# Patient Record
Sex: MALE | Race: WHITE | Employment: OTHER | URBAN - METROPOLITAN AREA
[De-identification: names, ages, dates, MRNs, and addresses within clinical notes are randomized per-mention and may not be internally consistent; named-entity substitution may affect disease eponyms.]

---

## 2018-04-12 ENCOUNTER — NURSE ONLY (OUTPATIENT)
Dept: NON INVASIVE DIAGNOSTICS | Age: 73
End: 2018-04-12
Payer: MEDICARE

## 2018-04-12 ENCOUNTER — TELEPHONE (OUTPATIENT)
Dept: NON INVASIVE DIAGNOSTICS | Age: 73
End: 2018-04-12

## 2018-04-12 ENCOUNTER — HOSPITAL ENCOUNTER (OUTPATIENT)
Dept: CARDIOLOGY | Age: 73
Discharge: HOME | End: 2018-04-12
Payer: MEDICARE

## 2018-04-12 ENCOUNTER — OFFICE VISIT (OUTPATIENT)
Dept: CARDIOLOGY CLINIC | Age: 73
End: 2018-04-12
Payer: MEDICARE

## 2018-04-12 VITALS
HEART RATE: 81 BPM | WEIGHT: 192.7 LBS | HEIGHT: 70 IN | BODY MASS INDEX: 27.59 KG/M2 | DIASTOLIC BLOOD PRESSURE: 66 MMHG | RESPIRATION RATE: 16 BRPM | SYSTOLIC BLOOD PRESSURE: 120 MMHG

## 2018-04-12 DIAGNOSIS — I25.5 ISCHEMIC CARDIOMYOPATHY: Primary | ICD-10-CM

## 2018-04-12 DIAGNOSIS — Z95.810 AICD (AUTOMATIC CARDIOVERTER/DEFIBRILLATOR) PRESENT: ICD-10-CM

## 2018-04-12 DIAGNOSIS — I50.20 SYSTOLIC CONGESTIVE HEART FAILURE, UNSPECIFIED CONGESTIVE HEART FAILURE CHRONICITY: Primary | ICD-10-CM

## 2018-04-12 DIAGNOSIS — Z95.810 AICD (AUTOMATIC CARDIOVERTER/DEFIBRILLATOR) PRESENT: Primary | ICD-10-CM

## 2018-04-12 DIAGNOSIS — I25.5 ISCHEMIC CARDIOMYOPATHY: ICD-10-CM

## 2018-04-12 LAB
LV EF: 30 %
LVEF MODALITY: NORMAL

## 2018-04-12 PROCEDURE — 1036F TOBACCO NON-USER: CPT | Performed by: INTERNAL MEDICINE

## 2018-04-12 PROCEDURE — 4040F PNEUMOC VAC/ADMIN/RCVD: CPT | Performed by: INTERNAL MEDICINE

## 2018-04-12 PROCEDURE — 3017F COLORECTAL CA SCREEN DOC REV: CPT | Performed by: INTERNAL MEDICINE

## 2018-04-12 PROCEDURE — 93283 PRGRMG EVAL IMPLANTABLE DFB: CPT | Performed by: INTERNAL MEDICINE

## 2018-04-12 PROCEDURE — 93306 TTE W/DOPPLER COMPLETE: CPT

## 2018-04-12 PROCEDURE — G8598 ASA/ANTIPLAT THER USED: HCPCS | Performed by: INTERNAL MEDICINE

## 2018-04-12 PROCEDURE — 93000 ELECTROCARDIOGRAM COMPLETE: CPT | Performed by: INTERNAL MEDICINE

## 2018-04-12 PROCEDURE — G8419 CALC BMI OUT NRM PARAM NOF/U: HCPCS | Performed by: INTERNAL MEDICINE

## 2018-04-12 PROCEDURE — 1123F ACP DISCUSS/DSCN MKR DOCD: CPT | Performed by: INTERNAL MEDICINE

## 2018-04-12 PROCEDURE — G8427 DOCREV CUR MEDS BY ELIG CLIN: HCPCS | Performed by: INTERNAL MEDICINE

## 2018-04-12 PROCEDURE — 99214 OFFICE O/P EST MOD 30 MIN: CPT | Performed by: INTERNAL MEDICINE

## 2018-04-19 ENCOUNTER — ANESTHESIA (OUTPATIENT)
Dept: CARDIAC CATH/INVASIVE PROCEDURES | Age: 73
End: 2018-04-19

## 2018-04-19 ENCOUNTER — HOSPITAL ENCOUNTER (OUTPATIENT)
Dept: GENERAL RADIOLOGY | Age: 73
Discharge: HOME | End: 2018-04-21
Payer: MEDICARE

## 2018-04-19 ENCOUNTER — HOSPITAL ENCOUNTER (OUTPATIENT)
Dept: CARDIAC CATH/INVASIVE PROCEDURES | Age: 73
Discharge: HOME | End: 2018-04-19
Payer: MEDICARE

## 2018-04-19 ENCOUNTER — ANESTHESIA EVENT (OUTPATIENT)
Dept: CARDIAC CATH/INVASIVE PROCEDURES | Age: 73
End: 2018-04-19

## 2018-04-19 VITALS
BODY MASS INDEX: 27.49 KG/M2 | DIASTOLIC BLOOD PRESSURE: 80 MMHG | WEIGHT: 192 LBS | RESPIRATION RATE: 16 BRPM | SYSTOLIC BLOOD PRESSURE: 175 MMHG | HEART RATE: 71 BPM | TEMPERATURE: 98.2 F | HEIGHT: 70 IN | OXYGEN SATURATION: 97 %

## 2018-04-19 VITALS — OXYGEN SATURATION: 97 % | DIASTOLIC BLOOD PRESSURE: 84 MMHG | SYSTOLIC BLOOD PRESSURE: 168 MMHG

## 2018-04-19 LAB
ANION GAP SERPL CALCULATED.3IONS-SCNC: 18 MMOL/L (ref 7–16)
BASOPHILS ABSOLUTE: 0.02 E9/L (ref 0–0.2)
BASOPHILS RELATIVE PERCENT: 0.6 % (ref 0–2)
BUN BLDV-MCNC: 18 MG/DL (ref 8–23)
CALCIUM SERPL-MCNC: 9.8 MG/DL (ref 8.6–10.2)
CHLORIDE BLD-SCNC: 102 MMOL/L (ref 98–107)
CO2: 23 MMOL/L (ref 22–29)
CREAT SERPL-MCNC: 0.9 MG/DL (ref 0.7–1.2)
EKG ATRIAL RATE: 66 BPM
EKG P-R INTERVAL: 180 MS
EKG Q-T INTERVAL: 426 MS
EKG QRS DURATION: 98 MS
EKG QTC CALCULATION (BAZETT): 446 MS
EKG R AXIS: 6 DEGREES
EKG T AXIS: 100 DEGREES
EKG VENTRICULAR RATE: 66 BPM
EOSINOPHILS ABSOLUTE: 0.05 E9/L (ref 0.05–0.5)
EOSINOPHILS RELATIVE PERCENT: 1.5 % (ref 0–6)
GFR AFRICAN AMERICAN: >60
GFR NON-AFRICAN AMERICAN: >60 ML/MIN/1.73
GLUCOSE BLD-MCNC: 157 MG/DL (ref 74–109)
HCT VFR BLD CALC: 33.7 % (ref 37–54)
HEMOGLOBIN: 11.2 G/DL (ref 12.5–16.5)
IMMATURE GRANULOCYTES #: 0.03 E9/L
IMMATURE GRANULOCYTES %: 0.9 % (ref 0–5)
LV EF: 35 %
LVEF MODALITY: NORMAL
LYMPHOCYTES ABSOLUTE: 0.9 E9/L (ref 1.5–4)
LYMPHOCYTES RELATIVE PERCENT: 26.2 % (ref 20–42)
MAGNESIUM: 2.1 MG/DL (ref 1.6–2.6)
MCH RBC QN AUTO: 28.8 PG (ref 26–35)
MCHC RBC AUTO-ENTMCNC: 33.2 % (ref 32–34.5)
MCV RBC AUTO: 86.6 FL (ref 80–99.9)
MONOCYTES ABSOLUTE: 0.23 E9/L (ref 0.1–0.95)
MONOCYTES RELATIVE PERCENT: 6.7 % (ref 2–12)
NEUTROPHILS ABSOLUTE: 2.2 E9/L (ref 1.8–7.3)
NEUTROPHILS RELATIVE PERCENT: 64.1 % (ref 43–80)
PDW BLD-RTO: 15 FL (ref 11.5–15)
PLATELET # BLD: 119 E9/L (ref 130–450)
PMV BLD AUTO: 12.5 FL (ref 7–12)
POTASSIUM SERPL-SCNC: 3.9 MMOL/L (ref 3.5–5)
RBC # BLD: 3.89 E12/L (ref 3.8–5.8)
SODIUM BLD-SCNC: 143 MMOL/L (ref 132–146)
WBC # BLD: 3.4 E9/L (ref 4.5–11.5)

## 2018-04-19 PROCEDURE — 36415 COLL VENOUS BLD VENIPUNCTURE: CPT

## 2018-04-19 PROCEDURE — C1721 AICD, DUAL CHAMBER: HCPCS

## 2018-04-19 PROCEDURE — 2720000010 HC SURG SUPPLY STERILE

## 2018-04-19 PROCEDURE — 3700000001 HC ADD 15 MINUTES (ANESTHESIA)

## 2018-04-19 PROCEDURE — 71045 X-RAY EXAM CHEST 1 VIEW: CPT

## 2018-04-19 PROCEDURE — 2580000003 HC RX 258: Performed by: NURSE ANESTHETIST, CERTIFIED REGISTERED

## 2018-04-19 PROCEDURE — 93010 ELECTROCARDIOGRAM REPORT: CPT | Performed by: INTERNAL MEDICINE

## 2018-04-19 PROCEDURE — 6360000002 HC RX W HCPCS: Performed by: NURSE ANESTHETIST, CERTIFIED REGISTERED

## 2018-04-19 PROCEDURE — 3700000000 HC ANESTHESIA ATTENDED CARE

## 2018-04-19 PROCEDURE — 33263 RMVL & RPLCMT DFB GEN 2 LEAD: CPT | Performed by: INTERNAL MEDICINE

## 2018-04-19 PROCEDURE — 80048 BASIC METABOLIC PNL TOTAL CA: CPT

## 2018-04-19 PROCEDURE — 2580000003 HC RX 258: Performed by: INTERNAL MEDICINE

## 2018-04-19 PROCEDURE — 2500000003 HC RX 250 WO HCPCS

## 2018-04-19 PROCEDURE — 93005 ELECTROCARDIOGRAM TRACING: CPT | Performed by: INTERNAL MEDICINE

## 2018-04-19 PROCEDURE — 2580000003 HC RX 258

## 2018-04-19 PROCEDURE — 6360000002 HC RX W HCPCS: Performed by: INTERNAL MEDICINE

## 2018-04-19 PROCEDURE — 6360000002 HC RX W HCPCS

## 2018-04-19 PROCEDURE — C1781 MESH (IMPLANTABLE): HCPCS

## 2018-04-19 PROCEDURE — 85025 COMPLETE CBC W/AUTO DIFF WBC: CPT

## 2018-04-19 PROCEDURE — 83735 ASSAY OF MAGNESIUM: CPT

## 2018-04-19 RX ORDER — PROPOFOL 10 MG/ML
INJECTION, EMULSION INTRAVENOUS PRN
Status: DISCONTINUED | OUTPATIENT
Start: 2018-04-19 | End: 2018-04-19 | Stop reason: SDUPTHER

## 2018-04-19 RX ORDER — SODIUM CHLORIDE 9 MG/ML
INJECTION, SOLUTION INTRAVENOUS CONTINUOUS PRN
Status: DISCONTINUED | OUTPATIENT
Start: 2018-04-19 | End: 2018-04-19 | Stop reason: SDUPTHER

## 2018-04-19 RX ORDER — FENTANYL CITRATE 50 UG/ML
INJECTION, SOLUTION INTRAMUSCULAR; INTRAVENOUS PRN
Status: DISCONTINUED | OUTPATIENT
Start: 2018-04-19 | End: 2018-04-19 | Stop reason: SDUPTHER

## 2018-04-19 RX ORDER — SODIUM CHLORIDE 9 MG/ML
INJECTION, SOLUTION INTRAVENOUS ONCE
Status: COMPLETED | OUTPATIENT
Start: 2018-04-19 | End: 2018-04-19

## 2018-04-19 RX ORDER — MIDAZOLAM HYDROCHLORIDE 1 MG/ML
INJECTION INTRAMUSCULAR; INTRAVENOUS PRN
Status: DISCONTINUED | OUTPATIENT
Start: 2018-04-19 | End: 2018-04-19 | Stop reason: SDUPTHER

## 2018-04-19 RX ORDER — SODIUM CHLORIDE 0.9 % (FLUSH) 0.9 %
10 SYRINGE (ML) INJECTION PRN
Status: DISCONTINUED | OUTPATIENT
Start: 2018-04-19 | End: 2018-04-20 | Stop reason: HOSPADM

## 2018-04-19 RX ORDER — ONDANSETRON 2 MG/ML
4 INJECTION INTRAMUSCULAR; INTRAVENOUS EVERY 6 HOURS PRN
Status: DISCONTINUED | OUTPATIENT
Start: 2018-04-19 | End: 2018-04-20 | Stop reason: HOSPADM

## 2018-04-19 RX ORDER — SODIUM CHLORIDE 0.9 % (FLUSH) 0.9 %
10 SYRINGE (ML) INJECTION EVERY 12 HOURS SCHEDULED
Status: DISCONTINUED | OUTPATIENT
Start: 2018-04-19 | End: 2018-04-20 | Stop reason: HOSPADM

## 2018-04-19 RX ORDER — CEPHALEXIN 500 MG/1
500 CAPSULE ORAL 3 TIMES DAILY
Qty: 9 CAPSULE | Refills: 0 | Status: SHIPPED | OUTPATIENT
Start: 2018-04-19 | End: 2018-08-02

## 2018-04-19 RX ORDER — ACETAMINOPHEN 325 MG/1
650 TABLET ORAL EVERY 4 HOURS PRN
Status: DISCONTINUED | OUTPATIENT
Start: 2018-04-19 | End: 2018-04-20 | Stop reason: HOSPADM

## 2018-04-19 RX ORDER — PROPOFOL 10 MG/ML
INJECTION, EMULSION INTRAVENOUS CONTINUOUS PRN
Status: DISCONTINUED | OUTPATIENT
Start: 2018-04-19 | End: 2018-04-19 | Stop reason: SDUPTHER

## 2018-04-19 RX ADMIN — SODIUM CHLORIDE: 9 INJECTION, SOLUTION INTRAVENOUS at 07:25

## 2018-04-19 RX ADMIN — FENTANYL CITRATE 50 MCG: 50 INJECTION, SOLUTION INTRAMUSCULAR; INTRAVENOUS at 07:35

## 2018-04-19 RX ADMIN — MIDAZOLAM HYDROCHLORIDE 2 MG: 1 INJECTION, SOLUTION INTRAMUSCULAR; INTRAVENOUS at 07:30

## 2018-04-19 RX ADMIN — VANCOMYCIN HYDROCHLORIDE 2 G: 1 INJECTION, POWDER, LYOPHILIZED, FOR SOLUTION INTRAVENOUS at 07:40

## 2018-04-19 RX ADMIN — FENTANYL CITRATE 50 MCG: 50 INJECTION, SOLUTION INTRAMUSCULAR; INTRAVENOUS at 07:40

## 2018-04-19 RX ADMIN — PROPOFOL 50 MG: 10 INJECTION, EMULSION INTRAVENOUS at 07:42

## 2018-04-19 RX ADMIN — PROPOFOL 15 MCG/KG/MIN: 10 INJECTION, EMULSION INTRAVENOUS at 07:42

## 2018-04-19 RX ADMIN — SODIUM CHLORIDE: 9 INJECTION, SOLUTION INTRAVENOUS at 06:47

## 2018-04-19 ASSESSMENT — PULMONARY FUNCTION TESTS
PIF_VALUE: 0

## 2018-04-24 ENCOUNTER — HOSPITAL ENCOUNTER (OUTPATIENT)
Age: 73
Discharge: HOME | End: 2018-04-26
Payer: MEDICARE

## 2018-04-24 LAB
ALBUMIN SERPL-MCNC: 4.5 G/DL (ref 3.5–5.2)
ALP BLD-CCNC: 57 U/L (ref 40–129)
ALT SERPL-CCNC: 28 U/L (ref 0–40)
ANION GAP SERPL CALCULATED.3IONS-SCNC: 22 MMOL/L (ref 7–16)
AST SERPL-CCNC: 45 U/L (ref 0–39)
BILIRUB SERPL-MCNC: 0.8 MG/DL (ref 0–1.2)
BUN BLDV-MCNC: 21 MG/DL (ref 8–23)
CALCIUM SERPL-MCNC: 10 MG/DL (ref 8.6–10.2)
CHLORIDE BLD-SCNC: 98 MMOL/L (ref 98–107)
CO2: 22 MMOL/L (ref 22–29)
CREAT SERPL-MCNC: 1.1 MG/DL (ref 0.7–1.2)
GFR AFRICAN AMERICAN: >60
GFR NON-AFRICAN AMERICAN: >60 ML/MIN/1.73
GLUCOSE BLD-MCNC: 125 MG/DL (ref 74–109)
HBA1C MFR BLD: 7.1 % (ref 4.8–5.9)
POTASSIUM SERPL-SCNC: 4.6 MMOL/L (ref 3.5–5)
PROSTATE SPECIFIC ANTIGEN: <0.01 NG/ML (ref 0–4)
SODIUM BLD-SCNC: 142 MMOL/L (ref 132–146)
TOTAL PROTEIN: 7.8 G/DL (ref 6.4–8.3)
TSH SERPL DL<=0.05 MIU/L-ACNC: 1.97 UIU/ML (ref 0.27–4.2)
VITAMIN D 25-HYDROXY: 20 NG/ML (ref 30–100)

## 2018-04-24 PROCEDURE — 80053 COMPREHEN METABOLIC PANEL: CPT

## 2018-04-24 PROCEDURE — 84443 ASSAY THYROID STIM HORMONE: CPT

## 2018-04-24 PROCEDURE — G0103 PSA SCREENING: HCPCS

## 2018-04-24 PROCEDURE — 82306 VITAMIN D 25 HYDROXY: CPT

## 2018-04-24 PROCEDURE — 83036 HEMOGLOBIN GLYCOSYLATED A1C: CPT

## 2018-05-01 ENCOUNTER — NURSE ONLY (OUTPATIENT)
Dept: NON INVASIVE DIAGNOSTICS | Age: 73
End: 2018-05-01
Payer: MEDICARE

## 2018-05-01 DIAGNOSIS — Z95.810 AICD (AUTOMATIC CARDIOVERTER/DEFIBRILLATOR) PRESENT: Primary | ICD-10-CM

## 2018-05-01 DIAGNOSIS — I47.20 VENTRICULAR TACHYCARDIA: ICD-10-CM

## 2018-05-01 PROCEDURE — 93280 PM DEVICE PROGR EVAL DUAL: CPT | Performed by: INTERNAL MEDICINE

## 2018-05-23 ENCOUNTER — HOSPITAL ENCOUNTER (OUTPATIENT)
Age: 73
Discharge: HOME | End: 2018-05-25
Payer: MEDICARE

## 2018-05-23 PROCEDURE — 88305 TISSUE EXAM BY PATHOLOGIST: CPT

## 2018-05-27 ENCOUNTER — HOSPITAL ENCOUNTER (OUTPATIENT)
Age: 73
Discharge: HOME | End: 2018-05-27
Payer: MEDICARE

## 2018-05-27 LAB — PROSTATE SPECIFIC ANTIGEN: <0.01 NG/ML (ref 0–4)

## 2018-05-27 PROCEDURE — 36415 COLL VENOUS BLD VENIPUNCTURE: CPT

## 2018-05-27 PROCEDURE — 84153 ASSAY OF PSA TOTAL: CPT

## 2018-06-13 ENCOUNTER — HOSPITAL ENCOUNTER (OUTPATIENT)
Age: 73
Discharge: HOME | End: 2018-06-15
Payer: MEDICARE

## 2018-06-13 PROCEDURE — 88305 TISSUE EXAM BY PATHOLOGIST: CPT

## 2018-08-02 ENCOUNTER — TELEPHONE (OUTPATIENT)
Dept: NON INVASIVE DIAGNOSTICS | Age: 73
End: 2018-08-02

## 2018-08-02 ENCOUNTER — OFFICE VISIT (OUTPATIENT)
Dept: NON INVASIVE DIAGNOSTICS | Age: 73
End: 2018-08-02
Payer: MEDICARE

## 2018-08-02 VITALS
HEIGHT: 70 IN | SYSTOLIC BLOOD PRESSURE: 148 MMHG | RESPIRATION RATE: 16 BRPM | BODY MASS INDEX: 28.2 KG/M2 | WEIGHT: 197 LBS | HEART RATE: 71 BPM | DIASTOLIC BLOOD PRESSURE: 70 MMHG

## 2018-08-02 DIAGNOSIS — Z95.810 AICD (AUTOMATIC CARDIOVERTER/DEFIBRILLATOR) PRESENT: Primary | ICD-10-CM

## 2018-08-02 PROCEDURE — G8427 DOCREV CUR MEDS BY ELIG CLIN: HCPCS | Performed by: INTERNAL MEDICINE

## 2018-08-02 PROCEDURE — 1036F TOBACCO NON-USER: CPT | Performed by: INTERNAL MEDICINE

## 2018-08-02 PROCEDURE — G8598 ASA/ANTIPLAT THER USED: HCPCS | Performed by: INTERNAL MEDICINE

## 2018-08-02 PROCEDURE — 99214 OFFICE O/P EST MOD 30 MIN: CPT | Performed by: INTERNAL MEDICINE

## 2018-08-02 PROCEDURE — G8417 CALC BMI ABV UP PARAM F/U: HCPCS | Performed by: INTERNAL MEDICINE

## 2018-08-02 PROCEDURE — 1101F PT FALLS ASSESS-DOCD LE1/YR: CPT | Performed by: INTERNAL MEDICINE

## 2018-08-02 PROCEDURE — 93283 PRGRMG EVAL IMPLANTABLE DFB: CPT | Performed by: INTERNAL MEDICINE

## 2018-08-02 PROCEDURE — 4040F PNEUMOC VAC/ADMIN/RCVD: CPT | Performed by: INTERNAL MEDICINE

## 2018-08-02 PROCEDURE — 3017F COLORECTAL CA SCREEN DOC REV: CPT | Performed by: INTERNAL MEDICINE

## 2018-08-02 PROCEDURE — 1123F ACP DISCUSS/DSCN MKR DOCD: CPT | Performed by: INTERNAL MEDICINE

## 2018-08-02 RX ORDER — SENNOSIDES 8.6 MG
650 CAPSULE ORAL EVERY 8 HOURS PRN
Status: ON HOLD | COMMUNITY
End: 2022-10-26 | Stop reason: HOSPADM

## 2018-08-10 ENCOUNTER — TELEPHONE (OUTPATIENT)
Dept: NON INVASIVE DIAGNOSTICS | Age: 73
End: 2018-08-10

## 2018-08-10 NOTE — TELEPHONE ENCOUNTER
We have received your remote transmission. Our staff will contact you if there is anything that needs to be discussed. Your next appointment is 11/7/2018 remote transmission from home.

## 2018-09-25 ENCOUNTER — HOSPITAL ENCOUNTER (EMERGENCY)
Age: 73
Discharge: HOME | End: 2018-09-25
Attending: EMERGENCY MEDICINE
Payer: MEDICARE

## 2018-09-25 ENCOUNTER — APPOINTMENT (OUTPATIENT)
Dept: CT IMAGING | Age: 73
End: 2018-09-25
Payer: MEDICARE

## 2018-09-25 VITALS
RESPIRATION RATE: 20 BRPM | SYSTOLIC BLOOD PRESSURE: 172 MMHG | DIASTOLIC BLOOD PRESSURE: 82 MMHG | TEMPERATURE: 97.6 F | WEIGHT: 197 LBS | HEIGHT: 71 IN | HEART RATE: 77 BPM | BODY MASS INDEX: 27.58 KG/M2 | OXYGEN SATURATION: 96 %

## 2018-09-25 DIAGNOSIS — K57.32 DIVERTICULITIS OF COLON: Primary | ICD-10-CM

## 2018-09-25 LAB
ALBUMIN SERPL-MCNC: 4.4 G/DL (ref 3.5–5.2)
ALP BLD-CCNC: 59 U/L (ref 40–129)
ALT SERPL-CCNC: 40 U/L (ref 0–40)
ANION GAP SERPL CALCULATED.3IONS-SCNC: 15 MMOL/L (ref 7–16)
AST SERPL-CCNC: 38 U/L (ref 0–39)
BASOPHILS ABSOLUTE: 0.02 E9/L (ref 0–0.2)
BASOPHILS RELATIVE PERCENT: 0.3 % (ref 0–2)
BILIRUB SERPL-MCNC: 1.2 MG/DL (ref 0–1.2)
BILIRUBIN URINE: NEGATIVE
BLOOD, URINE: NEGATIVE
BUN BLDV-MCNC: 22 MG/DL (ref 8–23)
CALCIUM SERPL-MCNC: 9.8 MG/DL (ref 8.6–10.2)
CHLORIDE BLD-SCNC: 99 MMOL/L (ref 98–107)
CLARITY: CLEAR
CO2: 24 MMOL/L (ref 22–29)
COLOR: ABNORMAL
CREAT SERPL-MCNC: 1 MG/DL (ref 0.7–1.2)
EOSINOPHILS ABSOLUTE: 0.06 E9/L (ref 0.05–0.5)
EOSINOPHILS RELATIVE PERCENT: 0.8 % (ref 0–6)
GFR AFRICAN AMERICAN: >60
GFR NON-AFRICAN AMERICAN: >60 ML/MIN/1.73
GLUCOSE BLD-MCNC: 141 MG/DL (ref 74–109)
GLUCOSE URINE: NEGATIVE MG/DL
HCT VFR BLD CALC: 38.4 % (ref 37–54)
HEMOGLOBIN: 12.3 G/DL (ref 12.5–16.5)
IMMATURE GRANULOCYTES #: 0.04 E9/L
IMMATURE GRANULOCYTES %: 0.5 % (ref 0–5)
KETONES, URINE: NEGATIVE MG/DL
LACTIC ACID: 1.2 MMOL/L (ref 0.5–2.2)
LEUKOCYTE ESTERASE, URINE: NEGATIVE
LIPASE: 82 U/L (ref 13–60)
LYMPHOCYTES ABSOLUTE: 1.33 E9/L (ref 1.5–4)
LYMPHOCYTES RELATIVE PERCENT: 17.9 % (ref 20–42)
MCH RBC QN AUTO: 28.6 PG (ref 26–35)
MCHC RBC AUTO-ENTMCNC: 32 % (ref 32–34.5)
MCV RBC AUTO: 89.3 FL (ref 80–99.9)
MONOCYTES ABSOLUTE: 0.56 E9/L (ref 0.1–0.95)
MONOCYTES RELATIVE PERCENT: 7.5 % (ref 2–12)
NEUTROPHILS ABSOLUTE: 5.41 E9/L (ref 1.8–7.3)
NEUTROPHILS RELATIVE PERCENT: 73 % (ref 43–80)
NITRITE, URINE: NEGATIVE
PDW BLD-RTO: 14.9 FL (ref 11.5–15)
PH UA: 5.5 (ref 5–9)
PLATELET # BLD: 151 E9/L (ref 130–450)
PMV BLD AUTO: 12.1 FL (ref 7–12)
POTASSIUM SERPL-SCNC: 3.9 MMOL/L (ref 3.5–5)
PROTEIN UA: NEGATIVE MG/DL
RBC # BLD: 4.3 E12/L (ref 3.8–5.8)
SODIUM BLD-SCNC: 138 MMOL/L (ref 132–146)
SPECIFIC GRAVITY UA: 1.02 (ref 1–1.03)
TOTAL PROTEIN: 7.7 G/DL (ref 6.4–8.3)
UROBILINOGEN, URINE: 2 E.U./DL
WBC # BLD: 7.4 E9/L (ref 4.5–11.5)

## 2018-09-25 PROCEDURE — 81003 URINALYSIS AUTO W/O SCOPE: CPT

## 2018-09-25 PROCEDURE — 6360000002 HC RX W HCPCS: Performed by: EMERGENCY MEDICINE

## 2018-09-25 PROCEDURE — 83605 ASSAY OF LACTIC ACID: CPT

## 2018-09-25 PROCEDURE — 2580000003 HC RX 258: Performed by: EMERGENCY MEDICINE

## 2018-09-25 PROCEDURE — 99284 EMERGENCY DEPT VISIT MOD MDM: CPT

## 2018-09-25 PROCEDURE — 6360000004 HC RX CONTRAST MEDICATION: Performed by: RADIOLOGY

## 2018-09-25 PROCEDURE — 96367 TX/PROPH/DG ADDL SEQ IV INF: CPT

## 2018-09-25 PROCEDURE — 96365 THER/PROPH/DIAG IV INF INIT: CPT

## 2018-09-25 PROCEDURE — 83690 ASSAY OF LIPASE: CPT

## 2018-09-25 PROCEDURE — 2500000003 HC RX 250 WO HCPCS: Performed by: EMERGENCY MEDICINE

## 2018-09-25 PROCEDURE — 74177 CT ABD & PELVIS W/CONTRAST: CPT

## 2018-09-25 PROCEDURE — 80053 COMPREHEN METABOLIC PANEL: CPT

## 2018-09-25 PROCEDURE — 85025 COMPLETE CBC W/AUTO DIFF WBC: CPT

## 2018-09-25 RX ORDER — MORPHINE SULFATE 2 MG/ML
4 INJECTION, SOLUTION INTRAMUSCULAR; INTRAVENOUS ONCE
Status: DISCONTINUED | OUTPATIENT
Start: 2018-09-25 | End: 2018-09-25 | Stop reason: HOSPADM

## 2018-09-25 RX ORDER — 0.9 % SODIUM CHLORIDE 0.9 %
250 INTRAVENOUS SOLUTION INTRAVENOUS ONCE
Status: COMPLETED | OUTPATIENT
Start: 2018-09-25 | End: 2018-09-25

## 2018-09-25 RX ORDER — CEFDINIR 300 MG/1
300 CAPSULE ORAL 2 TIMES DAILY
Qty: 20 CAPSULE | Refills: 0 | Status: SHIPPED | OUTPATIENT
Start: 2018-09-25 | End: 2018-10-05

## 2018-09-25 RX ORDER — METRONIDAZOLE 500 MG/1
500 TABLET ORAL 3 TIMES DAILY
Qty: 30 TABLET | Refills: 0 | Status: SHIPPED | OUTPATIENT
Start: 2018-09-25 | End: 2018-10-05

## 2018-09-25 RX ADMIN — SODIUM CHLORIDE 250 ML: 9 INJECTION, SOLUTION INTRAVENOUS at 10:58

## 2018-09-25 RX ADMIN — CEFTRIAXONE SODIUM 2 G: 2 INJECTION, POWDER, FOR SOLUTION INTRAMUSCULAR; INTRAVENOUS at 13:59

## 2018-09-25 RX ADMIN — IOPAMIDOL 110 ML: 755 INJECTION, SOLUTION INTRAVENOUS at 11:53

## 2018-09-25 RX ADMIN — METRONIDAZOLE 500 MG: 500 INJECTION, SOLUTION INTRAVENOUS at 12:48

## 2018-09-25 ASSESSMENT — ENCOUNTER SYMPTOMS
EYE DISCHARGE: 0
SORE THROAT: 0
WHEEZING: 0
EYE PAIN: 0
NAUSEA: 0
EYE REDNESS: 0
DIARRHEA: 1
ABDOMINAL PAIN: 1
COUGH: 0
SHORTNESS OF BREATH: 0
VOMITING: 0
SINUS PRESSURE: 0
BACK PAIN: 0

## 2018-09-25 ASSESSMENT — PAIN DESCRIPTION - ORIENTATION: ORIENTATION: RIGHT;LEFT

## 2018-09-25 ASSESSMENT — PAIN SCALES - GENERAL
PAINLEVEL_OUTOF10: 5
PAINLEVEL_OUTOF10: 5

## 2018-09-25 ASSESSMENT — PAIN DESCRIPTION - LOCATION: LOCATION: ABDOMEN

## 2018-09-25 NOTE — ED NOTES
Discharged with a followup to PCP.  Return here if worse or concerns     Eric Greene RN  09/25/18 [id number]

## 2018-09-25 NOTE — ED PROVIDER NOTES
Negative Negative    Ketones, Urine Negative Negative mg/dL    Specific Gravity, UA 1.025 1.005 - 1.030    Blood, Urine Negative Negative    pH, UA 5.5 5.0 - 9.0    Protein, UA Negative Negative mg/dL    Urobilinogen, Urine 2.0 (A) <2.0 E.U./dL    Nitrite, Urine Negative Negative    Leukocyte Esterase, Urine Negative Negative   Lipase   Result Value Ref Range    Lipase 82 (H) 13 - 60 U/L       Radiology:  CT ABDOMEN PELVIS W IV CONTRAST Additional Contrast? None   Final Result   Addendum 1 of 1   Addendum: In the IMPRESSION it states \"Moderate severe sigmoid   diverticulitis. It should say: \"Moderate to severe sigmoid   diverticulosis \"            Final   Mural thickening and pericolonic fat stranding involving the sigmoid   colon suggestive of early acute diverticulitis. Please note that mural   thickening can also be seen with neoplastic process therefore   short-term follow-up following medical treatment is recommended. Right and left-sided fat-containing inguinal hernias containing loop   of colon in probable appendix in the right. Moderate severe sigmoid diverticulitis. Moderate to severe degenerative changes lumbar spine.          ------------------------- NURSING NOTES AND VITALS REVIEWED ---------------------------  Date / Time Roomed:  9/25/2018  9:54 AM  ED Bed Assignment:  06/06    The nursing notes within the ED encounter and vital signs as below have been reviewed. BP (!) 172/82   Pulse 77   Temp 97.6 °F (36.4 °C) (Oral)   Resp 20   Ht 5' 11\" (1.803 m)   Wt 197 lb (89.4 kg)   SpO2 96%   BMI 27.48 kg/m²   Oxygen Saturation Interpretation: Normal      ------------------------------------------ PROGRESS NOTES ------------------------------------------  I have spoken with the patient and discussed todays results, in addition to providing specific details for the plan of care and counseling regarding the diagnosis and prognosis.   Their questions are answered at this time and they are

## 2018-10-17 ENCOUNTER — OFFICE VISIT (OUTPATIENT)
Dept: CARDIOLOGY CLINIC | Age: 73
End: 2018-10-17
Payer: MEDICARE

## 2018-10-17 VITALS
WEIGHT: 196 LBS | HEIGHT: 71 IN | HEART RATE: 78 BPM | DIASTOLIC BLOOD PRESSURE: 64 MMHG | BODY MASS INDEX: 27.44 KG/M2 | SYSTOLIC BLOOD PRESSURE: 130 MMHG | RESPIRATION RATE: 16 BRPM

## 2018-10-17 DIAGNOSIS — I50.9 CONGESTIVE HEART FAILURE, UNSPECIFIED HF CHRONICITY, UNSPECIFIED HEART FAILURE TYPE (HCC): Primary | ICD-10-CM

## 2018-10-17 PROCEDURE — 1101F PT FALLS ASSESS-DOCD LE1/YR: CPT | Performed by: INTERNAL MEDICINE

## 2018-10-17 PROCEDURE — 3017F COLORECTAL CA SCREEN DOC REV: CPT | Performed by: INTERNAL MEDICINE

## 2018-10-17 PROCEDURE — G8484 FLU IMMUNIZE NO ADMIN: HCPCS | Performed by: INTERNAL MEDICINE

## 2018-10-17 PROCEDURE — 99214 OFFICE O/P EST MOD 30 MIN: CPT | Performed by: INTERNAL MEDICINE

## 2018-10-17 PROCEDURE — G8427 DOCREV CUR MEDS BY ELIG CLIN: HCPCS | Performed by: INTERNAL MEDICINE

## 2018-10-17 PROCEDURE — 93000 ELECTROCARDIOGRAM COMPLETE: CPT | Performed by: INTERNAL MEDICINE

## 2018-10-17 PROCEDURE — 1036F TOBACCO NON-USER: CPT | Performed by: INTERNAL MEDICINE

## 2018-10-17 PROCEDURE — 4040F PNEUMOC VAC/ADMIN/RCVD: CPT | Performed by: INTERNAL MEDICINE

## 2018-10-17 PROCEDURE — G8417 CALC BMI ABV UP PARAM F/U: HCPCS | Performed by: INTERNAL MEDICINE

## 2018-10-17 PROCEDURE — G8598 ASA/ANTIPLAT THER USED: HCPCS | Performed by: INTERNAL MEDICINE

## 2018-10-17 PROCEDURE — 1123F ACP DISCUSS/DSCN MKR DOCD: CPT | Performed by: INTERNAL MEDICINE

## 2018-10-17 NOTE — PROGRESS NOTES
no gallop and no friction rub. No murmur heard. Pulmonary/Chest: Effort normal and breath sounds normal. No respiratory distress. No wheezes. No rales. No tenderness. Abdominal: Soft. Bowel sounds are normal. No distension and no mass. No tenderness. No rebound and no guarding. Musculoskeletal: Normal range of motion. No edema and no tenderness. Lymphadenopathy:   No cervical adenopathy. No groin adenopathy. Neurological: Alert and oriented to person, place, and time. Skin: Skin is warm and dry. No rash noted. Not diaphoretic. No erythema. Psychiatric: Normal mood and affect. Behavior is normal.     EKG:  normal sinus rhythm, nonspecific ST and T waves changes, Diffuse inverted T waves. 06/27/2012 Echocardiogram: MIldly dilated LV; EF 35%; DDI; Mild RVE; Mod RVD; mild LAE; 1-2+ MR.    12/24/2007 Cardiac Catheterization:  Triple vessel disease including left main stenosis    12/24/2007 CABG:  LIMA - LAD; SVG - RI, OM1, OM2, PDA. Sternal rewiring Jan 2008.    04/2010 Device Implant:  Dr. Ronny Villalobos. Generator:  [employer] ; model [id number], serial [id number]. ASSESSMENT AND PLAN:  Patient Active Problem List   Diagnosis    CHF (congestive heart failure) (HCC)    S/P CABG x 4    CAD (coronary artery disease)    MI, old    Hyperlipidemia    Hypertension    Cardiomyopathy ([address]    Ventricular tachycardia ([address]    Pancreatitis    AICD (automatic cardioverter/defibrillator) present    Implantable cardioverter-defibrillator (ICD) at end of device life     1. CAD/CABG: ASA. Intolerant to BB. Intolerant to statin. 2. CMP: Continue ACEI. Intolerant to BB. 3. HTN: Observe. 4. Lipids: Statin intolerant. 5. ICD: Recent PG change. Sees KHAI Lee D.O.   Cardiologist  Cardiology, [address] [place] [address]

## 2018-11-07 ENCOUNTER — NURSE ONLY (OUTPATIENT)
Dept: NON INVASIVE DIAGNOSTICS | Age: 73
End: 2018-11-07
Payer: MEDICARE

## 2018-11-07 DIAGNOSIS — Z95.810 AICD (AUTOMATIC CARDIOVERTER/DEFIBRILLATOR) PRESENT: Primary | ICD-10-CM

## 2018-11-07 PROCEDURE — 93295 DEV INTERROG REMOTE 1/2/MLT: CPT | Performed by: INTERNAL MEDICINE

## 2018-11-07 PROCEDURE — 93296 REM INTERROG EVL PM/IDS: CPT | Performed by: INTERNAL MEDICINE

## 2018-11-24 ENCOUNTER — HOSPITAL ENCOUNTER (EMERGENCY)
Age: 73
Discharge: HOME | End: 2018-11-24
Attending: EMERGENCY MEDICINE
Payer: MEDICARE

## 2018-11-24 ENCOUNTER — APPOINTMENT (OUTPATIENT)
Dept: GENERAL RADIOLOGY | Age: 73
End: 2018-11-24
Payer: MEDICARE

## 2018-11-24 VITALS
RESPIRATION RATE: 18 BRPM | DIASTOLIC BLOOD PRESSURE: 78 MMHG | WEIGHT: 193 LBS | HEART RATE: 83 BPM | OXYGEN SATURATION: 94 % | BODY MASS INDEX: 27.02 KG/M2 | HEIGHT: 71 IN | TEMPERATURE: 98.3 F | SYSTOLIC BLOOD PRESSURE: 179 MMHG

## 2018-11-24 DIAGNOSIS — K59.00 CONSTIPATION, UNSPECIFIED CONSTIPATION TYPE: Primary | ICD-10-CM

## 2018-11-24 PROCEDURE — 99283 EMERGENCY DEPT VISIT LOW MDM: CPT

## 2018-11-24 PROCEDURE — 6370000000 HC RX 637 (ALT 250 FOR IP): Performed by: EMERGENCY MEDICINE

## 2018-11-24 PROCEDURE — 74018 RADEX ABDOMEN 1 VIEW: CPT

## 2018-11-24 RX ADMIN — MAGESIUM CITRATE 296 ML: 1.75 LIQUID ORAL at 03:33

## 2018-11-24 ASSESSMENT — PAIN SCALES - GENERAL: PAINLEVEL_OUTOF10: 3

## 2018-11-26 ENCOUNTER — HOSPITAL ENCOUNTER (OUTPATIENT)
Age: 73
Discharge: HOME | End: 2018-11-26
Payer: MEDICARE

## 2018-11-26 LAB — PROSTATE SPECIFIC ANTIGEN: <0.01 NG/ML (ref 0–4)

## 2018-11-26 PROCEDURE — 84153 ASSAY OF PSA TOTAL: CPT

## 2018-11-26 PROCEDURE — 36415 COLL VENOUS BLD VENIPUNCTURE: CPT

## 2019-01-22 ENCOUNTER — HOSPITAL ENCOUNTER (OUTPATIENT)
Age: 74
Discharge: HOME | End: 2019-01-24
Payer: MEDICARE

## 2019-01-22 PROCEDURE — 87075 CULTR BACTERIA EXCEPT BLOOD: CPT

## 2019-01-22 PROCEDURE — 87070 CULTURE OTHR SPECIMN AEROBIC: CPT

## 2019-01-25 LAB — WOUND/ABSCESS: NORMAL

## 2019-01-27 LAB — ANAEROBIC CULTURE: NORMAL

## 2019-02-06 ENCOUNTER — NURSE ONLY (OUTPATIENT)
Dept: NON INVASIVE DIAGNOSTICS | Age: 74
End: 2019-02-06
Payer: MEDICARE

## 2019-02-06 DIAGNOSIS — Z95.810 AICD (AUTOMATIC CARDIOVERTER/DEFIBRILLATOR) PRESENT: Primary | ICD-10-CM

## 2019-02-06 PROCEDURE — 93296 REM INTERROG EVL PM/IDS: CPT | Performed by: INTERNAL MEDICINE

## 2019-02-06 PROCEDURE — 93295 DEV INTERROG REMOTE 1/2/MLT: CPT | Performed by: INTERNAL MEDICINE

## 2019-04-01 ENCOUNTER — TELEPHONE (OUTPATIENT)
Dept: CARDIOLOGY CLINIC | Age: 74
End: 2019-04-01

## 2019-04-01 NOTE — TELEPHONE ENCOUNTER
Pt is calling to say his angina has returned. It is worse with exertion and cold. Had 2 episodes over the weekend. On Sunday he took 2 ASA with relief. He states he has had Nitro in the past and would like a refill. Pt is due to see Dr Kerr December 05-06-19. Please call pt with  advice/recommendations. Pt was encouraged to go to ER if symptoms should return, pt refuses.

## 2019-04-04 NOTE — TELEPHONE ENCOUNTER
Contacted patient and scheduled office visit  with LUIS ALBERTO Jackson on 4/5/2019 per Dr. Ursula Gonsales instructions. Patient states he feels a little better, but refuses to go to ER if symptoms recur.

## 2019-05-06 ENCOUNTER — OFFICE VISIT (OUTPATIENT)
Dept: CARDIOLOGY CLINIC | Age: 74
End: 2019-05-06
Payer: MEDICARE

## 2019-05-06 VITALS
SYSTOLIC BLOOD PRESSURE: 112 MMHG | HEIGHT: 70 IN | BODY MASS INDEX: 28.32 KG/M2 | HEART RATE: 76 BPM | DIASTOLIC BLOOD PRESSURE: 74 MMHG | RESPIRATION RATE: 16 BRPM | WEIGHT: 197.8 LBS

## 2019-05-06 DIAGNOSIS — I50.9 CHRONIC CONGESTIVE HEART FAILURE, UNSPECIFIED HEART FAILURE TYPE (HCC): ICD-10-CM

## 2019-05-06 DIAGNOSIS — R07.2 PRECORDIAL PAIN: Primary | ICD-10-CM

## 2019-05-06 PROCEDURE — 4040F PNEUMOC VAC/ADMIN/RCVD: CPT | Performed by: INTERNAL MEDICINE

## 2019-05-06 PROCEDURE — 1036F TOBACCO NON-USER: CPT | Performed by: INTERNAL MEDICINE

## 2019-05-06 PROCEDURE — G8417 CALC BMI ABV UP PARAM F/U: HCPCS | Performed by: INTERNAL MEDICINE

## 2019-05-06 PROCEDURE — 99214 OFFICE O/P EST MOD 30 MIN: CPT | Performed by: INTERNAL MEDICINE

## 2019-05-06 PROCEDURE — G8427 DOCREV CUR MEDS BY ELIG CLIN: HCPCS | Performed by: INTERNAL MEDICINE

## 2019-05-06 PROCEDURE — 93000 ELECTROCARDIOGRAM COMPLETE: CPT | Performed by: INTERNAL MEDICINE

## 2019-05-06 PROCEDURE — 3017F COLORECTAL CA SCREEN DOC REV: CPT | Performed by: INTERNAL MEDICINE

## 2019-05-06 PROCEDURE — G8598 ASA/ANTIPLAT THER USED: HCPCS | Performed by: INTERNAL MEDICINE

## 2019-05-06 PROCEDURE — 1123F ACP DISCUSS/DSCN MKR DOCD: CPT | Performed by: INTERNAL MEDICINE

## 2019-05-06 RX ORDER — NITROGLYCERIN 0.4 MG/1
0.4 TABLET SUBLINGUAL EVERY 5 MIN PRN
Qty: 25 TABLET | Refills: 3 | Status: SHIPPED | OUTPATIENT
Start: 2019-05-06 | End: 2019-10-25 | Stop reason: SDUPTHER

## 2019-05-06 RX ORDER — ISOSORBIDE MONONITRATE 30 MG/1
30 TABLET, EXTENDED RELEASE ORAL DAILY
Qty: 30 TABLET | Refills: 3 | Status: SHIPPED | OUTPATIENT
Start: 2019-05-06 | End: 2019-08-25 | Stop reason: SDUPTHER

## 2019-05-06 NOTE — PROGRESS NOTES
CHIEF COMPLAINT: CAD/CHF/CMP/ICD    HISTORY OF PRESENT ILLNESS: Patient is a 74 y.o. male seen at the request of Henry Heredia DO. Patient previously following with Dr. Aminata Young. History of CABG x 5 in 58/80/6466 with very complicated recovery. He also has ICD. Noted angina with cold and exertion. Some FOX. Past Medical History:   Diagnosis Date    CAD (coronary artery disease)     [facility]     [facility]     Diabetes mellitus ([facility]     Diverticulitis     Hyperlipidemia     Hypertension     MI, old     Pancreatitis     Prostate cancer ([facility]     prostate    S/P CABG x 4     Skin cancer     [facility] tachycardia ([facility])        Patient Active Problem List   Diagnosis    [facility]congestive heart failure) ([facility])    S/P CABG x 4    CAD (coronary artery disease)    MI, old    Hyperlipidemia    Hypertension    Cardiomyopathy ([facility]    Ventricular tachycardia ([facility]    Pancreatitis    AICD (automatic cardioverter/defibrillator) present    Implantable cardioverter-defibrillator (ICD) at end of device life       No Known Allergies    Current Outpatient Medications   Medication Sig Dispense Refill    acetaminophen (TYLENOL 8 HOUR ARTHRITIS PAIN) 650 MG extended release tablet Take 650 mg by mouth every 8 hours as needed for Pain      ONE TOUCH ULTRA TEST strip TEST once daily  0    ONE TOUCH ULTRASOFT LANCETS MISC use 1 once daily  0    OXYGEN Inhale 3 L into the lungs as needed      finasteride (PROSCAR) 5 MG tablet take 1 tablet by mouth once daily  0    fenofibrate (TRICOR) 145 MG tablet Take 145 mg by mouth daily   0    glipiZIDE (GLUCOTROL) 5 MG tablet Take 5 mg by mouth 2 times daily (before meals)   0    aspirin 81 MG tablet Take 81 mg by mouth daily.  gabapentin (NEURONTIN) 600 MG tablet Take 600 mg by mouth 3 times daily.  tamsulosin (FLOMAX) 0.4 MG capsule Take 0.4 mg by mouth daily.       furosemide (LASIX) 40 MG tablet Take 40 mg by mouth daily.  omeprazole (PRILOSEC) 20 MG capsule Take 20 mg by mouth daily.  ramipril (ALTACE) 5 MG capsule Take 5 mg by mouth 2 times daily. No current facility-administered medications for this visit. Social History     Socioeconomic History    Marital status:      Spouse name: Not on file    Number of children: Not on file    Years of education: Not on file    Highest education level: Not on file   Occupational History    Not on file   Social Needs    Financial resource strain: Not on file    Food insecurity:     Worry: Not on file     Inability: Not on file    Transportation needs:     Medical: Not on file     Non-medical: Not on file   Tobacco Use    Smoking status: Never Smoker    Smokeless tobacco: Never Used    Tobacco comment: used to smoke occ cigar   Substance and Sexual Activity    Alcohol use: No    Drug use: No    Sexual activity: Not Currently   Lifestyle    Physical activity:     Days per week: Not on file     Minutes per session: Not on file    Stress: Not on file   Relationships    Social connections:     Talks on phone: Not on file     Gets together: Not on file     Attends Zoroastrian service: Not on file     Active member of club or organization: Not on file     Attends meetings of clubs or organizations: Not on file     Relationship status: Not on file    Intimate partner violence:     Fear of current or ex partner: Not on file     Emotionally abused: Not on file     Physically abused: Not on file     Forced sexual activity: Not on file   Other Topics Concern    Not on file   Social History Narrative    Not on file       Family History   Problem Relation Age of Onset    Cancer Father         liver     Review of Systems:  Heart: as above   Lungs: as above   Eyes: denies changes in vision or discharge. Ears: denies changes in hearing or pain. Nose: denies epistaxis or masses   Throat: denies sore throat or trouble swallowing. Neuro: denies numbness, tingling, tremors. Skin: denies rashes or itching. : denies hematuria, dysuria   GI: denies vomiting, diarrhea   Psych: denies mood changed, anxiety, depression. All other systems negative. Physical Exam   /74   Pulse 76   Resp 16   Ht 5' 10\" (1.778 m)   Wt 197 lb 12.8 oz (89.7 kg)   BMI 28.38 kg/m²   Constitutional: Oriented to person, place, and time. Well-developed and well-nourished. No distress. Head: Normocephalic and atraumatic. Eyes: EOM are normal. Pupils are equal, round, and reactive to light. Neck: Normal range of motion. Neck supple. No hepatojugular reflux and no JVD present. Carotid bruit is not present. No tracheal deviation present. No thyromegaly present. Cardiovascular: Normal rate, regular rhythm, normal heart sounds and intact distal pulses. Exam reveals no gallop and no friction rub. No murmur heard. Pulmonary/Chest: Effort normal and breath sounds normal. No respiratory distress. No wheezes. No rales. No tenderness. Abdominal: Soft. Bowel sounds are normal. No distension and no mass. No tenderness. No rebound and no guarding. Musculoskeletal: Normal range of motion. No edema and no tenderness. Lymphadenopathy:   No cervical adenopathy. No groin adenopathy. Neurological: Alert and oriented to person, place, and time. Skin: Skin is warm and dry. No rash noted. Not diaphoretic. No erythema. Psychiatric: Normal mood and affect. Behavior is normal.     EKG:  normal sinus rhythm, nonspecific ST and T waves changes, Diffuse inverted T waves. 06/27/2012 Echocardiogram: MIldly dilated LV; EF 35%; DDI; Mild RVE; Mod RVD; mild LAE; 1-2+ MR.    12/24/2007 Cardiac Catheterization:  Triple vessel disease including left main stenosis    12/24/2007 CABG:  LIMA - LAD; SVG - RI, OM1, OM2, PDA. Sternal rewiring Jan 2008.    04/2010 Device Implant:  Dr. Jennifer Wang. Generator:  [employer] ; model [id number], serial [id number].     ASSESSMENT AND

## 2019-05-08 ENCOUNTER — TELEPHONE (OUTPATIENT)
Dept: CARDIOLOGY | Age: 74
End: 2019-05-08

## 2019-05-08 ENCOUNTER — NURSE ONLY (OUTPATIENT)
Dept: NON INVASIVE DIAGNOSTICS | Age: 74
End: 2019-05-08
Payer: MEDICARE

## 2019-05-08 DIAGNOSIS — Z95.810 AICD (AUTOMATIC CARDIOVERTER/DEFIBRILLATOR) PRESENT: ICD-10-CM

## 2019-05-08 DIAGNOSIS — I47.20 VENTRICULAR TACHYCARDIA: Primary | ICD-10-CM

## 2019-05-08 PROCEDURE — 93296 REM INTERROG EVL PM/IDS: CPT | Performed by: INTERNAL MEDICINE

## 2019-05-08 PROCEDURE — 93295 DEV INTERROG REMOTE 1/2/MLT: CPT | Performed by: INTERNAL MEDICINE

## 2019-05-14 ENCOUNTER — HOSPITAL ENCOUNTER (OUTPATIENT)
Age: 74
Discharge: HOME | End: 2019-05-16
Payer: MEDICARE

## 2019-05-14 LAB
ALBUMIN SERPL-MCNC: 4.6 G/DL (ref 3.5–5.2)
ALP BLD-CCNC: 50 U/L (ref 40–129)
ALT SERPL-CCNC: 34 U/L (ref 0–40)
ANION GAP SERPL CALCULATED.3IONS-SCNC: 17 MMOL/L (ref 7–16)
AST SERPL-CCNC: 36 U/L (ref 0–39)
BILIRUB SERPL-MCNC: 0.5 MG/DL (ref 0–1.2)
BUN BLDV-MCNC: 24 MG/DL (ref 8–23)
CALCIUM SERPL-MCNC: 10.1 MG/DL (ref 8.6–10.2)
CHLORIDE BLD-SCNC: 103 MMOL/L (ref 98–107)
CO2: 22 MMOL/L (ref 22–29)
CREAT SERPL-MCNC: 1.1 MG/DL (ref 0.7–1.2)
GFR AFRICAN AMERICAN: >60
GFR NON-AFRICAN AMERICAN: >60 ML/MIN/1.73
GLUCOSE BLD-MCNC: 137 MG/DL (ref 74–99)
HBA1C MFR BLD: 7.3 % (ref 4–5.6)
POTASSIUM SERPL-SCNC: 5.3 MMOL/L (ref 3.5–5)
PROSTATE SPECIFIC ANTIGEN: <0.01 NG/ML (ref 0–4)
SODIUM BLD-SCNC: 142 MMOL/L (ref 132–146)
TOTAL PROTEIN: 7.5 G/DL (ref 6.4–8.3)
TSH SERPL DL<=0.05 MIU/L-ACNC: 2.2 UIU/ML (ref 0.27–4.2)
VITAMIN D 25-HYDROXY: 22 NG/ML (ref 30–100)

## 2019-05-14 PROCEDURE — 80053 COMPREHEN METABOLIC PANEL: CPT

## 2019-05-14 PROCEDURE — G0103 PSA SCREENING: HCPCS

## 2019-05-14 PROCEDURE — 83036 HEMOGLOBIN GLYCOSYLATED A1C: CPT

## 2019-05-14 PROCEDURE — 84443 ASSAY THYROID STIM HORMONE: CPT

## 2019-05-14 PROCEDURE — 82306 VITAMIN D 25 HYDROXY: CPT

## 2019-05-24 NOTE — PROGRESS NOTES
See [employer] [place] report. Remote monitoring reviewed over a 90 day period.   End of 90 day monitoring period date of service 05/08/19

## 2019-06-06 ENCOUNTER — HOSPITAL ENCOUNTER (OUTPATIENT)
Age: 74
Discharge: HOME | End: 2019-06-06
Payer: MEDICARE

## 2019-06-06 LAB — PROSTATE SPECIFIC ANTIGEN: <0.01 NG/ML (ref 0–4)

## 2019-06-06 PROCEDURE — 36415 COLL VENOUS BLD VENIPUNCTURE: CPT

## 2019-06-06 PROCEDURE — 84153 ASSAY OF PSA TOTAL: CPT

## 2019-06-17 ENCOUNTER — OFFICE VISIT (OUTPATIENT)
Dept: CARDIOLOGY CLINIC | Age: 74
End: 2019-06-17
Payer: MEDICARE

## 2019-06-17 VITALS
HEIGHT: 70 IN | HEART RATE: 69 BPM | SYSTOLIC BLOOD PRESSURE: 138 MMHG | BODY MASS INDEX: 27.97 KG/M2 | RESPIRATION RATE: 16 BRPM | DIASTOLIC BLOOD PRESSURE: 64 MMHG | WEIGHT: 195.4 LBS

## 2019-06-17 DIAGNOSIS — I25.119 CORONARY ARTERY DISEASE WITH ANGINA PECTORIS, UNSPECIFIED VESSEL OR LESION TYPE, UNSPECIFIED WHETHER NATIVE OR TRANSPLANTED HEART (HCC): Primary | ICD-10-CM

## 2019-06-17 PROCEDURE — 93000 ELECTROCARDIOGRAM COMPLETE: CPT | Performed by: INTERNAL MEDICINE

## 2019-06-17 PROCEDURE — G8417 CALC BMI ABV UP PARAM F/U: HCPCS | Performed by: INTERNAL MEDICINE

## 2019-06-17 PROCEDURE — 3017F COLORECTAL CA SCREEN DOC REV: CPT | Performed by: INTERNAL MEDICINE

## 2019-06-17 PROCEDURE — 99214 OFFICE O/P EST MOD 30 MIN: CPT | Performed by: INTERNAL MEDICINE

## 2019-06-17 PROCEDURE — 1036F TOBACCO NON-USER: CPT | Performed by: INTERNAL MEDICINE

## 2019-06-17 PROCEDURE — G8598 ASA/ANTIPLAT THER USED: HCPCS | Performed by: INTERNAL MEDICINE

## 2019-06-17 PROCEDURE — 4040F PNEUMOC VAC/ADMIN/RCVD: CPT | Performed by: INTERNAL MEDICINE

## 2019-06-17 PROCEDURE — G8427 DOCREV CUR MEDS BY ELIG CLIN: HCPCS | Performed by: INTERNAL MEDICINE

## 2019-06-17 PROCEDURE — 1123F ACP DISCUSS/DSCN MKR DOCD: CPT | Performed by: INTERNAL MEDICINE

## 2019-06-17 NOTE — PROGRESS NOTES
CHIEF COMPLAINT: CAD/CHF/CMP/ICD    HISTORY OF PRESENT ILLNESS: Patient is a 74 y.o. male seen at the request of Henry Heredia DO. Patient previously following with Dr. Aminata Young. History of CABG x 5 in 56/97/4462 with very complicated recovery. He also has ICD. Chest pain has improved with medication titration. Stable FOX.      Past Medical History:   Diagnosis Date    CAD (coronary artery disease)     [facility]     CHF (congestive heart failure) (HCC)     Diabetes mellitus ([facility]     Diverticulitis     Hyperlipidemia     Hypertension     MI, old     Pancreatitis     Prostate cancer ([facility]     prostate    S/P CABG x 4     Skin cancer     Ventricular tachycardia (HCC)        Patient Active Problem List   Diagnosis    CHF (congestive heart failure) (HCC)    S/P CABG x 4    CAD (coronary artery disease)    MI, old    Hyperlipidemia    Hypertension    Cardiomyopathy ([facility]    Ventricular tachycardia ([facility]    Pancreatitis    AICD (automatic cardioverter/defibrillator) present    Implantable cardioverter-defibrillator (ICD) at end of device life       No Known Allergies    Current Outpatient Medications   Medication Sig Dispense Refill    Omega-3 Fatty Acids (FISH OIL PO) Take by mouth 2 times daily Indications: unknown of dosage      isosorbide mononitrate (IMDUR) 30 MG extended release tablet Take 1 tablet by mouth daily 30 tablet 3    nitroGLYCERIN (NITROSTAT) 0.4 MG SL tablet Place 1 tablet under the tongue every 5 minutes as needed for Chest pain 25 tablet 3    acetaminophen (TYLENOL 8 HOUR ARTHRITIS PAIN) 650 MG extended release tablet Take 650 mg by mouth every 8 hours as needed for Pain      ONE TOUCH ULTRA TEST strip TEST once daily  0    ONE TOUCH ULTRASOFT LANCETS MISC use 1 once daily  0    OXYGEN Inhale 3 L into the lungs as needed      finasteride (PROSCAR) 5 MG tablet take 1 tablet by mouth once daily  0    fenofibrate (TRICOR) 145 MG tablet Take 145 mg by mouth daily   0    glipiZIDE (GLUCOTROL) 5 MG tablet Take 5 mg by mouth 2 times daily (before meals)   0    aspirin 81 MG tablet Take 81 mg by mouth daily.  gabapentin (NEURONTIN) 600 MG tablet Take 600 mg by mouth 3 times daily.  tamsulosin (FLOMAX) 0.4 MG capsule Take 0.4 mg by mouth daily.  furosemide (LASIX) 40 MG tablet Take 40 mg by mouth daily.  omeprazole (PRILOSEC) 20 MG capsule Take 20 mg by mouth daily.  ramipril (ALTACE) 5 MG capsule Take 5 mg by mouth 2 times daily. No current facility-administered medications for this visit. Social History     Socioeconomic History    Marital status:       Spouse name: Not on file    Number of children: Not on file    Years of education: Not on file    Highest education level: Not on file   Occupational History    Not on file   Social Needs    Financial resource strain: Not on file    Food insecurity:     Worry: Not on file     Inability: Not on file    Transportation needs:     Medical: Not on file     Non-medical: Not on file   Tobacco Use    Smoking status: Never Smoker    Smokeless tobacco: Never Used    Tobacco comment: used to smoke occ cigar   Substance and Sexual Activity    Alcohol use: No    Drug use: No    Sexual activity: Not Currently   Lifestyle    Physical activity:     Days per week: Not on file     Minutes per session: Not on file    Stress: Not on file   Relationships    Social connections:     Talks on phone: Not on file     Gets together: Not on file     Attends Yazidism service: Not on file     Active member of club or organization: Not on file     Attends meetings of clubs or organizations: Not on file     Relationship status: Not on file    Intimate partner violence:     Fear of current or ex partner: Not on file     Emotionally abused: Not on file     Physically abused: Not on file     Forced sexual activity: Not on file   Other Topics Concern    Not on file   Social History Narrative    Not on file       Family History   Problem Relation Age of Onset    Cancer Father         liver     Review of Systems:  Heart: as above   Lungs: as above   Eyes: denies changes in vision or discharge. Ears: denies changes in hearing or pain. Nose: denies epistaxis or masses   Throat: denies sore throat or trouble swallowing. Neuro: denies numbness, tingling, tremors. Skin: denies rashes or itching. : denies hematuria, dysuria   GI: denies vomiting, diarrhea   Psych: denies mood changed, anxiety, depression. All other systems negative. Physical Exam   /64   Pulse 69   Resp 16   Ht 5' 10\" (1.778 m)   Wt 195 lb 6.4 oz (88.6 kg)   BMI 28.04 kg/m²   Constitutional: Oriented to person, place, and time. Well-developed and well-nourished. No distress. Head: Normocephalic and atraumatic. Eyes: EOM are normal. Pupils are equal, round, and reactive to light. Neck: Normal range of motion. Neck supple. No hepatojugular reflux and no JVD present. Carotid bruit is not present. No tracheal deviation present. No thyromegaly present. Cardiovascular: Normal rate, regular rhythm, normal heart sounds and intact distal pulses. Exam reveals no gallop and no friction rub. No murmur heard. Pulmonary/Chest: Effort normal and breath sounds normal. No respiratory distress. No wheezes. No rales. No tenderness. Abdominal: Soft. Bowel sounds are normal. No distension and no mass. No tenderness. No rebound and no guarding. Musculoskeletal: Normal range of motion. No edema and no tenderness. Lymphadenopathy:   No cervical adenopathy. No groin adenopathy. Neurological: Alert and oriented to person, place, and time. Skin: Skin is warm and dry. No rash noted. Not diaphoretic. No erythema. Psychiatric: Normal mood and affect. Behavior is normal.     EKG:  normal sinus rhythm, nonspecific ST and T waves changes, Diffuse inverted T waves.     06/27/2012 Echocardiogram: MIldly dilated LV; EF 35%; DDI; Mild RVE; Mod RVD; mild LAE; 1-2+ MR.    12/24/2007 Cardiac Catheterization:  Triple vessel disease including left main stenosis    12/24/2007 CABG:  LIMA - LAD; SVG - RI, OM1, OM2, PDA. Sternal rewiring Jan 2008.    04/2010 Device Implant:  Dr. Opal Nieves. Generator:  Siobhan Hardin DR; model [id number], serial [id number]. ASSESSMENT AND PLAN:  Patient Active Problem List   Diagnosis    CHF (congestive heart failure) (HCC)    S/P CABG x 4    CAD (coronary artery disease)    MI, old    Hyperlipidemia    Hypertension    Cardiomyopathy ([address]    Ventricular tachycardia ([address]    Pancreatitis    AICD (automatic cardioverter/defibrillator) present    Implantable cardioverter-defibrillator (ICD) at end of device life     1. CAD/CABG: ASA. Intolerant to BB. Intolerant to statin. Had anginal symptoms. Added imdur with improvement. 2. CMP: Continue ACEI. Intolerant to BB. 3. HTN: Observe. 4. Lipids: Statin intolerant. 5. ICD: Sees KHAI Parker D.O.   Cardiologist  Cardiology, [facility]

## 2019-08-26 RX ORDER — ISOSORBIDE MONONITRATE 30 MG/1
TABLET, EXTENDED RELEASE ORAL
Qty: 30 TABLET | Refills: 5 | Status: SHIPPED
Start: 2019-08-26 | End: 2020-02-17

## 2019-09-06 ENCOUNTER — NURSE ONLY (OUTPATIENT)
Dept: NON INVASIVE DIAGNOSTICS | Age: 74
End: 2019-09-06
Payer: MEDICARE

## 2019-09-06 DIAGNOSIS — I47.20 VENTRICULAR TACHYCARDIA: Primary | ICD-10-CM

## 2019-09-06 DIAGNOSIS — Z95.810 AICD (AUTOMATIC CARDIOVERTER/DEFIBRILLATOR) PRESENT: ICD-10-CM

## 2019-09-06 PROCEDURE — 93295 DEV INTERROG REMOTE 1/2/MLT: CPT | Performed by: INTERNAL MEDICINE

## 2019-09-06 PROCEDURE — 93296 REM INTERROG EVL PM/IDS: CPT | Performed by: INTERNAL MEDICINE

## 2019-09-20 ENCOUNTER — HOSPITAL ENCOUNTER (OUTPATIENT)
Age: 74
Discharge: HOME | End: 2019-09-22
Payer: MEDICARE

## 2019-09-20 LAB
AMYLASE: 82 U/L (ref 20–100)
ANION GAP SERPL CALCULATED.3IONS-SCNC: 21 MMOL/L (ref 7–16)
BUN BLDV-MCNC: 27 MG/DL (ref 8–23)
CALCIUM SERPL-MCNC: 10.2 MG/DL (ref 8.6–10.2)
CHLORIDE BLD-SCNC: 98 MMOL/L (ref 98–107)
CO2: 18 MMOL/L (ref 22–29)
CREAT SERPL-MCNC: 1.2 MG/DL (ref 0.7–1.2)
GFR AFRICAN AMERICAN: >60
GFR NON-AFRICAN AMERICAN: 59 ML/MIN/1.73
GLUCOSE BLD-MCNC: 120 MG/DL (ref 74–99)
POTASSIUM SERPL-SCNC: 4.8 MMOL/L (ref 3.5–5)
SODIUM BLD-SCNC: 137 MMOL/L (ref 132–146)

## 2019-09-20 PROCEDURE — 82150 ASSAY OF AMYLASE: CPT

## 2019-09-20 PROCEDURE — 80048 BASIC METABOLIC PNL TOTAL CA: CPT

## 2019-09-24 ENCOUNTER — HOSPITAL ENCOUNTER (EMERGENCY)
Age: 74
Discharge: HOME | End: 2019-09-24
Attending: EMERGENCY MEDICINE
Payer: MEDICARE

## 2019-09-24 ENCOUNTER — APPOINTMENT (OUTPATIENT)
Dept: GENERAL RADIOLOGY | Age: 74
End: 2019-09-24
Payer: MEDICARE

## 2019-09-24 VITALS
OXYGEN SATURATION: 98 % | HEIGHT: 70 IN | HEART RATE: 85 BPM | DIASTOLIC BLOOD PRESSURE: 88 MMHG | TEMPERATURE: 97.6 F | BODY MASS INDEX: 27.49 KG/M2 | RESPIRATION RATE: 16 BRPM | SYSTOLIC BLOOD PRESSURE: 154 MMHG | WEIGHT: 192 LBS

## 2019-09-24 DIAGNOSIS — S90.111A CONTUSION OF RIGHT GREAT TOE WITHOUT DAMAGE TO NAIL, INITIAL ENCOUNTER: Primary | ICD-10-CM

## 2019-09-24 LAB
ANION GAP SERPL CALCULATED.3IONS-SCNC: 15 MMOL/L (ref 7–16)
BUN BLDV-MCNC: 23 MG/DL (ref 8–23)
CALCIUM SERPL-MCNC: 9.7 MG/DL (ref 8.6–10.2)
CHLORIDE BLD-SCNC: 104 MMOL/L (ref 98–107)
CO2: 21 MMOL/L (ref 22–29)
CREAT SERPL-MCNC: 1.1 MG/DL (ref 0.7–1.2)
GFR AFRICAN AMERICAN: >60
GFR NON-AFRICAN AMERICAN: >60 ML/MIN/1.73
GLUCOSE BLD-MCNC: 226 MG/DL (ref 74–99)
HCT VFR BLD CALC: 35 % (ref 37–54)
HEMOGLOBIN: 11.5 G/DL (ref 12.5–16.5)
MCH RBC QN AUTO: 29.3 PG (ref 26–35)
MCHC RBC AUTO-ENTMCNC: 32.9 % (ref 32–34.5)
MCV RBC AUTO: 89.3 FL (ref 80–99.9)
PDW BLD-RTO: 14.6 FL (ref 11.5–15)
PLATELET # BLD: 141 E9/L (ref 130–450)
PMV BLD AUTO: 11.7 FL (ref 7–12)
POTASSIUM SERPL-SCNC: 4.4 MMOL/L (ref 3.5–5)
RBC # BLD: 3.92 E12/L (ref 3.8–5.8)
SODIUM BLD-SCNC: 140 MMOL/L (ref 132–146)
URIC ACID, SERUM: 6.7 MG/DL (ref 3.4–7)
WBC # BLD: 3.7 E9/L (ref 4.5–11.5)

## 2019-09-24 PROCEDURE — 6360000002 HC RX W HCPCS: Performed by: EMERGENCY MEDICINE

## 2019-09-24 PROCEDURE — 96372 THER/PROPH/DIAG INJ SC/IM: CPT

## 2019-09-24 PROCEDURE — 73630 X-RAY EXAM OF FOOT: CPT

## 2019-09-24 PROCEDURE — 80048 BASIC METABOLIC PNL TOTAL CA: CPT

## 2019-09-24 PROCEDURE — 99283 EMERGENCY DEPT VISIT LOW MDM: CPT

## 2019-09-24 PROCEDURE — 36415 COLL VENOUS BLD VENIPUNCTURE: CPT

## 2019-09-24 PROCEDURE — 84550 ASSAY OF BLOOD/URIC ACID: CPT

## 2019-09-24 PROCEDURE — 85027 COMPLETE CBC AUTOMATED: CPT

## 2019-09-24 RX ORDER — NAPROXEN 500 MG/1
500 TABLET ORAL 2 TIMES DAILY
Qty: 14 TABLET | Refills: 0 | Status: SHIPPED | OUTPATIENT
Start: 2019-09-24 | End: 2022-06-30

## 2019-09-24 RX ORDER — KETOROLAC TROMETHAMINE 30 MG/ML
30 INJECTION, SOLUTION INTRAMUSCULAR; INTRAVENOUS ONCE
Status: COMPLETED | OUTPATIENT
Start: 2019-09-24 | End: 2019-09-24

## 2019-09-24 RX ADMIN — KETOROLAC TROMETHAMINE 30 MG: 30 INJECTION, SOLUTION INTRAMUSCULAR at 01:39

## 2019-09-24 ASSESSMENT — PAIN SCALES - GENERAL: PAINLEVEL_OUTOF10: 7

## 2019-09-24 ASSESSMENT — PAIN DESCRIPTION - ORIENTATION: ORIENTATION: RIGHT

## 2019-09-24 ASSESSMENT — PAIN DESCRIPTION - PAIN TYPE: TYPE: ACUTE PAIN

## 2019-09-24 ASSESSMENT — PAIN DESCRIPTION - DESCRIPTORS: DESCRIPTORS: DISCOMFORT

## 2019-09-24 ASSESSMENT — PAIN DESCRIPTION - LOCATION: LOCATION: TOE (COMMENT WHICH ONE)

## 2019-09-24 NOTE — ED PROVIDER NOTES
HPI:  9/24/19,   Time: 1:36 AM         Sheri Penaloza is a 74 y.o. male presenting to the ED for RT Great Toe pain and swelling , beginning 9 hours ago. The complaint has been persistent, mild in severity, and worsened by standing, walking. Patient states he is a diabetic has diabetic neuropathy noticed some swelling and ecchymosis to his right great toe. He denies any trauma. States the toe is swollen is tender to touch. Patient denies history of gout. Denies fevers or chills. He has no chest pain shortness of breath. ROS:   Pertinent positives and negatives are stated within HPI, all other systems reviewed and are negative.  --------------------------------------------- PAST HISTORY ---------------------------------------------  Past Medical History:  has a past medical history of CAD (coronary artery disease), [facility]omy[facility] ([facility] CHF (congestive heart failure) ([facility] Diabetes mellitus ([facility] Diverticulitis, Hyperlipidemia, Hypertension, MI, old, Pancreatitis, Prostate cancer ([facility] S/P CABG x 4, Skin cancer, and [facility] Past Surgical History:  has a past surgical history that includes Kidney surgery; Bladder surgery; Elbow surgery; Coronary artery bypass graft (12/24/2007); Colonoscopy; hernia repair; Cardiac pacemaker placement (04/2010); and Cardiac defibrillator placement (04/19/2018). Social History:  reports that he has never smoked. He has never used smokeless tobacco. He reports that he does not drink alcohol or use drugs. Family History: family history includes Cancer in his father. The patients home medications have been reviewed. Allergies: Patient has no known allergies.     -------------------------------------------------- RESULTS -------------------------------------------------  All laboratory and radiology results have been personally reviewed by myself   LABS:  Results for orders placed or performed during the hospital encounter of 09/24/19   CBC

## 2019-10-25 RX ORDER — NITROGLYCERIN 0.4 MG/1
0.4 TABLET SUBLINGUAL EVERY 5 MIN PRN
Qty: 25 TABLET | Refills: 3 | Status: SHIPPED
Start: 2019-10-25 | End: 2020-02-24 | Stop reason: SDUPTHER

## 2019-12-06 ENCOUNTER — NURSE ONLY (OUTPATIENT)
Dept: NON INVASIVE DIAGNOSTICS | Age: 74
End: 2019-12-06
Payer: MEDICARE

## 2019-12-06 DIAGNOSIS — I47.20 VENTRICULAR TACHYCARDIA: Primary | ICD-10-CM

## 2019-12-06 DIAGNOSIS — Z95.810 AICD (AUTOMATIC CARDIOVERTER/DEFIBRILLATOR) PRESENT: ICD-10-CM

## 2019-12-06 PROCEDURE — 93295 DEV INTERROG REMOTE 1/2/MLT: CPT | Performed by: INTERNAL MEDICINE

## 2019-12-06 PROCEDURE — 93296 REM INTERROG EVL PM/IDS: CPT | Performed by: INTERNAL MEDICINE

## 2020-01-22 ENCOUNTER — HOSPITAL ENCOUNTER (OUTPATIENT)
Age: 75
Discharge: HOME | End: 2020-01-22
Payer: MEDICAID

## 2020-01-22 LAB — PROSTATE SPECIFIC ANTIGEN: <0.01 NG/ML (ref 0–4)

## 2020-01-22 PROCEDURE — 84153 ASSAY OF PSA TOTAL: CPT

## 2020-01-22 PROCEDURE — 36415 COLL VENOUS BLD VENIPUNCTURE: CPT

## 2020-01-23 ENCOUNTER — OFFICE VISIT (OUTPATIENT)
Dept: CARDIOLOGY CLINIC | Age: 75
End: 2020-01-23
Payer: MEDICARE

## 2020-01-23 VITALS
RESPIRATION RATE: 18 BRPM | HEIGHT: 70 IN | HEART RATE: 84 BPM | BODY MASS INDEX: 28.09 KG/M2 | SYSTOLIC BLOOD PRESSURE: 110 MMHG | WEIGHT: 196.2 LBS | DIASTOLIC BLOOD PRESSURE: 64 MMHG

## 2020-01-23 PROCEDURE — 99214 OFFICE O/P EST MOD 30 MIN: CPT | Performed by: INTERNAL MEDICINE

## 2020-01-23 PROCEDURE — G8417 CALC BMI ABV UP PARAM F/U: HCPCS | Performed by: INTERNAL MEDICINE

## 2020-01-23 PROCEDURE — G8427 DOCREV CUR MEDS BY ELIG CLIN: HCPCS | Performed by: INTERNAL MEDICINE

## 2020-01-23 PROCEDURE — G8484 FLU IMMUNIZE NO ADMIN: HCPCS | Performed by: INTERNAL MEDICINE

## 2020-01-23 PROCEDURE — 1123F ACP DISCUSS/DSCN MKR DOCD: CPT | Performed by: INTERNAL MEDICINE

## 2020-01-23 PROCEDURE — 1036F TOBACCO NON-USER: CPT | Performed by: INTERNAL MEDICINE

## 2020-01-23 PROCEDURE — 4040F PNEUMOC VAC/ADMIN/RCVD: CPT | Performed by: INTERNAL MEDICINE

## 2020-01-23 PROCEDURE — 93000 ELECTROCARDIOGRAM COMPLETE: CPT | Performed by: INTERNAL MEDICINE

## 2020-01-23 NOTE — PROGRESS NOTES
Other Topics Concern    Not on file   Social History Narrative    Not on file       Family History   Problem Relation Age of Onset    Cancer Father         liver     Review of Systems:  Heart: as above   Lungs: as above   Eyes: denies changes in vision or discharge. Ears: denies changes in hearing or pain. Nose: denies epistaxis or masses   Throat: denies sore throat or trouble swallowing. Neuro: denies numbness, tingling, tremors. Skin: denies rashes or itching. : denies hematuria, dysuria   GI: denies vomiting, diarrhea   Psych: denies mood changed, anxiety, depression. All other systems negative. Physical Exam   There were no vitals taken for this visit. Constitutional: Oriented to person, place, and time. Well-developed and well-nourished. No distress. Head: Normocephalic and atraumatic. Eyes: EOM are normal. Pupils are equal, round, and reactive to light. Neck: Normal range of motion. Neck supple. No hepatojugular reflux and no JVD present. Carotid bruit is not present. No tracheal deviation present. No thyromegaly present. Cardiovascular: Normal rate, regular rhythm, normal heart sounds and intact distal pulses. Exam reveals no gallop and no friction rub. No murmur heard. Pulmonary/Chest: Effort normal and breath sounds normal. No respiratory distress. No wheezes. No rales. No tenderness. Abdominal: Soft. Bowel sounds are normal. No distension and no mass. No tenderness. No rebound and no guarding. Musculoskeletal: Normal range of motion. No edema and no tenderness. Lymphadenopathy:   No cervical adenopathy. No groin adenopathy. Neurological: Alert and oriented to person, place, and time. Skin: Skin is warm and dry. No rash noted. Not diaphoretic. No erythema. Psychiatric: Normal mood and affect. Behavior is normal.     EKG:  normal sinus rhythm, nonspecific ST and T waves changes, Diffuse inverted T waves.     06/27/2012 Echocardiogram: MIldly dilated LV; EF 35%;

## 2020-02-04 ENCOUNTER — TELEPHONE (OUTPATIENT)
Dept: CARDIOLOGY | Age: 75
End: 2020-02-04

## 2020-02-04 NOTE — TELEPHONE ENCOUNTER
Called patient to schedule Lexiscan and he refused to schedule and will not have this test.  Electronically signed by Veda Garay on 2/4/2020 at 10:30 AM

## 2020-02-17 RX ORDER — ISOSORBIDE MONONITRATE 30 MG/1
TABLET, EXTENDED RELEASE ORAL
Qty: 30 TABLET | Refills: 5 | Status: SHIPPED
Start: 2020-02-17 | End: 2020-07-27

## 2020-02-24 ENCOUNTER — TELEPHONE (OUTPATIENT)
Dept: CARDIOLOGY CLINIC | Age: 75
End: 2020-02-24

## 2020-02-24 RX ORDER — NITROGLYCERIN 0.4 MG/1
0.4 TABLET SUBLINGUAL EVERY 5 MIN PRN
Qty: 25 TABLET | Refills: 3 | Status: SHIPPED
Start: 2020-02-24 | End: 2020-07-15 | Stop reason: SDUPTHER

## 2020-02-25 ENCOUNTER — HOSPITAL ENCOUNTER (OUTPATIENT)
Age: 75
Discharge: HOME | End: 2020-02-27
Payer: MEDICARE

## 2020-03-06 ENCOUNTER — NURSE ONLY (OUTPATIENT)
Dept: NON INVASIVE DIAGNOSTICS | Age: 75
End: 2020-03-06
Payer: MEDICARE

## 2020-03-06 PROCEDURE — 93295 DEV INTERROG REMOTE 1/2/MLT: CPT | Performed by: INTERNAL MEDICINE

## 2020-03-06 PROCEDURE — 93296 REM INTERROG EVL PM/IDS: CPT | Performed by: INTERNAL MEDICINE

## 2020-03-09 NOTE — PROGRESS NOTES
See [employer] [place] report. Remote monitoring reviewed over a 90 day period.   End of 90 day monitoring period date of service 03/06/2020

## 2020-03-10 ENCOUNTER — TELEPHONE (OUTPATIENT)
Dept: CARDIOLOGY CLINIC | Age: 75
End: 2020-03-10

## 2020-06-10 ENCOUNTER — NURSE ONLY (OUTPATIENT)
Dept: NON INVASIVE DIAGNOSTICS | Age: 75
End: 2020-06-10
Payer: MEDICARE

## 2020-06-10 PROCEDURE — 93295 DEV INTERROG REMOTE 1/2/MLT: CPT | Performed by: INTERNAL MEDICINE

## 2020-06-10 PROCEDURE — 93296 REM INTERROG EVL PM/IDS: CPT | Performed by: INTERNAL MEDICINE

## 2020-07-15 RX ORDER — NITROGLYCERIN 0.4 MG/1
0.4 TABLET SUBLINGUAL EVERY 5 MIN PRN
Qty: 25 TABLET | Refills: 3 | Status: SHIPPED
Start: 2020-07-15 | End: 2020-11-03 | Stop reason: SDUPTHER

## 2020-07-27 RX ORDER — ISOSORBIDE MONONITRATE 30 MG/1
TABLET, EXTENDED RELEASE ORAL
Qty: 30 TABLET | Refills: 5 | Status: SHIPPED
Start: 2020-07-27 | End: 2020-10-07 | Stop reason: DRUGHIGH

## 2020-07-31 ENCOUNTER — HOSPITAL ENCOUNTER (OUTPATIENT)
Age: 75
Discharge: HOME | End: 2020-08-02
Payer: MEDICARE

## 2020-07-31 LAB
ALBUMIN SERPL-MCNC: 4.2 G/DL (ref 3.5–5.2)
ALP BLD-CCNC: 43 U/L (ref 40–129)
ALT SERPL-CCNC: 18 U/L (ref 0–40)
ANION GAP SERPL CALCULATED.3IONS-SCNC: 15 MMOL/L (ref 7–16)
AST SERPL-CCNC: 23 U/L (ref 0–39)
BILIRUB SERPL-MCNC: 0.8 MG/DL (ref 0–1.2)
BUN BLDV-MCNC: 38 MG/DL (ref 8–23)
CALCIUM SERPL-MCNC: 10.1 MG/DL (ref 8.6–10.2)
CHLORIDE BLD-SCNC: 100 MMOL/L (ref 98–107)
CO2: 23 MMOL/L (ref 22–29)
CREAT SERPL-MCNC: 1.3 MG/DL (ref 0.7–1.2)
GFR AFRICAN AMERICAN: >60
GFR NON-AFRICAN AMERICAN: 54 ML/MIN/1.73
GLUCOSE BLD-MCNC: 98 MG/DL (ref 74–99)
HBA1C MFR BLD: 6.3 % (ref 4–5.6)
POTASSIUM SERPL-SCNC: 5.3 MMOL/L (ref 3.5–5)
PROSTATE SPECIFIC ANTIGEN: <0.03 NG/ML (ref 0–4)
SODIUM BLD-SCNC: 138 MMOL/L (ref 132–146)
TOTAL PROTEIN: 7.4 G/DL (ref 6.4–8.3)
TSH SERPL DL<=0.05 MIU/L-ACNC: 1.8 UIU/ML (ref 0.27–4.2)
VITAMIN D 25-HYDROXY: 33 NG/ML (ref 30–100)

## 2020-07-31 PROCEDURE — 82306 VITAMIN D 25 HYDROXY: CPT

## 2020-07-31 PROCEDURE — 80053 COMPREHEN METABOLIC PANEL: CPT

## 2020-07-31 PROCEDURE — 84443 ASSAY THYROID STIM HORMONE: CPT

## 2020-07-31 PROCEDURE — G0103 PSA SCREENING: HCPCS

## 2020-07-31 PROCEDURE — 83036 HEMOGLOBIN GLYCOSYLATED A1C: CPT

## 2020-09-14 ENCOUNTER — NURSE ONLY (OUTPATIENT)
Dept: NON INVASIVE DIAGNOSTICS | Age: 75
End: 2020-09-14
Payer: MEDICARE

## 2020-09-14 PROCEDURE — 93295 DEV INTERROG REMOTE 1/2/MLT: CPT | Performed by: INTERNAL MEDICINE

## 2020-09-14 PROCEDURE — 93297 REM INTERROG DEV EVAL ICPMS: CPT | Performed by: INTERNAL MEDICINE

## 2020-09-14 PROCEDURE — 93296 REM INTERROG EVL PM/IDS: CPT | Performed by: INTERNAL MEDICINE

## 2020-09-17 NOTE — PROGRESS NOTES
See [employer] [place] report. Remote monitoring reviewed over a 90 day period. End of 90 day monitoring period date of service 9.14.2020.

## 2020-09-18 ENCOUNTER — TELEPHONE (OUTPATIENT)
Dept: NON INVASIVE DIAGNOSTICS | Age: 75
End: 2020-09-18

## 2020-09-18 NOTE — TELEPHONE ENCOUNTER
----- Message from Arabella Coffman sent at 9/17/2020  3:30 PM EDT -----  Can you try and call and schedule this patient  ----- Message -----  From: Yessenia Connors RN  Sent: 9/17/2020   3:05 PM EDT  To: Konstantin Liu for office visit with Dr. Carl Parham

## 2020-09-30 ENCOUNTER — TELEPHONE (OUTPATIENT)
Dept: ADMINISTRATIVE | Age: 75
End: 2020-09-30

## 2020-09-30 ENCOUNTER — TELEPHONE (OUTPATIENT)
Dept: CARDIOLOGY CLINIC | Age: 75
End: 2020-09-30

## 2020-09-30 NOTE — TELEPHONE ENCOUNTER
Pt calling c/o Angina since March more persistent for the past 2 months. He takes a Nitro and the pain subside - EDG nurse told him to call Cardiology. I connected him to Kayla/[place].

## 2020-09-30 NOTE — TELEPHONE ENCOUNTER
OV early next week. Esthela Millan D.O.   Cardiologist  Cardiology, [facility]

## 2020-09-30 NOTE — TELEPHONE ENCOUNTER
Pt called stating he has been having anginal chest pain for approximately 2 months. He states he has acid reflux and his Prilosec was doubled. He states he has still been having the pain but when he takes NTG the pain stops. Pt declined my attempts to be transferred to Nurse Triage stating he only wants an appt with Dr. Alfred Lombard. Please call pt.

## 2020-09-30 NOTE — TELEPHONE ENCOUNTER
Pt. States that he has been having a lot of chest pain and a burning sensation in his chest, he has been taking nitrostat frequently and it does help. Pt. Has GERD and he is taking prilosec and carafate which are not helping and he is scheduled for an endoscopy on 10/23. He says symptoms are very similar. Pt. also says he will not go to the ER no matter how bad his symptoms are, please advise

## 2020-10-06 ENCOUNTER — TELEPHONE (OUTPATIENT)
Dept: CARDIOLOGY CLINIC | Age: 75
End: 2020-10-06

## 2020-10-06 ENCOUNTER — OFFICE VISIT (OUTPATIENT)
Dept: CARDIOLOGY CLINIC | Age: 75
End: 2020-10-06
Payer: MEDICARE

## 2020-10-06 VITALS
BODY MASS INDEX: 28.06 KG/M2 | DIASTOLIC BLOOD PRESSURE: 58 MMHG | RESPIRATION RATE: 16 BRPM | SYSTOLIC BLOOD PRESSURE: 108 MMHG | WEIGHT: 196 LBS | HEART RATE: 81 BPM | HEIGHT: 70 IN

## 2020-10-06 PROCEDURE — G8417 CALC BMI ABV UP PARAM F/U: HCPCS | Performed by: INTERNAL MEDICINE

## 2020-10-06 PROCEDURE — 4040F PNEUMOC VAC/ADMIN/RCVD: CPT | Performed by: INTERNAL MEDICINE

## 2020-10-06 PROCEDURE — 99214 OFFICE O/P EST MOD 30 MIN: CPT | Performed by: INTERNAL MEDICINE

## 2020-10-06 PROCEDURE — 93000 ELECTROCARDIOGRAM COMPLETE: CPT | Performed by: INTERNAL MEDICINE

## 2020-10-06 PROCEDURE — 1036F TOBACCO NON-USER: CPT | Performed by: INTERNAL MEDICINE

## 2020-10-06 PROCEDURE — G8484 FLU IMMUNIZE NO ADMIN: HCPCS | Performed by: INTERNAL MEDICINE

## 2020-10-06 PROCEDURE — 1123F ACP DISCUSS/DSCN MKR DOCD: CPT | Performed by: INTERNAL MEDICINE

## 2020-10-06 PROCEDURE — G8427 DOCREV CUR MEDS BY ELIG CLIN: HCPCS | Performed by: INTERNAL MEDICINE

## 2020-10-06 RX ORDER — SUCRALFATE 1 G/1
1 TABLET ORAL 4 TIMES DAILY
COMMUNITY
Start: 2020-08-03 | End: 2022-06-30

## 2020-10-06 NOTE — TELEPHONE ENCOUNTER
Pt. Was seen in office today, he states that he is not going to go through with the stress test, he doesn't want any tests or procedures and will not have OV, he is asking if you would change or increase any of his meds at this time,please advise

## 2020-10-06 NOTE — PROGRESS NOTES
CHIEF COMPLAINT: CAD/CHF/CMP/ICD    HISTORY OF PRESENT ILLNESS: Patient is a 66 y.o. male seen at the request of Shaina Newman DO. Patient previously following with Dr. Casper Fuentes. History of CABG x 5 in 05/66/7800 with very complicated recovery. He also has ICD. No CP or SOB. Increased heartburn.     Past Medical History:   Diagnosis Date    CAD (coronary artery disease)     [facility] [address]     [facility]     Diabetes mellitus ([facility] [address]     Diverticulitis     Hyperlipidemia     Hypertension     MI, old     Pancreatitis     Prostate cancer ([facility] [address]     prostate    S/P CABG x 4     Skin cancer     Ventricular tachycardia (HCC)        Patient Active Problem List   Diagnosis    [facility]est[facility] failure) [facility]    S/P CABG x 4    CAD (coronary artery disease)    MI, old    Hyperlipidemia    Hypertension    Cardiomyopathy ([facility] [address]    Ventricular tachycardia ([facility] [address]    Pancreatitis    AICD (automatic cardioverter/defibrillator) present    Implantable cardioverter-defibrillator (ICD) at end of device life       No Known Allergies    Current Outpatient Medications   Medication Sig Dispense Refill    sucralfate (CARAFATE) 1 GM tablet Take 1 g by mouth 2 times daily       isosorbide mononitrate (IMDUR) 30 MG extended release tablet TAKE 1 TABLET BY MOUTH EVERY DAY 30 tablet 5    nitroGLYCERIN (NITROSTAT) 0.4 MG SL tablet Place 1 tablet under the tongue every 5 minutes as needed for Chest pain 25 tablet 3    acetaminophen (TYLENOL 8 HOUR ARTHRITIS PAIN) 650 MG extended release tablet Take 650 mg by mouth every 8 hours as needed for Pain      ONE TOUCH ULTRA TEST strip TEST once daily  0    ONE TOUCH ULTRASOFT LANCETS MISC use 1 once daily  0    OXYGEN Inhale 3 L into the lungs as needed      finasteride (PROSCAR) 5 MG tablet take 1 tablet by mouth once daily  0    fenofibrate (TRICOR) 145 MG tablet Take 145 mg by mouth daily   0    glipiZIDE (GLUCOTROL) 5 MG tablet Take 5 mg by mouth 2 times daily (before meals)   0    aspirin 81 MG tablet Take 81 mg by mouth daily.  gabapentin (NEURONTIN) 600 MG tablet Take 600 mg by mouth 3 times daily.  tamsulosin (FLOMAX) 0.4 MG capsule Take 0.4 mg by mouth daily.  furosemide (LASIX) 40 MG tablet Take 40 mg by mouth daily.  omeprazole (PRILOSEC) 20 MG capsule Take 40 mg by mouth 2 times daily       ramipril (ALTACE) 5 MG capsule Take 5 mg by mouth 2 times daily.  naproxen (NAPROSYN) 500 MG tablet Take 1 tablet by mouth 2 times daily for 7 days 14 tablet 0    Omega-3 Fatty Acids (FISH OIL PO) Take by mouth 2 times daily Indications: unknown of dosage       No current facility-administered medications for this visit. Social History     Socioeconomic History    Marital status:       Spouse name: Not on file    Number of children: Not on file    Years of education: Not on file    Highest education level: Not on file   Occupational History    Not on file   Social Needs    Financial resource strain: Not on file    Food insecurity     Worry: Not on file     Inability: Not on file    Transportation needs     Medical: Not on file     Non-medical: Not on file   Tobacco Use    Smoking status: Never Smoker    Smokeless tobacco: Never Used    Tobacco comment: used to smoke occ cigar   Substance and Sexual Activity    Alcohol use: No    Drug use: No    Sexual activity: Not Currently   Lifestyle    Physical activity     Days per week: Not on file     Minutes per session: Not on file    Stress: Not on file   Relationships    Social connections     Talks on phone: Not on file     Gets together: Not on file     Attends Buddhism service: Not on file     Active member of club or organization: Not on file     Attends meetings of clubs or organizations: Not on file     Relationship status: Not on file    Intimate partner violence     Fear of current or ex partner: Not on file     Emotionally abused: Not on file     Physically abused: Not on file     Forced sexual activity: Not on file   Other Topics Concern    Not on file   Social History Narrative    Not on file       Family History   Problem Relation Age of Onset    Cancer Father         liver     Review of Systems:  Heart: as above   Lungs: as above   Eyes: denies changes in vision or discharge. Ears: denies changes in hearing or pain. Nose: denies epistaxis or masses   Throat: denies sore throat or trouble swallowing. Neuro: denies numbness, tingling, tremors. Skin: denies rashes or itching. : denies hematuria, dysuria   GI: denies vomiting, diarrhea   Psych: denies mood changed, anxiety, depression. All other systems negative. Physical Exam   BP (!) 108/58   Pulse 81   Resp 16   Ht 5' 10\" (1.778 m)   Wt 196 lb (88.9 kg)   BMI 28.12 kg/m²   Constitutional: Oriented to person, place, and time. Well-developed and well-nourished. No distress. Head: Normocephalic and atraumatic. Eyes: EOM are normal. Pupils are equal, round, and reactive to light. Neck: Normal range of motion. Neck supple. No hepatojugular reflux and no JVD present. Carotid bruit is not present. No tracheal deviation present. No thyromegaly present. Cardiovascular: Normal rate, regular rhythm, normal heart sounds and intact distal pulses. Exam reveals no gallop and no friction rub. No murmur heard. Pulmonary/Chest: Effort normal and breath sounds normal. No respiratory distress. No wheezes. No rales. No tenderness. Abdominal: Soft. Bowel sounds are normal. No distension and no mass. No tenderness. No rebound and no guarding. Musculoskeletal: Normal range of motion. No edema and no tenderness. Lymphadenopathy:   No cervical adenopathy. No groin adenopathy. Neurological: Alert and oriented to person, place, and time. Skin: Skin is warm and dry. No rash noted. Not diaphoretic. No erythema. Psychiatric: Normal mood and affect.  Behavior is normal.     EKG:  normal sinus rhythm, nonspecific ST and T waves changes, Diffuse inverted T waves. 06/27/2012 Echocardiogram: MIldly dilated LV; EF 35%; DDI; Mild RVE; Mod RVD; mild LAE; 1-2+ MR.    12/24/2007 Cardiac Catheterization:  Triple vessel disease including left main stenosis    12/24/2007 CABG:  LIMA - LAD; SVG - RI, OM1, OM2, PDA. Sternal rewiring Jan 2008.    04/2010 Device Implant:  Dr. Cedrick Heart. Generator:  Verlan Holstein ; model [id number], serial [id number]. ASSESSMENT AND PLAN:  Patient Active Problem List   Diagnosis    [facility]    S/P CABG x 4    CAD (coronary artery disease)    MI, old    Hyperlipidemia    Hypertension    Cardiomyopathy ([address]    Ventricular tachycardia ([address]    Pancreatitis    AICD (automatic cardioverter/defibrillator) present    Implantable cardioverter-defibrillator (ICD) at end of device life     1. CAD-CABG: ASA. Intolerant to BB. Intolerant to statin. Pharm stress. 2. CMP: Continue ACEI. Intolerant to BB. 3. HTN: Observe. 4. Lipids: Statin intolerant. 5. ICD: Sees KHAI Jordan D.O.   Cardiologist  Cardiology, [facility]

## 2020-10-07 ENCOUNTER — TELEPHONE (OUTPATIENT)
Dept: CARDIOLOGY | Age: 75
End: 2020-10-07

## 2020-10-07 RX ORDER — ISOSORBIDE MONONITRATE 60 MG/1
60 TABLET, EXTENDED RELEASE ORAL DAILY
COMMUNITY
End: 2020-10-07 | Stop reason: SDUPTHER

## 2020-10-07 RX ORDER — ISOSORBIDE MONONITRATE 60 MG/1
60 TABLET, EXTENDED RELEASE ORAL DAILY
Qty: 30 TABLET | Refills: 5 | Status: SHIPPED
Start: 2020-10-07 | End: 2020-11-03 | Stop reason: DRUGHIGH

## 2020-10-07 NOTE — TELEPHONE ENCOUNTER
Noted.     Increase imdur to 60 mg daily. Letitia Muniz D.O.   Cardiologist  Cardiology, [facility]

## 2020-10-07 NOTE — TELEPHONE ENCOUNTER
CALLED TO SCHEDULE LEXISCAN STRESS TEST AND PATIENT DOES NOT WANT TO SCHEDULE.   Electronically signed by Kong Gandhi on 10/7/2020 at 2:26 PM

## 2020-11-03 ENCOUNTER — TELEPHONE (OUTPATIENT)
Dept: CARDIOLOGY CLINIC | Age: 75
End: 2020-11-03

## 2020-11-03 RX ORDER — ISOSORBIDE MONONITRATE 120 MG/1
120 TABLET, EXTENDED RELEASE ORAL DAILY
COMMUNITY
End: 2020-11-03 | Stop reason: SDUPTHER

## 2020-11-03 RX ORDER — NITROGLYCERIN 0.4 MG/1
0.4 TABLET SUBLINGUAL EVERY 5 MIN PRN
Qty: 25 TABLET | Refills: 0 | Status: SHIPPED
Start: 2020-11-03 | End: 2020-12-28 | Stop reason: SDUPTHER

## 2020-11-03 RX ORDER — ISOSORBIDE MONONITRATE 120 MG/1
120 TABLET, EXTENDED RELEASE ORAL DAILY
Qty: 30 TABLET | Refills: 5 | Status: SHIPPED
Start: 2020-11-03 | End: 2021-04-28

## 2020-11-03 NOTE — TELEPHONE ENCOUNTER
Pt. States that he has been taking nitrostat several times a week for chest pain caused by acid reflux, he states that he is having a lot of GI issues and the nitrostat helps, please advise

## 2020-11-24 ENCOUNTER — TELEPHONE (OUTPATIENT)
Dept: NON INVASIVE DIAGNOSTICS | Age: 75
End: 2020-11-24

## 2020-12-14 ENCOUNTER — NURSE ONLY (OUTPATIENT)
Dept: NON INVASIVE DIAGNOSTICS | Age: 75
End: 2020-12-14
Payer: MEDICARE

## 2020-12-14 PROCEDURE — 93295 DEV INTERROG REMOTE 1/2/MLT: CPT | Performed by: INTERNAL MEDICINE

## 2020-12-14 PROCEDURE — 93296 REM INTERROG EVL PM/IDS: CPT | Performed by: INTERNAL MEDICINE

## 2020-12-28 RX ORDER — NITROGLYCERIN 0.4 MG/1
0.4 TABLET SUBLINGUAL EVERY 5 MIN PRN
Qty: 25 TABLET | Refills: 0 | Status: SHIPPED
Start: 2020-12-28 | End: 2021-03-08 | Stop reason: SDUPTHER

## 2020-12-29 ENCOUNTER — TELEPHONE (OUTPATIENT)
Dept: NON INVASIVE DIAGNOSTICS | Age: 75
End: 2020-12-29

## 2020-12-29 NOTE — TELEPHONE ENCOUNTER
----- Message from Evelin Whaley sent at 12/18/2020  8:08 AM EST -----  Regarding: PAST DUE YEARLY W/ CK MERLIN DEVICE  PAST DUE YEARLY W/ CK MERLIN DEVICE

## 2020-12-29 NOTE — TELEPHONE ENCOUNTER
Called and tried to schedule appointment with patient he refused appointment said that he is not going anywhere right now due to COVID, the ice on the roads. He told me that \"it doesn't matter to him, he is not coming to ANY appointments\" He hung up the phone. I called the patient back and explained that if he is going to be non-compliant he can be discharged from the practice. He said that he does not wish to come ot the office due to the weather and [place]. He wants to get a COVID vaccine first. I told the patient we will call him in the Spring and see if we can get something set up when the roads were better. Told the patient to make sure in the mean time to keep the monitor hooked up and gave him the next transmission date for his remote. He verbalized understanding.

## 2021-02-24 ENCOUNTER — TELEPHONE (OUTPATIENT)
Dept: CARDIOLOGY CLINIC | Age: 76
End: 2021-02-24

## 2021-02-24 ENCOUNTER — TELEPHONE (OUTPATIENT)
Dept: NON INVASIVE DIAGNOSTICS | Age: 76
End: 2021-02-24

## 2021-02-24 ENCOUNTER — APPOINTMENT (OUTPATIENT)
Dept: GENERAL RADIOLOGY | Age: 76
DRG: 313 | End: 2021-02-24
Payer: MEDICARE

## 2021-02-24 ENCOUNTER — HOSPITAL ENCOUNTER (INPATIENT)
Age: 76
LOS: 1 days | Discharge: HOME | DRG: 313 | End: 2021-02-25
Attending: EMERGENCY MEDICINE
Payer: MEDICARE

## 2021-02-24 VITALS
DIASTOLIC BLOOD PRESSURE: 88 MMHG | OXYGEN SATURATION: 97 % | RESPIRATION RATE: 20 BRPM | HEART RATE: 105 BPM | BODY MASS INDEX: 27.16 KG/M2 | WEIGHT: 194 LBS | HEIGHT: 71 IN | SYSTOLIC BLOOD PRESSURE: 158 MMHG | TEMPERATURE: 96 F

## 2021-02-24 DIAGNOSIS — R07.9 CHEST PAIN, UNSPECIFIED TYPE: Primary | ICD-10-CM

## 2021-02-24 PROBLEM — R07.89 CHEST PAIN, ATYPICAL: Status: ACTIVE | Noted: 2021-02-24

## 2021-02-24 LAB
ALBUMIN SERPL-MCNC: 4.4 G/DL (ref 3.5–5.2)
ALP BLD-CCNC: 30 U/L (ref 40–129)
ALT SERPL-CCNC: 8 U/L (ref 0–40)
ANION GAP SERPL CALCULATED.3IONS-SCNC: 13 MMOL/L (ref 7–16)
AST SERPL-CCNC: 26 U/L (ref 0–39)
BILIRUB SERPL-MCNC: 0.6 MG/DL (ref 0–1.2)
BUN BLDV-MCNC: 34 MG/DL (ref 8–23)
CALCIUM SERPL-MCNC: 9.4 MG/DL (ref 8.6–10.2)
CHLORIDE BLD-SCNC: 104 MMOL/L (ref 98–107)
CO2: 21 MMOL/L (ref 22–29)
CREAT SERPL-MCNC: 1.4 MG/DL (ref 0.7–1.2)
D DIMER: <200 NG/ML DDU
EKG ATRIAL RATE: 90 BPM
EKG P AXIS: 59 DEGREES
EKG P-R INTERVAL: 234 MS
EKG Q-T INTERVAL: 380 MS
EKG QRS DURATION: 92 MS
EKG QTC CALCULATION (BAZETT): 464 MS
EKG R AXIS: 85 DEGREES
EKG T AXIS: 115 DEGREES
EKG VENTRICULAR RATE: 90 BPM
GFR AFRICAN AMERICAN: 59
GFR NON-AFRICAN AMERICAN: 49 ML/MIN/1.73
GLUCOSE BLD-MCNC: 110 MG/DL (ref 74–99)
HCT VFR BLD CALC: 41.1 % (ref 37–54)
HEMOGLOBIN: 12.9 G/DL (ref 12.5–16.5)
MAGNESIUM: 2.2 MG/DL (ref 1.6–2.6)
MCH RBC QN AUTO: 27.9 PG (ref 26–35)
MCHC RBC AUTO-ENTMCNC: 31.4 % (ref 32–34.5)
MCV RBC AUTO: 89 FL (ref 80–99.9)
PDW BLD-RTO: 14.4 FL (ref 11.5–15)
PLATELET # BLD: 147 E9/L (ref 130–450)
PMV BLD AUTO: 13.3 FL (ref 7–12)
POTASSIUM SERPL-SCNC: 4.3 MMOL/L (ref 3.5–5)
PRO-BNP: 6024 PG/ML (ref 0–450)
RBC # BLD: 4.62 E12/L (ref 3.8–5.8)
SODIUM BLD-SCNC: 138 MMOL/L (ref 132–146)
TOTAL PROTEIN: 7.5 G/DL (ref 6.4–8.3)
TROPONIN: 0.03 NG/ML (ref 0–0.03)
WBC # BLD: 5.9 E9/L (ref 4.5–11.5)

## 2021-02-24 PROCEDURE — 85378 FIBRIN DEGRADE SEMIQUANT: CPT

## 2021-02-24 PROCEDURE — 99283 EMERGENCY DEPT VISIT LOW MDM: CPT

## 2021-02-24 PROCEDURE — 71045 X-RAY EXAM CHEST 1 VIEW: CPT

## 2021-02-24 PROCEDURE — 93005 ELECTROCARDIOGRAM TRACING: CPT | Performed by: NURSE PRACTITIONER

## 2021-02-24 PROCEDURE — 93010 ELECTROCARDIOGRAM REPORT: CPT | Performed by: INTERNAL MEDICINE

## 2021-02-24 PROCEDURE — 84484 ASSAY OF TROPONIN QUANT: CPT

## 2021-02-24 PROCEDURE — 99284 EMERGENCY DEPT VISIT MOD MDM: CPT

## 2021-02-24 PROCEDURE — 80053 COMPREHEN METABOLIC PANEL: CPT

## 2021-02-24 PROCEDURE — 1200000000 HC SEMI PRIVATE

## 2021-02-24 PROCEDURE — 85027 COMPLETE CBC AUTOMATED: CPT

## 2021-02-24 PROCEDURE — 83735 ASSAY OF MAGNESIUM: CPT

## 2021-02-24 PROCEDURE — 83880 ASSAY OF NATRIURETIC PEPTIDE: CPT

## 2021-02-24 RX ORDER — PROMETHAZINE HYDROCHLORIDE 25 MG/1
12.5 TABLET ORAL EVERY 6 HOURS PRN
Status: CANCELLED | OUTPATIENT
Start: 2021-02-24

## 2021-02-24 RX ORDER — ISOSORBIDE MONONITRATE 30 MG/1
120 TABLET, EXTENDED RELEASE ORAL DAILY
Status: CANCELLED | OUTPATIENT
Start: 2021-02-24

## 2021-02-24 RX ORDER — SUCRALFATE 1 G/1
1 TABLET ORAL 2 TIMES DAILY
Status: CANCELLED | OUTPATIENT
Start: 2021-02-24

## 2021-02-24 RX ORDER — ATORVASTATIN CALCIUM 40 MG/1
80 TABLET, FILM COATED ORAL NIGHTLY
Status: CANCELLED | OUTPATIENT
Start: 2021-02-24

## 2021-02-24 RX ORDER — ONDANSETRON 2 MG/ML
4 INJECTION INTRAMUSCULAR; INTRAVENOUS EVERY 6 HOURS PRN
Status: CANCELLED | OUTPATIENT
Start: 2021-02-24

## 2021-02-24 RX ORDER — ACETAMINOPHEN 325 MG/1
650 TABLET ORAL EVERY 6 HOURS PRN
Status: CANCELLED | OUTPATIENT
Start: 2021-02-24

## 2021-02-24 RX ORDER — RAMIPRIL 5 MG/1
5 CAPSULE ORAL 2 TIMES DAILY
Status: CANCELLED | OUTPATIENT
Start: 2021-02-24

## 2021-02-24 RX ORDER — NITROGLYCERIN 0.4 MG/1
0.4 TABLET SUBLINGUAL ONCE
Status: DISCONTINUED | OUTPATIENT
Start: 2021-02-24 | End: 2021-02-25 | Stop reason: HOSPADM

## 2021-02-24 RX ORDER — SODIUM CHLORIDE 0.9 % (FLUSH) 0.9 %
10 SYRINGE (ML) INJECTION PRN
Status: CANCELLED | OUTPATIENT
Start: 2021-02-24

## 2021-02-24 RX ORDER — TAMSULOSIN HYDROCHLORIDE 0.4 MG/1
0.4 CAPSULE ORAL DAILY
Status: CANCELLED | OUTPATIENT
Start: 2021-02-24

## 2021-02-24 RX ORDER — POTASSIUM CHLORIDE 20 MEQ/1
40 TABLET, EXTENDED RELEASE ORAL PRN
Status: CANCELLED | OUTPATIENT
Start: 2021-02-24

## 2021-02-24 RX ORDER — FINASTERIDE 5 MG/1
5 TABLET, FILM COATED ORAL DAILY
Status: CANCELLED | OUTPATIENT
Start: 2021-02-24

## 2021-02-24 RX ORDER — ACETAMINOPHEN 650 MG/1
650 SUPPOSITORY RECTAL EVERY 6 HOURS PRN
Status: CANCELLED | OUTPATIENT
Start: 2021-02-24

## 2021-02-24 RX ORDER — MAGNESIUM SULFATE IN WATER 40 MG/ML
2000 INJECTION, SOLUTION INTRAVENOUS PRN
Status: CANCELLED | OUTPATIENT
Start: 2021-02-24

## 2021-02-24 RX ORDER — GABAPENTIN 600 MG/1
600 TABLET ORAL 3 TIMES DAILY
Status: CANCELLED | OUTPATIENT
Start: 2021-02-24

## 2021-02-24 RX ORDER — PANTOPRAZOLE SODIUM 40 MG/1
40 TABLET, DELAYED RELEASE ORAL
Status: CANCELLED | OUTPATIENT
Start: 2021-02-25

## 2021-02-24 RX ORDER — POLYETHYLENE GLYCOL 3350 17 G/17G
17 POWDER, FOR SOLUTION ORAL DAILY PRN
Status: CANCELLED | OUTPATIENT
Start: 2021-02-24

## 2021-02-24 RX ORDER — POTASSIUM CHLORIDE 7.45 MG/ML
10 INJECTION INTRAVENOUS PRN
Status: CANCELLED | OUTPATIENT
Start: 2021-02-24

## 2021-02-24 RX ORDER — SODIUM CHLORIDE 0.9 % (FLUSH) 0.9 %
10 SYRINGE (ML) INJECTION EVERY 12 HOURS SCHEDULED
Status: CANCELLED | OUTPATIENT
Start: 2021-02-24

## 2021-02-24 RX ORDER — FUROSEMIDE 20 MG/1
40 TABLET ORAL DAILY
Status: CANCELLED | OUTPATIENT
Start: 2021-02-24

## 2021-02-24 NOTE — ED PROVIDER NOTES
HPI:  2/24/21,   Time: 10:52 AM ROSENDO Pollock is a 66 y.o. male with past medical history of CAD/MI s/p CABG in 2007, HFrEF (EF 30% in 2018) s/p ICD, HTN, HLD, DM2, GERD presenting to the ED for chest pain, beginning 1 week ago. The complaint has been persistent, mild in severity, and worsened by nothing. Patient states he has had ongoing burning chest pain for the past week. Chest pain is left sided under the nipple without radiation. Pain is unrelated to exertion and non-pleuritic in nature. He denies any shocks from his ICD. Patient does endorse history of GERD, however this pain has not been relieved with PPIs or antacids. Denies any shortness of breath, abdominal pain, nausea/vomiting, constipation/diarrhea. Review of Systems:   A complete review of systems was performed and pertinent positives and negatives are stated within HPI, all other systems reviewed and are negative.          --------------------------------------------- PAST HISTORY ---------------------------------------------  Past Medical History:  has a past medical history of CAD (coronary artery disease), Cardiomyopathy ([facility] [address] CHF (congestive heart failure) ([facility] [address] Diabetes mellitus ([facility] [address] Diverticulitis, Hyperlipidemia, Hypertension, MI, old, Pancreatitis, Prostate cancer ([facility] [address] S/P CABG x 4, Skin cancer, and Ventricular tachycardia ([facility] [address] Past Surgical History:  has a past surgical history that includes Kidney surgery; Bladder surgery; Elbow surgery; Coronary artery bypass graft (12/24/2007); Colonoscopy; hernia repair; Cardiac pacemaker placement (04/2010); Cardiac defibrillator placement (04/19/2018); and Skin cancer excision (2019). Social History:  reports that he has never smoked. He has never used smokeless tobacco. He reports that he does not drink alcohol or use drugs. Family History: family history includes Cancer in his father. The patients home medications have been reviewed.     Allergies: Patient has no known allergies. ---------------------------------------------------PHYSICAL EXAM--------------------------------------    Constitutional/General: Alert and oriented x3, well appearing, non toxic in NAD  Head: Normocephalic and atraumatic  Eyes: PERRL, EOMI, conjunctive normal, sclera non icteric  Mouth: Oropharynx clear, handling secretions, no trismus, no asymmetry of the posterior oropharynx or uvular edema  Neck: Supple, full ROM, non tender to palpation in the midline, no stridor, no crepitus, no meningeal signs  Respiratory: Lungs clear to auscultation bilaterally, no wheezes, rales, or rhonchi. Not in respiratory distress  Cardiovascular:  Regular rate. Regular rhythm. No murmurs, gallops, or rubs. 2+ distal pulses  Chest: No chest wall tenderness  GI:  Abdomen Soft, Non tender, Non distended. +BS. No organomegaly, no palpable masses,  No rebound, guarding, or rigidity. Musculoskeletal: Moves all extremities x 4. Warm and well perfused, no clubbing, cyanosis, or edema. Capillary refill <3 seconds  Integument: skin warm and dry. Crusted, vesicular lesions along L inframammary fold extending to L subscapular region, no surrounding purulence or erythema  Lymphatic: no lymphadenopathy noted  Neurologic: GCS 15, no focal deficits, symmetric strength 5/5 in the upper and lower extremities bilaterally  Psychiatric: Normal Affect    -------------------------------------------------- RESULTS -------------------------------------------------  I have personally reviewed all laboratory and imaging results for this patient. Results are listed below.      LABS:  Results for orders placed or performed during the hospital encounter of 02/24/21   CBC   Result Value Ref Range    WBC 5.9 4.5 - 11.5 E9/L    RBC 4.62 3.80 - 5.80 E12/L    Hemoglobin 12.9 12.5 - 16.5 g/dL    Hematocrit 41.1 37.0 - 54.0 %    MCV 89.0 80.0 - 99.9 fL    MCH 27.9 26.0 - 35.0 pg    MCHC 31.4 (L) 32.0 - 34.5 %    RDW 14.4 11.5 - 15.0 fL Platelets 067 057 - 351 E9/L    MPV 13.3 (H) 7.0 - 12.0 fL   Comprehensive Metabolic Panel   Result Value Ref Range    Sodium 138 132 - 146 mmol/L    Potassium 4.3 3.5 - 5.0 mmol/L    Chloride 104 98 - 107 mmol/L    CO2 21 (L) 22 - 29 mmol/L    Anion Gap 13 7 - 16 mmol/L    Glucose 110 (H) 74 - 99 mg/dL    BUN 34 (H) 8 - 23 mg/dL    CREATININE 1.4 (H) 0.7 - 1.2 mg/dL    GFR Non-African American 49 >=60 mL/min/1.73    GFR African American 59     Calcium 9.4 8.6 - 10.2 mg/dL    Total Protein 7.5 6.4 - 8.3 g/dL    Albumin 4.4 3.5 - 5.2 g/dL    Total Bilirubin 0.6 0.0 - 1.2 mg/dL    Alkaline Phosphatase 30 (L) 40 - 129 U/L    ALT 8 0 - 40 U/L    AST 26 0 - 39 U/L   Troponin   Result Value Ref Range    Troponin 0.03 0.00 - 0.03 ng/mL   Brain Natriuretic Peptide   Result Value Ref Range    Pro-BNP 6,024 (H) 0 - 450 pg/mL   Magnesium   Result Value Ref Range    Magnesium 2.2 1.6 - 2.6 mg/dL   D-dimer, quantitative   Result Value Ref Range    D-Dimer, Quant <200 ng/mL DDU       RADIOLOGY:  Interpreted by Radiologist.  XR CHEST PORTABLE   Final Result   Likely cardiomegaly. Pleural thickening on the left as before. Otherwise,   no definite new abnormal findings. EKG: This EKG is signed and interpreted by the EP. Time: 1020  Rate: 90  Rhythm: Sinus   Interpretation: 1st degree AVB  Comparison: None      ------------------------- NURSING NOTES AND VITALS REVIEWED ---------------------------   The nursing notes within the ED encounter and vital signs as below have been reviewed by myself. BP (!) 151/86   Pulse 100   Temp 96 °F (35.6 °C) (Tympanic)   Resp 18   Ht 5' 11\" (1.803 m)   Wt 194 lb (88 kg)   SpO2 97%   BMI 27.06 kg/m²   Oxygen Saturation Interpretation: Normal    The patients available past medical records and past encounters were reviewed.         ------------------------------ ED COURSE/MEDICAL DECISION MAKING----------------------  Medications   aspirin EC tablet 325 mg (325 mg Oral Not Given 2/24/21 1105)   nitroGLYCERIN (NITROSTAT) SL tablet 0.4 mg (has no administration in time range)         ED COURSE:       Medical Decision Making:    Patient presenting with atypical chest pain. Initial EKG unremarkable, negative for any acute ST-T changes. Initial troponin 0.03. D dimer checked due to sinus tachycardia and atypical nature of chest pain, negative. CXR resulted negative for acute cardiopulmonary process. Due to extensive cardiac history and multiple comorbidities/risk factors, HEART score of 4, patient will be admitted for further ischemic workup. Case discussed with internal medicine, they will admit the patient. This patient's ED course included: a personal history and physicial examination, re-evaluation prior to disposition, IV medications and cardiac monitoring    This patient has remained hemodynamically stable during their ED course. Re-Evaluations:             Re-evaluation. Patients symptoms are improving    Re-examination  2/24/21   10:52 AM EST          Vital Signs:   Vitals:    02/24/21 1001 02/24/21 1016   BP:  (!) 151/86   Pulse: 120 100   Resp:  18   Temp: 96 °F (35.6 °C) 96 °F (35.6 °C)   TempSrc:  Tympanic   SpO2: 94% 97%   Weight:  194 lb (88 kg)   Height:  5' 11\" (1.803 m)     Card/Pulm:  Rhythm: normal rate. Heart Sounds: Normal S1, S2 and no murmurs, gallops, or rubs. clear to auscultation, no wheezes or rales and unlabored breathing. Capillary Refill: normal.  Radial Pulse:  present 2+. Skin:  Dry and Warm. Consultations:             Internal Medicine - Dr. Leni Abel      Counseling: The emergency provider has spoken with the patient and discussed todays results, in addition to providing specific details for the plan of care and counseling regarding the diagnosis and prognosis.   Questions are answered at this time and they are agreeable with the plan.       --------------------------------- IMPRESSION AND DISPOSITION ---------------------------------    IMPRESSION  1. Chest pain, unspecified type        DISPOSITION  Disposition: Admit to telemetry  Patient condition is stable    NOTE: This report was transcribed using voice recognition software.  Every effort was made to ensure accuracy; however, inadvertent computerized transcription errors may be present      Tung Banks MD  Resident  02/24/21 [id number]

## 2021-02-24 NOTE — TELEPHONE ENCOUNTER
Voicemail from patient c/o pain related to device - see previous notes from Dr. Nyla Muse office. Called back and left v/m for patient regarding sending a remote tx with merlin monitor but reinforcing that with active chest pain - we recommend going to the ER. Left call back extension.      Juliet Lopez RN, BSN  [place]

## 2021-02-24 NOTE — Clinical Note
Patient Class: Inpatient [101]   REQUIRED: Diagnosis: Chest pain, atypical [625385]   Estimated Length of Stay: Estimated stay of less than 2 midnights   Telemetry Bed Required?: Yes

## 2021-02-24 NOTE — TELEPHONE ENCOUNTER
ER if worsens, OV tomorrow if not. Abraham Shah D.O.   Cardiologist  Cardiology, [facility]

## 2021-02-24 NOTE — TELEPHONE ENCOUNTER
Patient states that he is having severe  chest pain and burning sensation that he never experienced before, I instructed him to go to the ER but he refuses saying he doesn't trust the doctors at the hospital, he says he has been trying to reach the device clinic to do a remote check.

## 2021-02-24 NOTE — H&P
Hospitalist History & Physical      PCP: Anabel Morales DO    Date of Admission: 2/24/2021    Date of Service: Pt seen/examined on 2/24/2021     Chief Complaint:  had concerns including Chest Pain (nonradiating midsternal chest pain onset 1wk ago; Shortness of breath; denies abdominal pain, n/v/d). History Of Present Illness:    Mr. Anyi Gleasno, a 66y.o. year old male  who  has a past medical history of CAD (coronary artery disease), Cardiomyopathy ([address] CHF (congestive heart failure) ([address] Diabetes mellitus ([address] Diverticulitis, Hyperlipidemia, Hypertension, MI, old, Pancreatitis, Prostate cancer ([facility] S/P CABG x 4, Skin cancer, and Ventricular tachycardia ([facility] Patient presents emergency department complaints of chest pain. Began about 1 week ago. Persistent. Worsened by nothing. Scribes it is ongoing burning. No radiation. Oxygen dependent at baseline. Has been feeling more short of breath. Denies Nausea, vomiting, fever, chills, constipation, diarrhea. Vital signs assessed emergency department the patient be tachycardic with a rate in the 120s. Hypertensive with blood pressure 151/86. Remainder vital signs within normal limits and stable. Physical exam unremarkable. Chest x-ray shows cardiomegaly. EKG reveals sinus rhythm, first-degree AVB. Laboratory studies notable for creatinine 1.4, proBNP 6024, troponin 0.03. Patient will be admitted for further ischemic work-up.     Past Medical History:   Diagnosis Date    CAD (coronary artery disease)     Cardiomyopathy ([address]     CHF (congestive heart failure) (HCC)     Diabetes mellitus ([facility]     Diverticulitis     Hyperlipidemia     Hypertension     MI, old     Pancreatitis     Prostate cancer ([facility]     prostate    S/P CABG x 4     Skin cancer     Ventricular tachycardia [facility])        Past Surgical History:   Procedure Laterality Date    BLADDER SURGERY      CARDIAC DEFIBRILLATOR PLACEMENT  04/19/2018    D[facility] GENT CHANGE [facility]    CARDIAC PACEMAKER PLACEMENT  04/2010    [facility]     COLONOSCOPY      CORONARY ARTERY BYPASS GRAFT  12/24/2007    ELBOW SURGERY      HERNIA REPAIR      KIDNEY SURGERY      SKIN CANCER EXCISION  2019    nose and cheek       Prior to Admission medications    Medication Sig Start Date End Date Taking? Authorizing Provider   nitroGLYCERIN (NITROSTAT) 0.4 MG SL tablet Place 1 tablet under the tongue every 5 minutes as needed for Chest pain 12/28/20   Jerardo Low MD   isosorbide mononitrate (IMDUR) 120 MG extended release tablet Take 1 tablet by mouth daily 11/3/20   Azeem Potter DO   sucralfate (CARAFATE) 1 GM tablet Take 1 g by mouth 2 times daily  8/3/20   Historical Provider, MD   naproxen (NAPROSYN) 500 MG tablet Take 1 tablet by mouth 2 times daily for 7 days 9/24/19 10/1/19  Venus Pedersen,    Omega-3 Fatty Acids (FISH OIL PO) Take by mouth 2 times daily Indications: unknown of dosage    Historical Provider, MD   acetaminophen (TYLENOL 8 HOUR ARTHRITIS PAIN) 650 MG extended release tablet Take 650 mg by mouth every 8 hours as needed for Pain    Historical Provider, MD   ONE TOUCH ULTRA TEST strip TEST once daily 2/22/17   Historical Provider, MD   ONE TOUCH ULTRASOFT LANCETS MISC use 1 once daily 2/22/17   Historical Provider, MD   OXYGEN Inhale 3 L into the lungs as needed    Historical Provider, MD   finasteride (PROSCAR) 5 MG tablet take 1 tablet by mouth once daily 1/3/17   Historical Provider, MD   fenofibrate (TRICOR) 145 MG tablet Take 145 mg by mouth daily  8/30/16   Historical Provider, MD   glipiZIDE (GLUCOTROL) 5 MG tablet Take 5 mg by mouth 2 times daily (before meals)  8/30/16   Historical Provider, MD   aspirin 81 MG tablet Take 81 mg by mouth daily. Historical Provider, MD   gabapentin (NEURONTIN) 600 MG tablet Take 600 mg by mouth 3 times daily. Historical Provider, MD   tamsulosin (FLOMAX) 0.4 MG capsule Take 0.4 mg by mouth daily.     Historical Provider, MD   furosemide (LASIX) 40 MG tablet Take 40 mg by mouth daily. Historical Provider, MD   omeprazole (PRILOSEC) 20 MG capsule Take 40 mg by mouth 2 times daily     Historical Provider, MD   ramipril (ALTACE) 5 MG capsule Take 5 mg by mouth 2 times daily. Historical Provider, MD         Allergies:  Patient has no known allergies. Social History:    TOBACCO:   reports that he has never smoked. He has never used smokeless tobacco.  ETOH:   reports no history of alcohol use. Family History:    Reviewed in detail and negative for DM, CAD, Cancer, CVA. Positive as follows\"      Problem Relation Age of Onset    Cancer Father         liver       REVIEW OF SYSTEMS:   Pertinent positives as noted in the HPI. All other systems reviewed and negative. PHYSICAL EXAM:  BP (!) 151/86   Pulse 100   Temp 96 °F (35.6 °C) (Tympanic)   Resp 18   Ht 5' 11\" (1.803 m)   Wt 194 lb (88 kg)   SpO2 97%   BMI 27.06 kg/m²   General appearance: No apparent distress, appears stated age and cooperative. HEENT: Normal cephalic, atraumatic without obvious deformity. Pupils equal, round, and reactive to light. Extra ocular muscles intact. Conjunctivae/corneas clear. Neck: Supple, with full range of motion. No jugular venous distention. Trachea midline. Respiratory: Clear to auscultation bilaterally  Cardiovascular: Tachycardia  Abdomen: Soft, nontender, nondistended  Musculoskeletal: No clubbing, cyanosis, edema of bilateral lower extremities. Brisk capillary refill. Skin: Normal skin color. No rashes or lesions. Neurologic:  Neurovascularly intact without any focal sensory/motor deficits.  Cranial nerves: II-XII intact, grossly non-focal.    Reviewed EKG and CXR personally      CBC:   Recent Labs     02/24/21  1107   WBC 5.9   RBC 4.62   HGB 12.9   HCT 41.1   MCV 89.0   RDW 14.4        BMP:   Recent Labs     02/24/21  1107      K 4.3      CO2 21*   BUN 34*   CREATININE 1.4*   MG 2.2 LFT:  Recent Labs     02/24/21  1107   PROT 7.5   ALKPHOS 30*   ALT 8   AST 26   BILITOT 0.6     CE:  Recent Labs     02/24/21  1107   TROPONINI 0.03     PT/INR: No results for input(s): INR, APTT in the last 72 hours. BNP: No results for input(s): BNP in the last 72 hours. ESR: No results found for: SEDRATE  CRP: No results found for: CRP  D Dimer:   Lab Results   Component Value Date    DDIMER <200 02/24/2021      Folate and B12: No results found for: COELTVGL45, No results found for: FOLATE  Lactic Acid:   Lab Results   Component Value Date    LACTA 1.2 09/25/2018     Thyroid Studies:   Lab Results   Component Value Date    TSH 1.800 07/31/2020    J1EADQR 7.9 10/10/2011       Oupatient labs:  Lab Results   Component Value Date    CHOL 296 (H) 08/12/2014    TRIG 1,001 (H) 08/12/2014    HDL 25 08/12/2014    LDLCALC - (AA) 08/12/2014    TSH 1.800 07/31/2020    PSA <0.03 07/31/2020    INR 1.2 04/08/2014    LABA1C 6.3 (H) 07/31/2020       Urinalysis:    Lab Results   Component Value Date    NITRU Negative 09/25/2018    WBCUA 10-20 08/02/2012    BACTERIA RARE 08/02/2012    RBCUA >20 08/02/2012    BLOODU Negative 09/25/2018    SPECGRAV 1.025 09/25/2018    GLUCOSEU Negative 09/25/2018       Imaging:  Xr Chest Portable    Result Date: 2/24/2021  EXAMINATION: ONE XRAY VIEW OF THE CHEST 2/24/2021 10:01 am COMPARISON: 19 April 2018 HISTORY: ORDERING SYSTEM PROVIDED HISTORY: CP TECHNOLOGIST PROVIDED HISTORY: Reason for exam:->CP What reading provider will be dictating this exam?->CRC FINDINGS: Stable postoperative changes with left-sided pacemaker. Left heart border is somewhat obscured by pleural thickening on the left. The heart is estimated to be somewhat enlarged. The pulmonary vascularity is normal.    Likely cardiomegaly. Pleural thickening on the left as before. Otherwise, no definite new abnormal findings.       ASSESSMENT:  Chest pain, rule out ACS  History of coronary artery disease, s/p CABG  Congestive heart failure, EF 30% (2018)  Chronically oxygen dependent  CKD stage III  Hypertension  Hyperlipidemia  Type 2 diabetes      PLAN:  Admit to medicine service  Telemetry  Cycle serial troponins  Echocardiogram  Nuclear stress test  Aspirin, atorvastatin, Lovenox  Resume home medications        Diet: No diet orders on file  Code Status: Prior  Surrogate decision maker confirmed with patient:   Extended Emergency Contact Information  Primary Emergency Contact: Gianfranco Williamson, [address] [facility] Phone: [phone]  Relation: Child  Secondary Emergency Contact: Claudette Beal, [address] Phone: [phone]  Mobile Phone: [phone]  Relation: Other    DVT Prophylaxis: []Lovenox []Heparin []PCD [] [address] []Encouraged ambulation  Disposition: []Med/Surg [] Intermediate [] ICU/CCU  Admit status: [] Observation [] Inpatient     +++++++++++++++++++++++++++++++++++++++++++++++++  [address], [place]  +++++++++++++++++++++++++++++++++++++++++++++++++  NOTE: This report was transcribed using voice recognition software. Every effort was made to ensure accuracy; however, inadvertent computerized transcription errors may be present.

## 2021-02-25 ENCOUNTER — TELEPHONE (OUTPATIENT)
Dept: CARDIOLOGY CLINIC | Age: 76
End: 2021-02-25

## 2021-02-25 ENCOUNTER — TELEPHONE (OUTPATIENT)
Dept: CARDIOLOGY | Age: 76
End: 2021-02-25

## 2021-02-25 DIAGNOSIS — R07.9 CHEST PAIN, UNSPECIFIED TYPE: Primary | ICD-10-CM

## 2021-02-25 DIAGNOSIS — R06.02 SHORTNESS OF BREATH: ICD-10-CM

## 2021-02-25 NOTE — TELEPHONE ENCOUNTER
SCHEDULED STRESS TEST FOR 03-05-21. REVIEWED COVID CHECKLIST WITH PATIENT.     Electronically signed by Dixie Rodriguez on 2/25/2021 at 2:11 PM

## 2021-02-25 NOTE — TELEPHONE ENCOUNTER
Please arrange a pharm stress. Rakesh Helms D.O.   Cardiologist  Cardiology, [facility]

## 2021-02-25 NOTE — TELEPHONE ENCOUNTER
Patient went to ER yesterday for chest pain but left before treatment was complete, he called today and asked for a stress test, please advise

## 2021-03-03 ENCOUNTER — TELEPHONE (OUTPATIENT)
Dept: CARDIOLOGY | Age: 76
End: 2021-03-03

## 2021-03-03 NOTE — TELEPHONE ENCOUNTER
3-3-21 @ 5275. Spoke with the patient with a reminder regarding his stress test on 3-5-21 @ 0700. Instructions given including holding his Imdur for 24 hours and not to take his diabetic medications since he is not to eat after midnight. He states he was diagnosed with shingles on his chest last Friday 2-26-21 but he denies any open sores and says that he never had any. He says that his family doctor caught it in time and he never had any sores. He denies any signs/symptoms of the Covid. He voiced understanding to all instructions.   Jossy Thornton RN

## 2021-03-05 ENCOUNTER — HOSPITAL ENCOUNTER (OUTPATIENT)
Dept: CARDIOLOGY | Age: 76
Discharge: HOME | End: 2021-03-05
Payer: MEDICARE

## 2021-03-05 ENCOUNTER — TELEPHONE (OUTPATIENT)
Dept: CARDIOLOGY | Age: 76
End: 2021-03-05

## 2021-03-05 DIAGNOSIS — R07.9 CHEST PAIN, UNSPECIFIED TYPE: Primary | ICD-10-CM

## 2021-03-05 DIAGNOSIS — R06.02 SHORTNESS OF BREATH: ICD-10-CM

## 2021-03-05 RX ORDER — SODIUM CHLORIDE 0.9 % (FLUSH) 0.9 %
10 SYRINGE (ML) INJECTION PRN
Status: DISCONTINUED | OUTPATIENT
Start: 2021-03-05 | End: 2021-03-05

## 2021-03-05 NOTE — TELEPHONE ENCOUNTER
patient does not want to have testing done    Electronically signed by Kerry Cannon on 3/5/2021 at 8:03 AM

## 2021-03-05 NOTE — PROCEDURES
3-5-21 @ 0700. Patient here for Lexiscan stress test ordered by Dr. Lyssa Hall. Patient received complete instructions when the test was scheduled. Patient saying he refuses to have anything injected in him and doesn't want to stay here for 3 hours to have this test done. He says he was diagnosed last Friday with shingles across his chest so he feels that was the cause of his chest pain. He says he does not have any sores on his chest. He says he feels if he has this test done it will harm him even more because he already has a bad heart. We told him we will cancel his test today and let Dr. Lyssa Hall know. He voiced understanding.   Hernan Silver RN

## 2021-03-08 ENCOUNTER — TELEPHONE (OUTPATIENT)
Dept: CARDIOLOGY CLINIC | Age: 76
End: 2021-03-08

## 2021-03-08 RX ORDER — NITROGLYCERIN 0.4 MG/1
0.4 TABLET SUBLINGUAL EVERY 5 MIN PRN
Qty: 25 TABLET | Refills: 0 | Status: SHIPPED
Start: 2021-03-08 | End: 2021-05-06 | Stop reason: SDUPTHER

## 2021-03-08 NOTE — TELEPHONE ENCOUNTER
Patient requested a refill for nitrostat, a Refill was sent and patient was instructed to go to the ER if he has chest pain. He understood instructions. FAMILY HISTORY:  Father  Still living? No  Family history of lung cancer, Age at diagnosis: Age Unknown    Mother  Still living? Unknown  Family history of CHF (congestive heart failure), Age at diagnosis: Age Unknown

## 2021-04-28 RX ORDER — ISOSORBIDE MONONITRATE 120 MG/1
TABLET, EXTENDED RELEASE ORAL
Qty: 30 TABLET | Refills: 5 | Status: SHIPPED
Start: 2021-04-28 | End: 2021-10-14

## 2021-05-06 ENCOUNTER — TELEPHONE (OUTPATIENT)
Dept: CARDIOLOGY CLINIC | Age: 76
End: 2021-05-06

## 2021-05-06 RX ORDER — NITROGLYCERIN 0.4 MG/1
0.4 TABLET SUBLINGUAL EVERY 5 MIN PRN
Qty: 25 TABLET | Refills: 0 | Status: SHIPPED
Start: 2021-05-06 | End: 2021-06-21 | Stop reason: SDUPTHER

## 2021-06-14 ENCOUNTER — OFFICE VISIT (OUTPATIENT)
Dept: CARDIOLOGY CLINIC | Age: 76
End: 2021-06-14
Payer: MEDICARE

## 2021-06-14 VITALS
HEIGHT: 69 IN | SYSTOLIC BLOOD PRESSURE: 120 MMHG | BODY MASS INDEX: 26.78 KG/M2 | RESPIRATION RATE: 16 BRPM | HEART RATE: 82 BPM | WEIGHT: 180.8 LBS | DIASTOLIC BLOOD PRESSURE: 60 MMHG

## 2021-06-14 DIAGNOSIS — I25.119 CORONARY ARTERY DISEASE WITH ANGINA PECTORIS, UNSPECIFIED VESSEL OR LESION TYPE, UNSPECIFIED WHETHER NATIVE OR TRANSPLANTED HEART (HCC): Primary | ICD-10-CM

## 2021-06-14 PROCEDURE — G8417 CALC BMI ABV UP PARAM F/U: HCPCS | Performed by: INTERNAL MEDICINE

## 2021-06-14 PROCEDURE — 93000 ELECTROCARDIOGRAM COMPLETE: CPT | Performed by: INTERNAL MEDICINE

## 2021-06-14 PROCEDURE — 99214 OFFICE O/P EST MOD 30 MIN: CPT | Performed by: INTERNAL MEDICINE

## 2021-06-14 PROCEDURE — G8427 DOCREV CUR MEDS BY ELIG CLIN: HCPCS | Performed by: INTERNAL MEDICINE

## 2021-06-14 PROCEDURE — 1123F ACP DISCUSS/DSCN MKR DOCD: CPT | Performed by: INTERNAL MEDICINE

## 2021-06-14 PROCEDURE — 4040F PNEUMOC VAC/ADMIN/RCVD: CPT | Performed by: INTERNAL MEDICINE

## 2021-06-14 PROCEDURE — 1036F TOBACCO NON-USER: CPT | Performed by: INTERNAL MEDICINE

## 2021-06-14 RX ORDER — OMEPRAZOLE 40 MG/1
40 CAPSULE, DELAYED RELEASE ORAL 2 TIMES DAILY
Status: ON HOLD | COMMUNITY
Start: 2021-04-26 | End: 2022-07-23 | Stop reason: SDUPTHER

## 2021-06-14 NOTE — PROGRESS NOTES
CHIEF COMPLAINT: CAD/CHF/CMP/ICD    HISTORY OF PRESENT ILLNESS: Patient is a 66 y.o. male seen at the request of Dano Carlos DO. Patient previously following with Dr. Katerin Zepeda. History of CABG x 5 in 14/64/2599 with very complicated recovery. He also has ICD. Recent cath at [facility] with recommendation for medical management. Some SOB. No CP.      Past Medical History:   Diagnosis Date    CAD (coronary artery disease)     Cardiomyopathy ([address]     CHF (congestive heart failure) (HCC)     Diabetes mellitus ([address]     Diverticulitis     Hyperlipidemia     Hypertension     MI, old     Pancreatitis     Prostate cancer ([address]     prostate    S/P CABG x 4     Skin cancer     Ventricular tachycardia (HCC)        Patient Active Problem List   Diagnosis    CHF (congestive heart failure) (HCC)    S/P CABG x 4    CAD (coronary artery disease)    MI, old    Hyperlipidemia    Hypertension    Cardiomyopathy ([address]    Ventricular tachycardia ([address]    Pancreatitis    AICD (automatic cardioverter/defibrillator) present    Implantable cardioverter-defibrillator (ICD) at end of device life    Chest pain, atypical       No Known Allergies    Current Outpatient Medications   Medication Sig Dispense Refill    omeprazole (PRILOSEC) 40 MG delayed release capsule 40 mg 2 times daily       nitroGLYCERIN (NITROSTAT) 0.4 MG SL tablet Place 1 tablet under the tongue every 5 minutes as needed for Chest pain 25 tablet 0    isosorbide mononitrate (IMDUR) 120 MG extended release tablet TAKE 1 TABLET BY MOUTH EVERY DAY 30 tablet 5    sucralfate (CARAFATE) 1 GM tablet Take 1 g by mouth 2 times daily       naproxen (NAPROSYN) 500 MG tablet Take 1 tablet by mouth 2 times daily for 7 days 14 tablet 0    Omega-3 Fatty Acids (FISH OIL PO) Take by mouth 2 times daily Indications: unknown of dosage      acetaminophen (TYLENOL 8 HOUR ARTHRITIS PAIN) 650 MG extended release tablet Take 650 mg by mouth every 8 hours as needed for Pain      ONE TOUCH ULTRA TEST strip TEST once daily  0    ONE TOUCH ULTRASOFT LANCETS MISC use 1 once daily  0    OXYGEN Inhale 3 L into the lungs as needed      finasteride (PROSCAR) 5 MG tablet take 1 tablet by mouth once daily  0    fenofibrate (TRICOR) 145 MG tablet Take 145 mg by mouth daily   0    glipiZIDE (GLUCOTROL) 5 MG tablet Take 5 mg by mouth 2 times daily (before meals)   0    aspirin 81 MG tablet Take 81 mg by mouth daily.  gabapentin (NEURONTIN) 600 MG tablet Take 600 mg by mouth 3 times daily.  tamsulosin (FLOMAX) 0.4 MG capsule Take 0.4 mg by mouth daily.  furosemide (LASIX) 40 MG tablet Take 40 mg by mouth 2 times daily       omeprazole (PRILOSEC) 40 MG delayed release capsule Take 40 mg by mouth 2 times daily       ramipril (ALTACE) 5 MG capsule Take 5 mg by mouth 2 times daily. No current facility-administered medications for this visit. Social History     Socioeconomic History    Marital status:      Spouse name: Not on file    Number of children: Not on file    Years of education: Not on file    Highest education level: Not on file   Occupational History    Not on file   Tobacco Use    Smoking status: Never Smoker    Smokeless tobacco: Never Used    Tobacco comment: used to smoke occ cigar   Vaping Use    Vaping Use: Never used   Substance and Sexual Activity    Alcohol use: No    Drug use: No    Sexual activity: Not Currently   Other Topics Concern    Not on file   Social History Narrative    Not on file     Social Determinants of Health     Financial Resource Strain:     Difficulty of Paying Living Expenses:    Food Insecurity:     Worried About Running Out of Food in the Last Year:     [address] in the Last Year:    Transportation Needs:     Lack of Transportation (Medical):      Lack of Transportation (Non-Medical):    Physical Activity:     Days of Exercise per Week:     Minutes of Exercise per Session: Stress:     Feeling of Stress :    Social Connections:     Frequency of Communication with Friends and Family:     Frequency of Social Gatherings with Friends and Family:     Attends Judaism Services:     Active Member of Clubs or Organizations:     Attends Club or Organization Meetings:     Marital Status:    Intimate Partner Violence:     Fear of Current or Ex-Partner:     Emotionally Abused:     Physically Abused:     Sexually Abused:        Family History   Problem Relation Age of Onset    Cancer Father         liver     Review of Systems:  Heart: as above   Lungs: as above   Eyes: denies changes in vision or discharge. Ears: denies changes in hearing or pain. Nose: denies epistaxis or masses   Throat: denies sore throat or trouble swallowing. Neuro: denies numbness, tingling, tremors. Skin: denies rashes or itching. : denies hematuria, dysuria   GI: denies vomiting, diarrhea   Psych: denies mood changed, anxiety, depression. All other systems negative. Physical Exam   /60   Pulse 82   Resp 16   Ht 5' 9\" (1.753 m)   Wt 180 lb 12.8 oz (82 kg)   BMI 26.70 kg/m²   Constitutional: Oriented to person, place, and time. Well-developed and well-nourished. No distress. Head: Normocephalic and atraumatic. Eyes: EOM are normal. Pupils are equal, round, and reactive to light. Neck: Normal range of motion. Neck supple. No hepatojugular reflux and no JVD present. Carotid bruit is not present. No tracheal deviation present. No thyromegaly present. Cardiovascular: Normal rate, regular rhythm, normal heart sounds and intact distal pulses. Exam reveals no gallop and no friction rub. No murmur heard. Pulmonary/Chest: Effort normal and breath sounds normal. No respiratory distress. No wheezes. No rales. No tenderness. Abdominal: Soft. Bowel sounds are normal. No distension and no mass. No tenderness. No rebound and no guarding. Musculoskeletal: Normal range of motion.  No portion from SVG->RI graft. RCA:   _ RCA Status: Not Applicable.    Additional Comment: Occluded at the ostia. GRAFTS:   _ Left Internal Mammary Artery Graft End to Side to the Left Anterior Descending   . Additional Comments - patent. _ Saphenous Vein Graft End to Side to the Ramus Intermedius . Additional   Comments - patent with mild-moderate disease in the mid-portion; supplies   mid-distal LCx and backfills to partially supply LAD. _ Saphenous Vein Graft End to Side to the OM-2 Second Obtuse Marginal Branch .  Additional Comments - occluded. _ Saphenous Vein Graft End to Side to the Right Posterior Descending Artery .   Additional Comments - occluded. Impression:- Severe/occlusive native 3 vessel CAD with patent LIMA->LAD, SVG->RI/OM1. SVG->OM2 is occluded   - Native RCA is occluded at the ostia as is the SVG->rPDA. The right is fed by L->R collaterals from the LIMA graft     Recommended Treatment: Medical Therapy. Plan: - Recommend aggressive medical therapy for severe coronary artery disease   - start high-intensity statin   - maximize anti-anginal and heart failure therapy     ASSESSMENT AND PLAN:  Patient Active Problem List   Diagnosis    [facility]    S/P CABG x 4    CAD (coronary artery disease)    MI, old    Hyperlipidemia    Hypertension    Cardiomyopathy ([address]    Ventricular tachycardia ([address]    Pancreatitis    AICD (automatic cardioverter/defibrillator) present    Implantable cardioverter-defibrillator (ICD) at end of device life    Chest pain, atypical     1. CAD-CABG: ASA. Intolerant to BB. Intolerant to statin. Cath at [facility] as above 3/15/2021. Medical management recommended. 2. CMP: Continue ACEI/lasix. Intolerant to BB. 3. HTN: Observe. 4. Lipids: Statin intolerant. 5. ICD: Sees EP. 6. CKD    7. DM    Madeleine Garcia D.O.   Cardiologist  Cardiology, [address] [facility]

## 2021-06-21 RX ORDER — NITROGLYCERIN 0.4 MG/1
0.4 TABLET SUBLINGUAL EVERY 5 MIN PRN
Qty: 25 TABLET | Refills: 0 | Status: SHIPPED
Start: 2021-06-21 | End: 2021-09-20 | Stop reason: SDUPTHER

## 2021-09-20 RX ORDER — NITROGLYCERIN 0.4 MG/1
0.4 TABLET SUBLINGUAL EVERY 5 MIN PRN
Qty: 25 TABLET | Refills: 0 | Status: SHIPPED
Start: 2021-09-20 | End: 2022-01-12 | Stop reason: SDUPTHER

## 2021-10-14 RX ORDER — ISOSORBIDE MONONITRATE 120 MG/1
TABLET, EXTENDED RELEASE ORAL
Qty: 30 TABLET | Refills: 5 | Status: SHIPPED
Start: 2021-10-14 | End: 2022-02-16

## 2022-01-12 RX ORDER — NITROGLYCERIN 0.4 MG/1
0.4 TABLET SUBLINGUAL EVERY 5 MIN PRN
Qty: 25 TABLET | Refills: 0 | Status: SHIPPED
Start: 2022-01-12 | End: 2022-04-26 | Stop reason: SDUPTHER

## 2022-02-16 RX ORDER — ISOSORBIDE MONONITRATE 120 MG/1
TABLET, EXTENDED RELEASE ORAL
Qty: 30 TABLET | Refills: 2 | Status: SHIPPED
Start: 2022-02-16 | End: 2022-05-31

## 2022-04-26 ENCOUNTER — OFFICE VISIT (OUTPATIENT)
Dept: CARDIOLOGY CLINIC | Age: 77
End: 2022-04-26
Payer: MEDICARE

## 2022-04-26 VITALS
SYSTOLIC BLOOD PRESSURE: 112 MMHG | DIASTOLIC BLOOD PRESSURE: 60 MMHG | HEIGHT: 70 IN | HEART RATE: 81 BPM | BODY MASS INDEX: 26.2 KG/M2 | WEIGHT: 183 LBS | RESPIRATION RATE: 18 BRPM

## 2022-04-26 DIAGNOSIS — I25.119 CORONARY ARTERY DISEASE WITH ANGINA PECTORIS, UNSPECIFIED VESSEL OR LESION TYPE, UNSPECIFIED WHETHER NATIVE OR TRANSPLANTED HEART (HCC): Primary | ICD-10-CM

## 2022-04-26 PROCEDURE — G8417 CALC BMI ABV UP PARAM F/U: HCPCS | Performed by: INTERNAL MEDICINE

## 2022-04-26 PROCEDURE — 1123F ACP DISCUSS/DSCN MKR DOCD: CPT | Performed by: INTERNAL MEDICINE

## 2022-04-26 PROCEDURE — 1036F TOBACCO NON-USER: CPT | Performed by: INTERNAL MEDICINE

## 2022-04-26 PROCEDURE — 93000 ELECTROCARDIOGRAM COMPLETE: CPT | Performed by: INTERNAL MEDICINE

## 2022-04-26 PROCEDURE — 99214 OFFICE O/P EST MOD 30 MIN: CPT | Performed by: INTERNAL MEDICINE

## 2022-04-26 PROCEDURE — G8427 DOCREV CUR MEDS BY ELIG CLIN: HCPCS | Performed by: INTERNAL MEDICINE

## 2022-04-26 PROCEDURE — 4040F PNEUMOC VAC/ADMIN/RCVD: CPT | Performed by: INTERNAL MEDICINE

## 2022-04-26 RX ORDER — RANOLAZINE 500 MG/1
500 TABLET, EXTENDED RELEASE ORAL 2 TIMES DAILY
Qty: 60 TABLET | Refills: 3 | Status: SHIPPED
Start: 2022-04-26 | End: 2022-06-30

## 2022-04-26 RX ORDER — NITROGLYCERIN 0.4 MG/1
0.4 TABLET SUBLINGUAL EVERY 5 MIN PRN
Qty: 25 TABLET | Refills: 3 | Status: ON HOLD
Start: 2022-04-26 | End: 2022-10-26 | Stop reason: HOSPADM

## 2022-04-26 NOTE — PROGRESS NOTES
CHIEF COMPLAINT: CAD/CHF/CMP/ICD    HISTORY OF PRESENT ILLNESS: Patient is a 64 y.o. male seen at the request of Shaina Newman DO. Patient previously following with Dr. Casper Fuentes. History of CABG x 5 in 34/60/3955 with very complicated recovery. He also has ICD. Recent cath at [facility] with recommendation for medical management. Some SOB. No CP. Some heartburn at night.      Past Medical History:   Diagnosis Date    CAD (coronary artery disease)     Cardiomyopathy ([address]     [facility]     Diabetes mellitus ([address]     Diverticulitis     Hyperlipidemia     Hypertension     MI, old     Pancreatitis     Pro[facility] ([facility]     prostate    S/P CABG x 4     Skin cancer     Ventricular tachycardia ([facility]        Patient Active Problem List   Diagnosis    CHF [facility]congestive heart failure) ([facility]    S/P CABG x 4    CAD (coronary artery disease)    MI, old    Hyperlipidemia    Hypertension    Cardiomyopathy ([facility]    Ventricular tachycardia ([address]    Pancreatitis    AICD (automatic cardioverter/defibrillator) present    Implantable cardioverter-defibrillator (ICD) at end of device life    Chest pain, atypical       No Known Allergies    Current Outpatient Medications   Medication Sig Dispense Refill    isosorbide mononitrate (IMDUR) 120 MG extended release tablet TAKE 1 TABLET BY MOUTH EVERY DAY 30 tablet 2    nitroGLYCERIN (NITROSTAT) 0.4 MG SL tablet Place 1 tablet under the tongue every 5 minutes as needed for Chest pain 25 tablet 0    omeprazole (PRILOSEC) 40 MG delayed release capsule 40 mg 2 times daily       sucralfate (CARAFATE) 1 GM tablet Take 1 g by mouth 4 times daily       acetaminophen (TYLENOL 8 HOUR ARTHRITIS PAIN) 650 MG extended release tablet Take 650 mg by mouth every 8 hours as needed for Pain      OXYGEN Inhale 4 L into the lungs as needed       fenofibrate (TRICOR) 145 MG tablet Take 145 mg by mouth daily   0    glipiZIDE (GLUCOTROL) 5 MG tablet Take 5 mg by mouth 2 times daily (before meals)   0    aspirin 81 MG tablet Take 81 mg by mouth daily.  gabapentin (NEURONTIN) 600 MG tablet Take 600 mg by mouth 3 times daily.  furosemide (LASIX) 40 MG tablet Take 40 mg by mouth 2 times daily       ramipril (ALTACE) 5 MG capsule Take 5 mg by mouth 2 times daily.  naproxen (NAPROSYN) 500 MG tablet Take 1 tablet by mouth 2 times daily for 7 days (Patient not taking: Reported on 4/26/2022) 14 tablet 0    Omega-3 Fatty Acids (FISH OIL PO) Take by mouth 2 times daily Indications: unknown of dosage (Patient not taking: Reported on 4/26/2022)      finasteride (PROSCAR) 5 MG tablet take 1 tablet by mouth once daily (Patient not taking: Reported on 4/26/2022)  0    tamsulosin (FLOMAX) 0.4 MG capsule Take 0.4 mg by mouth daily. (Patient not taking: Reported on 4/26/2022)      omeprazole (PRILOSEC) 40 MG delayed release capsule Take 40 mg by mouth 2 times daily  (Patient not taking: Reported on 4/26/2022)       No current facility-administered medications for this visit. Social History     Socioeconomic History    Marital status:       Spouse name: Not on file    Number of children: Not on file    Years of education: Not on file    Highest education level: Not on file   Occupational History    Not on file   Tobacco Use    Smoking status: Never Smoker    Smokeless tobacco: Never Used    Tobacco comment: used to smoke occ cigar   Vaping Use    Vaping Use: Never used   Substance and Sexual Activity    Alcohol use: No    Drug use: No    Sexual activity: Not Currently   Other Topics Concern    Not on file   Social History Narrative    Not on file     Social Determinants of Health     Financial Resource Strain:     Difficulty of Paying Living Expenses: Not on file   Food Insecurity:     Worried About Running Out of Food in the Last Year: Not on file    Marisa of Food in the Last Year: Not on file   Transportation Needs:     Lack of Transportation (Medical): Not on file    Lack of Transportation (Non-Medical): Not on file   Physical Activity:     Days of Exercise per Week: Not on file    Minutes of Exercise per Session: Not on file   Stress:     Feeling of Stress : Not on file   Social Connections:     Frequency of Communication with Friends and Family: Not on file    Frequency of Social Gatherings with Friends and Family: Not on file    Attends [employer] Services: Not on file    Active Member of [address] [place] [address] or Organizations: Not on file    Attends Club or Organization Meetings: Not on file    Marital Status: Not on file   Intimate Partner Violence:     Fear of Current or Ex-Partner: Not on file    Emotionally Abused: Not on file    Physically Abused: Not on file    Sexually Abused: Not on file   Housing Stability:     Unable to Pay for Housing in the Last Year: Not on file    Number of [place] in the Last Year: Not on file    Unstable Housing in the Last Year: Not on file       Family History   Problem Relation Age of Onset    Cancer Father         liver     Review of Systems:  Heart: as above   Lungs: as above   Eyes: denies changes in vision or discharge. Ears: denies changes in hearing or pain. Nose: denies epistaxis or masses   Throat: denies sore throat or trouble swallowing. Neuro: denies numbness, tingling, tremors. Skin: denies rashes or itching. : denies hematuria, dysuria   GI: denies vomiting, diarrhea   Psych: denies mood changed, anxiety, depression. All other systems negative. Physical Exam   /60   Pulse 81   Resp 18   Ht 5' 10\" (1.778 m)   Wt 183 lb (83 kg)   BMI 26.26 kg/m²   Constitutional: Oriented to person, place, and time. Well-developed and well-nourished. No distress. Head: Normocephalic and atraumatic. Eyes: EOM are normal. Pupils are equal, round, and reactive to light. Neck: Normal range of motion. Neck supple. No hepatojugular reflux and no JVD present. Carotid bruit is not present. No tracheal deviation present. No thyromegaly present. Cardiovascular: Normal rate, regular rhythm, normal heart sounds and intact distal pulses. Exam reveals no gallop and no friction rub. No murmur heard. Pulmonary/Chest: Effort normal and breath sounds normal. No respiratory distress. No wheezes. No rales. No tenderness. Abdominal: Soft. Bowel sounds are normal. No distension and no mass. No tenderness. No rebound and no guarding. Musculoskeletal: Normal range of motion. No edema and no tenderness. Lymphadenopathy:   No cervical adenopathy. No groin adenopathy. Neurological: Alert and oriented to person, place, and time. Skin: Skin is warm and dry. No rash noted. Not diaphoretic. No erythema. Psychiatric: Normal mood and affect. Behavior is normal.     EKG:  normal sinus rhythm, nonspecific ST and T waves changes, Diffuse inverted T waves. 06/27/2012 Echocardiogram: MIldly dilated LV; EF 35%; DDI; Mild RVE; Mod RVD; mild LAE; 1-2+ MR.    12/24/2007 Cardiac Catheterization:  Triple vessel disease including left main stenosis    12/24/2007 CABG:  LIMA - LAD; SVG - RI, OM1, OM2, PDA. Sternal rewiring Jan 2008.    04/2010 Device Implant:  Dr. Fran Cortez. Generator:  David Partida DR; model [id number], serial [id number]. Echocardiogram 3/14/21  There is a resting wall motion abnormality in the territory of the LAD and RCA. - The left ventricle is dilated. Left ventricular systolic function is severely decreased. EF = 26 ± 5% (2D biplane) Grade III left ventricular diastolic dysfunction.   - The right ventricle is normal in size. Right ventricular systolic function is moderately decreased. - The left atrial cavity is severely dilated. - The right atrial cavity is dilated. - There is moderately severe (3+) mitral valve regurgitation due to restricted leaflet motion likely related to ischemic heart disease.      Cath Summary 3/15/2021:  Coronary Anatomy: Right Dominant   LMT: _ The LMT has severe diffuse disease. LAD:   _ The LAD has severe diffuse disease.    Additional Comment: Moderate caliber vessel with  in the proximal portion. The  vessel is fed by patent LIMA graft. The distal portion is small in caliber and wraps the apex. There are L->R collaterals. LCX: _ The Circumflex has severe diffuse disease.    Additional Comment: Moderate caliber non-dominant vessel which is fed by SVG->RI/OM1.   RAMUS: _ The ostial Ramus - .    Additional Comment: Fills in the mid-distal portion from SVG->RI graft. RCA:   _ RCA Status: Not Applicable.    Additional Comment: Occluded at the ostia. GRAFTS:   _ Left Internal Mammary Artery Graft End to Side to the Left Anterior Descending   . Additional Comments - patent. _ Saphenous Vein Graft End to Side to the Ramus Intermedius . Additional   Comments - patent with mild-moderate disease in the mid-portion; supplies   mid-distal LCx and backfills to partially supply LAD. _ Saphenous Vein Graft End to Side to the OM-2 Second Obtuse Marginal Branch .  Additional Comments - occluded. _ Saphenous Vein Graft End to Side to the Right Posterior Descending Artery .   Additional Comments - occluded. Impression:- Severe/occlusive native 3 vessel CAD with patent LIMA->LAD, SVG->RI/OM1. SVG->OM2 is occluded   - Native RCA is occluded at the ostia as is the SVG->rPDA. The right is fed by L->R collaterals from the LIMA graft     Recommended Treatment: Medical Therapy.    Plan: - Recommend aggressive medical therapy for severe coronary artery disease   - start high-intensity statin   - maximize anti-anginal and heart failure therapy     ASSESSMENT AND PLAN:  Patient Active Problem List   Diagnosis    [facility]    S/P CABG x 4    CAD (coronary artery disease)    MI, old    Hyperlipidemia    Hypertension    Cardiomyopathy ([address]    Ventricular tachycardia ([address]    Pancreatitis    AICD (automatic cardioverter/defibrillator) present    Implantable cardioverter-defibrillator (ICD) at end of device life    Chest pain, atypical     1. CAD-CABG: ASA. Intolerant to BB. Intolerant to statin. Cath at [facility] as above 3/15/2021. Medical management recommended. ASA/nitrate. Intolerant to BB. Trial ranexa. 2. CMP: Continue ACEI/lasix. Intolerant to BB. 3. HTN: Observe. 4. Lipids: Statin intolerant. 5. ICD: Sees EP. 6. CKD    7. DM    Shine Machuca D.O.   Cardiologist  Cardiology, [facility]

## 2022-05-09 ENCOUNTER — TELEPHONE (OUTPATIENT)
Dept: CARDIOLOGY CLINIC | Age: 77
End: 2022-05-09

## 2022-05-09 RX ORDER — TORSEMIDE 20 MG/1
20 TABLET ORAL 2 TIMES DAILY
COMMUNITY
End: 2022-05-09 | Stop reason: SDUPTHER

## 2022-05-09 RX ORDER — TORSEMIDE 20 MG/1
20 TABLET ORAL 2 TIMES DAILY
Qty: 60 TABLET | Refills: 5 | Status: SHIPPED
Start: 2022-05-09 | End: 2022-06-30

## 2022-05-09 NOTE — TELEPHONE ENCOUNTER
Patient states that since he started Ranexa 6days ago he gained 5lbs, his abdomen is distended and he is having SOB, please advise

## 2022-05-09 NOTE — TELEPHONE ENCOUNTER
Patient says he is taking lasix 40mg bid, he started taking an extra dose and it is not helping, please advise

## 2022-05-31 RX ORDER — ISOSORBIDE MONONITRATE 120 MG/1
TABLET, EXTENDED RELEASE ORAL
Qty: 30 TABLET | Refills: 2 | Status: SHIPPED
Start: 2022-05-31 | End: 2022-06-30

## 2022-06-14 ENCOUNTER — TELEPHONE (OUTPATIENT)
Dept: CARDIOLOGY CLINIC | Age: 77
End: 2022-06-14

## 2022-06-14 NOTE — TELEPHONE ENCOUNTER
Patient called to cancel his OV, patient said he was seen at [facility] and a lot of his meds were changed so he will continue to f/u with [facility] and won't be scheduling with us.

## 2022-06-30 ENCOUNTER — OFFICE VISIT (OUTPATIENT)
Dept: CARDIOLOGY CLINIC | Age: 77
End: 2022-06-30
Payer: MEDICARE

## 2022-06-30 VITALS
RESPIRATION RATE: 16 BRPM | BODY MASS INDEX: 26.35 KG/M2 | SYSTOLIC BLOOD PRESSURE: 118 MMHG | HEART RATE: 74 BPM | WEIGHT: 177.9 LBS | HEIGHT: 69 IN | DIASTOLIC BLOOD PRESSURE: 60 MMHG

## 2022-06-30 DIAGNOSIS — I25.119 CORONARY ARTERY DISEASE WITH ANGINA PECTORIS, UNSPECIFIED VESSEL OR LESION TYPE, UNSPECIFIED WHETHER NATIVE OR TRANSPLANTED HEART (HCC): Primary | ICD-10-CM

## 2022-06-30 PROCEDURE — 99214 OFFICE O/P EST MOD 30 MIN: CPT | Performed by: INTERNAL MEDICINE

## 2022-06-30 PROCEDURE — 1036F TOBACCO NON-USER: CPT | Performed by: INTERNAL MEDICINE

## 2022-06-30 PROCEDURE — 1123F ACP DISCUSS/DSCN MKR DOCD: CPT | Performed by: INTERNAL MEDICINE

## 2022-06-30 PROCEDURE — G8427 DOCREV CUR MEDS BY ELIG CLIN: HCPCS | Performed by: INTERNAL MEDICINE

## 2022-06-30 PROCEDURE — 93000 ELECTROCARDIOGRAM COMPLETE: CPT | Performed by: INTERNAL MEDICINE

## 2022-06-30 PROCEDURE — G8417 CALC BMI ABV UP PARAM F/U: HCPCS | Performed by: INTERNAL MEDICINE

## 2022-06-30 RX ORDER — HYDRALAZINE HYDROCHLORIDE 25 MG/1
25 TABLET, FILM COATED ORAL 2 TIMES DAILY
Qty: 270 TABLET | Refills: 3 | Status: ON HOLD
Start: 2022-06-30 | End: 2022-10-26 | Stop reason: HOSPADM

## 2022-06-30 RX ORDER — TRAZODONE HYDROCHLORIDE 50 MG/1
TABLET ORAL
Status: ON HOLD | COMMUNITY
Start: 2022-06-21 | End: 2022-10-26 | Stop reason: HOSPADM

## 2022-06-30 RX ORDER — LINACLOTIDE 290 UG/1
CAPSULE, GELATIN COATED ORAL
Status: ON HOLD | COMMUNITY
Start: 2022-06-21 | End: 2022-10-12 | Stop reason: HOSPADM

## 2022-06-30 RX ORDER — TORSEMIDE 20 MG/1
20 TABLET ORAL DAILY
Qty: 90 TABLET | Refills: 3 | Status: ON HOLD
Start: 2022-06-30 | End: 2022-10-12 | Stop reason: HOSPADM

## 2022-06-30 RX ORDER — METOPROLOL SUCCINATE 25 MG/1
25 TABLET, EXTENDED RELEASE ORAL DAILY
Qty: 90 TABLET | Refills: 3 | Status: ON HOLD
Start: 2022-06-30 | End: 2022-10-12 | Stop reason: HOSPADM

## 2022-06-30 RX ORDER — ISOSORBIDE MONONITRATE 30 MG/1
30 TABLET, EXTENDED RELEASE ORAL DAILY
Qty: 90 TABLET | Refills: 3 | Status: ON HOLD
Start: 2022-06-30 | End: 2022-07-23 | Stop reason: HOSPADM

## 2022-06-30 NOTE — PROGRESS NOTES
CHIEF COMPLAINT: CAD/CHF/CMP/ICD    HISTORY OF PRESENT ILLNESS: Patient is a 64 y.o. male seen at the request of Sloane Flores DO. History of CABG x 5 in 27/24/3973 with very complicated recovery. He also has ICD. Cath at [facility] with recommendation for medical management. Some SOB. No CP. Past Medical History:   Diagnosis Date    CAD (coronary artery disease)     [facility]     [facility]     Diabetes mellitus [facility]     Diverticulitis     Hyperlipidemia     Hypertension     MI, old     Pancreatitis     Prostate cancer ([facility]     prostate    S/P CABG x 4     Skin cancer     Ventricular tachycardia ([facility])        Patient Active Problem List   Diagnosis    [facility] failure) ([facility]    S/P CABG x 4    CAD (coronary artery disease)    MI, old    Hyperlipidemia    Hypertension    Cardiomy[facility]    Ventricular tachycardia ([facility]    Pancreatitis    AICD (automatic cardioverter/defibrillator) present    Implantable cardioverter-defibrillator (ICD) at end of device life    Chest pain, atypical       No Known Allergies    Current Outpatient Medications   Medication Sig Dispense Refill    LINZESS 290 MCG CAPS capsule TAKE 1 CAPSULE BY MOUTH EVERY DAY      traZODone (DESYREL) 50 MG tablet TAKE 1 TABLET BY MOUTH AT BEDTIME      nitroGLYCERIN (NITROSTAT) 0.4 MG SL tablet Place 1 tablet under the tongue every 5 minutes as needed for Chest pain 25 tablet 3    omeprazole (PRILOSEC) 40 MG delayed release capsule 40 mg 2 times daily       acetaminophen (TYLENOL 8 HOUR ARTHRITIS PAIN) 650 MG extended release tablet Take 650 mg by mouth every 8 hours as needed for Pain      OXYGEN Inhale 4 L into the lungs as needed       finasteride (PROSCAR) 5 MG tablet take 1 tablet by mouth once daily  0    glipiZIDE (GLUCOTROL) 5 MG tablet Take 5 mg by mouth 2 times daily (before meals)   0    aspirin 81 MG tablet Take 81 mg by mouth daily.       tamsulosin (FLOMAX) 0.4 MG capsule Take 0.4 mg by mouth daily       omeprazole (PRILOSEC) 40 MG delayed release capsule Take 40 mg by mouth 2 times daily         No current facility-administered medications for this visit. Social History     Socioeconomic History    Marital status:      Spouse name: Not on file    Number of children: Not on file    Years of education: Not on file    Highest education level: Not on file   Occupational History    Not on file   Tobacco Use    Smoking status: Never Smoker    Smokeless tobacco: Never Used    Tobacco comment: used to smoke occ cigar   Vaping Use    Vaping Use: Never used   Substance and Sexual Activity    Alcohol use: No    Drug use: No    Sexual activity: Not Currently   Other Topics Concern    Not on file   Social History Narrative    Not on file     Social Determinants of Health     Financial Resource Strain:     Difficulty of Paying Living Expenses: Not on file   Food Insecurity:     Worried About Running Out of Food in the Last Year: Not on file    Marisa of Food in the Last Year: Not on file   Transportation Needs:     Lack of Transportation (Medical): Not on file    Lack of Transportation (Non-Medical):  Not on file   Physical Activity:     Days of Exercise per Week: Not on file    Minutes of Exercise per Session: Not on file   Stress:     Feeling of Stress : Not on file   Social Connections:     Frequency of Communication with Friends and Family: Not on file    Frequency of Social Gatherings with Friends and Family: Not on file    Attends [employer] Services: Not on file    Active Member of Clubs or Organizations: Not on file    Attends Club or Organization Meetings: Not on file    Marital Status: Not on file   Intimate Partner Violence:     Fear of Current or Ex-Partner: Not on file    Emotionally Abused: Not on file    Physically Abused: Not on file    Sexually Abused: Not on file   Housing Stability:     Unable to sinus rhythm, nonspecific ST and T waves changes, Diffuse inverted T waves. 06/27/2012 Echocardiogram: MIldly dilated LV; EF 35%; DDI; Mild RVE; Mod RVD; mild LAE; 1-2+ MR.    12/24/2007 Cardiac Catheterization:  Triple vessel disease including left main stenosis    12/24/2007 CABG:  LIMA - LAD; SVG - RI, OM1, OM2, PDA. Sternal rewiring Jan 2008.    04/2010 Device Implant:  Dr. Miguel Mccoy. Generator:  Claus Powell DR; model [id number], serial [id number]. Echocardiogram 3/14/21  There is a resting wall motion abnormality in the territory of the LAD and RCA. - The left ventricle is dilated. Left ventricular systolic function is severely decreased. EF = 26 ± 5% (2D biplane) Grade III left ventricular diastolic dysfunction.   - The right ventricle is normal in size. Right ventricular systolic function is moderately decreased. - The left atrial cavity is severely dilated. - The right atrial cavity is dilated. - There is moderately severe (3+) mitral valve regurgitation due to restricted leaflet motion likely related to ischemic heart disease. Cath Summary 3/15/2021:  Coronary Anatomy: Right Dominant   LMT: _ The LMT has severe diffuse disease. LAD: _ The LAD has severe diffuse disease.    Additional Comment: Moderate caliber vessel with  in the proximal portion. The  vessel is fed by patent LIMA graft. The distal portion is small in caliber and wraps the apex. There are L->R collaterals. LCX: _ The Circumflex has severe diffuse disease.    Additional Comment: Moderate caliber non-dominant vessel which is fed by SVG->RI/OM1.   RAMUS: _ The ostial Ramus - .    Additional Comment: Fills in the mid-distal portion from SVG->RI graft. RCA:_ RCA Status: Not Applicable.    Additional Comment: Occluded at the ostia. GRAFTS: _ Left Internal Mammary Artery Graft End to Side to the Left Anterior Descending   . Additional Comments - patent. _ Saphenous Vein Graft End to Side to the Ramus Intermedius .  Additional   Comments - patent with mild-moderate disease in the mid-portion; supplies   mid-distal LCx and backfills to partially supply LAD. _ Saphenous Vein Graft End to Side to the OM-2 Second Obtuse Marginal Branch .  Additional Comments - occluded. _ Saphenous Vein Graft End to Side to the Right Posterior Descending Artery .   Additional Comments - occluded. Impression:- Severe/occlusive native 3 vessel CAD with patent LIMA->LAD, SVG->RI/OM1. SVG->OM2 is occluded - Native RCA is occluded at the ostia as is the SVG->rPDA. The right is fed by L->R collaterals from the LIMA graft   Recommended Treatment: Medical Therapy. Plan: - Recommend aggressive medical therapy for severe coronary artery disease   - start high-intensity statin   - maximize anti-anginal and heart failure therapy     ECHO CONCLUSIONS 5/13/2022 [facility]:   - Technically difficult exam due to body habitus and suboptimal positioning.   - Exam indication: Evaluation of known heart failure to guide therapy   - The left ventricle is severely dilated. Left ventricular systolic function is severely decreased. EF = 20 ± 5% (visual est.) Definity contrast used for endocardial border detection. Left ventricular diastolic function was not   evaluated due to an arrhythmia. - The right ventricle is dilated. Right ventricular systolic function is moderately to severely decreased. - The left atrial cavity is severely dilated. - The right atrial cavity is dilated. - There is moderate (2+) mitral valve regurgitation.   - Peak/mean gradients of 15/7 mmHg, gradients averaged due to arrhythmia.   - Prior EF reported as 26% (3/2021). - Exam was compared with the prior CC echocardiographic exam performed on 3/14/2021. There is no significant change.    Electronically signed by Mariaa Coy MD on 5/13/2022 at 4:35:31 PM      ASSESSMENT AND PLAN:  Patient Active Problem List   Diagnosis    [facility]    S/P CABG x 4    CAD (coronary artery disease)    MI, old    Hyperlipidemia    Hypertension    Cardiomyopathy ([address]    [facility]    Pancreatitis    AICD (automatic cardioverter/defibrillator) present    Implantable cardioverter-defibrillator (ICD) at end of device life    Chest pain, atypical     1. CAD-CABG: ASA. Intolerant to BB. Intolerant to statin. Cath at [facility] as above 3/15/2021. Medical management recommended. ASA. 2. ICMP: Recommend ACEI/lasix. Intolerant to BB. 3. HTN: Observe. 4. Lipids: Statin intolerant. 5. ICD: Sees EP. 6. CKD    7. DM    Avi Fletcher D.O.   Cardiologist  Cardiology, [facility]

## 2022-07-05 ENCOUNTER — TELEPHONE (OUTPATIENT)
Dept: NON INVASIVE DIAGNOSTICS | Age: 77
End: 2022-07-05

## 2022-07-05 NOTE — TELEPHONE ENCOUNTER
Please see Murj Report. 1 VT on  7/3/2022 at 10:59 pm treated with ATPx1. Duration 14s, avg V [id number] bpm  EGM is c/w VT in VT Zone treated w/ burst ATP x 1 which terminated the arrhythmia    On   Toprol Xl 25mg QD    Nor recent stress or echos. Recently hospitalized at [facility] in 6/2022 for anxiety. Patient overdue for office visit with Dr. Bunny Madsen. Forwarding to Dr. Bunny Madsen.      [place] [id number]

## 2022-07-06 NOTE — TELEPHONE ENCOUNTER
Called and spoke with patient regarding Dr. Santiago Distance recommendation. Patient states that he can't talk right now and that he will call me back. There was a woman on the phone telling the patient that it was his doctors office calling and questioning if he needed to do something. I again explained that we would like to increase his Toprol to 25mg BID and order a stress and echo. Patient states that he will call me back and hung up on me. At this time no medication change or tests ordered until patient calls our office back.          Simba Barfield

## 2022-07-21 ENCOUNTER — HOSPITAL ENCOUNTER (OUTPATIENT)
Age: 77
Setting detail: OBSERVATION
Discharge: NURSING, REHAB OR LONG-TERM CARE | End: 2022-07-24
Attending: EMERGENCY MEDICINE | Admitting: FAMILY MEDICINE
Payer: MEDICARE

## 2022-07-21 ENCOUNTER — APPOINTMENT (OUTPATIENT)
Dept: GENERAL RADIOLOGY | Age: 77
End: 2022-07-21
Payer: MEDICARE

## 2022-07-21 DIAGNOSIS — I50.9 CONGESTIVE HEART FAILURE, UNSPECIFIED HF CHRONICITY, UNSPECIFIED HEART FAILURE TYPE (HCC): ICD-10-CM

## 2022-07-21 DIAGNOSIS — R62.7 FAILURE TO THRIVE IN ADULT: Primary | ICD-10-CM

## 2022-07-21 LAB
ALBUMIN SERPL-MCNC: 3.9 G/DL (ref 3.5–5.2)
ALP BLD-CCNC: 142 U/L (ref 40–129)
ALT SERPL-CCNC: 21 U/L (ref 0–40)
ANION GAP SERPL CALCULATED.3IONS-SCNC: 13 MMOL/L (ref 7–16)
AST SERPL-CCNC: 21 U/L (ref 0–39)
BACTERIA: NORMAL /HPF
BASOPHILS ABSOLUTE: 0.03 E9/L (ref 0–0.2)
BASOPHILS RELATIVE PERCENT: 0.3 % (ref 0–2)
BILIRUB SERPL-MCNC: 1.7 MG/DL (ref 0–1.2)
BILIRUBIN URINE: NEGATIVE
BLOOD, URINE: NEGATIVE
BUN BLDV-MCNC: 27 MG/DL (ref 6–23)
CALCIUM SERPL-MCNC: 10.4 MG/DL (ref 8.6–10.2)
CHLORIDE BLD-SCNC: 100 MMOL/L (ref 98–107)
CLARITY: CLEAR
CO2: 26 MMOL/L (ref 22–29)
COLOR: YELLOW
CREAT SERPL-MCNC: 1.1 MG/DL (ref 0.7–1.2)
EOSINOPHILS ABSOLUTE: 0.02 E9/L (ref 0.05–0.5)
EOSINOPHILS RELATIVE PERCENT: 0.2 % (ref 0–6)
GFR AFRICAN AMERICAN: >60
GFR NON-AFRICAN AMERICAN: >60 ML/MIN/1.73
GLUCOSE BLD-MCNC: 143 MG/DL (ref 74–99)
GLUCOSE URINE: NEGATIVE MG/DL
HCT VFR BLD CALC: 37.1 % (ref 37–54)
HEMOGLOBIN: 11.9 G/DL (ref 12.5–16.5)
IMMATURE GRANULOCYTES #: 0.04 E9/L
IMMATURE GRANULOCYTES %: 0.4 % (ref 0–5)
KETONES, URINE: NEGATIVE MG/DL
LEUKOCYTE ESTERASE, URINE: NEGATIVE
LYMPHOCYTES ABSOLUTE: 1.55 E9/L (ref 1.5–4)
LYMPHOCYTES RELATIVE PERCENT: 15.2 % (ref 20–42)
MAGNESIUM: 1.9 MG/DL (ref 1.6–2.6)
MCH RBC QN AUTO: 26.6 PG (ref 26–35)
MCHC RBC AUTO-ENTMCNC: 32.1 % (ref 32–34.5)
MCV RBC AUTO: 82.8 FL (ref 80–99.9)
MONOCYTES ABSOLUTE: 0.65 E9/L (ref 0.1–0.95)
MONOCYTES RELATIVE PERCENT: 6.4 % (ref 2–12)
NEUTROPHILS ABSOLUTE: 7.9 E9/L (ref 1.8–7.3)
NEUTROPHILS RELATIVE PERCENT: 77.5 % (ref 43–80)
NITRITE, URINE: NEGATIVE
PDW BLD-RTO: 17.4 FL (ref 11.5–15)
PH UA: 5.5 (ref 5–9)
PLATELET # BLD: 184 E9/L (ref 130–450)
PMV BLD AUTO: 11.3 FL (ref 7–12)
POTASSIUM REFLEX MAGNESIUM: 3.4 MMOL/L (ref 3.5–5)
PRO-BNP: ABNORMAL PG/ML (ref 0–450)
PROTEIN UA: NORMAL MG/DL
RBC # BLD: 4.48 E12/L (ref 3.8–5.8)
RBC UA: NORMAL /HPF (ref 0–2)
SODIUM BLD-SCNC: 139 MMOL/L (ref 132–146)
SPECIFIC GRAVITY UA: 1.02 (ref 1–1.03)
TOTAL PROTEIN: 6.8 G/DL (ref 6.4–8.3)
TROPONIN, HIGH SENSITIVITY: 61 NG/L (ref 0–11)
UROBILINOGEN, URINE: 1 E.U./DL
WBC # BLD: 10.2 E9/L (ref 4.5–11.5)
WBC UA: NORMAL /HPF (ref 0–5)

## 2022-07-21 PROCEDURE — 83880 ASSAY OF NATRIURETIC PEPTIDE: CPT

## 2022-07-21 PROCEDURE — 83735 ASSAY OF MAGNESIUM: CPT

## 2022-07-21 PROCEDURE — 84484 ASSAY OF TROPONIN QUANT: CPT

## 2022-07-21 PROCEDURE — 93005 ELECTROCARDIOGRAM TRACING: CPT | Performed by: PHYSICIAN ASSISTANT

## 2022-07-21 PROCEDURE — 99285 EMERGENCY DEPT VISIT HI MDM: CPT

## 2022-07-21 PROCEDURE — G0378 HOSPITAL OBSERVATION PER HR: HCPCS

## 2022-07-21 PROCEDURE — 81001 URINALYSIS AUTO W/SCOPE: CPT

## 2022-07-21 PROCEDURE — 80053 COMPREHEN METABOLIC PANEL: CPT

## 2022-07-21 PROCEDURE — 36415 COLL VENOUS BLD VENIPUNCTURE: CPT

## 2022-07-21 PROCEDURE — 71046 X-RAY EXAM CHEST 2 VIEWS: CPT

## 2022-07-21 PROCEDURE — 85025 COMPLETE CBC W/AUTO DIFF WBC: CPT

## 2022-07-21 ASSESSMENT — PAIN - FUNCTIONAL ASSESSMENT
PAIN_FUNCTIONAL_ASSESSMENT: NONE - DENIES PAIN
PAIN_FUNCTIONAL_ASSESSMENT: NONE - DENIES PAIN

## 2022-07-21 NOTE — LETTER
[address] Medicaid  CERTIFICATION OF NECESSITY  FOR TRANSPORTATION   BY WHEELCHAIR VAN     Individual Information   1. Name: Yolanda Nesbitt 2. [place] Medicaid Billing Number: ***   3. Address: [address] [place] [address]      Transportation Provider Information   4. Provider Name: ***   5. [place] Medicaid Provider Number: *** National Provider Identifier (NPI): ***     Certification  7. Criteria:  By signing this document, the practitioner certifies that two statements are true:  A. This individual must be accompanied by a mobility-related assistive device from the point of pick-up to the point of drop-off. B. Transport of this individual by standard passenger vehicle or common carrier is precluded or contraindicated. 8. Period Beginning Date: 7/22/2022     9. Length  [x] Not more than 2 day(s)  [] One Year     Additional Information Relevant to Certification   10. Comments or Explanations, If Necessary or Appropriate     CHF. Failure to thrive in adult     Certifying Practitioner Information   11. Name of Practitioner: Carmelo Salamanca MD   12. [place] Medicaid Provider Number, If Applicable: * 13. National Provider Identifier (NPI): ***     Signature Information   14. Date of Signature:7/22/2022   13. Name of Person Signing: Geoffrey Aragon RN     16.  Signature and Professional Designation: Electronically signed by Geoffrey Aragon RN on 7/22/2022 at 9:39 AM       [facility] [id number]  Rev. 7/2015

## 2022-07-21 NOTE — ED NOTES
Department of Emergency Medicine    FIRST PROVIDER TRIAGE NOTE             Independent MLP           7/21/22  12:52 PM EDT    Date of Encounter: 7/21/22   MRN: [id number]    Vitals:    07/21/22 1204 07/21/22 1254   BP:  130/71   Pulse: (!) 113 97   Resp:  19   Temp: 97.8 °F (36.6 °C)    SpO2: 96%       HPI: Iwona Gaffney is a 64 y.o. male who presents to the ED for Failure To Thrive (Pt in end stage CHF and having issues with ADLs, wants placed at [facility] to be with his brother)    ROS: Negative for cp, sob, abd pain, cough, vomiting, or diarrhea. Physical Exam:   Gen Appearance/Constitutional: alert  CV: tachycardia  Pulm: abnormal breath sounds auscultated     Initial Plan of Care: All treatment areas with department are currently occupied. Plan to order/Initiate the following while awaiting opening in ED: labs, EKG, and imaging studies.     Initial Plan of Care: Initiate Treatment-Testing, Proceed toTreatment Area When Bed Available for ED Attending/MLP to Continue Care    Electronically signed by Ej Maciel PA-C   DD: 7/21/22       Ej Maciel PA-C  07/21/22 [id number]

## 2022-07-22 LAB
ANION GAP SERPL CALCULATED.3IONS-SCNC: 11 MMOL/L (ref 7–16)
BASOPHILS ABSOLUTE: 0.04 E9/L (ref 0–0.2)
BASOPHILS RELATIVE PERCENT: 0.4 % (ref 0–2)
BUN BLDV-MCNC: 28 MG/DL (ref 6–23)
CALCIUM SERPL-MCNC: 9.4 MG/DL (ref 8.6–10.2)
CHLORIDE BLD-SCNC: 101 MMOL/L (ref 98–107)
CO2: 29 MMOL/L (ref 22–29)
CREAT SERPL-MCNC: 1.2 MG/DL (ref 0.7–1.2)
EKG ATRIAL RATE: 102 BPM
EKG Q-T INTERVAL: 318 MS
EKG QRS DURATION: 104 MS
EKG QTC CALCULATION (BAZETT): 410 MS
EKG R AXIS: -31 DEGREES
EKG T AXIS: -178 DEGREES
EKG VENTRICULAR RATE: 100 BPM
EOSINOPHILS ABSOLUTE: 0.1 E9/L (ref 0.05–0.5)
EOSINOPHILS RELATIVE PERCENT: 1.1 % (ref 0–6)
GFR AFRICAN AMERICAN: >60
GFR NON-AFRICAN AMERICAN: 58 ML/MIN/1.73
GLUCOSE BLD-MCNC: 110 MG/DL (ref 74–99)
HCT VFR BLD CALC: 35.2 % (ref 37–54)
HEMOGLOBIN: 11.2 G/DL (ref 12.5–16.5)
IMMATURE GRANULOCYTES #: 0.03 E9/L
IMMATURE GRANULOCYTES %: 0.3 % (ref 0–5)
LYMPHOCYTES ABSOLUTE: 2.22 E9/L (ref 1.5–4)
LYMPHOCYTES RELATIVE PERCENT: 24.1 % (ref 20–42)
MCH RBC QN AUTO: 26.5 PG (ref 26–35)
MCHC RBC AUTO-ENTMCNC: 31.8 % (ref 32–34.5)
MCV RBC AUTO: 83.4 FL (ref 80–99.9)
METER GLUCOSE: 122 MG/DL (ref 74–99)
MONOCYTES ABSOLUTE: 0.7 E9/L (ref 0.1–0.95)
MONOCYTES RELATIVE PERCENT: 7.6 % (ref 2–12)
NEUTROPHILS ABSOLUTE: 6.13 E9/L (ref 1.8–7.3)
NEUTROPHILS RELATIVE PERCENT: 66.5 % (ref 43–80)
PDW BLD-RTO: 17.7 FL (ref 11.5–15)
PLATELET # BLD: 167 E9/L (ref 130–450)
PMV BLD AUTO: 11.5 FL (ref 7–12)
POTASSIUM REFLEX MAGNESIUM: 3.7 MMOL/L (ref 3.5–5)
PROCALCITONIN: 0.05 NG/ML (ref 0–0.08)
RBC # BLD: 4.22 E12/L (ref 3.8–5.8)
SODIUM BLD-SCNC: 141 MMOL/L (ref 132–146)
TROPONIN, HIGH SENSITIVITY: 69 NG/L (ref 0–11)
WBC # BLD: 9.2 E9/L (ref 4.5–11.5)

## 2022-07-22 PROCEDURE — 84145 PROCALCITONIN (PCT): CPT

## 2022-07-22 PROCEDURE — G0378 HOSPITAL OBSERVATION PER HR: HCPCS

## 2022-07-22 PROCEDURE — 80048 BASIC METABOLIC PNL TOTAL CA: CPT

## 2022-07-22 PROCEDURE — 97161 PT EVAL LOW COMPLEX 20 MIN: CPT

## 2022-07-22 PROCEDURE — 97530 THERAPEUTIC ACTIVITIES: CPT

## 2022-07-22 PROCEDURE — 2580000003 HC RX 258: Performed by: FAMILY MEDICINE

## 2022-07-22 PROCEDURE — 6370000000 HC RX 637 (ALT 250 FOR IP)

## 2022-07-22 PROCEDURE — 97165 OT EVAL LOW COMPLEX 30 MIN: CPT

## 2022-07-22 PROCEDURE — 6360000002 HC RX W HCPCS: Performed by: FAMILY MEDICINE

## 2022-07-22 PROCEDURE — 36415 COLL VENOUS BLD VENIPUNCTURE: CPT

## 2022-07-22 PROCEDURE — 6370000000 HC RX 637 (ALT 250 FOR IP): Performed by: FAMILY MEDICINE

## 2022-07-22 PROCEDURE — 82962 GLUCOSE BLOOD TEST: CPT

## 2022-07-22 PROCEDURE — 84484 ASSAY OF TROPONIN QUANT: CPT

## 2022-07-22 PROCEDURE — 96372 THER/PROPH/DIAG INJ SC/IM: CPT

## 2022-07-22 PROCEDURE — 85025 COMPLETE CBC W/AUTO DIFF WBC: CPT

## 2022-07-22 RX ORDER — GLIPIZIDE 5 MG/1
5 TABLET ORAL
Status: DISCONTINUED | OUTPATIENT
Start: 2022-07-22 | End: 2022-07-24 | Stop reason: HOSPADM

## 2022-07-22 RX ORDER — METOPROLOL SUCCINATE 25 MG/1
25 TABLET, EXTENDED RELEASE ORAL DAILY
Status: DISCONTINUED | OUTPATIENT
Start: 2022-07-22 | End: 2022-07-24 | Stop reason: HOSPADM

## 2022-07-22 RX ORDER — LANOLIN ALCOHOL/MO/W.PET/CERES
3 CREAM (GRAM) TOPICAL NIGHTLY PRN
Status: DISCONTINUED | OUTPATIENT
Start: 2022-07-22 | End: 2022-07-24 | Stop reason: HOSPADM

## 2022-07-22 RX ORDER — SODIUM CHLORIDE 0.9 % (FLUSH) 0.9 %
10 SYRINGE (ML) INJECTION PRN
Status: DISCONTINUED | OUTPATIENT
Start: 2022-07-22 | End: 2022-07-24 | Stop reason: HOSPADM

## 2022-07-22 RX ORDER — ENOXAPARIN SODIUM 100 MG/ML
40 INJECTION SUBCUTANEOUS DAILY
Status: DISCONTINUED | OUTPATIENT
Start: 2022-07-22 | End: 2022-07-24 | Stop reason: HOSPADM

## 2022-07-22 RX ORDER — ACETAMINOPHEN 325 MG/1
650 TABLET ORAL EVERY 6 HOURS PRN
Status: DISCONTINUED | OUTPATIENT
Start: 2022-07-22 | End: 2022-07-24 | Stop reason: HOSPADM

## 2022-07-22 RX ORDER — ONDANSETRON 2 MG/ML
4 INJECTION INTRAMUSCULAR; INTRAVENOUS EVERY 6 HOURS PRN
Status: DISCONTINUED | OUTPATIENT
Start: 2022-07-22 | End: 2022-07-24 | Stop reason: HOSPADM

## 2022-07-22 RX ORDER — TAMSULOSIN HYDROCHLORIDE 0.4 MG/1
0.4 CAPSULE ORAL DAILY
Status: DISCONTINUED | OUTPATIENT
Start: 2022-07-22 | End: 2022-07-24 | Stop reason: HOSPADM

## 2022-07-22 RX ORDER — HYDROXYZINE PAMOATE 25 MG/1
25 CAPSULE ORAL 3 TIMES DAILY PRN
Status: DISCONTINUED | OUTPATIENT
Start: 2022-07-22 | End: 2022-07-24 | Stop reason: HOSPADM

## 2022-07-22 RX ORDER — TORSEMIDE 20 MG/1
20 TABLET ORAL DAILY
Status: DISCONTINUED | OUTPATIENT
Start: 2022-07-22 | End: 2022-07-24 | Stop reason: HOSPADM

## 2022-07-22 RX ORDER — ASPIRIN 81 MG/1
81 TABLET ORAL DAILY
Status: DISCONTINUED | OUTPATIENT
Start: 2022-07-22 | End: 2022-07-24 | Stop reason: HOSPADM

## 2022-07-22 RX ORDER — ACETAMINOPHEN 650 MG/1
650 SUPPOSITORY RECTAL EVERY 6 HOURS PRN
Status: DISCONTINUED | OUTPATIENT
Start: 2022-07-22 | End: 2022-07-24 | Stop reason: HOSPADM

## 2022-07-22 RX ORDER — ISOSORBIDE MONONITRATE 30 MG/1
30 TABLET, EXTENDED RELEASE ORAL DAILY
Status: DISCONTINUED | OUTPATIENT
Start: 2022-07-22 | End: 2022-07-24 | Stop reason: HOSPADM

## 2022-07-22 RX ORDER — PANTOPRAZOLE SODIUM 40 MG/1
40 TABLET, DELAYED RELEASE ORAL
Status: DISCONTINUED | OUTPATIENT
Start: 2022-07-22 | End: 2022-07-24 | Stop reason: HOSPADM

## 2022-07-22 RX ORDER — PROMETHAZINE HYDROCHLORIDE 12.5 MG/1
12.5 TABLET ORAL EVERY 6 HOURS PRN
Status: DISCONTINUED | OUTPATIENT
Start: 2022-07-22 | End: 2022-07-24 | Stop reason: HOSPADM

## 2022-07-22 RX ORDER — SODIUM CHLORIDE 9 MG/ML
INJECTION, SOLUTION INTRAVENOUS PRN
Status: DISCONTINUED | OUTPATIENT
Start: 2022-07-22 | End: 2022-07-24 | Stop reason: HOSPADM

## 2022-07-22 RX ORDER — SODIUM CHLORIDE 0.9 % (FLUSH) 0.9 %
10 SYRINGE (ML) INJECTION EVERY 12 HOURS SCHEDULED
Status: DISCONTINUED | OUTPATIENT
Start: 2022-07-22 | End: 2022-07-24 | Stop reason: HOSPADM

## 2022-07-22 RX ORDER — FINASTERIDE 5 MG/1
5 TABLET, FILM COATED ORAL DAILY
Status: DISCONTINUED | OUTPATIENT
Start: 2022-07-22 | End: 2022-07-24 | Stop reason: HOSPADM

## 2022-07-22 RX ORDER — HYDRALAZINE HYDROCHLORIDE 25 MG/1
25 TABLET, FILM COATED ORAL 2 TIMES DAILY
Status: DISCONTINUED | OUTPATIENT
Start: 2022-07-22 | End: 2022-07-24 | Stop reason: HOSPADM

## 2022-07-22 RX ORDER — POLYETHYLENE GLYCOL 3350 17 G/17G
17 POWDER, FOR SOLUTION ORAL DAILY PRN
Status: DISCONTINUED | OUTPATIENT
Start: 2022-07-22 | End: 2022-07-24 | Stop reason: HOSPADM

## 2022-07-22 RX ADMIN — GLIPIZIDE 5 MG: 5 TABLET ORAL at 06:00

## 2022-07-22 RX ADMIN — HYDRALAZINE HYDROCHLORIDE 25 MG: 25 TABLET, FILM COATED ORAL at 20:10

## 2022-07-22 RX ADMIN — FINASTERIDE 5 MG: 5 TABLET, FILM COATED ORAL at 07:49

## 2022-07-22 RX ADMIN — METOPROLOL SUCCINATE 25 MG: 25 TABLET, EXTENDED RELEASE ORAL at 07:49

## 2022-07-22 RX ADMIN — ENOXAPARIN SODIUM 40 MG: 100 INJECTION SUBCUTANEOUS at 07:49

## 2022-07-22 RX ADMIN — TAMSULOSIN HYDROCHLORIDE 0.4 MG: 0.4 CAPSULE ORAL at 07:49

## 2022-07-22 RX ADMIN — HYDRALAZINE HYDROCHLORIDE 25 MG: 25 TABLET, FILM COATED ORAL at 07:49

## 2022-07-22 RX ADMIN — GLIPIZIDE 5 MG: 5 TABLET ORAL at 16:27

## 2022-07-22 RX ADMIN — Medication 10 ML: at 20:21

## 2022-07-22 RX ADMIN — ASPIRIN 81 MG: 81 TABLET, COATED ORAL at 07:49

## 2022-07-22 RX ADMIN — Medication 10 ML: at 07:50

## 2022-07-22 RX ADMIN — ACETAMINOPHEN 650 MG: 325 TABLET ORAL at 20:17

## 2022-07-22 RX ADMIN — ACETAMINOPHEN 650 MG: 325 TABLET ORAL at 06:00

## 2022-07-22 RX ADMIN — ISOSORBIDE MONONITRATE 30 MG: 30 TABLET, EXTENDED RELEASE ORAL at 07:49

## 2022-07-22 RX ADMIN — PANTOPRAZOLE SODIUM 40 MG: 40 TABLET, DELAYED RELEASE ORAL at 06:00

## 2022-07-22 RX ADMIN — MELATONIN 3 MG ORAL TABLET 3 MG: 3 TABLET ORAL at 21:27

## 2022-07-22 ASSESSMENT — PAIN DESCRIPTION - DESCRIPTORS
DESCRIPTORS: ACHING
DESCRIPTORS: ACHING

## 2022-07-22 ASSESSMENT — PAIN DESCRIPTION - ORIENTATION
ORIENTATION: LEFT;RIGHT
ORIENTATION: LEFT;RIGHT

## 2022-07-22 ASSESSMENT — PAIN SCALES - GENERAL
PAINLEVEL_OUTOF10: 6
PAINLEVEL_OUTOF10: 7
PAINLEVEL_OUTOF10: 6

## 2022-07-22 ASSESSMENT — PAIN DESCRIPTION - LOCATION
LOCATION: SHOULDER
LOCATION: SHOULDER

## 2022-07-22 ASSESSMENT — PAIN - FUNCTIONAL ASSESSMENT: PAIN_FUNCTIONAL_ASSESSMENT: NONE - DENIES PAIN

## 2022-07-22 NOTE — PROGRESS NOTES
[address]        Date:2022                                                  Patient Name: Lincoln Schilling    MRN: [id number]    : 1945    Room: [address] [place] [address]      Evaluating OT: Melissa Carroll, [facility] O[facility]; [id number]      Referring Provider: Sal Howell DO    Specific Provider Orders/Date: OT Eval and Treat 22      Diagnosis: Failure to thrive in adult     Surgery: none     Pertinent Medical History:  has a past medical history of CAD (coronary artery disease), Cardiomyopathy ([facility] CHF (congestive heart failure) ([facility] [address] Diabetes mellitus ([facility] [address] Diverticulitis, Hyperlipidemia, Hypertension, MI, old, Pancreatitis, Prostate cancer ([facility] [address] S/P CABG x 4, Skin cancer, and Ventricular tachycardia ([facility] [address]       Reason for Admission: weakness, decreased ability to complete ADLS    Recommended Adaptive Equipment:  shower chair - further AE TBD depending progression/discharge plan     Precautions:  Fall Risk     Assessment of current deficits:    [x] Functional mobility  [x]ADLs  [x] Strength               [x]Cognition    [x] Functional transfers   [x] IADLs         [x] Safety Awareness   [x]Endurance    [x] Fine Coordination              [x] Balance      [] Vision/perception   []Sensation     []Gross Motor Coordination  [] ROM  [] Delirium                   [] Motor Control     OT PLAN OF CARE   OT POC based on physician orders, patient diagnosis and results of clinical assessment    Frequency/Duration: 1-3 days/wk for 2 weeks PRN   Specific OT Treatment Interventions to include:   * Instruction/training on adapted ADL techniques and AE recommendations to increase functional independence within precautions       * Training on energy conservation strategies, correct breathing pattern and techniques to improve independence/tolerance for self-care routine  * Functional transfer/mobility training/DME recommendations for increased independence, safety, and fall prevention  * Patient/Family education to increase follow through with safety techniques and functional independence  * Recommendation of environmental modifications for increased safety with functional transfers/mobility and ADLs  * Therapeutic exercise to improve motor endurance, ROM, and functional strength for ADLs/functional transfers  * Therapeutic activities to facilitate/challenge dynamic balance, stand tolerance for increased safety and independence with ADLs      Home Living: Pt lives alone in 2 story home with 5-6 steps to enter. Bed and bath on 2nd floor with 10+10 steps and BHR to access.     Bathroom setup: walk in shower    Equipment owned: none    Prior Level of Function: mod ind - min A with ADLs  ind with IADLs  (However pt reports increased difficulty with all ADLs and IADLs d/t increased overall weakness - living on 2nd floor d/t history of falling down stairs)  ambulated with no AD prior  Driving: ?      Pain Level: mild arthritic pain  Cognition: A&O: 4/4 - however decreased insight into problem solving and sequencing    Follows single step directions ~75% of the time - increased time to process   Memory:  good   Sequencing:  fair   Problem solving:  fair   Judgement/safety:  fair     Functional Assessment:  AM-PAC Daily Activity Raw Score: 15/24   Initial Eval Status  Date: 7/22/22 Treatment Status  Date: STGs = LTGs  Time frame: 10-14 days   Feeding Stand by Assist   Tray set up  Independent    Grooming Stand by Assist   Demonstrated functional reach seated EOB  Independent    UB Dressing Minimal Assist   Assist to tie gown seated EOB   Decreased activity tolerance  Independent    LB Dressing Moderate Assist   Derrick socks seated EOB   Decreased functional reach d/t weakness/fatigue  Independent    Bathing Moderate Assist  Would benefit from use of shower chair for increased independence/safety d/t decreased dynamic standing balance  Independent    Toileting Moderate Assist   Verbal cues for lower surface transfer and use of grab bars   Assist to manage LB clothing following toileting   Independent    Bed Mobility  Log Roll: SBA  Supine to sit: Stand by Assist   Sit to supine: Stand by Assist   Supine to sit: Independent   Sit to supine: Independent    Functional Transfers Sit to stand:SBA  Stand to sit:SBA   Stand pivot: Min A with HHA  Commode: Min A with HHA  Sit to stand:ind   Stand to sit:ind  Stand pivot: mod ind  Commode: mod ind    Functional Mobility Min A with HHA   within short household distance  Bed <> bathroom  Reaching for furniture - declined ww  Moderate [place]    Balance Sitting:     Static - sba     Dynamic - sba  Standing: Min A with HHA  Sitting:  Static: ind  Dynamic: ind  Standing: mod ind   Activity Tolerance Fair -   Decreased activity tolerance d/t overall fatigue/weakness  Overall body pain d/t arthritis   Good   Visual/  Perceptual Glasses: yes            Vitals WFL         BUE  ROM/Strength/  Fine motor Coordination   increase BUE muscle strength for improved indep with functional transfers       Hearing: WFL   Sensation:  No c/o numbness or tingling  Tone: WFL   Edema: unremarkable    Patient/Family Goal: pt goal is to discharge to [facility] - where patients cousin currently lives    Based on patient's functional performance as stated below and level of assistance needed prior to admission, this therapist believes that the patient would benefit from further skilled OT following hospital stay in an effort to increase safety, functional independence, and quality of life. Comment: Cleared by RN to see pt. Upon arrival patient lying supine in bed  and agreeable to OT session. At end of session, patient lying supine in bed with call light and phone within reach, all lines and tubes intact.   Overall patient demonstrated decreased independence and safety during completion of ADL/functional transfer/mobility tasks. Pt would benefit from continued skilled OT to increase safety and independence with completion of ADL/IADL tasks for functional independence and quality of life. Treatment: Pt required vc's for proper technique/safety with hand placement/body mechanics/posture for bed mobility/ADLs/functional tranfers/mobility/ww management. Pt required vc's for sequencing/initiation of ADLs/functional transfers. Pt able to  sit EOB ~5  mins to increase core strength/balance/activity tolerance for ease with ADLS. Pt completed toileting tasks with verbal cues for safety, completed pericare seated, assist with LB clothing. Requested to return to bed following light ADLs d/t overall fatigue and pain. Pt required increased time to complete ADLs/functional transfers due to overall deconditioned state and weakness. Educated on AE for ease with LE dressing. Pt required rest breaks during session and educated on pursed lip breathing. Pt appeared to have tolerated session well and appears motivated/cooperative/pleasant . Pt instructed on use of call light for assistance and fall prevention. Pt demo'ing fair understanding of education provided. Continue to educate. Rehab Potential: Good  for established goals       LTG: maximize independence with ADLs to return to PLOF    Patient and/or family were instructed on functional diagnosis, prognosis/goals and OT plan of care. Demonstrated FAIR understanding. [] Malnutrition indicators have been identified and nursing has been notified to ensure a dietitian consult is ordered. Eval Complexity: Low    Evaluation time includes thorough review of current medical information, gathering information on past medical & social history & PLOF, completion of standardized testing, informal observation of tasks, consultation with other medical professions/disciplines, assessment of data & development of POC/goals.      Time In: 0840  Time Out: 4158  Total Treatment Time: none    Min Units   OT Eval Low 75860  x     OT Eval Medium 44351      OT Eval High 41710      OT Re-Eval [id number]       Therapeutic Ex 91665       Therapeutic Activities 67891       ADL/Self Care 23286       Orthotic Management 35483       Manual 64811     Neuro Re-Ed 81585       Non-Billable Time          Evaluation Time additionally includes thorough review of current medical information, gathering information on past medical history/social history and prior level of function, interpretation of standardized testing/informal observation of tasks, assessment of data and development of plan of care and goals.           YUE Hawkins OTR/L; [id number]

## 2022-07-22 NOTE — H&P
Hospitalist History & Physical      PCP: Carlos Lamb DO    Date of Service: Pt seen/examined on 7/21/2022     Chief Complaint:  had concerns including Failure To Thrive (Pt in end stage CHF and having issues with ADLs, wants placed at [facility] on [address] to be with his brother). History Of Present Illness:    Mr. Deangelo Peraza, a 64y.o. year old male  who  has a past medical history of CAD (coronary artery disease), Cardiomyopathy ([address] CHF (congestive heart failure) ([address] Diabetes mellitus ([address] Diverticulitis, Hyperlipidemia, Hypertension, MI, old, Pancreatitis, Prostate cancer ([address] S/P CABG x 4, Skin cancer, and Ventricular tachycardia ([address] Patient presented to the emergency department with concerns about not being able to care for himself. He has a history of end-stage heart failure. Has been feeling weak recently. Having problems with activities of daily living. He would like to be placed at [address] [place] [address] to be with his brother. Vital signs within norm limits and stable. Laboratory studies demonstrate potassium 3.4, BUN 27, glucose 143, troponin 61, proBNP 36,737. Chest x-ray shows bilateral pleural effusions. Medicine consulted for admission.       Past Medical History:   Diagnosis Date    CAD (coronary artery disease)     Cardiomyopathy ([address]     CHF (congestive heart failure) (HCC)     Diabetes mellitus ([address]     Diverticulitis     Hyperlipidemia     Hypertension     MI, old     Pancreatitis     Prostate cancer ([address]     prostate    S/P CABG x 4     Skin cancer     Ventricular tachycardia [facility])        Past Surgical History:   Procedure Laterality Date    BLADDER SURGERY      CARDIAC DEFIBRILLATOR PLACEMENT  04/19/2018    D-ICD GENT CHANGE    [facility]    CARDIAC PACEMAKER PLACEMENT  04/2010    [facility]     COLONOSCOPY      CORONARY ARTERY BYPASS GRAFT  12/24/2007    ELBOW SURGERY      HERNIA REPAIR      KIDNEY SURGERY      SKIN CANCER EXCISION  2019    nose and cheek       Prior to Admission medications    Medication Sig Start Date End Date Taking? Authorizing Provider   LINZESS 290 MCG CAPS capsule TAKE 1 CAPSULE BY MOUTH EVERY DAY 6/21/22   Historical Provider, MD   traZODone (DESYREL) 50 MG tablet TAKE 1 TABLET BY MOUTH AT BEDTIME 6/21/22   Historical Provider, MD   torsemide (DEMADEX) 20 MG tablet Take 1 tablet by mouth daily 6/30/22   Mala Sinha, DO   metoprolol succinate (TOPROL XL) 25 MG extended release tablet Take 1 tablet by mouth daily 6/30/22   Mala Sinha, DO   hydrALAZINE (APRESOLINE) 25 MG tablet Take 1 tablet by mouth in the morning and at bedtime 6/30/22   Mala Sinha, DO   isosorbide mononitrate (IMDUR) 30 MG extended release tablet Take 1 tablet by mouth daily 6/30/22   Mala Sinha, DO   nitroGLYCERIN (NITROSTAT) 0.4 MG SL tablet Place 1 tablet under the tongue every 5 minutes as needed for Chest pain 4/26/22   Mala Sinha, DO   omeprazole (PRILOSEC) 40 MG delayed release capsule 40 mg 2 times daily  4/26/21   Historical Provider, MD   acetaminophen (TYLENOL 8 HOUR ARTHRITIS PAIN) 650 MG extended release tablet Take 650 mg by mouth every 8 hours as needed for Pain    Historical Provider, MD   OXYGEN Inhale 4 L into the lungs as needed     Historical Provider, MD   finasteride (PROSCAR) 5 MG tablet take 1 tablet by mouth once daily 1/3/17   Historical Provider, MD   glipiZIDE (GLUCOTROL) 5 MG tablet Take 5 mg by mouth 2 times daily (before meals)  8/30/16   Historical Provider, MD   aspirin 81 MG tablet Take 81 mg by mouth daily. Historical Provider, MD   tamsulosin (FLOMAX) 0.4 MG capsule Take 0.4 mg by mouth daily     Historical Provider, MD   omeprazole (PRILOSEC) 40 MG delayed release capsule Take 40 mg by mouth 2 times daily      Historical Provider, MD         Allergies:  Patient has no known allergies. Social History:    TOBACCO:   reports that he has never smoked.  He has never used smokeless tobacco.  ETOH:   reports no history of alcohol use.    Family History:    Reviewed in detail and negative for DM, CAD, Cancer, CVA. Positive as follows\"      Problem Relation Age of Onset    Cancer Father         liver       REVIEW OF SYSTEMS:   Pertinent positives as noted in the HPI. All other systems reviewed and negative. PHYSICAL EXAM:  BP (!) 98/52   Pulse 74   Temp 97.8 °F (36.6 °C)   Resp 20   SpO2 96%   General appearance: No apparent distress, appears stated age and cooperative. HEENT: Normal cephalic, atraumatic without obvious deformity. Pupils equal, round, and reactive to light. Extra ocular muscles intact. Conjunctivae/corneas clear. Neck: Supple, with full range of motion. No jugular venous distention. Trachea midline. Respiratory: CTA  Cardiovascular: RRR  Abdomen: S/NT/ND  Musculoskeletal: No clubbing, cyanosis, edema of bilateral lower extremities. Brisk capillary refill. Skin: Normal skin color. No rashes or lesions. Neurologic:  Neurovascularly intact without any focal sensory/motor deficits. Cranial nerves: II-XII intact, grossly non-focal.  Psychiatric: Alert and oriented, thought content appropriate, normal insight    Reviewed EKG and CXR personally      CBC:   Recent Labs     07/21/22  1357   WBC 10.2   RBC 4.48   HGB 11.9*   HCT 37.1   MCV 82.8   RDW 17.4*        BMP:   Recent Labs     07/21/22  1357      K 3.4*      CO2 26   BUN 27*   CREATININE 1.1   MG 1.9     LFT:  Recent Labs     07/21/22  1357   PROT 6.8   ALKPHOS 142*   ALT 21   AST 21   BILITOT 1.7*     CE:  No results for input(s): Cherre Gash in the last 72 hours. PT/INR: No results for input(s): INR, APTT in the last 72 hours. BNP: No results for input(s): BNP in the last 72 hours.   ESR: No results found for: SEDRATE  CRP: No results found for: CRP  D Dimer:   Lab Results   Component Value Date    DDIMER <200 02/24/2021      Folate and B12: No results found for: [id number], No results found for: FOLATE  Lactic Acid:   Lab Results Component Value Date    LACTA 1.2 09/25/2018     Thyroid Studies:   Lab Results   Component Value Date    TSH 1.330 09/20/2021    D4FDUWT 7.9 10/10/2011       Oupatient labs:  Lab Results   Component Value Date    CHOL 224 (H) 09/20/2021    TRIG 361 (H) 09/20/2021    HDL 33 09/20/2021    LDLCALC 119 (H) 09/20/2021    TSH 1.330 09/20/2021    PSA <0.03 09/20/2021    INR 1.2 04/08/2014    LABA1C 5.5 09/20/2021       Urinalysis:    Lab Results   Component Value Date/Time    NITRU Negative 07/21/2022 01:57 PM    [address] NONE 07/21/2022 01:57 PM    BACTERIA NONE SEEN 07/21/2022 01:57 PM    RBCUA NONE 07/21/2022 01:57 PM    RBCUA >20 08/02/2012 09:20 AM    BLOODU Negative 07/21/2022 01:57 PM    SPECGRAV 1.020 07/21/2022 01:57 PM    GLUCOSEU Negative 07/21/2022 01:57 PM       Imaging:  XR CHEST (2 VW)    Result Date: 7/21/2022  EXAMINATION: TWO XRAY VIEWS OF THE CHEST 7/21/2022 1:30 pm COMPARISON: None. HISTORY: ORDERING SYSTEM PROVIDED HISTORY: Shortness of breath TECHNOLOGIST PROVIDED HISTORY: Reason for exam:->Shortness of breath What reading provider will be dictating this exam?->CRC FINDINGS: The heart is enlarged. Mild opacity at the lung lung base. Costophrenic angles are blunted bilaterally. No large pneumothorax. The patient's chin obscures the lung apices. Pacemaker and median sternotomy present. Suspect early CHF and bilateral pleural effusion. Superimposed pneumonia cannot be excluded.        ASSESSMENT:  -Failure to thrive in adult  -Congestive heart failure  -Coronary artery disease  -Diabetes mellitus type 2  -Hypertension  -BPH      PLAN:  -Admit to medicine  -CM/CW consult for placement  -PT/OT  -Continue home medications        Diet: No diet orders on file  Code Status: Prior  Surrogate decision maker confirmed with patient:   Extended Emergency Contact Information  Primary Emergency Contact: [address] [facility], [address] [place] [address] Phone: [phone]  Relation: Child  Secondary Emergency Contact: Cedric [address], [address] Phone: [phone]  Mobile Phone: [phone]  Relation: Other    DVT Prophylaxis: []Lovenox []Heparin []PCD [] [address] []Encouraged ambulation  Disposition: []Med/Surg [] Intermediate [] ICU/CCU  Admit status: [] Observation [] Inpatient     +++++++++++++++++++++++++++++++++++++++++++++++++  Bailee Thompson, DO  +++++++++++++++++++++++++++++++++++++++++++++++++  NOTE: This report was transcribed using voice recognition software. Every effort was made to ensure accuracy; however, inadvertent computerized transcription errors may be present.

## 2022-07-22 NOTE — ACP (ADVANCE CARE PLANNING)
Advance Care Planning     Advance Care Planning Activator (Inpatient)  Conversation Note      Date of ACP Conversation: 7/22/2022   Conversation Conducted with: Patient with Decision Making Capacity  ACP Activator: Lavonne Olivo MD    Hospital diagnoses warranting ACP: Heart failure- end stage, CAD with hx of CABGX5  Principal Problem:    Failure to thrive in adult  Resolved Problems:    * No resolved hospital problems. *        Health Care Decision Maker:   Current Designated Health Care Decision Maker:         Care Preferences    Ventilation: \"If you were in your present state of health and suddenly became very ill and were unable to breathe on your own, what would your preference be about the use of a ventilator (breathing machine) if it were available to you? \"      Would the patient desire the use of ventilator (breathing machine)?: no    \"If your health worsens and it becomes clear that your chance of recovery is unlikely, what would your preference be about the use of a ventilator (breathing machine) if it were available to you? \"     Would the patient desire the use of ventilator (breathing machine)?: No      Resuscitation  \"CPR works best to restart the heart when there is a sudden event, like a heart attack, in someone who is otherwise healthy. Unfortunately, CPR does not typically restart the heart for people who have serious health conditions or who are very sick. \"    \"In the event your heart stopped as a result of an underlying serious health condition, would you want attempts to be made to restart your heart (answer \"yes\" for attempt to resuscitate) or would you prefer a natural death (answer \"no\" for do not attempt to resuscitate)? \" no       [] Yes   [] No   Educated Patient / Awilda Galeas regarding differences between Advance Directives and portable DNR orders.         Conversation Outcomes:  [x] ACP discussion completed  [] Existing advance directive reviewed with patient; no changes to patient's previously recorded wishes  [x] New Advance Directive completed  [] Portable Do Not Rescitate prepared for Provider review and signature  [] POLST/POST/MOLST/MOST prepared for Provider review and signature    Details of ACP discussion: Had a detailed discussion with patient regarding CODE STATUS. Patient mentions that he would not like any aggressive measures and would not like resuscitative medications, chest compressions, be placed on ventilator and would like to die peacefully.    Length of ACP Conversation in minutes: 16 min   Code status following completion of discussion: DNR-CCA     Follow-up plan:    [] Schedule follow-up conversation to continue planning  [] Referred individual to Provider for additional questions/concerns   [] Advised patient/agent/surrogate to review completed ACP document and update if needed with changes in condition, patient preferences or care setting  [] This note routed to one or more involved healthcare providers  [] None    Electronically signed by Melida Waters MD on 7/22/2022 at 11:08 AM

## 2022-07-22 NOTE — CARE COORDINATION
7/22 Care Coordination: Pt has been accepted at [facility]. Can admit over weekend. PASRR complete, Envelope in soft Chart. Will have to set up Ambulette. CM/SW will continue to follow for discharge planning.    Madison DOWELLN,RN--BC  [phone]

## 2022-07-22 NOTE — PROGRESS NOTES
(1.753 m)   Wt 177 lb 14.6 oz (80.7 kg)   SpO2 98%   BMI 26.27 kg/m²     General appearance: No apparent distress, appears stated age and cooperative. HEENT: Pupils equal, round, and reactive to light. Conjunctivae/corneas clear. Neck: Supple, with full range of motion. No jugular venous distention. Trachea midline. Respiratory:  decreased breath sounds on the lower lung fields bilaterally  Cardiovascular: Regular rate and rhythm with normal S1/S2 without murmurs, rubs or gallops. Abdomen: Soft, non-tender, non-distended with normal bowel sounds. Musculoskeletal: No pedal edema noted bilaterally of the lower extremities,   Skin: Skin color, texture, turgor normal.  No rashes or lesions. Neurologic: No focal deficits  Psychiatric: Alert and oriented, thought content appropriate, normal insight    Labs:   Recent Labs     07/21/22  1357 07/22/22  0655   WBC 10.2 9.2   HGB 11.9* 11.2*   HCT 37.1 35.2*    167     Recent Labs     07/21/22  1357 07/22/22  0655    141   K 3.4* 3.7    101   CO2 26 29   BUN 27* 28*   CREATININE 1.1 1.2   CALCIUM 10.4* 9.4     Recent Labs     07/21/22  1357   AST 21   ALT 21   BILITOT 1.7*   ALKPHOS 142*     No results for input(s): INR in the last 72 hours. No results for input(s): Suhas Honeycutt in the last 72 hours. Assessment/Plan:    Active Hospital Problems    Diagnosis Date Noted    Failure to thrive in adult [R62.7] 07/21/2022     Priority: Medium   Heart failure exacerbation  CAD with history of CABG x5  Hypertension  Diabetes mellitus type 2  Failure to thrive in adult  Benign prostate hypertrophy    proBNP was reviewed and shows to be elevated at 36,000 737 with a chest x-ray showing bilateral pleural effusions  Chest x-ray showing possible concern for pneumonia. Ordered procalcitonin which was negative.   We will continue to monitor off antibiotics at this time  Patient currently on torsemide 20 mg which has been recently been adjusted in June at his cardiologist office. Cardiology has been consulted  / consulted-working on placement at discharge    DVT Prophylaxis: Lovenox  Diet: ADULT DIET; Regular  Code Status: Full Code    Dispo -ongoing specialist evaluation, placement at [facility].     Piyush Wu MD

## 2022-07-22 NOTE — CARE COORDINATION
7/22 Care Coordination: Pt in with inability to care for self, wants [facility] placement. Wants [facility], Referral called, they have beds will review. PT/OT seeing pt. Can possible discharge today. Will start Pas/Envelope. CM/SW will continue to follow for discharge planning.    Nelsy DOWELLN,RN--BC  [phone]

## 2022-07-22 NOTE — ED PROVIDER NOTES
HPI:  7/21/22, Time: 9:15 PM EDT         Heriberto Morton is a 64 y.o. male presenting to the ED for unable to care for self, beginning days ago. The complaint has been persistent, moderate in severity, and worsened by nothing. Patient presenting here because of unable to care for self. Patient reports history of heart failure. Patient reporting feeling some weakness. Patient reporting no abdominal pain no vomiting no diarrhea patient reporting no productive cough. Patient reporting no syncopal event. ROS:   Pertinent positives and negatives are stated within HPI, all other systems reviewed and are negative.  --------------------------------------------- PAST HISTORY ---------------------------------------------  Past Medical History:  has a past medical history of CAD (coronary artery disease), Cardiomyopathy ([facility] [address] CHF (congestive heart failure) ([facility] [address] Diabetes mellitus ([facility] [address] Diverticulitis, Hyperlipidemia, Hypertension, MI, old, Pancreatitis, Prostate cancer ([facility] [address] S/P CABG x 4, Skin cancer, and Ventricular tachycardia ([facility] [address] Past Surgical History:  has a past surgical history that includes Kidney surgery; Bladder surgery; Elbow surgery; Coronary artery bypass graft (12/24/2007); Colonoscopy; hernia repair; Cardiac pacemaker placement (04/2010); Cardiac defibrillator placement (04/19/2018); and Skin cancer excision (2019). Social History:  reports that he has never smoked. He has never used smokeless tobacco. He reports that he does not drink alcohol and does not use drugs. Family History: family history includes Cancer in his father. The patients home medications have been reviewed. Allergies: Patient has no known allergies.     ---------------------------------------------------PHYSICAL EXAM--------------------------------------    Constitutional/General: Alert and oriented x3,   Head: Normocephalic and atraumatic  Eyes: PERRL, EOMI  Mouth: Oropharynx clear, handling secretions, no trismus  Neck: Supple, full ROM, non tender to palpation in the midline, no stridor, no crepitus, no meningeal signs  Pulmonary: Lungs clear to auscultation bilaterally, no wheezes, rales, or rhonchi. Not in respiratory distress  Cardiovascular:  Regular rate. Regular rhythm. No murmurs, gallops, or rubs. 2+ distal pulses  Chest: no chest wall tenderness  Abdomen: Soft. Non tender. Non distended. +BS. No rebound, guarding, or rigidity. No pulsatile masses appreciated. Musculoskeletal: Moves all extremities x 4. Warm and well perfused, no clubbing, cyanosis, or edema. Capillary refill <3 seconds  Skin: warm and dry. No rashes. Neurologic: GCS 15, CN 2-12 grossly intact, no focal deficits, symmetric strength 5/5 in the upper and lower extremities bilaterally  Psych: Normal Affect    -------------------------------------------------- RESULTS -------------------------------------------------  I have personally reviewed all laboratory and imaging results for this patient. Results are listed below.      LABS:  Results for orders placed or performed during the hospital encounter of 07/21/22   CBC with Auto Differential   Result Value Ref Range    WBC 10.2 4.5 - 11.5 E9/L    RBC 4.48 3.80 - 5.80 E12/L    Hemoglobin 11.9 (L) 12.5 - 16.5 g/dL    Hematocrit 37.1 37.0 - 54.0 %    MCV 82.8 80.0 - 99.9 fL    MCH 26.6 26.0 - 35.0 pg    MCHC 32.1 32.0 - 34.5 %    RDW 17.4 (H) 11.5 - 15.0 fL    Platelets 596 992 - 947 E9/L    MPV 11.3 7.0 - 12.0 fL    Neutrophils % 77.5 43.0 - 80.0 %    Immature Granulocytes % 0.4 0.0 - 5.0 %    Lymphocytes % 15.2 (L) 20.0 - 42.0 %    Monocytes % 6.4 2.0 - 12.0 %    Eosinophils % 0.2 0.0 - 6.0 %    Basophils % 0.3 0.0 - 2.0 %    Neutrophils Absolute 7.90 (H) 1.80 - 7.30 E9/L    Immature Granulocytes # 0.04 E9/L    Lymphocytes Absolute 1.55 1.50 - 4.00 E9/L    Monocytes Absolute 0.65 0.10 - 0.95 E9/L    Eosinophils Absolute 0.02 (L) 0.05 - 0.50 E9/L    Basophils Absolute 0.03 0.00 - 0.20 E9/L Comprehensive Metabolic Panel w/ Reflex to MG   Result Value Ref Range    Sodium 139 132 - 146 mmol/L    Potassium reflex Magnesium 3.4 (L) 3.5 - 5.0 mmol/L    Chloride 100 98 - 107 mmol/L    CO2 26 22 - 29 mmol/L    Anion Gap 13 7 - 16 mmol/L    Glucose 143 (H) 74 - 99 mg/dL    BUN 27 (H) 6 - 23 mg/dL    Creatinine 1.1 0.7 - 1.2 mg/dL    GFR Non-African American >60 >=60 mL/min/1.73    GFR African American >60     Calcium 10.4 (H) 8.6 - 10.2 mg/dL    Total Protein 6.8 6.4 - 8.3 g/dL    Albumin 3.9 3.5 - 5.2 g/dL    Total Bilirubin 1.7 (H) 0.0 - 1.2 mg/dL    Alkaline Phosphatase 142 (H) 40 - 129 U/L    ALT 21 0 - 40 U/L    AST 21 0 - 39 U/L   Troponin   Result Value Ref Range    Troponin, High Sensitivity 61 (H) 0 - 11 ng/L   Brain Natriuretic Peptide   Result Value Ref Range    Pro-BNP 36,737 (H) 0 - 450 pg/mL   Urinalysis   Result Value Ref Range    Color, UA Yellow Straw/Yellow    Clarity, UA Clear Clear    Glucose, Ur Negative Negative mg/dL    Bilirubin Urine Negative Negative    Ketones, Urine Negative Negative mg/dL    Specific Gravity, UA 1.020 1.005 - 1.030    Blood, Urine Negative Negative    pH, UA 5.5 5.0 - 9.0    Protein, UA TRACE Negative mg/dL    Urobilinogen, Urine 1.0 <2.0 E.U./dL    Nitrite, Urine Negative Negative    Leukocyte Esterase, Urine Negative Negative   Microscopic Urinalysis   Result Value Ref Range    WBC, UA NONE 0 - 5 /HPF    RBC, UA NONE 0 - 2 /HPF    Bacteria, UA NONE SEEN None Seen /HPF   Magnesium   Result Value Ref Range    Magnesium 1.9 1.6 - 2.6 mg/dL   EKG 12 Lead   Result Value Ref Range    Ventricular Rate 100 BPM    Atrial Rate 102 BPM    QRS Duration 104 ms    Q-T Interval 318 ms    QTc Calculation (Bazett) 410 ms    R Axis -31 degrees    T Axis -178 degrees       RADIOLOGY:  Interpreted by Radiologist.  XR CHEST (2 VW)   Final Result   Suspect early CHF and bilateral pleural effusion. Superimposed pneumonia   cannot be excluded.              EKG:  This EKG is signed and interpreted by me. Rate: 100  Rhythm: Sinus  Interpretation: non-specific EKG  Comparison: stable as compared to patient's most recent EKG        ------------------------- NURSING NOTES AND VITALS REVIEWED ---------------------------   The nursing notes within the ED encounter and vital signs as below have been reviewed by myself. BP (!) 98/52   Pulse 74   Temp 97.8 °F (36.6 °C)   Resp 20   SpO2 96%   Oxygen Saturation Interpretation: Normal    The patients available past medical records and past encounters were reviewed. ------------------------------ ED COURSE/MEDICAL DECISION MAKING----------------------  Medications - No data to display          Medical Decision Making:    Presenting here because unable to care for self. Patient ongoing for days. Patient reporting that he is unable to get around his house. Patient does have history of end-stage CHF. Patient reporting no chest pain no difficulty breathing reports no abdominal pain. He is awake alert here. Labs are reviewed he is mildly hypertensive here in the emergency department. Patient chest x-ray does show heart failure his proBNP is elevated. Patient will be admitted for further evaluation treatment    Re-Evaluations:             Re-evaluation. Patients symptoms show no change  Patient reeval and made aware of findings and plan. Consultations:             Internal medicine    Critical Care: This patient's ED course included: a personal history and physicial eaxmination    This patient has been closely monitored during their ED course. Counseling: The emergency provider has spoken with the patient and discussed todays results, in addition to providing specific details for the plan of care and counseling regarding the diagnosis and prognosis.   Questions are answered at this time and they are agreeable with the plan.       --------------------------------- IMPRESSION AND DISPOSITION ---------------------------------    IMPRESSION  1. Failure to thrive in adult    2. Congestive heart failure, unspecified HF chronicity, [facility]        DISPOSITION  Disposition: Admit to telemetry  Patient condition is stable        NOTE: This report was transcribed using voice recognition software.  Every effort was made to ensure accuracy; however, inadvertent computerized transcription errors may be present          Veda Merchant MD  07/21/22 5968

## 2022-07-22 NOTE — PROGRESS NOTES
Physical Therapy    Initial Assessment     Name: Ekta Miller  : 1945  MRN: [id number]      Date of Service: 2022    Evaluating PT: Nader Pichardo PT, DPT [id number]      Room #:  3056/5518-J  Diagnosis:  Failure to thrive in adult [R62.7]  Congestive heart failure, unspecified HF chronicity, unspecified heart failure type ([facility] [address] [I50.9]  PMHx/PSHx:  CHF, CAD, HLD, HTN, Cardiomyopathy, Ventricular Cardiomyopathy, Pancreatitis, DM, Prostate CA, Skin CA, Diverticulitis  Precautions:  Fall Risk, Alarm    SUBJECTIVE:    Pt lives alone in a 2 story house with 2 stair(s) and 1 rail(s) to enter. Pt bed and bath is on second floor with 2 flights of stairs and 1 rail up. Pt ambulated with no AD prior to admission. Pt has a cane he occasionally used. OBJECTIVE:   Initial Evaluation  Date: 22 Treatment Date: Short Term/ Long Term   Goals   AM-PAC 6 Clicks      Was pt agreeable to Eval/treatment? Yes     Does pt have pain? Arthritis Pain     Bed Mobility  Rolling: NT  Supine to sit: SBA  Sit to supine: SBA  Scooting: SBA  Rolling: Independent   Supine to sit: Independent   Sit to supine: Independent   Scooting: Independent    Transfers Sit to stand: Min A  Stand to sit: Min A  Stand pivot: Min A with no AD  Sit to stand: Supervision  Stand to sit: Supervision  Stand pivot: Supervision   Ambulation   2x15 feet with Min A with no AD   >50 feet with Supervision with [employer]   Stair negotiation: ascended and descended NT  TBD   ROM BUE: See OT note  BLE: WFL     Strength BUE: See OT note  BLE: 3+/5     Balance Sitting EOB: SBA  Dynamic Standing: Min A wth no AD  Sitting EOB: Independent   Dynamic Standing: Supervision with [employer]     Pt is A & O x: 3 grossly to person, place, time and situation. Sensation: Pt reports diabetic neuropathy in his feet  Edema: Unremarkable      Patient education  Pt educated on PT role in acute care.     Patient response to education:   Pt verbalized understanding Pt demonstrated skill Pt requires further education in this area   Yes NA Reinforce     ASSESSMENT:    Conditions Requiring Skilled Therapeutic Intervention:    [x]Decreased strength     []Decreased ROM  [x]Decreased functional mobility  [x]Decreased balance   [x]Decreased endurance   []Decreased posture  []Decreased sensation  []Decreased coordination   []Decreased vision  [x]Decreased safety awareness   []Increased pain     Comments:    Upon entry to room, pt was supine in bed and agreeable to PT evaluation. Pt moved to sitting EOB with no physical help needed. Pt sat EOB for 3+ minutes. Pt stood at [employer] EOB with light assistance to maintain balance. Once standing pt completed 3 sidesteps then declined to ambulate any farther and wanted to lay back down. After education on PT role, pt still declined and he moved back to supine in bed. As all needs were being met and session was wrapped up, pt requested to ambulate to bathroom. Pt moved back out of bed. Pt declined to use the walker and light assistance as pt ambulated to bathroom. Pt has small steps and was slightly unsteady. Pt transferred to commode with light assistance and was educated to pull light when done. Pt completed bowel movement then ambulated back to bed with light assistance. Pt was left supine in bed with all needs met and call light nearby. Treatment:  Patient practiced and was instructed in the following treatment:    Bed mobility. Verbal cues for technique and safety. Transfers: x2 EOB, x1 commode. Verbal cues for hand placement, technique and safety. Physical assistance to steady. Ambulation: Verbal cues for [employer] technique with sidesteps. Physical assistance to stay balanced   Sitting Balance: EOB 3+ minutes (endurance, tolerance to upright)    Pt's/family goals:  1. To go to nursing facilty    Prognosis is Good for reaching above PT goals. Patient and or family understand(s) diagnosis, prognosis, and plan of care.   Yes    PHYSICAL THERAPY PLAN OF CARE:    PT POC is established based on physician order and patient diagnosis     Referring provider/PT Order:    07/22/22 0245  PT eval and treat  Start:  07/22/22 0245,   End:  07/22/22 0245,   ONE TIME,   Standing Count:  1 Occurrences,   R         Tarik Rodríguez DO     Diagnosis:  Failure to thrive in adult [R62.7]  Congestive heart failure, unspecified HF chronicity, unspecified heart failure type ([facility] [I50.9]  Specific instructions for next treatment: To increase activity. Current Treatment Recommendations:     [x] Strengthening to improve independence with functional mobility   [] ROM to improve independence with functional mobility   [x] Balance Training to improve static/dynamic balance and to reduce fall risk  [x] Endurance Training to improve activity tolerance during functional mobility   [x] Transfer Training to improve safety and independence with all functional transfers   [x] Gait Training to improve gait mechanics, endurance and assess need for appropriate assistive device  [x] Stair Training in preparation for safe discharge home and/or into the community   [] Positioning to prevent skin breakdown and contractures  [x] Safety and Education Training   [x] Patient/Caregiver Education   [] HEP  [] Other     PT long term treatment goals are located in above grid    Frequency of treatments: 2-5x/week x 2-3 days. Time in  0810  Time out  0830    Total Treatment Time  10 minutes     Evaluation Time includes thorough review of current medical information, gathering information on past medical history/social history and prior level of function, completion of standardized testing/informal observation of tasks, assessment of data and education on plan of care and goals.     CPT codes:  [x] Low Complexity PT evaluation 84671  [] Moderate Complexity PT evaluation 28759  [] High Complexity PT evaluation 58720  [] PT Re-evaluation 30977  [] Gait training 56708 0 minutes  [] Manual therapy 15678 0 minutes  [x] Therapeutic activities 23034 10 minutes  [] Therapeutic exercises 59020 0 minutes  [] Neuromuscular reeducation 22958 0 minutes     Ashley Martinez, PT, DPT  [id number]      Tarun Murphy, SPT

## 2022-07-23 PROCEDURE — 99214 OFFICE O/P EST MOD 30 MIN: CPT | Performed by: INTERNAL MEDICINE

## 2022-07-23 PROCEDURE — G0378 HOSPITAL OBSERVATION PER HR: HCPCS

## 2022-07-23 PROCEDURE — 2580000003 HC RX 258: Performed by: FAMILY MEDICINE

## 2022-07-23 PROCEDURE — 96372 THER/PROPH/DIAG INJ SC/IM: CPT

## 2022-07-23 PROCEDURE — 93005 ELECTROCARDIOGRAM TRACING: CPT | Performed by: NURSE PRACTITIONER

## 2022-07-23 PROCEDURE — 6370000000 HC RX 637 (ALT 250 FOR IP): Performed by: FAMILY MEDICINE

## 2022-07-23 PROCEDURE — 6360000002 HC RX W HCPCS: Performed by: FAMILY MEDICINE

## 2022-07-23 RX ORDER — ISOSORBIDE DINITRATE 20 MG/1
20 TABLET ORAL 3 TIMES DAILY
Qty: 90 TABLET | Refills: 0 | Status: ON HOLD | OUTPATIENT
Start: 2022-07-23 | End: 2022-10-26 | Stop reason: HOSPADM

## 2022-07-23 RX ORDER — OMEPRAZOLE 40 MG/1
40 CAPSULE, DELAYED RELEASE ORAL DAILY
Qty: 30 CAPSULE | Refills: 3 | Status: ON HOLD
Start: 2022-07-23 | End: 2022-10-26 | Stop reason: HOSPADM

## 2022-07-23 RX ADMIN — Medication 10 ML: at 08:39

## 2022-07-23 RX ADMIN — GLIPIZIDE 5 MG: 5 TABLET ORAL at 16:51

## 2022-07-23 RX ADMIN — Medication 10 ML: at 20:43

## 2022-07-23 RX ADMIN — ASPIRIN 81 MG: 81 TABLET, COATED ORAL at 08:38

## 2022-07-23 RX ADMIN — TAMSULOSIN HYDROCHLORIDE 0.4 MG: 0.4 CAPSULE ORAL at 08:38

## 2022-07-23 RX ADMIN — PANTOPRAZOLE SODIUM 40 MG: 40 TABLET, DELAYED RELEASE ORAL at 05:40

## 2022-07-23 RX ADMIN — METOPROLOL SUCCINATE 25 MG: 25 TABLET, EXTENDED RELEASE ORAL at 08:38

## 2022-07-23 RX ADMIN — HYDRALAZINE HYDROCHLORIDE 25 MG: 25 TABLET, FILM COATED ORAL at 08:38

## 2022-07-23 RX ADMIN — FINASTERIDE 5 MG: 5 TABLET, FILM COATED ORAL at 08:39

## 2022-07-23 RX ADMIN — GLIPIZIDE 5 MG: 5 TABLET ORAL at 05:40

## 2022-07-23 RX ADMIN — ISOSORBIDE MONONITRATE 30 MG: 30 TABLET, EXTENDED RELEASE ORAL at 08:39

## 2022-07-23 RX ADMIN — ENOXAPARIN SODIUM 40 MG: 100 INJECTION SUBCUTANEOUS at 08:38

## 2022-07-23 RX ADMIN — TORSEMIDE 20 MG: 20 TABLET ORAL at 08:38

## 2022-07-23 NOTE — CONSULTS
Palliative Care Department  [phone]  Palliative Care Initial Consult  Provider Rita Joshi, APRN - CNP      PATIENT: Virgen Brown  : 1945  MRN: [id number]  ADMISSION DATE: 2022  9:04 PM  Referring Provider: Karli Hu MD    Palliative Medicine was consulted on hospital day 0 for assistance with Code Status Discussion, Family support: End-stage heart failure. HPI:     Gabriel Rankin is a 64 y.o. y/o male with a history of coronary artery disease, cardiomyopathy, congestive heart failure, diabetes mellitus, diverticulitis, hyperlipidemia, hypertension, MI, pancreatitis, prostate cancer, CABG x4, skin cancer, V. tach who presented to [facility] on 2022 with inability to care for himself at home. In the emergency room significant laboratory findings shows proBNP of 36,737. Chest x-ray shows early CHF and bilateral pleural effusion. Superimposed pneumonia cannot be excluded. Patient had a history with the EBONI on 2022 with a EF of 20 to 25%, and was follow up at [facility] where he was found not to be a good candidate for further cardiac intervention, cardiology was consulted, recommending CODE STATUS to be changed to DNR CC. Palliative medicine consulted to discuss goal of care and family support    ASSESSMENT/PLAN:     Pertinent Hospital Diagnoses     Failure to thrive  Congestive heart failure      Palliative Care Encounter / Counseling Regarding Goals of Care  Please see detailed goals of care discussion as below  At this time, Virgen Brown, Does have capacity for medical decision-making. Capacity is time limited and situation/question specific  Outcome of goals of care meeting:  Continue with current medical treatment  Patient wished to be able to go to nursing home and be with his cousin at [address] [place] [address], case management following.   Patient do not wish to escalate care, wishing for CODE STATUS to be changed to DNR CC  He is not sure he wants Examination:  Gen: elderly, thin, NAD, awake, alert   HEENT: normocephalic, atraumatic, PERRL, EOMI,   Neck: trachea midline, no JVD  Lungs: respirations easy and not labored,   Heart: regular rate and rhythm, distant heart tones,   Abdomen: normoactive bowel sounds, soft, non-tender  Extremities: no clubbing, cyanosis or edema, moving all extremities    Skin: warm, dry without rashes, lesions, bruising  Neuro: awake, alert, oriented x 3, follows commands, no gross neurologic deficit    Objective data reviewed: labs, images, records, medication use, vitals, and chart    Time/Communication  Greater than 50% of time spent, total 50 minutes in counseling and coordination of care at the bedside regarding  status discussion and goals of care. Thank you for allowing Palliative Medicine to participate in the care of Connor Hamilton. Note: This report was completed using computerize voiced recognition software. Every effort has been made to ensure accuracy; however, inadvertent computerized transcription errors may be present.

## 2022-07-23 NOTE — PROGRESS NOTES
Hospitalist Progress Note      PCP: Roxana Kocher, DO    Date of Admission: 7/21/2022    Chief Complaint: chest discomfort, failure to thrive    Hospital Course:    Patient presented to the emergency department with concerns about not being able to care for himself. He has a history of end-stage heart failure. Has been feeling weak recently. Having problems with activities of daily living. He would like to be placed at [address] [place] [address] to be with his brother. Vital signs within norm limits and stable. Laboratory studies demonstrate potassium 3.4, BUN 27, glucose 143, troponin 61, proBNP 36,737. Chest x-ray shows bilateral pleural effusions. Medicine consulted for admission. Subjective: Patient was seen at bedside and mentions again about not wanting anything aggressive, palliative consulted. Patient mentions that he would not like any aggressive management.       Medications:  Reviewed    Infusion Medications    sodium chloride       Scheduled Medications    aspirin  81 mg Oral Daily    finasteride  5 mg Oral Daily    glipiZIDE  5 mg Oral BID AC    hydrALAZINE  25 mg Oral BID    isosorbide mononitrate  30 mg Oral Daily    metoprolol succinate  25 mg Oral Daily    pantoprazole  40 mg Oral QAM AC    tamsulosin  0.4 mg Oral Daily    torsemide  20 mg Oral Daily    sodium chloride flush  10 mL IntraVENous 2 times per day    enoxaparin  40 mg SubCUTAneous Daily     PRN Meds: sodium chloride flush, sodium chloride, promethazine **OR** ondansetron, polyethylene glycol, acetaminophen **OR** acetaminophen, hydrOXYzine pamoate, melatonin      Intake/Output Summary (Last 24 hours) at 7/23/2022 1155  Last data filed at 7/23/2022 1018  Gross per 24 hour   Intake 360 ml   Output --   Net 360 ml       Exam:    BP (!) 144/66   Pulse 74   Temp 96.9 °F (36.1 °C) (Temporal)   Resp 18   Ht 5' 9\" (1.753 m)   Wt 177 lb 14.6 oz (80.7 kg)   SpO2 98%   BMI 26.27 kg/m²     General appearance: No apparent distress, appears stated age and cooperative. HEENT: Pupils equal, round, and reactive to light. Conjunctivae/corneas clear. Neck: Supple, with full range of motion. No jugular venous distention. Trachea midline. Respiratory:  decreased breath sounds on the lower lung fields bilaterally  Cardiovascular: Regular rate and rhythm with normal S1/S2 without murmurs, rubs or gallops. Abdomen: Soft, non-tender, non-distended with normal bowel sounds. Musculoskeletal: No pedal edema noted bilaterally of the lower extremities,   Skin: Skin color, texture, turgor normal.  No rashes or lesions. Neurologic: No focal deficits  Psychiatric: Alert and oriented, thought content appropriate, normal insight    Labs:   Recent Labs     07/21/22  1357 07/22/22  0655   WBC 10.2 9.2   HGB 11.9* 11.2*   HCT 37.1 35.2*    167     Recent Labs     07/21/22  1357 07/22/22  0655    141   K 3.4* 3.7    101   CO2 26 29   BUN 27* 28*   CREATININE 1.1 1.2   CALCIUM 10.4* 9.4     Recent Labs     07/21/22  1357   AST 21   ALT 21   BILITOT 1.7*   ALKPHOS 142*     No results for input(s): INR in the last 72 hours. No results for input(s): Janae Barnhart in the last 72 hours. Assessment/Plan:    Active Hospital Problems    Diagnosis Date Noted    Failure to thrive in adult [R62.7] 07/21/2022     Priority: Medium   Heart failure exacerbation  CAD with history of CABG x5  Hypertension  Diabetes mellitus type 2  Failure to thrive in adult  Benign prostate hypertrophy    proBNP was reviewed and shows to be elevated at 36,000 737 with a chest x-ray showing bilateral pleural effusions  Chest x-ray showing possible concern for pneumonia. Ordered procalcitonin which was negative. We will continue to monitor off antibiotics at this time  Patient currently on torsemide 20 mg which has been recently been adjusted in June at his cardiologist office.   Cardiology has been consulted-switched imdur to isosordil 20mg TID   / consulted-working on placement at discharge  Palliative consulted per Cardiology for end stage heart failure    DVT Prophylaxis: Lovenox  Diet: ADULT DIET; Regular  Code Status: DNR-CCA    Dispo -ongoing specialist evaluation, placement at DC.     Shane Byrd MD

## 2022-07-23 NOTE — CONSULTS
I independently interviewed and examined the patient. I have reviewed the above documentation completed by the DEVEN. Please see my additional contributions to the HPI, physical exam, and assessment / medical decision making. Reason for consult: CHF    He was previously known to Dr. Sandy Tan service and also EP service and also follows at [facility] for his cardiology needs. Mr. Kali Hampton is a 42-year-old  male with history of CAD, S/p CABG x 5v ( L-LAD, SVG to RI, SVG to OM1, SVG to OM 2 and SVG to RPDA) - 12/2007, LHC in 3/16/21 showed severe disease in native vessels, all the grafts were occluded, recommended medical therapy only, ICM with EF 20+ -5% based on echo on 5/13/22 at [facility], mod MR, RV function reduced, RHC 5/16/22 >> increased biventricular filling pressures, mod PH and PCWP 26 mmHg, CI 2.73, ICD inserted in 2010 and generator change in 4/18, h/o VT, HT, HLD, DM type 2 non insulin, history of prostate cancer (details unknown), medical non compliance, non smoker. DNR-CCA, lives alone. Patient was not taking meds at home and was taking only intermittently. History of intolerance to Entresto, ACEI and ARB's    He presented on 7/21/22 with not eating and drinking for one week and would like to die in peace. Has chronic dyspnea, no orthopnea, PND, leg edema. No dyspnea at rest, no chest pain and no other symptoms. He would like to go to [address] [facility]. Patient declined further cardiac testing. Denies any cardiac symptoms. Patient is also complaining of constipation and abdominal bloating.     ER work-up- bp 130/71, , 96% on RA    Labs -creat 1.1, BNP 67994, trop 61 , 69, hb 11.9    CXR, early CHF, pleural effusion      Review of Systems:  Cardiac: As per HPI  General: No fever, chills  Pulmonary: As per HPI  GI: No nausea, vomiting  Musculoskeletal: MENESES x 4, no focal motor deficits  Skin: Intact, no rashes  Neuro/Psych: No headache or seizures    Physical Exam:  BP (!) 144/66   Pulse 74   Temp 96.9 °F (36.1 °C) (Temporal)   Resp 18   Ht 5' 9\" (1.753 m)   Wt 177 lb 14.6 oz (80.7 kg)   SpO2 98%   BMI 26.27 kg/m²   Appearance: Awake, alert, no acute respiratory distress, frail  Skin: Intact, no rash  Head: Normocephalic, atraumatic  ENMT: MMM, no rhinorrhea  Neck: Supple, no carotid bruits, JVD normal  Lungs: Clear to auscultation bilaterally. No wheezes, rales, or rhonchi. Cardiac: Irregularly irregular rate and rhythm, +S1S2, no murmurs apparent  Abdomen: Soft, +bowel sounds  Extremities: Moves all extremities x 4, no lower extremity edema  Neurologic: No focal motor deficits apparent, normal mood and affect    Telemetry findings reviewed: Currently not on the monitor patient refused    EKG: Possible atrial fibrillation with heart rate of 100, significant baseline artifact, left axis deviation, frequent premature ventricular complexes, LVH, abnormal EKG. Vitals and labs were reviewed: As above blood pressure 144/66, heart rate 74 sats 98% room air    Labs-BUN/creatinine 28/1.2, proBNP 36,000 737, troponin, essentially 61>> 69, H&H 11.2/34.2    Chest x-ray-early CHF and bilateral pleural effusions superimposed pneumonia cannot be excluded. Pacemaker evaluation 7/4/2022: 1 episode of nonsustained VT was noted on 7/3/2022 no ICD shocks. There is 1 burst of ATP that was terminated. .  Battery life 4.5 years 1% A. fib burden. TTE: 4/13/2018 (Dr. Blaze Goncalves):  Ejection fraction is visually estimated at 30%. The entire apex is severely hypokinetic. Overall ejection fraction severely decreased. Moderate left ventricular concentric hypertrophy noted. There is doppler evidence of stage I diastolic dysfunction. Normal right ventricle structure and function. Physiologic and/or trace mitral regurgitation is present. [facility] ([facility], 3/16/2021): LMT: _ The LMT has severe diffuse disease. LAD: _ The LAD has severe diffuse disease.  Additional Comment: Moderate caliber vessel with  in the proximal portion. The vessel is fed by patent LIMA graft. The distal portion is small in caliber and wraps the apex. There are L->R collaterals. LCX: _ The Circumflex has severe diffuse disease. Additional Comment: Moderate caliber non-dominant vessel which is fed by SVG->RI/OM1. RAMUS: _ The ostial Ramus - . Additional Comment: Fills in the mid-distal portion from SVG->RI graft. RCA: _ RCA Status: Not Applicable. Additional Comment: Occluded at the ostia. GRAFTS: _ Left Internal Mammary Artery Graft End to Side to the Left Anterior Descending. Additional Comments - patent. _ Saphenous Vein Graft End to Side to the Ramus Intermedius . Additional  Comments - patent with mild-moderate disease in the mid-portion; supplies mid-distal LCx and backfills to partially supply LAD. _ Saphenous Vein Graft End to Side to the OM-2 Second Obtuse Marginal Branch. Additional Comments - occluded. _ Saphenous Vein Graft End to Side to the Right Posterior Descending Artery. Additional Comments - occluded. Impression:- Severe/occlusive native 3 vessel CAD with patent LIMA->LAD, SVG->RI/OM1. SVG->OM2 is occluded - Native RCA is occluded at the ostia as is the SVG->rPDA. The right is fed by L->R collaterals from the LIMA graft Recommended Treatment: Medical Therapy. TTE: 5/13/2022 ([facility]): - Technically difficult exam due to body habitus and suboptimal positioning. - Exam indication: Evaluation of known heart failure to guide therapy - The left ventricle is severely dilated. Left ventricular systolic function is severely decreased. EF = 20 ± 5% (visual est.) Definity contrast used for   endocardial border detection. Left ventricular diastolic function was not evaluated due to an arrhythmia. The right ventricle is dilated. Right ventricular systolic function is moderately to severely decreased. - The left atrial cavity is severely dilated. - The right atrial cavity is dilated. There is moderate (2+) mitral valve regurgitation. - -Peak/mean gradients of 15/7 mmHg, gradients averaged due to arrhythmia. -Prior EF reported as 26% (3/2021). Assessment:  Ischemic cardiomyopathy with an EF of 35%  ACC/AHA stage D, NYHA functional class II  Chronic HFrEF and appears euvolemic  History of CAD status post CABG x5v (LIMA to LAD, SVG to OM1, SVG to OM2, SVG to RI and SVG to RPDA) in 2007 with severe multivessel disease and all the grafts were occluded except for LIMA to LAD which was patent. .  Patient is not a candidate for any further cardiac intervention. Not a candidate for cardiac transplant or LVAD due to medication noncompliance  Hypertension, not well controlled  Hyperlipidemia  Type 2 diabetes  History of VT  S/p ICD in situ and recent generator change in 2018  History of prostate cancer  EKG appears irregular suggestive of A. fib but there is significant baseline artifact. Monitor showed normal sinus rhythm with occasional premature ventricular complexes. Plan:   Based on his history and testing at [facility], he is not a candidate for any further cardiac intervention. We will continue guideline directed medical therapy. He has history of intolerance to ACE and ARB's. Continue current guideline directed medical therapy including Toprol-XL, hydralazine. Change Imdur to Isordil 20 mg p.o. 3 times a day. Will repeat EKG to make sure he is not in A. fib. Continue oral Demadex  Consult palliative care. Continue cardiac diet and restrict daily salt intake less than 2 g  We will see him as needed, rest of the care as per primary service  Please consider changing his CODE STATUS to DNR CC. Thank you for consulting and allowing us to participate in [facility]. Deneen Milian MD, Trinidad Son.   [facility]) Cardiology

## 2022-07-23 NOTE — PROGRESS NOTES
Pt seen, brief discussion regarding hospice services. Pt is agitated and is only concerned with receiving a call regarding his dog getting moved to his friend mayo's house. Pt concerned that his phone no longer works, his cell phone was in airplane mode. Pt placed a call to his friend Sameer Urrutia and left a message. Redirected to hospice discussion. Pt states that he just wants to remain comfortable and die in his nursing home. He states that his daughter no longer helps him and he did not want her contacted. HOTV will f/u with CM on call to discuss discharge and hospice admission after discharge if pt is agreeable.

## 2022-07-23 NOTE — CONSULTS
Inpatient Cardiology Consultation      Reason for Consult:  CHF    Consulting Physician: Dr. Sharif Mariscal     Requesting Physician:  Dr, Monta Severance    Date of Consultation: 7/23/2022    HISTORY OF PRESENT ILLNESS:   Mr. Gabriele Katz is a 64year old male who is known to [facility], Dr. Mirella Stanford and Dr. Nuris Cherry. Patient was most recently seen in OP with Dr. Mirella Stanford on 7/3/2022 --> no changes were made. PMH: CAD with history of CABG, Ischemic CMP, HTN, HLD, s/p ICD in situ, CKD, DM. Recent Hospital Encounters; [facility]   5/12/2022-5/17/2022: [facility]: Admitted for acute on chronic systolic heart failure (admission weight 191 pounds), given IV diuretics and GDMT was optimized. Patient underwent a right heart cath showing moderate pulmonary hypertension. Patient developed GUMARO (creatinine of 3.2) with a discharge creatinine of 2.9. DIscharge weight 172 pounds. Was evaluated by EP at that time for arrhythmia burden and patient was started on Toprol-XL 25 mg daily. 6/3/2022: [facility] admission: Patient was admitted for fatigue, weakness and continues to do poorly at home. He has extreme anxiety related to his medical conditions and has limited help by family. He also complained of joint pain. Per cardiology at that time, patient was doing well from a cardiovascular standpoint. Patient does however have a history of moderate to severe ischemic MR, felt to be underestimated by prior TTE. Patient was recommended for further evaluation by EBONI and possible consideration of a clip --> patient declined. CODE STATUS DNR CCA. Telephone Encounter:  EP office: ICD interrogation (7/4/2022): 1 VT at 10:59 pm treated with ATP x 1, duration 14 seconds. Toprol-XL increased to 25 mg BID. Stress test and ECHO ordered. Patient reported that over the past 2 weeks he has had a loss of appetite with little p.o. intake.   He stated that he lives in a two-story home and has been unable to ambulate up and down the steps and therefore he has been staying on the second floor only. His friend occasionally brings him a sandwich in the morning but he does not eat and drink throughout the day. He stated that he has been too weak to go from his bed to the bathroom over the past several days and he is unable to care for himself any longer. He stated \"I am exhausted and I want to die in peace. \"  He stated that he came to the hospital for [facility] [place] [facility] [place] [id number] (on [facility]) so that he could live with his cousin and hopefully die peacefully there. He denies ICD firing, shortness of breath or chest pain. He denies lower extremity edema. He stated that he intermittently takes his medications at home and has nearly \"given up. \"  Therefore he [facility] on 7/21/2022. Upon arrival to the ED: /71, , Afebrile, 96% on RA. Labs: Na+ 139, K+ 3.9, BUN/Cr 27/1.1>>1.2 (today), proBNP [id number], hs-cTnT 61>>69. Alk phos 142, Bilirubin 1.7. WBC 10.2, H/H 11.9/37.1, platelet count 903. UA: negative. CXR: Suspect early CHF and bilateral pleural effusion, superimposed pneumonia cannot be excluded. EKG: Atrial fibrillation, rate 100 bpm, left axis deviation, frequent PVCs, LVH. ER medications: None. He was admitted to a telemetry monitored unit for further evaluation. Patient appears anxious and wants to be discharged to a nursing facility. He denies chest pain and shortness of breath. He does appear to be intermittently confused during exam.  No Family is at the bedside. Please note: past medical records were reviewed per electronic medical record (EMR) - see detailed reports under Past Medical/ Surgical History. Past Medical History:    CAD  Status post CABG 12/24/2007 (LIMA-LAD, SVG-RI, OM1, OM2, PDA. Stress test ordered 3/5/2021: Cancelled due to patient having shingles. [facility] ([facility], 3/16/2021): LMT: _ The LMT has severe diffuse disease. LAD: _ The LAD has severe diffuse disease.  Additional Comment: Moderate caliber dilated. Left ventricular systolic function is severely decreased. EF = 26 ± 5% (2D biplane) Grade III left ventricular diastolic dysfunction. The right ventricle is normal in size. Right ventricular systolic function is   moderately decreased. - The left atrial cavity is severely dilated. - The right atrial cavity is dilated. There is moderately severe (3+) mitral valve regurgitation due to restricted leaflet motion likely related to ischemic heart disease. TTE: 5/13/2022 ([facility]): - Technically difficult exam due to body habitus and suboptimal positioning. - Exam indication: Evaluation of known heart failure to guide therapy - The left ventricle is severely dilated. Left ventricular systolic function is severely decreased. EF = 20 ± 5% (visual est.) Definity contrast used for   endocardial border detection. Left ventricular diastolic function was not evaluated due to an arrhythmia. The right ventricle is dilated. Right ventricular systolic function is moderately to severely decreased. - The left atrial cavity is severely dilated. - The right atrial cavity is dilated. There is moderate (2+) mitral valve regurgitation. - -Peak/mean gradients of 15/7 mmHg, gradients averaged due to arrhythmia. -Prior EF reported as 26% (3/2021). - Exam was compared with the prior  echocardiographic exam performed on  3/14/2021. There is no significant change. Valvular heart disease: Moderate-severe ischemic MR (patient was recommended for further evaluation with EBONI and possible consideration of a clip 6/3/2022 at [facility] --> patient declined. Status post ICD (implanted 4/19/2010 for primary prevention) with generator change 4/19/2018. Post recent ICD interrogation 7/4/2022: AV pacing 6.7%, RV pacing 3.2%, AT/AF burden 1%. Appropriate VT therapy: Successful.   VT episodes detected on 7/3/2022 at 10:59 PM.  Duration 14 seconds with an average V rate of 181 bpm.  EGM is C/W VT and VT zone treated with burst ATP x1 which terminated arrhythmia. Telephone encounter: EP 7/5/2022 Toprol-XL was increased to 25 mg twice daily. Pulmonary hypertension:  RHC: 5/16/2022: [facility]: Elevated biventricular filling pressures (RA 10, PCWP 26), moderate pulmonary hypertension (most likely postcapillary), preserved cardiac index by assumed Sunni 2.73. Hypertension  Hyperlipidemia  Type 2 diabetes mellitus  History of VT  History of prostate cancer  History of diverticulitis  History of pancreatitis  Medical noncompliance  History of hernia repair, elbow surgery, bladder surgery. History of skin melanoma (nose and right-sided cheek) status post excision 2019. Medications Prior to admit:  Prior to Admission medications    Medication Sig Start Date End Date Taking?  Authorizing Provider   LINZESS 290 MCG CAPS capsule TAKE 1 CAPSULE BY MOUTH EVERY DAY 6/21/22   Historical Provider, MD   traZODone (DESYREL) 50 MG tablet TAKE 1 TABLET BY MOUTH AT BEDTIME 6/21/22   Historical Provider, MD   torsemide (DEMADEX) 20 MG tablet Take 1 tablet by mouth daily 6/30/22 Mayra Dine, DO   metoprolol succinate (TOPROL XL) 25 MG extended release tablet Take 1 tablet by mouth daily 6/30/22 Mayra Dine, DO   hydrALAZINE (APRESOLINE) 25 MG tablet Take 1 tablet by mouth in the morning and at bedtime 6/30/22 Mayra Dine, DO   isosorbide mononitrate (IMDUR) 30 MG extended release tablet Take 1 tablet by mouth daily 6/30/22 Mayra Dine, DO   nitroGLYCERIN (NITROSTAT) 0.4 MG SL tablet Place 1 tablet under the tongue every 5 minutes as needed for Chest pain 4/26/22 Mayra Dine, DO   omeprazole (PRILOSEC) 40 MG delayed release capsule 40 mg 2 times daily  4/26/21   Historical Provider, MD   acetaminophen (TYLENOL 8 HOUR ARTHRITIS PAIN) 650 MG extended release tablet Take 650 mg by mouth every 8 hours as needed for Pain    Historical Provider, MD   OXYGEN Inhale 4 L into the lungs as needed     Historical Provider, MD   finasteride (PROSCAR) 5 MG tablet take 1 tablet by mouth once daily 1/3/17   Historical Provider, MD   glipiZIDE (GLUCOTROL) 5 MG tablet Take 5 mg by mouth 2 times daily (before meals)  8/30/16   Historical Provider, MD   aspirin 81 MG tablet Take 81 mg by mouth daily. Historical Provider, MD   tamsulosin (FLOMAX) 0.4 MG capsule Take 0.4 mg by mouth daily     Historical Provider, MD   omeprazole (PRILOSEC) 40 MG delayed release capsule Take 40 mg by mouth 2 times daily      Historical Provider, MD       Current Medications:    Current Facility-Administered Medications: aspirin EC tablet 81 mg, 81 mg, Oral, Daily  finasteride (PROSCAR) tablet 5 mg, 5 mg, Oral, Daily  glipiZIDE (GLUCOTROL) tablet 5 mg, 5 mg, Oral, BID AC  hydrALAZINE (APRESOLINE) tablet 25 mg, 25 mg, Oral, BID  isosorbide mononitrate (IMDUR) extended release tablet 30 mg, 30 mg, Oral, Daily  metoprolol succinate (TOPROL XL) extended release tablet 25 mg, 25 mg, Oral, Daily  pantoprazole (PROTONIX) tablet 40 mg, 40 mg, Oral, QAM AC  tamsulosin (FLOMAX) capsule 0.4 mg, 0.4 mg, Oral, Daily  torsemide (DEMADEX) tablet 20 mg, 20 mg, Oral, Daily  sodium chloride flush 0.9 % injection 10 mL, 10 mL, IntraVENous, 2 times per day  sodium chloride flush 0.9 % injection 10 mL, 10 mL, IntraVENous, PRN  0.9 % sodium chloride infusion, , IntraVENous, PRN  enoxaparin (LOVENOX) injection 40 mg, 40 mg, SubCUTAneous, Daily  promethazine (PHENERGAN) tablet 12.5 mg, 12.5 mg, Oral, Q6H PRN **OR** ondansetron (ZOFRAN) injection 4 mg, 4 mg, IntraVENous, Q6H PRN  polyethylene glycol (GLYCOLAX) packet 17 g, 17 g, Oral, Daily PRN  acetaminophen (TYLENOL) tablet 650 mg, 650 mg, Oral, Q6H PRN **OR** acetaminophen (TYLENOL) suppository 650 mg, 650 mg, Rectal, Q6H PRN  hydrOXYzine pamoate (VISTARIL) capsule 25 mg, 25 mg, Oral, TID PRN  melatonin tablet 3 mg, 3 mg, Oral, Nightly PRN    Allergies:  Patient has no known allergies. Social History:    Nonsmoker  Denies alcohol and illicit drug use.      Family bruit  MS: Good muscle strength and tone. No atrophy or abnormal movements. : Deferred  SKIN: Warm and dry no statis dermatitis or ulcers   NEURO / PSYCH: Oriented to person, place and time. Speech clear and appropriate. Follows all commands. Flat affect. Appears confused at times. DATA:    ECG: See HPI. Tele strips: Patient refuses telemetry. Diagnostic:      Intake/Output Summary (Last 24 hours) at 7/23/2022 0656  Last data filed at 7/22/2022 1744  Gross per 24 hour   Intake 600 ml   Output --   Net 600 ml       Labs:   CBC:   Recent Labs     07/21/22  1357 07/22/22  0655   WBC 10.2 9.2   HGB 11.9* 11.2*   HCT 37.1 35.2*    167     BMP:   Recent Labs     07/21/22  1357 07/22/22  0655    141   K 3.4* 3.7   CO2 26 29   BUN 27* 28*   CREATININE 1.1 1.2   LABGLOM >60 58   CALCIUM 10.4* 9.4     Mag:   Recent Labs     07/21/22  1357   MG 1.9     Phos: No results for input(s): PHOS in the last 72 hours. TFT:   Lab Results   Component Value Date    TSH 1.330 09/20/2021    P0STBZQ 7.9 10/10/2011      HgA1c:   Lab Results   Component Value Date    LABA1C 5.5 09/20/2021     No results found for: EAG  proBNP:   Recent Labs     07/21/22  1357   PROBNP 36,737*       CARDIAC ENZYMES:  Recent Labs     07/21/22  1357 07/22/22  0026   TROPHS 61* 69*     FASTING LIPID PANEL:  Lab Results   Component Value Date/Time    CHOL 224 09/20/2021 01:36 PM    HDL 33 09/20/2021 01:36 PM    LDLCALC 119 09/20/2021 01:36 PM    TRIG 361 09/20/2021 01:36 PM     LIVER PROFILE:  Recent Labs     07/21/22  1357   AST 21   ALT 21   LABALBU 3.9       CXR: 7/21/2022   Suspect early CHF and bilateral pleural effusion. Superimposed pneumonia   cannot be excluded. Assessment/Plan to follow as per Dr. Myles Li.   Electronically signed by GINA Barba CNP on 7/23/22 at 1:23 PM EDT      Signed                                                                                                    I independently interviewed °F (36.1 °C) (Temporal)   Resp 18   Ht 5' 9\" (1.753 m)   Wt 177 lb 14.6 oz (80.7 kg)   SpO2 98%   BMI 26.27 kg/m²   Appearance: Awake, alert, no acute respiratory distress, frail  Skin: Intact, no rash  Head: Normocephalic, atraumatic  ENMT: MMM, no rhinorrhea  Neck: Supple, no carotid bruits, JVD normal  Lungs: Clear to auscultation bilaterally. No wheezes, rales, or rhonchi. Cardiac: Irregularly irregular rate and rhythm, +S1S2, no murmurs apparent  Abdomen: Soft, +bowel sounds  Extremities: Moves all extremities x 4, no lower extremity edema  Neurologic: No focal motor deficits apparent, normal mood and affect     Telemetry findings reviewed: Currently not on the monitor patient refused     EKG: Possible atrial fibrillation with heart rate of 100, significant baseline artifact, left axis deviation, frequent premature ventricular complexes, LVH, abnormal EKG. Vitals and labs were reviewed: As above blood pressure 144/66, heart rate 74 sats 98% room air     Labs-BUN/creatinine 28/1.2, proBNP 36,000 737, troponin, essentially 61>> 69, H&H 11.2/34.2     Chest x-ray-early CHF and bilateral pleural effusions superimposed pneumonia cannot be excluded. Pacemaker evaluation 7/4/2022: 1 episode of nonsustained VT was noted on 7/3/2022 no ICD shocks. There is 1 burst of ATP that was terminated. .  Battery life 4.5 years 1% A. fib burden. TTE: 4/13/2018 (Dr. Earl Stanley):  Ejection fraction is visually estimated at 30%. The entire apex is severely hypokinetic. Overall ejection fraction severely decreased. Moderate left ventricular concentric hypertrophy noted. There is doppler evidence of stage I diastolic dysfunction. Normal right ventricle structure and function. Physiologic and/or trace mitral regurgitation is present. [facility] ([facility], 3/16/2021): LMT: _ The LMT has severe diffuse disease. LAD: _ The LAD has severe diffuse disease. Additional Comment: Moderate caliber vessel with  in the proximal portion.  The vessel is fed by patent LIMA graft. The distal portion is small in caliber and wraps the apex. There are L->R collaterals. LCX: _ The Circumflex has severe diffuse disease. Additional Comment: Moderate caliber non-dominant vessel which is fed by SVG->RI/OM1. RAMUS: _ The ostial Ramus - . Additional Comment: Fills in the mid-distal portion from SVG->RI graft. RCA: _ RCA Status: Not Applicable. Additional Comment: Occluded at the ostia. GRAFTS: _ Left Internal Mammary Artery Graft End to Side to the Left Anterior Descending. Additional Comments - patent. _ Saphenous Vein Graft End to Side to the Ramus Intermedius . Additional  Comments - patent with mild-moderate disease in the mid-portion; supplies mid-distal LCx and backfills to partially supply LAD. _ Saphenous Vein Graft End to Side to the OM-2 Second Obtuse Marginal Branch. Additional Comments - occluded. _ Saphenous Vein Graft End to Side to the Right Posterior Descending Artery. Additional Comments - occluded. Impression:- Severe/occlusive native 3 vessel CAD with patent LIMA->LAD, SVG->RI/OM1. SVG->OM2 is occluded - Native RCA is occluded at the ostia as is the SVG->rPDA. The right is fed by L->R collaterals from the LIMA graft Recommended Treatment: Medical Therapy. TTE: 5/13/2022 ([facility]): - Technically difficult exam due to body habitus and suboptimal positioning. - Exam indication: Evaluation of known heart failure to guide therapy - The left ventricle is severely dilated. Left ventricular systolic function is severely decreased. EF = 20 ± 5% (visual est.) Definity contrast used for   endocardial border detection. Left ventricular diastolic function was not evaluated due to an arrhythmia. The right ventricle is dilated. Right ventricular systolic function is moderately to severely decreased. - The left atrial cavity is severely dilated. - The right atrial cavity is dilated. There is moderate (2+) mitral valve regurgitation. - -Peak/mean gradients of 15/7 mmHg, gradients averaged due to arrhythmia. -Prior EF reported as 26% (3/2021). Assessment:  Ischemic cardiomyopathy with an EF of 35%  ACC/AHA stage D, NYHA functional class II  Chronic HFrEF and appears euvolemic  History of CAD status post CABG x5v (LIMA to LAD, SVG to OM1, SVG to OM2, SVG to RI and SVG to RPDA) in 2007 with severe multivessel disease and all the grafts were occluded except for LIMA to LAD which was patent. .  Patient is not a candidate for any further cardiac intervention. Not a candidate for cardiac transplant or LVAD due to medication noncompliance  Hypertension, not well controlled  Hyperlipidemia  Type 2 diabetes  History of VT  S/p ICD in situ and recent generator change in 2018  History of prostate cancer  EKG appears irregular suggestive of A. fib but there is significant baseline artifact. Monitor showed normal sinus rhythm with occasional premature ventricular complexes. Plan:   Based on his history and testing at [facility], he is not a candidate for any further cardiac intervention. We will continue guideline directed medical therapy. He has history of intolerance to ACE and ARB's. Continue current guideline directed medical therapy including Toprol-XL, hydralazine. Change Imdur to Isordil 20 mg p.o. 3 times a day. Will repeat EKG to make sure he is not in A. fib. Continue oral Demadex  Consult palliative care. Continue cardiac diet and restrict daily salt intake less than 2 g  We will see him as needed, rest of the care as per primary service  Please consider changing his CODE STATUS to DNR CC. Thank you for consulting and allowing us to participate in Mr. Naomi stevens. Zelia Boxer, MD, Elise Campo.   [facility]) Cardiology

## 2022-07-24 VITALS
DIASTOLIC BLOOD PRESSURE: 61 MMHG | OXYGEN SATURATION: 96 % | BODY MASS INDEX: 26.35 KG/M2 | WEIGHT: 177.91 LBS | RESPIRATION RATE: 20 BRPM | TEMPERATURE: 98.2 F | HEART RATE: 109 BPM | SYSTOLIC BLOOD PRESSURE: 121 MMHG | HEIGHT: 69 IN

## 2022-07-24 LAB — SARS-COV-2, NAAT: NOT DETECTED

## 2022-07-24 PROCEDURE — 96372 THER/PROPH/DIAG INJ SC/IM: CPT

## 2022-07-24 PROCEDURE — 6370000000 HC RX 637 (ALT 250 FOR IP)

## 2022-07-24 PROCEDURE — G0378 HOSPITAL OBSERVATION PER HR: HCPCS

## 2022-07-24 PROCEDURE — 6360000002 HC RX W HCPCS: Performed by: FAMILY MEDICINE

## 2022-07-24 PROCEDURE — 87635 SARS-COV-2 COVID-19 AMP PRB: CPT

## 2022-07-24 PROCEDURE — 2580000003 HC RX 258: Performed by: FAMILY MEDICINE

## 2022-07-24 PROCEDURE — 6370000000 HC RX 637 (ALT 250 FOR IP): Performed by: FAMILY MEDICINE

## 2022-07-24 RX ADMIN — FINASTERIDE 5 MG: 5 TABLET, FILM COATED ORAL at 09:24

## 2022-07-24 RX ADMIN — ENOXAPARIN SODIUM 40 MG: 100 INJECTION SUBCUTANEOUS at 09:29

## 2022-07-24 RX ADMIN — PANTOPRAZOLE SODIUM 40 MG: 40 TABLET, DELAYED RELEASE ORAL at 06:51

## 2022-07-24 RX ADMIN — GLIPIZIDE 5 MG: 5 TABLET ORAL at 06:51

## 2022-07-24 RX ADMIN — METOPROLOL SUCCINATE 25 MG: 25 TABLET, EXTENDED RELEASE ORAL at 09:24

## 2022-07-24 RX ADMIN — ISOSORBIDE MONONITRATE 30 MG: 30 TABLET, EXTENDED RELEASE ORAL at 09:24

## 2022-07-24 RX ADMIN — Medication 10 ML: at 09:29

## 2022-07-24 RX ADMIN — HYDRALAZINE HYDROCHLORIDE 25 MG: 25 TABLET, FILM COATED ORAL at 09:24

## 2022-07-24 RX ADMIN — TORSEMIDE 20 MG: 20 TABLET ORAL at 09:24

## 2022-07-24 RX ADMIN — HYDROXYZINE PAMOATE 25 MG: 25 CAPSULE ORAL at 09:26

## 2022-07-24 RX ADMIN — ASPIRIN 81 MG: 81 TABLET, COATED ORAL at 09:24

## 2022-07-24 RX ADMIN — TAMSULOSIN HYDROCHLORIDE 0.4 MG: 0.4 CAPSULE ORAL at 09:24

## 2022-07-24 ASSESSMENT — PAIN SCALES - GENERAL: PAINLEVEL_OUTOF10: 0

## 2022-07-24 NOTE — CARE COORDINATION
7/24/22 RAYMUNDO Note: Received a call from Paula at  [facility] who states they will follow patient once he gets to [facility]. Call placed to [facility] again and no answer. Call placed to [facility] again and no response.  Electronically signed by Brian Ramos RN CM on 7/24/2022 at 11:25 AM

## 2022-07-24 NOTE — DISCHARGE SUMMARY
Hospitalist Discharge Summary    Patient ID: Juan J Uriarte   Patient : 1945  Patient's PCP: Maggie Swan DO    Admit Date: 2022   Admitting Physician: Dieter Thornton DO    Discharge Date:  2022   Discharge Physician: Vijay Maya MD   Discharge Condition: Stable  Discharge Disposition: [facility] (Non-Skilled)      Hospital course in brief:  (Please refer to daily progress notes for a comprehensive review of the hospitalization by requesting medical records)  Patient presented to the emergency department with concerns about not being able to care for himself. He has a history of end-stage heart failure. Has been feeling weak recently. Having problems with activities of daily living. He would like to be placed at [address] [place] [address] to be with his brother. Vital signs within norm limits and stable. Laboratory studies demonstrate potassium 3.4, BUN 27, glucose 143, troponin 61, proBNP 36,737. Chest x-ray shows bilateral pleural effusions. Medicine consulted for admission  proBNP was reviewed and shows to be elevated at 36,000 737 with a chest x-ray showing bilateral pleural effusions. Chest x-ray showing possible concern for pneumonia. Ordered procalcitonin which was negative. monitor off antibiotics and remained stable. Patient currently on torsemide 20 mg which has been recently been adjusted in  at his cardiologist office. Based on his history and testing at [facility], he is not a candidate for any further cardiac intervention. We will continue guideline directed medical therapy. He has history of intolerance to ACE and ARB's. Cardiology has been consulted- patient does not want any testing or active/aggressive interventions-switched imdur to isosordil 20mg TID  per cardiology. Patient kept mentioning that he would like to not pursue any further treatment and would like to die peacefully. Patient denied suicidal ideation.   Palliative consulted per Cardiology for end stage heart failure. Patient's CODE STATUS was changed to DNR CCA. Further consideration regarding DNR CCA was also considered but patient would like to think over it at a later time. Hospice will follow-up at the facility based on patient's wishes. Consults:   IP CONSULT TO SOCIAL WORK  IP CONSULT TO CARDIOLOGY  IP CONSULT TO PALLIATIVE CARE  IP CONSULT TO HOSPICE    Discharge Diagnoses:  Failure to thrive  Heart failure exacerbation  CAD with history of CABG x5  Hypertension  Diabetes mellitus type 2  Failure to thrive in adult  Benign prostate hypertrophy    Discharge Instructions / Follow up: Follow-up with PCP within 1 week of discharge. Follow-up with consultants as indicated by them. Compliance with medications as prescribed on discharge. No future appointments. The patient's condition is STABLE. At this time the patient is without objective evidence of an acute process requiring continuing hospitalization or inpatient management. They are stable for discharge with outpatient follow-up. I have spoken with the patient and discussed the results of the current hospitalization, in addition to providing specific details for the plan of care and counseling regarding the diagnosis and prognosis. The plan has been discussed in detail and they are aware of the specific conditions for emergent return, as well as the importance of follow-up. Their questions are answered at this time and they are agreeable with the plan for discharge to Nursing home. Continued appropriate risk factor modification of blood pressure, diabetes and serum lipids will remain essential to reducing risk of future atherosclerotic development    Activity: activity as tolerated    Physical exam:  General appearance: No apparent distress, appears stated age and cooperative. HEENT: Conjunctivae/corneas clear. Mucous membranes moist.  Neck: Supple. No JVD. Respiratory:  Clear to auscultation bilaterally.   Normal respiratory effort. Cardiovascular:  RRR. S1, S2 without MRG. PV: Pulses palpable. No edema. Abdomen: Soft, non-tender, non-distended. +BS  Musculoskeletal: No obvious deformities. Skin: Normal skin color. No rashes or lesions. Good turgor. Neurologic:  Grossly non-focal. Awake, alert, following commands. Psychiatric: Alert and oriented, thought content appropriate, normal insight and judgement    Significant labs:  CBC:   Recent Labs     07/21/22  1357 07/22/22  0655   WBC 10.2 9.2   RBC 4.48 4.22   HGB 11.9* 11.2*   HCT 37.1 35.2*   MCV 82.8 83.4   RDW 17.4* 17.7*    167     BMP:   Recent Labs     07/21/22  1357 07/22/22  0655    141   K 3.4* 3.7    101   CO2 26 29   BUN 27* 28*   CREATININE 1.1 1.2   MG 1.9  --      LFT:  Recent Labs     07/21/22  1357   PROT 6.8   ALKPHOS 142*   ALT 21   AST 21   BILITOT 1.7*     PT/INR: No results for input(s): INR, APTT in the last 72 hours. BNP: No results for input(s): BNP in the last 72 hours. Hgb A1C:   Lab Results   Component Value Date    LABA1C 5.5 09/20/2021     Folate and B12: No results found for: ZDFEBXLM31, No results found for: FOLATE  Thyroid Studies:   Lab Results   Component Value Date    TSH 1.330 09/20/2021    Z8AWKKC 7.9 10/10/2011       Urinalysis:    Lab Results   Component Value Date/Time    NITRU Negative 07/21/2022 01:57 PM    [address] NONE 07/21/2022 01:57 PM    BACTERIA NONE SEEN 07/21/2022 01:57 PM    RBCUA NONE 07/21/2022 01:57 PM    RBCUA >20 08/02/2012 09:20 AM    BLOODU Negative 07/21/2022 01:57 PM    SPECGRAV 1.020 07/21/2022 01:57 PM    GLUCOSEU Negative 07/21/2022 01:57 PM       Imaging:  XR CHEST (2 VW)    Result Date: 7/21/2022  EXAMINATION: TWO XRAY VIEWS OF THE CHEST 7/21/2022 1:30 pm COMPARISON: None. HISTORY: ORDERING SYSTEM PROVIDED HISTORY: Shortness of breath TECHNOLOGIST PROVIDED HISTORY: Reason for exam:->Shortness of breath What reading provider will be dictating this exam?->CRC FINDINGS: The heart is enlarged. Mild opacity at the lung lung base. Costophrenic angles are blunted bilaterally. No large pneumothorax. The patient's chin obscures the lung apices. Pacemaker and median sternotomy present. Suspect early CHF and bilateral pleural effusion. Superimposed pneumonia cannot be excluded. Discharge Medications:      Medication List        START taking these medications      isosorbide dinitrate 20 MG tablet  Commonly known as: ISORDIL  Take 1 tablet by mouth in the morning and 1 tablet at noon and 1 tablet before bedtime. CHANGE how you take these medications      omeprazole 40 MG delayed release capsule  Commonly known as: PRILOSEC  Take 1 capsule by mouth in the morning. What changed:   how to take this  when to take this  Another medication with the same name was removed. Continue taking this medication, and follow the directions you see here.             CONTINUE taking these medications      aspirin 81 MG tablet     finasteride 5 MG tablet  Commonly known as: PROSCAR     glipiZIDE 5 MG tablet  Commonly known as: GLUCOTROL     hydrALAZINE 25 MG tablet  Commonly known as: APRESOLINE  Take 1 tablet by mouth in the morning and at bedtime     Linzess 290 MCG Caps capsule  Generic drug: linaclotide     metoprolol succinate 25 MG extended release tablet  Commonly known as: TOPROL XL  Take 1 tablet by mouth daily     nitroGLYCERIN 0.4 MG SL tablet  Commonly known as: NITROSTAT  Place 1 tablet under the tongue every 5 minutes as needed for Chest pain     OXYGEN     tamsulosin 0.4 MG capsule  Commonly known as: FLOMAX     torsemide 20 MG tablet  Commonly known as: DEMADEX  Take 1 tablet by mouth daily     traZODone 50 MG tablet  Commonly known as: DESYREL     Tylenol 8 Hour Arthritis Pain 650 MG extended release tablet  Generic drug: acetaminophen            STOP taking these medications      isosorbide mononitrate 30 MG extended release tablet  Commonly known as: IMDUR               Where to Get Your Medications        These medications were sent to [facility] [address]      Phone: [phone]   isosorbide dinitrate 20 MG tablet       Information about where to get these medications is not yet available    Ask your nurse or doctor about these medications  omeprazole 40 MG delayed release capsule         Time Spent on discharge is more than 45 minutes in the examination, evaluation, counseling and review of medications and discharge plan.    +++++++++++++++++++++++++++++++++++++++++++++++++  Dior Mendez MD  [address]  +++++++++++++++++++++++++++++++++++++++++++++++++  NOTE: This report was transcribed using voice recognition software. Every effort was made to ensure accuracy; however, inadvertent computerized transcription errors may be present.

## 2022-07-24 NOTE — CARE COORDINATION
7/24/22 CM Note: Patient is cleared for discharge. Attempted x2 to contact liaison and call building with no answer or response. Will continue to call to send patient today.  Electronically signed by Sita Singh RN CM on 7/24/2022 at 10:23 AM

## 2022-07-24 NOTE — PROGRESS NOTES
Met with pt today, he is more calm now that his dogs have been picked up. He did not want to discuss hospice services, but was agreeable to them once he arrived at his nursing home. He states that he wants to \"die in peace at the nursing home\". He states that he does not wish to return to the hospital again. Attempted to discuss ICD deactivation, but pt states that he will think about it once he gets to his nursing home. CM working on d/c to [facility]. Pt states that he has a cousin at that facility. [facility] will admit once patient arrives at [facility]. Please notify once discharge time has been obtained.

## 2022-07-24 NOTE — CARE COORDINATION
CM placed call to [address] [place] [address] to obtain authorization # for transport to [facility] via [facility]. Reference # is [id number]. Spoke with Nicky Adjutant at [facility], wheelchair transport arranged for 2:00 PM and provided them with reference number. Ambulette form in soft chart. COVID negative. Per Jennifer Scott (chare nurse) at [facility] patient is able to come today. Beside RN notified of pickup time. Facility aware of pickup time.     Sanam Dang, MAGALYSN, RN  [facility] Case Management   Cell: [phone]

## 2022-07-24 NOTE — PROGRESS NOTES
Nurse to nurse call out to [address] [place] [address] in [facility], spoke with Shay Butler. Updated on patient status and discharge plan. Patient currently in bed with eyes closed resting comfortably at this time. Electronically signed by Demian Akers RN on 7/24/2022 at 1:15 PM

## 2022-07-24 NOTE — DISCHARGE INSTR - COC
Continuity of Care Form    Patient Name: Deangelo Peraza   :  1945  MRN:  [id number]    Admit date:  2022  Discharge date:  2022    Code Status Order: DNR-CCA   Advance Directives:     Admitting Physician:  Danilo Tyler DO  PCP: Carlos Lamb DO    Discharging Nurse: [facility] Unit/Room#: 8201/8201-A  Discharging Unit Phone Number: [phone]    Emergency Contact:   Extended Emergency Contact Information  Primary Emergency Contact: Vivienne Pereira, [address] Phone: [phone]  Relation: Child  Secondary Emergency Contact: Conchita Phalen, [address] Phone: [phone]  Mobile Phone: [phone]  Relation: Other    Past Surgical History:  Past Surgical History:   Procedure Laterality Date    BLADDER SURGERY      CARDIAC DEFIBRILLATOR PLACEMENT  2018    D-ICD GENT CHANGE    [facility]    CARDIAC PACEMAKER PLACEMENT  2010    [facility]     COLONOSCOPY      CORONARY ARTERY BYPASS GRAFT  2007    ELBOW SURGERY      HERNIA REPAIR      KIDNEY SURGERY      SKIN CANCER EXCISION      nose and cheek       Immunization History:   Immunization History   Administered Date(s) Administered    COVID-19, PFIZER GRAY top, DO NOT Dilute, (age 15 y+), IM, 30 mcg/0.3 mL 2022    COVID-19, PFIZER PURPLE top, DILUTE for use, (age 15 y+), 30mcg/0.3mL 2021, 2021, 2021       Active Problems:  Patient Active Problem List   Diagnosis Code    CHF (congestive heart failure) ([facility]) I50.9    S/P CABG x 4 Z95.1    CAD (coronary artery disease) I25.10    MI, old I25.2    Hyperlipidemia E78.5    Hypertension I10    Cardiomyopathy ([address] I42.9    Ventricular tachycardia ([address] I47.2    Pancreatitis K85.90    AICD (automatic cardioverter/defibrillator) present Z95.810    Implantable cardioverter-defibrillator (ICD) at end of device life Z45.02    Chest pain, atypical R07.89    Failure to thrive in adult R62.7 Isolation/Infection:   Isolation            No Isolation          Patient Infection Status       None to display            Nurse Assessment:  Last Vital Signs: /61   Pulse (!) 109   Temp 98.2 °F (36.8 °C) (Oral)   Resp 20   Ht 5' 9\" (1.753 m)   Wt 177 lb 14.6 oz (80.7 kg)   SpO2 96%   BMI 26.27 kg/m²     Last documented pain score (0-10 scale): Pain Level: 0  Last Weight:   Wt Readings from Last 1 Encounters:   07/22/22 177 lb 14.6 oz (80.7 kg)     Mental Status:  {IP PT MENTAL STATUS:20030}    IV Access:  [facility] DIMITRI IV ACCESS:[id number]}    Nursing Mobility/ADLs:  Walking   Independent  Transfer  Independent  Bathing  Independent  Dressing  Independent  Toileting  Assisted  Feeding  Assisted  Med Admin  Independent  Med Delivery   whole    Wound Care Documentation and Therapy:  Wound 07/22/22 Heel Left (Active)   Wound Etiology Venous 07/23/22 1947   Number of days: 2       Wound Heel Right (Active)   Wound Etiology Venous 07/23/22 1947   Number of days:        Wound 07/22/22 Sacrum blanchable redness to coccyx (Active)   Wound Etiology Pressure Stage 1 07/23/22 1947   Number of days: 2       Wound 07/22/22 Arm Lower;Right;Dorsal bruising (Active)   Number of days: 2       Wound 07/22/22 Arm Left; Lower;Dorsal bruising (Active)   Number of days: 2        Elimination:  Continence: Bowel: Yes  Bladder: Yes  Urinary Catheter: None   Colostomy/Ileostomy/Ileal Conduit: No       Date of Last BM: 7/22/22    Intake/Output Summary (Last 24 hours) at 7/24/2022 1020  Last data filed at 7/23/2022 1726  Gross per 24 hour   Intake 240 ml   Output --   Net 240 ml     I/O last 3 completed shifts: In: 480 [P.O.:480]  Out: -     Safety Concerns: At Risk for Falls    Impairments/Disabilities:      None    Nutrition Therapy:  Current Nutrition Therapy:   - Oral Diet:  General    Routes of Feeding: Oral  Liquids:  Thin Liquids  Daily Fluid Restriction: no  Last Modified Barium Swallow with Video (Video Swallowing Test): not done    Treatments at the Time of Hospital Discharge:   Respiratory Treatments: ***  Oxygen Therapy:  is not on home oxygen therapy. Ventilator:    - No ventilator support    Rehab Therapies: Physical Therapy and Occupational Therapy  Weight Bearing Status/Restrictions: No weight bearing restrictions  Other Medical Equipment (for information only, NOT a DME order):  {EQUIPMENT:281971633}  Other Treatments: ***    Patient's personal belongings (please select all that are sent with patient):  None    RN SIGNATURE:  Electronically signed by Lori Aragon RN on 7/24/22 at 10:23 AM EDT    CASE MANAGEMENT/SOCIAL WORK SECTION    Inpatient Status Date: 07/21/2022    Readmission Risk Assessment Score:  Readmission Risk              Risk of Unplanned Readmission:  0           Discharging to Facility/ Agency   Name: [facility]  Address: [address]  Phone: [phone]  Fax:     Dialysis Facility (if applicable)   Name:  Address:  Dialysis Schedule:  Phone:  Fax:    / signature: Electronically signed by Graeme Wood RN on 7/24/22 at 11:58 AM EDT    PHYSICIAN SECTION    Prognosis: {Prognosis:8292183980}    Condition at Discharge: [address] Patient Condition:061077529}    Rehab Potential (if transferring to Rehab): {Prognosis:8987118884}    Recommended Labs or Other Treatments After Discharge: ***    Physician Certification: I certify the above information and transfer of Roselia Carmona  is necessary for the continuing treatment of the diagnosis listed and that he requires [facility] for less 30 days.      Update Admission H&P: {CHP DME Changes in [facility]:350972977}    PHYSICIAN SIGNATURE:  {Esignature:[id number]}

## 2022-07-25 LAB
EKG ATRIAL RATE: 77 BPM
EKG P AXIS: 17 DEGREES
EKG P-R INTERVAL: 214 MS
EKG Q-T INTERVAL: 420 MS
EKG QRS DURATION: 104 MS
EKG QTC CALCULATION (BAZETT): 475 MS
EKG R AXIS: -40 DEGREES
EKG T AXIS: 157 DEGREES
EKG VENTRICULAR RATE: 77 BPM

## 2022-08-11 ENCOUNTER — CLINICAL DOCUMENTATION ONLY (OUTPATIENT)
Facility: CLINIC | Age: 77
End: 2022-08-11

## 2022-08-11 ENCOUNTER — CARE COORDINATION (OUTPATIENT)
Dept: CASE MANAGEMENT | Age: 77
End: 2022-08-11

## 2022-08-11 ENCOUNTER — TELEPHONE (OUTPATIENT)
Dept: CARE COORDINATION | Age: 77
End: 2022-08-11

## 2022-08-11 NOTE — CARE COORDINATION
Jayjay 45 Transitions Initial Follow Up Call    Call within 2 business days of discharge: Yes    Patient: Emily Key Patient : 1945   MRN: <[id number]>  Reason for Admission: Failure to thrive  Discharge Date: 22 RARS: No data recorded    Last Discharge [facility]       Date Complaint Diagnosis Description Type Department Provider    22 Failure To Thrive Failure to thrive in adult . .. ED to Hosp-Admission (Discharged) (ADMITTED) YZ 8S CDU Jody Dugan MD; Emmanuel Naik. .. Acute Care Course: Admission to [facility] 6/3- for musculoskeletal problem and anxiety. Admission [facility] for failure to thrive -. He discharged to [facility] -8/10. Per patientping he discharged to another skilled nursing facility. Request to  to obtain information. She reports patient went to [facility] [phone]. CTN called Rajendra Sanford for clarification. Patient admitted to their memory care unit. Follow up plan: No outreach       Non-face-to-face services provided:      Care Transitions 24 Hour Call    Care Transitions Interventions         Follow Up  No future appointments.     Ross Alegre RN

## 2022-08-11 NOTE — TELEPHONE ENCOUNTER
Contacted [facility], spoke records. She states patient went to [facility].  Number is [phone]

## 2022-10-01 ENCOUNTER — APPOINTMENT (OUTPATIENT)
Dept: GENERAL RADIOLOGY | Age: 77
DRG: 292 | End: 2022-10-01
Payer: MEDICARE

## 2022-10-01 ENCOUNTER — HOSPITAL ENCOUNTER (INPATIENT)
Age: 77
LOS: 11 days | Discharge: HOME HEALTH | DRG: 292 | End: 2022-10-12
Attending: STUDENT IN AN ORGANIZED HEALTH CARE EDUCATION/TRAINING PROGRAM | Admitting: FAMILY MEDICINE
Payer: MEDICARE

## 2022-10-01 DIAGNOSIS — I50.9 ACUTE ON CHRONIC CONGESTIVE HEART FAILURE, UNSPECIFIED HEART FAILURE TYPE (HCC): Primary | ICD-10-CM

## 2022-10-01 LAB
ALBUMIN SERPL-MCNC: 3.6 G/DL (ref 3.5–5.2)
ALP BLD-CCNC: 151 U/L (ref 40–129)
ALT SERPL-CCNC: 8 U/L (ref 0–40)
ANION GAP SERPL CALCULATED.3IONS-SCNC: 8 MMOL/L (ref 7–16)
AST SERPL-CCNC: 21 U/L (ref 0–39)
BACTERIA: NORMAL /HPF
BASOPHILS ABSOLUTE: 0.04 E9/L (ref 0–0.2)
BASOPHILS RELATIVE PERCENT: 0.4 % (ref 0–2)
BILIRUB SERPL-MCNC: 1 MG/DL (ref 0–1.2)
BILIRUBIN DIRECT: 0.6 MG/DL (ref 0–0.3)
BILIRUBIN URINE: NEGATIVE
BILIRUBIN, INDIRECT: 0.4 MG/DL (ref 0–1)
BLOOD, URINE: NEGATIVE
BUN BLDV-MCNC: 35 MG/DL (ref 6–23)
CALCIUM SERPL-MCNC: 9 MG/DL (ref 8.6–10.2)
CHLORIDE BLD-SCNC: 106 MMOL/L (ref 98–107)
CLARITY: CLEAR
CO2: 29 MMOL/L (ref 22–29)
COLOR: YELLOW
CREAT SERPL-MCNC: 1.1 MG/DL (ref 0.7–1.2)
EOSINOPHILS ABSOLUTE: 0.07 E9/L (ref 0.05–0.5)
EOSINOPHILS RELATIVE PERCENT: 0.8 % (ref 0–6)
GFR AFRICAN AMERICAN: >60
GFR NON-AFRICAN AMERICAN: >60 ML/MIN/1.73
GLUCOSE BLD-MCNC: 70 MG/DL (ref 74–99)
GLUCOSE URINE: NEGATIVE MG/DL
HCT VFR BLD CALC: 40 % (ref 37–54)
HEMOGLOBIN: 11.9 G/DL (ref 12.5–16.5)
IMMATURE GRANULOCYTES #: 0.03 E9/L
IMMATURE GRANULOCYTES %: 0.3 % (ref 0–5)
KETONES, URINE: NEGATIVE MG/DL
LACTIC ACID: 1.6 MMOL/L (ref 0.5–2.2)
LEUKOCYTE ESTERASE, URINE: NEGATIVE
LIPASE: 88 U/L (ref 13–60)
LYMPHOCYTES ABSOLUTE: 1.63 E9/L (ref 1.5–4)
LYMPHOCYTES RELATIVE PERCENT: 18.1 % (ref 20–42)
MCH RBC QN AUTO: 25.1 PG (ref 26–35)
MCHC RBC AUTO-ENTMCNC: 29.8 % (ref 32–34.5)
MCV RBC AUTO: 84.2 FL (ref 80–99.9)
METER GLUCOSE: 111 MG/DL (ref 74–99)
METER GLUCOSE: 176 MG/DL (ref 74–99)
MONOCYTES ABSOLUTE: 0.58 E9/L (ref 0.1–0.95)
MONOCYTES RELATIVE PERCENT: 6.4 % (ref 2–12)
NEUTROPHILS ABSOLUTE: 6.68 E9/L (ref 1.8–7.3)
NEUTROPHILS RELATIVE PERCENT: 74 % (ref 43–80)
NITRITE, URINE: NEGATIVE
PDW BLD-RTO: 18 FL (ref 11.5–15)
PH UA: 5.5 (ref 5–9)
PLATELET # BLD: 132 E9/L (ref 130–450)
PMV BLD AUTO: 11.2 FL (ref 7–12)
POTASSIUM REFLEX MAGNESIUM: 4.2 MMOL/L (ref 3.5–5)
PRO-BNP: ABNORMAL PG/ML (ref 0–450)
PROTEIN UA: NEGATIVE MG/DL
RBC # BLD: 4.75 E12/L (ref 3.8–5.8)
RBC UA: NORMAL /HPF (ref 0–2)
SODIUM BLD-SCNC: 143 MMOL/L (ref 132–146)
SPECIFIC GRAVITY UA: 1.01 (ref 1–1.03)
TOTAL PROTEIN: 6.4 G/DL (ref 6.4–8.3)
TROPONIN, HIGH SENSITIVITY: 64 NG/L (ref 0–11)
UROBILINOGEN, URINE: 0.2 E.U./DL
WBC # BLD: 9.1 E9/L (ref 4.5–11.5)
WBC UA: NORMAL /HPF (ref 0–5)

## 2022-10-01 PROCEDURE — 2060000000 HC ICU INTERMEDIATE R&B

## 2022-10-01 PROCEDURE — 93005 ELECTROCARDIOGRAM TRACING: CPT | Performed by: STUDENT IN AN ORGANIZED HEALTH CARE EDUCATION/TRAINING PROGRAM

## 2022-10-01 PROCEDURE — 84439 ASSAY OF FREE THYROXINE: CPT

## 2022-10-01 PROCEDURE — 82962 GLUCOSE BLOOD TEST: CPT

## 2022-10-01 PROCEDURE — 96374 THER/PROPH/DIAG INJ IV PUSH: CPT

## 2022-10-01 PROCEDURE — 84484 ASSAY OF TROPONIN QUANT: CPT

## 2022-10-01 PROCEDURE — 80076 HEPATIC FUNCTION PANEL: CPT

## 2022-10-01 PROCEDURE — 6370000000 HC RX 637 (ALT 250 FOR IP): Performed by: INTERNAL MEDICINE

## 2022-10-01 PROCEDURE — 80061 LIPID PANEL: CPT

## 2022-10-01 PROCEDURE — 2700000000 HC OXYGEN THERAPY PER DAY

## 2022-10-01 PROCEDURE — 99285 EMERGENCY DEPT VISIT HI MDM: CPT

## 2022-10-01 PROCEDURE — 51702 INSERT TEMP BLADDER CATH: CPT

## 2022-10-01 PROCEDURE — 71046 X-RAY EXAM CHEST 2 VIEWS: CPT

## 2022-10-01 PROCEDURE — 2580000003 HC RX 258: Performed by: INTERNAL MEDICINE

## 2022-10-01 PROCEDURE — 99223 1ST HOSP IP/OBS HIGH 75: CPT | Performed by: INTERNAL MEDICINE

## 2022-10-01 PROCEDURE — 80048 BASIC METABOLIC PNL TOTAL CA: CPT

## 2022-10-01 PROCEDURE — 85025 COMPLETE CBC W/AUTO DIFF WBC: CPT

## 2022-10-01 PROCEDURE — 81001 URINALYSIS AUTO W/SCOPE: CPT

## 2022-10-01 PROCEDURE — 83690 ASSAY OF LIPASE: CPT

## 2022-10-01 PROCEDURE — 83880 ASSAY OF NATRIURETIC PEPTIDE: CPT

## 2022-10-01 PROCEDURE — 83605 ASSAY OF LACTIC ACID: CPT

## 2022-10-01 PROCEDURE — 6360000002 HC RX W HCPCS: Performed by: STUDENT IN AN ORGANIZED HEALTH CARE EDUCATION/TRAINING PROGRAM

## 2022-10-01 PROCEDURE — 2500000003 HC RX 250 WO HCPCS: Performed by: INTERNAL MEDICINE

## 2022-10-01 PROCEDURE — 84443 ASSAY THYROID STIM HORMONE: CPT

## 2022-10-01 PROCEDURE — 83036 HEMOGLOBIN GLYCOSYLATED A1C: CPT

## 2022-10-01 RX ORDER — AMOXICILLIN 250 MG
2 CAPSULE ORAL DAILY PRN
Status: ON HOLD | COMMUNITY
End: 2022-10-12 | Stop reason: HOSPADM

## 2022-10-01 RX ORDER — ONDANSETRON 2 MG/ML
4 INJECTION INTRAMUSCULAR; INTRAVENOUS EVERY 6 HOURS PRN
Status: DISCONTINUED | OUTPATIENT
Start: 2022-10-01 | End: 2022-10-01

## 2022-10-01 RX ORDER — INSULIN LISPRO 100 [IU]/ML
0-4 INJECTION, SOLUTION INTRAVENOUS; SUBCUTANEOUS
Status: DISCONTINUED | OUTPATIENT
Start: 2022-10-02 | End: 2022-10-12 | Stop reason: HOSPADM

## 2022-10-01 RX ORDER — ACETAMINOPHEN 325 MG/1
650 TABLET ORAL EVERY 6 HOURS PRN
Status: DISCONTINUED | OUTPATIENT
Start: 2022-10-01 | End: 2022-10-12 | Stop reason: HOSPADM

## 2022-10-01 RX ORDER — BUMETANIDE 0.25 MG/ML
1 INJECTION, SOLUTION INTRAMUSCULAR; INTRAVENOUS EVERY 12 HOURS
Status: DISCONTINUED | OUTPATIENT
Start: 2022-10-01 | End: 2022-10-02

## 2022-10-01 RX ORDER — SODIUM CHLORIDE 0.9 % (FLUSH) 0.9 %
5-40 SYRINGE (ML) INJECTION PRN
Status: DISCONTINUED | OUTPATIENT
Start: 2022-10-01 | End: 2022-10-12 | Stop reason: HOSPADM

## 2022-10-01 RX ORDER — ONDANSETRON 4 MG/1
4 TABLET, ORALLY DISINTEGRATING ORAL EVERY 8 HOURS PRN
Status: DISCONTINUED | OUTPATIENT
Start: 2022-10-01 | End: 2022-10-01

## 2022-10-01 RX ORDER — INSULIN LISPRO 100 [IU]/ML
0-4 INJECTION, SOLUTION INTRAVENOUS; SUBCUTANEOUS NIGHTLY
Status: DISCONTINUED | OUTPATIENT
Start: 2022-10-01 | End: 2022-10-12 | Stop reason: HOSPADM

## 2022-10-01 RX ORDER — PANTOPRAZOLE SODIUM 40 MG/1
40 TABLET, DELAYED RELEASE ORAL
Status: DISCONTINUED | OUTPATIENT
Start: 2022-10-02 | End: 2022-10-12 | Stop reason: HOSPADM

## 2022-10-01 RX ORDER — GLIPIZIDE 5 MG/1
5 TABLET ORAL
Status: DISCONTINUED | OUTPATIENT
Start: 2022-10-02 | End: 2022-10-03

## 2022-10-01 RX ORDER — ISOSORBIDE DINITRATE 10 MG/1
20 TABLET ORAL 3 TIMES DAILY
Status: DISCONTINUED | OUTPATIENT
Start: 2022-10-01 | End: 2022-10-12 | Stop reason: HOSPADM

## 2022-10-01 RX ORDER — ACETAMINOPHEN 650 MG/1
650 SUPPOSITORY RECTAL EVERY 6 HOURS PRN
Status: DISCONTINUED | OUTPATIENT
Start: 2022-10-01 | End: 2022-10-12 | Stop reason: HOSPADM

## 2022-10-01 RX ORDER — TAMSULOSIN HYDROCHLORIDE 0.4 MG/1
0.4 CAPSULE ORAL DAILY
Status: DISCONTINUED | OUTPATIENT
Start: 2022-10-02 | End: 2022-10-12 | Stop reason: HOSPADM

## 2022-10-01 RX ORDER — SODIUM CHLORIDE 9 MG/ML
INJECTION, SOLUTION INTRAVENOUS PRN
Status: DISCONTINUED | OUTPATIENT
Start: 2022-10-01 | End: 2022-10-12 | Stop reason: HOSPADM

## 2022-10-01 RX ORDER — METOPROLOL SUCCINATE 25 MG/1
25 TABLET, EXTENDED RELEASE ORAL DAILY
Status: DISCONTINUED | OUTPATIENT
Start: 2022-10-02 | End: 2022-10-02

## 2022-10-01 RX ORDER — FUROSEMIDE 10 MG/ML
40 INJECTION INTRAMUSCULAR; INTRAVENOUS ONCE
Status: COMPLETED | OUTPATIENT
Start: 2022-10-01 | End: 2022-10-01

## 2022-10-01 RX ORDER — ASPIRIN 81 MG/1
81 TABLET ORAL DAILY
Status: DISCONTINUED | OUTPATIENT
Start: 2022-10-02 | End: 2022-10-12 | Stop reason: HOSPADM

## 2022-10-01 RX ORDER — HYDRALAZINE HYDROCHLORIDE 25 MG/1
25 TABLET, FILM COATED ORAL 2 TIMES DAILY
Status: DISCONTINUED | OUTPATIENT
Start: 2022-10-01 | End: 2022-10-12 | Stop reason: HOSPADM

## 2022-10-01 RX ORDER — SODIUM CHLORIDE 0.9 % (FLUSH) 0.9 %
5-40 SYRINGE (ML) INJECTION EVERY 12 HOURS SCHEDULED
Status: DISCONTINUED | OUTPATIENT
Start: 2022-10-01 | End: 2022-10-12 | Stop reason: HOSPADM

## 2022-10-01 RX ORDER — ENOXAPARIN SODIUM 100 MG/ML
40 INJECTION SUBCUTANEOUS DAILY
Status: DISCONTINUED | OUTPATIENT
Start: 2022-10-02 | End: 2022-10-12 | Stop reason: HOSPADM

## 2022-10-01 RX ORDER — TRAZODONE HYDROCHLORIDE 50 MG/1
50 TABLET ORAL NIGHTLY
Status: DISCONTINUED | OUTPATIENT
Start: 2022-10-01 | End: 2022-10-12 | Stop reason: HOSPADM

## 2022-10-01 RX ORDER — DEXTROSE MONOHYDRATE 100 MG/ML
INJECTION, SOLUTION INTRAVENOUS CONTINUOUS PRN
Status: DISCONTINUED | OUTPATIENT
Start: 2022-10-01 | End: 2022-10-12 | Stop reason: HOSPADM

## 2022-10-01 RX ORDER — POLYETHYLENE GLYCOL 3350 17 G/17G
17 POWDER, FOR SOLUTION ORAL DAILY PRN
Status: DISCONTINUED | OUTPATIENT
Start: 2022-10-01 | End: 2022-10-12 | Stop reason: HOSPADM

## 2022-10-01 RX ORDER — FINASTERIDE 5 MG/1
5 TABLET, FILM COATED ORAL DAILY
Status: DISCONTINUED | OUTPATIENT
Start: 2022-10-02 | End: 2022-10-12 | Stop reason: HOSPADM

## 2022-10-01 RX ORDER — MIRTAZAPINE 30 MG/1
30 TABLET, FILM COATED ORAL NIGHTLY
Status: ON HOLD | COMMUNITY
End: 2022-10-26 | Stop reason: HOSPADM

## 2022-10-01 RX ORDER — LORAZEPAM 0.5 MG/1
0.5 TABLET ORAL 3 TIMES DAILY
Status: ON HOLD | COMMUNITY
End: 2022-10-12 | Stop reason: HOSPADM

## 2022-10-01 RX ORDER — SODIUM CHLORIDE 0.9 % (FLUSH) 0.9 %
SYRINGE (ML) INJECTION
Status: DISPENSED
Start: 2022-10-01 | End: 2022-10-02

## 2022-10-01 RX ADMIN — FUROSEMIDE 40 MG: 10 INJECTION, SOLUTION INTRAMUSCULAR; INTRAVENOUS at 19:25

## 2022-10-01 RX ADMIN — HYDRALAZINE HYDROCHLORIDE 25 MG: 25 TABLET, FILM COATED ORAL at 23:15

## 2022-10-01 RX ADMIN — ISOSORBIDE DINITRATE 20 MG: 10 TABLET ORAL at 23:15

## 2022-10-01 RX ADMIN — TRAZODONE HYDROCHLORIDE 50 MG: 50 TABLET ORAL at 23:15

## 2022-10-01 RX ADMIN — BUMETANIDE 1 MG: 0.25 INJECTION INTRAMUSCULAR; INTRAVENOUS at 23:15

## 2022-10-01 RX ADMIN — SODIUM CHLORIDE, PRESERVATIVE FREE 10 ML: 5 INJECTION INTRAVENOUS at 23:16

## 2022-10-01 ASSESSMENT — PAIN - FUNCTIONAL ASSESSMENT
PAIN_FUNCTIONAL_ASSESSMENT: 0-10
PAIN_FUNCTIONAL_ASSESSMENT: NONE - DENIES PAIN

## 2022-10-01 ASSESSMENT — ENCOUNTER SYMPTOMS
EYE PAIN: 0
BACK PAIN: 0
SORE THROAT: 0
SHORTNESS OF BREATH: 1
EYE DISCHARGE: 0
VOMITING: 0
EYE REDNESS: 0
DIARRHEA: 0
WHEEZING: 0
SINUS PRESSURE: 0
NAUSEA: 0
COUGH: 0
ABDOMINAL PAIN: 0

## 2022-10-01 ASSESSMENT — PAIN SCALES - GENERAL: PAINLEVEL_OUTOF10: 7

## 2022-10-01 ASSESSMENT — PAIN DESCRIPTION - LOCATION: LOCATION: BACK;SHOULDER

## 2022-10-01 NOTE — ED PROVIDER NOTES
Department of Emergency Medicine   ED  Provider Note  Admit Date/RoomTime: 10/1/2022  4:21 PM  ED Room: 20/20              Lyly Chase is a 64 y.o. male with a PMHx significant for failure to thrive, CHF, CAD, status post CABG, HTN, HLD, cardiomyopathy, who presents for evaluation of weight gain and shortness of breath, beginning prior to arrival.  The complaint has been persistent, moderate in severity, and worsened by eating. The patient states that he follows with Dr Don Machuca for cardiology. Has extensive cardiac history. States that he needs some sort of intervention procedure however secondary to his baseline issues with a will not do any procedure as he is high risk. He was told that he would not survive anesthesia. Patient denies any dizziness, lightheadedness, chest pain, but is feeling short of breath. States that he has gained 30 pounds since being at this facility. The history is provided by the patient and medical records. Review of Systems   Constitutional:  Positive for unexpected weight change (Weight gain of 30 pounds). Negative for chills and fever. HENT:  Negative for ear pain, sinus pressure and sore throat. Eyes:  Negative for pain, discharge and redness. Respiratory:  Positive for shortness of breath. Negative for cough and wheezing. Cardiovascular:  Positive for leg swelling. Negative for chest pain. Gastrointestinal:  Negative for abdominal pain, diarrhea, nausea and vomiting. Genitourinary:  Negative for dysuria and frequency. Musculoskeletal:  Negative for arthralgias and back pain. Skin:  Negative for rash and wound. Neurological:  Negative for weakness and headaches. Hematological:  Negative for adenopathy. All other systems reviewed and are negative. Physical Exam  Vitals and nursing note reviewed. Constitutional:       General: He is not in acute distress. Appearance: Normal appearance. He is well-developed.  He is not ill-appearing. HENT:      Head: Normocephalic and atraumatic. Right Ear: External ear normal.      Left Ear: External ear normal.   Eyes:      General:         Left eye: Discharge present. Conjunctiva/sclera: Conjunctivae normal.      Pupils: Pupils are equal, round, and reactive to light. Comments: Number erythema and discharge on left eye extraocular muscles intact   Cardiovascular:      Rate and Rhythm: Normal rate and regular rhythm. Heart sounds: Normal heart sounds. No murmur heard. Pulmonary:      Effort: Pulmonary effort is normal. No respiratory distress. Breath sounds: Rhonchi (b/l bases) present. No wheezing. Abdominal:      General: There is no distension. Palpations: Abdomen is soft. There is no mass. Tenderness: There is no abdominal tenderness. Hernia: No hernia is present. Musculoskeletal:      Cervical back: Normal range of motion and neck supple. Right lower leg: Edema present. Left lower leg: Edema present. Skin:     General: Skin is warm and dry. Neurological:      General: No focal deficit present. Mental Status: He is alert. Procedures    [facility]       ED Course as of 10/07/22 1057   Sat Oct 01, 2022   1931 EKG: This EKG is signed and interpreted by me. Rate: 77  Rhythm: Sinus  Interpretation: Normal sinus with PVCs, nonstick EKG, no ST elevations or depressions, QRS 96, QTc 452, lots of underlying artifact  Comparison: changes compared to previous EKG    [BB]   1944 Spoke with Dr. Orestes Fenton, discussed the patient. [BB]      ED Course User Index  [BB] Cece Betancur, DO      [address] [place] [address] presents to the ED for evaluation for shortness of breath. Patient also notes that he has been gaining weight since he was discharged. Workup in the ED revealed elevated BNP of 26,231, troponin of 69 with repeat of 64. Patient with pitting edema on physical exam. CXR showing cardiomegaly with findings of CHF.  Small right pleural effusion and moderate left pleural effusion. Patient requires continued workup and management of their symptoms and will be admitted to the hospital for further evaluation and treatment.      --------------------------------------------- PAST HISTORY ---------------------------------------------  Past Medical History:  has a past medical history of CAD (coronary artery disease), Cardiomyopathy ([facility] CHF (congestive heart failure) ([facility] Diabetes mellitus ([facility] Diverticulitis, Hyperlipidemia, Hypertension, MI, old, Pancreatitis, Prostate cancer ([facility] S/P CABG x 4, Skin cancer, and Ventricular tachycardia ([facility] Past Surgical History:  has a past surgical history that includes Kidney surgery; Bladder surgery; Elbow surgery; Coronary artery bypass graft (12/24/2007); Colonoscopy; hernia repair; Cardiac pacemaker placement (04/2010); Cardiac defibrillator placement (04/19/2018); and Skin cancer excision (2019). Social History:  reports that he has never smoked. He has never used smokeless tobacco. He reports that he does not drink alcohol and does not use drugs. Family History: family history includes Cancer in his father. The patients home medications have been reviewed. Allergies: Patient has no known allergies.     -------------------------------------------------- RESULTS -------------------------------------------------    LABS:  Results for orders placed or performed during the hospital encounter of 10/01/22   Respiratory Panel, Molecular, with COVID-19 (Restricted: peds pts or suitable admitted adults)    Specimen: Nasopharyngeal; Nasal swab   Result Value Ref Range    Adenovirus by PCR Not Detected Not Detected    Bordetella parapertussis by PCR Not Detected Not Detected    Bordetella pertussis by PCR Not Detected Not Detected    Chlamydophilia pneumoniae by PCR Not Detected Not Detected    Coronavirus 229E by PCR Not Detected Not Detected    Coronavirus HKU1 by PCR Not Detected Not Detected    Coronavirus NL63 by PCR Not Detected Not Detected    Coronavirus OC43 by PCR Not Detected Not Detected    SARS-CoV-2, PCR Not Detected Not Detected    Human Metapneumovirus by PCR Not Detected Not Detected    Human Rhinovirus/Enterovirus by PCR Not Detected Not Detected    Influenza A by PCR Not Detected Not Detected    Influenza B by PCR Not Detected Not Detected    Mycoplasma pneumoniae by PCR Not Detected Not Detected    Parainfluenza Virus 1 by PCR Not Detected Not Detected    Parainfluenza Virus 2 by PCR Not Detected Not Detected    Parainfluenza Virus 3 by PCR Not Detected Not Detected    Parainfluenza Virus 4 by PCR Not Detected Not Detected    Respiratory Syncytial Virus by PCR Not Detected Not Detected   CBC with Auto Differential   Result Value Ref Range    WBC 9.1 4.5 - 11.5 E9/L    RBC 4.75 3.80 - 5.80 E12/L    Hemoglobin 11.9 (L) 12.5 - 16.5 g/dL    Hematocrit 40.0 37.0 - 54.0 %    MCV 84.2 80.0 - 99.9 fL    MCH 25.1 (L) 26.0 - 35.0 pg    MCHC 29.8 (L) 32.0 - 34.5 %    RDW 18.0 (H) 11.5 - 15.0 fL    Platelets 192 330 - 284 E9/L    MPV 11.2 7.0 - 12.0 fL    Neutrophils % 74.0 43.0 - 80.0 %    Immature Granulocytes % 0.3 0.0 - 5.0 %    Lymphocytes % 18.1 (L) 20.0 - 42.0 %    Monocytes % 6.4 2.0 - 12.0 %    Eosinophils % 0.8 0.0 - 6.0 %    Basophils % 0.4 0.0 - 2.0 %    Neutrophils Absolute 6.68 1.80 - 7.30 E9/L    Immature Granulocytes # 0.03 E9/L    Lymphocytes Absolute 1.63 1.50 - 4.00 E9/L    Monocytes Absolute 0.58 0.10 - 0.95 E9/L    Eosinophils Absolute 0.07 0.05 - 0.50 E9/L    Basophils Absolute 0.04 0.00 - 0.20 E9/L   Brain Natriuretic Peptide   Result Value Ref Range    Pro-BNP 26,231 (H) 0 - 450 pg/mL   Troponin   Result Value Ref Range    Troponin, High Sensitivity 64 (H) 0 - 11 ng/L   Urinalysis with Microscopic   Result Value Ref Range    Color, UA Yellow Straw/Yellow    Clarity, UA Clear Clear    Glucose, Ur Negative Negative mg/dL    Bilirubin Urine Negative Negative    Ketones, Urine Negative Negative mg/dL    Specific [place], UA 1.015 1.005 - 1.030    Blood, Urine Negative Negative    pH, UA 5.5 5.0 - 9.0    Protein, UA Negative Negative mg/dL    Urobilinogen, Urine 0.2 <2.0 E.U./dL    Nitrite, Urine Negative Negative    Leukocyte Esterase, Urine Negative Negative    WBC, UA NONE 0 - 5 /HPF    RBC, UA NONE 0 - 2 /HPF    Bacteria, UA NONE SEEN None Seen /HPF   Lipase   Result Value Ref Range    Lipase 88 (H) 13 - 60 U/L   Hepatic Function Panel   Result Value Ref Range    Total Protein 6.4 6.4 - 8.3 g/dL    Albumin 3.6 3.5 - 5.2 g/dL    Alkaline Phosphatase 151 (H) 40 - 129 U/L    ALT 8 0 - 40 U/L    AST 21 0 - 39 U/L    Total Bilirubin 1.0 0.0 - 1.2 mg/dL    Bilirubin, Direct 0.6 (H) 0.0 - 0.3 mg/dL    Bilirubin, Indirect 0.4 0.0 - 1.0 mg/dL   Lactic Acid   Result Value Ref Range    Lactic Acid 1.6 0.5 - 2.2 mmol/L   Basic Metabolic Panel w/ Reflex to MG   Result Value Ref Range    Sodium 143 132 - 146 mmol/L    Potassium reflex Magnesium 4.2 3.5 - 5.0 mmol/L    Chloride 106 98 - 107 mmol/L    CO2 29 22 - 29 mmol/L    Anion Gap 8 7 - 16 mmol/L    Glucose 70 (L) 74 - 99 mg/dL    BUN 35 (H) 6 - 23 mg/dL    Creatinine 1.1 0.7 - 1.2 mg/dL    GFR Non-African American >60 >=60 mL/min/1.73    GFR African American >60     Calcium 9.0 8.6 - 10.2 mg/dL   Basic Metabolic Panel w/ Reflex to MG   Result Value Ref Range    Sodium 145 132 - 146 mmol/L    Potassium reflex Magnesium 3.8 3.5 - 5.0 mmol/L    Chloride 108 (H) 98 - 107 mmol/L    CO2 26 22 - 29 mmol/L    Anion Gap 11 7 - 16 mmol/L    Glucose 63 (L) 74 - 99 mg/dL    BUN 34 (H) 6 - 23 mg/dL    Creatinine 1.1 0.7 - 1.2 mg/dL    GFR Non-African American >60 >=60 mL/min/1.73    GFR African American >60     Calcium 8.8 8.6 - 10.2 mg/dL   T4, Free   Result Value Ref Range    T4 Free 1.03 0.93 - 1.70 ng/dL   TSH   Result Value Ref Range    TSH 2.500 0.270 - 4.200 uIU/mL   Lipid Panel   Result Value Ref Range    Cholesterol, Total 105 0 - 199 mg/dL    Triglycerides 58 0 - 149 mg/dL    HDL 40 >40 mg/dL    LDL Calculated 53 0 - 99 mg/dL    VLDL Cholesterol Calculated 12 mg/dL   Hemoglobin A1C   Result Value Ref Range    Hemoglobin A1C 5.9 (H) 4.0 - 5.6 %   Basic Metabolic Panel w/ Reflex to MG   Result Value Ref Range    Sodium 144 132 - 146 mmol/L    Potassium reflex Magnesium 3.8 3.5 - 5.0 mmol/L    Chloride 106 98 - 107 mmol/L    CO2 27 22 - 29 mmol/L    Anion Gap 11 7 - 16 mmol/L    Glucose 66 (L) 74 - 99 mg/dL    BUN 36 (H) 6 - 23 mg/dL    Creatinine 1.3 (H) 0.7 - 1.2 mg/dL    GFR Non-African American 53 >=60 mL/min/1.73    GFR African American >60     Calcium 8.6 8.6 - 10.2 mg/dL   Brain Natriuretic Peptide   Result Value Ref Range    Pro-BNP 17,521 (H) 0 - 450 pg/mL   Basic Metabolic Panel w/ Reflex to MG   Result Value Ref Range    Sodium 144 132 - 146 mmol/L    Potassium reflex Magnesium 4.1 3.5 - 5.0 mmol/L    Chloride 105 98 - 107 mmol/L    CO2 30 (H) 22 - 29 mmol/L    Anion Gap 9 7 - 16 mmol/L    Glucose 93 74 - 99 mg/dL    BUN 35 (H) 6 - 23 mg/dL    Creatinine 1.2 0.7 - 1.2 mg/dL    GFR Non-African American 58 >=60 mL/min/1.73    GFR African American >60     Calcium 8.6 8.6 - 10.2 mg/dL   Basic Metabolic Panel w/ Reflex to MG   Result Value Ref Range    Sodium 142 132 - 146 mmol/L    Potassium reflex Magnesium 4.0 3.5 - 5.0 mmol/L    Chloride 103 98 - 107 mmol/L    CO2 30 (H) 22 - 29 mmol/L    Anion Gap 9 7 - 16 mmol/L    Glucose 92 74 - 99 mg/dL    BUN 35 (H) 6 - 23 mg/dL    Creatinine 1.2 0.7 - 1.2 mg/dL    GFR Non-African American 58 >=60 mL/min/1.73    GFR African American >60     Calcium 8.9 8.6 - 10.2 mg/dL   Brain Natriuretic Peptide   Result Value Ref Range    Pro-BNP 23,702 (H) 0 - 450 pg/mL   Basic Metabolic Panel   Result Value Ref Range    Sodium 141 132 - 146 mmol/L    Potassium 4.1 3.5 - 5.0 mmol/L    Chloride 103 98 - 107 mmol/L    CO2 27 22 - 29 mmol/L    Anion Gap 11 7 - 16 mmol/L    Glucose 107 (H) 74 - 99 mg/dL    BUN 37 (H) 6 - 23 mg/dL    Creatinine 1.2 0.7 - 1.2 mg/dL    GFR Non-African American 58 >=60 mL/min/1.73    GFR African American >60     Calcium 9.0 8.6 - 10.2 mg/dL   Brain Natriuretic Peptide   Result Value Ref Range    Pro-BNP 20,551 (H) 0 - 450 pg/mL   PSA, Diagnostic   Result Value Ref Range    PSA <0.01 0.00 - 4.00 ng/mL   Basic Metabolic Panel   Result Value Ref Range    Sodium 137 132 - 146 mmol/L    Potassium 3.7 3.5 - 5.0 mmol/L    Chloride 102 98 - 107 mmol/L    CO2 28 22 - 29 mmol/L    Anion Gap 7 7 - 16 mmol/L    Glucose 108 (H) 74 - 99 mg/dL    BUN 40 (H) 6 - 23 mg/dL    Creatinine 1.2 0.7 - 1.2 mg/dL    GFR Non-African American 58 >=60 mL/min/1.73    GFR African American >60     Calcium 8.8 8.6 - 10.2 mg/dL   Magnesium   Result Value Ref Range    Magnesium 2.3 1.6 - 2.6 mg/dL   POCT Glucose   Result Value Ref Range    Meter Glucose 111 (H) 74 - 99 mg/dL   POCT Glucose   Result Value Ref Range    Meter Glucose 176 (H) 74 - 99 mg/dL   POCT Glucose   Result Value Ref Range    Meter Glucose 152 (H) 74 - 99 mg/dL   POCT Glucose   Result Value Ref Range    Meter Glucose 59 (L) 74 - 99 mg/dL   POCT Glucose   Result Value Ref Range    Meter Glucose 119 (H) 74 - 99 mg/dL   POCT Glucose   Result Value Ref Range    Meter Glucose 116 (H) 74 - 99 mg/dL   POCT Glucose   Result Value Ref Range    Meter Glucose 82 74 - 99 mg/dL   POCT Glucose   Result Value Ref Range    Meter Glucose 80 74 - 99 mg/dL   POCT Glucose   Result Value Ref Range    Meter Glucose 70 (L) 74 - 99 mg/dL   POCT Glucose   Result Value Ref Range    Meter Glucose 92 74 - 99 mg/dL   POCT Glucose   Result Value Ref Range    Meter Glucose 93 74 - 99 mg/dL   POCT Glucose   Result Value Ref Range    Meter Glucose 88 74 - 99 mg/dL   POCT Glucose   Result Value Ref Range    Meter Glucose 144 (H) 74 - 99 mg/dL   POCT Glucose   Result Value Ref Range    Meter Glucose 115 (H) 74 - 99 mg/dL   POCT Glucose   Result Value Ref Range    Meter Glucose 91 74 - 99 mg/dL   POCT Glucose   Result Value Ref Range    Meter Glucose 95 74 - 99 mg/dL   POCT Glucose   Result Value Ref Range    Meter Glucose 105 (H) 74 - 99 mg/dL   POCT Glucose   Result Value Ref Range    Meter Glucose 114 (H) 74 - 99 mg/dL   POCT Glucose   Result Value Ref Range    Meter Glucose 110 (H) 74 - 99 mg/dL   POCT Glucose   Result Value Ref Range    Meter Glucose 101 (H) 74 - 99 mg/dL   POCT Glucose   Result Value Ref Range    Meter Glucose 94 74 - 99 mg/dL   POCT Glucose   Result Value Ref Range    Meter Glucose 122 (H) 74 - 99 mg/dL   POCT Glucose   Result Value Ref Range    Meter Glucose 108 (H) 74 - 99 mg/dL   EKG 12 Lead   Result Value Ref Range    Ventricular Rate 81 BPM    Atrial Rate 70 BPM    QRS Duration 114 ms    Q-T Interval 446 ms    QTc Calculation (Bazett) 518 ms    R Axis -36 degrees    T Axis 149 degrees   EKG 12 Lead   Result Value Ref Range    Ventricular Rate 77 BPM    Atrial Rate 63 BPM    QRS Duration 96 ms    Q-T Interval 400 ms    QTc Calculation (Bazett) 452 ms    R Axis 81 degrees    T Axis -89 degrees       RADIOLOGY:  XR CHEST (2 VW)   Final Result   1. Cardiomegaly with findings of CHF   2. Small right pleural effusion and moderate left pleural effusion   3.            ------------------------- NURSING NOTES AND VITALS REVIEWED ---------------------------  Date / Time Roomed:  10/1/2022  4:21 PM  ED Bed Assignment:  0403/0403-A    The nursing notes within the ED encounter and vital signs as below have been reviewed.      Patient Vitals for the past 24 hrs:   BP Temp Temp src Pulse Resp SpO2 Weight   10/07/22 0800 119/63 98.1 °F (36.7 °C) Oral -- 18 98 % --   10/07/22 0625 -- -- -- -- -- -- 200 lb 11.2 oz (91 kg)   10/07/22 0030 126/70 97.6 °F (36.4 °C) Oral 69 18 95 % --   10/06/22 2115 119/65 -- -- 55 -- -- --   10/06/22 2000 (!) 104/59 98.2 °F (36.8 °C) Oral 66 20 98 % --   10/06/22 1553 (!) 125/56 97.5 °F (36.4 °C) Oral 86 -- 99 % --       Oxygen Saturation Interpretation: Normal    ------------------------------------------ PROGRESS NOTES ------------------------------------------  Counseling:  I have spoken with the patient and discussed todays results, in addition to providing specific details for the plan of care and counseling regarding the diagnosis and prognosis. Their questions are answered at this time and they are agreeable with the plan of admission.    --------------------------------- ADDITIONAL PROVIDER NOTES ---------------------------------  Consultations:  Spoke with Dr. Capo Duncan. Discussed case. They will admit the patient. This patient's ED course included: a personal history and physicial examination, re-evaluation prior to disposition, multiple bedside re-evaluations, IV medications, cardiac monitoring, continuous pulse oximetry, and complex medical decision making and emergency management    This patient has remained hemodynamically stable during their ED course. Diagnosis:  1. Acute on chronic congestive heart failure, unspecified heart failure type ([facility]        Disposition:  Patient's disposition: Admit to telemetry  Patient's condition is stable.          Dennis Martel DO  10/07/22 1101

## 2022-10-02 PROBLEM — I38 VHD (VALVULAR HEART DISEASE): Status: ACTIVE | Noted: 2022-10-02

## 2022-10-02 PROBLEM — I50.23 ACUTE ON CHRONIC HFREF (HEART FAILURE WITH REDUCED EJECTION FRACTION) (HCC): Status: ACTIVE | Noted: 2022-10-02

## 2022-10-02 LAB
ADENOVIRUS BY PCR: NOT DETECTED
ANION GAP SERPL CALCULATED.3IONS-SCNC: 11 MMOL/L (ref 7–16)
BORDETELLA PARAPERTUSSIS BY PCR: NOT DETECTED
BORDETELLA PERTUSSIS BY PCR: NOT DETECTED
BUN BLDV-MCNC: 34 MG/DL (ref 6–23)
CALCIUM SERPL-MCNC: 8.8 MG/DL (ref 8.6–10.2)
CHLAMYDOPHILIA PNEUMONIAE BY PCR: NOT DETECTED
CHLORIDE BLD-SCNC: 108 MMOL/L (ref 98–107)
CHOLESTEROL, TOTAL: 105 MG/DL (ref 0–199)
CO2: 26 MMOL/L (ref 22–29)
CORONAVIRUS 229E BY PCR: NOT DETECTED
CORONAVIRUS HKU1 BY PCR: NOT DETECTED
CORONAVIRUS NL63 BY PCR: NOT DETECTED
CORONAVIRUS OC43 BY PCR: NOT DETECTED
CREAT SERPL-MCNC: 1.1 MG/DL (ref 0.7–1.2)
EKG ATRIAL RATE: 70 BPM
EKG Q-T INTERVAL: 446 MS
EKG QRS DURATION: 114 MS
EKG QTC CALCULATION (BAZETT): 518 MS
EKG R AXIS: -36 DEGREES
EKG T AXIS: 149 DEGREES
EKG VENTRICULAR RATE: 81 BPM
GFR AFRICAN AMERICAN: >60
GFR NON-AFRICAN AMERICAN: >60 ML/MIN/1.73
GLUCOSE BLD-MCNC: 63 MG/DL (ref 74–99)
HBA1C MFR BLD: 5.9 % (ref 4–5.6)
HDLC SERPL-MCNC: 40 MG/DL
HUMAN METAPNEUMOVIRUS BY PCR: NOT DETECTED
HUMAN RHINOVIRUS/ENTEROVIRUS BY PCR: NOT DETECTED
INFLUENZA A BY PCR: NOT DETECTED
INFLUENZA B BY PCR: NOT DETECTED
LDL CHOLESTEROL CALCULATED: 53 MG/DL (ref 0–99)
METER GLUCOSE: 116 MG/DL (ref 74–99)
METER GLUCOSE: 119 MG/DL (ref 74–99)
METER GLUCOSE: 152 MG/DL (ref 74–99)
METER GLUCOSE: 59 MG/DL (ref 74–99)
MYCOPLASMA PNEUMONIAE BY PCR: NOT DETECTED
PARAINFLUENZA VIRUS 1 BY PCR: NOT DETECTED
PARAINFLUENZA VIRUS 2 BY PCR: NOT DETECTED
PARAINFLUENZA VIRUS 3 BY PCR: NOT DETECTED
PARAINFLUENZA VIRUS 4 BY PCR: NOT DETECTED
POTASSIUM REFLEX MAGNESIUM: 3.8 MMOL/L (ref 3.5–5)
RESPIRATORY SYNCYTIAL VIRUS BY PCR: NOT DETECTED
SARS-COV-2, PCR: NOT DETECTED
SODIUM BLD-SCNC: 145 MMOL/L (ref 132–146)
T4 FREE: 1.03 NG/DL (ref 0.93–1.7)
TRIGL SERPL-MCNC: 58 MG/DL (ref 0–149)
TSH SERPL DL<=0.05 MIU/L-ACNC: 2.5 UIU/ML (ref 0.27–4.2)
VLDLC SERPL CALC-MCNC: 12 MG/DL

## 2022-10-02 PROCEDURE — 80048 BASIC METABOLIC PNL TOTAL CA: CPT

## 2022-10-02 PROCEDURE — 99232 SBSQ HOSP IP/OBS MODERATE 35: CPT | Performed by: NURSE PRACTITIONER

## 2022-10-02 PROCEDURE — 36415 COLL VENOUS BLD VENIPUNCTURE: CPT

## 2022-10-02 PROCEDURE — 2500000003 HC RX 250 WO HCPCS: Performed by: INTERNAL MEDICINE

## 2022-10-02 PROCEDURE — 6370000000 HC RX 637 (ALT 250 FOR IP): Performed by: NURSE PRACTITIONER

## 2022-10-02 PROCEDURE — 2500000003 HC RX 250 WO HCPCS: Performed by: NURSE PRACTITIONER

## 2022-10-02 PROCEDURE — 6360000002 HC RX W HCPCS: Performed by: INTERNAL MEDICINE

## 2022-10-02 PROCEDURE — 0202U NFCT DS 22 TRGT SARS-COV-2: CPT

## 2022-10-02 PROCEDURE — 2580000003 HC RX 258: Performed by: INTERNAL MEDICINE

## 2022-10-02 PROCEDURE — 2700000000 HC OXYGEN THERAPY PER DAY

## 2022-10-02 PROCEDURE — 6370000000 HC RX 637 (ALT 250 FOR IP): Performed by: INTERNAL MEDICINE

## 2022-10-02 PROCEDURE — 2060000000 HC ICU INTERMEDIATE R&B

## 2022-10-02 PROCEDURE — 82962 GLUCOSE BLOOD TEST: CPT

## 2022-10-02 PROCEDURE — 99223 1ST HOSP IP/OBS HIGH 75: CPT | Performed by: INTERNAL MEDICINE

## 2022-10-02 RX ORDER — BUMETANIDE 0.25 MG/ML
1 INJECTION, SOLUTION INTRAMUSCULAR; INTRAVENOUS 3 TIMES DAILY
Status: DISCONTINUED | OUTPATIENT
Start: 2022-10-02 | End: 2022-10-11

## 2022-10-02 RX ORDER — IPRATROPIUM BROMIDE AND ALBUTEROL SULFATE 2.5; .5 MG/3ML; MG/3ML
1 SOLUTION RESPIRATORY (INHALATION) EVERY 4 HOURS PRN
Status: DISCONTINUED | OUTPATIENT
Start: 2022-10-02 | End: 2022-10-12 | Stop reason: HOSPADM

## 2022-10-02 RX ORDER — LORAZEPAM 0.5 MG/1
0.5 TABLET ORAL 3 TIMES DAILY PRN
Status: DISCONTINUED | OUTPATIENT
Start: 2022-10-02 | End: 2022-10-12 | Stop reason: HOSPADM

## 2022-10-02 RX ORDER — METOPROLOL SUCCINATE 25 MG/1
25 TABLET, EXTENDED RELEASE ORAL 2 TIMES DAILY
Status: DISCONTINUED | OUTPATIENT
Start: 2022-10-02 | End: 2022-10-07

## 2022-10-02 RX ORDER — SENNA AND DOCUSATE SODIUM 50; 8.6 MG/1; MG/1
2 TABLET, FILM COATED ORAL NIGHTLY
Status: DISCONTINUED | OUTPATIENT
Start: 2022-10-02 | End: 2022-10-12 | Stop reason: HOSPADM

## 2022-10-02 RX ORDER — MIRTAZAPINE 15 MG/1
30 TABLET, FILM COATED ORAL NIGHTLY
Status: DISCONTINUED | OUTPATIENT
Start: 2022-10-02 | End: 2022-10-12 | Stop reason: HOSPADM

## 2022-10-02 RX ADMIN — ISOSORBIDE DINITRATE 20 MG: 10 TABLET ORAL at 19:41

## 2022-10-02 RX ADMIN — SODIUM CHLORIDE, PRESERVATIVE FREE 10 ML: 5 INJECTION INTRAVENOUS at 19:42

## 2022-10-02 RX ADMIN — GLIPIZIDE 5 MG: 5 TABLET ORAL at 05:44

## 2022-10-02 RX ADMIN — ASPIRIN 81 MG: 81 TABLET, COATED ORAL at 08:00

## 2022-10-02 RX ADMIN — ENOXAPARIN SODIUM 40 MG: 100 INJECTION SUBCUTANEOUS at 07:59

## 2022-10-02 RX ADMIN — BUMETANIDE 1 MG: 0.25 INJECTION INTRAMUSCULAR; INTRAVENOUS at 09:36

## 2022-10-02 RX ADMIN — LORAZEPAM 0.5 MG: 0.5 TABLET ORAL at 21:46

## 2022-10-02 RX ADMIN — BUMETANIDE 1 MG: 0.25 INJECTION, SOLUTION INTRAMUSCULAR; INTRAVENOUS at 14:36

## 2022-10-02 RX ADMIN — GLIPIZIDE 5 MG: 5 TABLET ORAL at 15:52

## 2022-10-02 RX ADMIN — HYDRALAZINE HYDROCHLORIDE 25 MG: 25 TABLET, FILM COATED ORAL at 08:00

## 2022-10-02 RX ADMIN — TAMSULOSIN HYDROCHLORIDE 0.4 MG: 0.4 CAPSULE ORAL at 08:00

## 2022-10-02 RX ADMIN — TRAZODONE HYDROCHLORIDE 50 MG: 50 TABLET ORAL at 19:41

## 2022-10-02 RX ADMIN — HYDRALAZINE HYDROCHLORIDE 25 MG: 25 TABLET, FILM COATED ORAL at 19:41

## 2022-10-02 RX ADMIN — ISOSORBIDE DINITRATE 20 MG: 10 TABLET ORAL at 08:00

## 2022-10-02 RX ADMIN — DOCUSATE SODIUM 50 MG AND SENNOSIDES 8.6 MG 2 TABLET: 8.6; 5 TABLET, FILM COATED ORAL at 19:41

## 2022-10-02 RX ADMIN — METOPROLOL SUCCINATE 25 MG: 25 TABLET, FILM COATED, EXTENDED RELEASE ORAL at 08:00

## 2022-10-02 RX ADMIN — PANTOPRAZOLE SODIUM 40 MG: 40 TABLET, DELAYED RELEASE ORAL at 05:44

## 2022-10-02 RX ADMIN — ISOSORBIDE DINITRATE 20 MG: 10 TABLET ORAL at 14:36

## 2022-10-02 RX ADMIN — FINASTERIDE 5 MG: 5 TABLET, FILM COATED ORAL at 08:00

## 2022-10-02 RX ADMIN — METOPROLOL SUCCINATE 25 MG: 25 TABLET, FILM COATED, EXTENDED RELEASE ORAL at 19:41

## 2022-10-02 RX ADMIN — BUMETANIDE 1 MG: 0.25 INJECTION, SOLUTION INTRAMUSCULAR; INTRAVENOUS at 19:41

## 2022-10-02 RX ADMIN — SODIUM CHLORIDE, PRESERVATIVE FREE 10 ML: 5 INJECTION INTRAVENOUS at 07:58

## 2022-10-02 RX ADMIN — MIRTAZAPINE 30 MG: 15 TABLET, FILM COATED ORAL at 19:41

## 2022-10-02 NOTE — PLAN OF CARE
Problem: Discharge Planning  Goal: Discharge to home or other facility with appropriate resources  10/2/2022 1011 by Dian Lackey RN  Outcome: Progressing  10/2/2022 0111 by Avila Del Valle RN  Outcome: Progressing     Problem: Skin/Tissue Integrity  Goal: Absence of new skin breakdown  Description: 1. Monitor for areas of redness and/or skin breakdown  2. Assess vascular access sites hourly  3. Every 4-6 hours minimum:  Change oxygen saturation probe site  4. Every 4-6 hours:  If on nasal continuous positive airway pressure, respiratory therapy assess nares and determine need for appliance change or resting period.   10/2/2022 1011 by Dian Lackey RN  Outcome: Progressing  10/2/2022 0111 by Avila Del Valle RN  Outcome: Progressing     Problem: Safety - Adult  Goal: Free from fall injury  10/2/2022 1011 by Dian Lackey RN  Outcome: Progressing  10/2/2022 0111 by Avila Del Valle RN  Outcome: Progressing

## 2022-10-02 NOTE — ED NOTES
[facility] faxed and confirmed to 4th floor.  Patient ready for transfer to room Susana Bryan, RN  10/01/22 2031

## 2022-10-02 NOTE — H&P
[facility] History and Physical          ASSESSMENT:      Principal Problem:    Congestive heart failure of unknown etiology ([address]  Resolved Problems:    * No resolved hospital problems. *      PLAN:    1. Acute on chronic diastolic and systolic congestive heart failure  I have started patient on Bumex 1 mg twice a day. Will monitor effectiveness. Patient may need to have somewhere between 20 and 30 pounds of fluid removed. Cardiology has been consulted. I have maintained patient's DNR status DNR CCA. Although he was previously cc he obviously wants treatment at this time. Continue aspirin, hydralazine and Isordil, and Toprol-XL. Patient's heart failure does seem end-stage. Hospice is not an unreasonable goal if patient is amenable. Also could consider stopping his ICD although patient does pace according to his EKG. 2.   Diabetes  Continuing Glucotrol. Fingersticks and sliding scale ordered. Code Status:  DNR-CCA  DVT prophylaxis: lovenox    CHIEF COMPLAINT:  Fluid overload    History of Present Illness:   67-year-old male with history of coronary artery disease status post CABG in 2007 of which all grafts were occluded in 2021 heart cath. Patient with ischemic cardiomyopathy with a EF of 20%. Patient also with moderate pulmonary hypertension and wedge pressure of 26 at that time. Also history of VTE, and has ICD that was inserted in 2020 with a generator change in 2018. Patient seen by cardiology as consult and July of this year when he had CHF. Patient is apparently been referred to [facility] clinic but is not a candidate for heart surgery, patient would essentially require a heart transplant. He has been intolerant to ACE, ARB, and Entresto and is currently on Toprol, hydralazine, and Isordil. Palliative care was consulted and although patient did not seem to want hospice his CODE STATUS was changed to DNR CC. At this time he appears to be a DNR CCA.     Since discharge patient is apparently gained over 30 pounds of water weight. With increasing peripheral edema and shortness of breath. For me patient will not discuss anything other than the fact that he did not like the nursing home he was at and will go back. He is constantly repeating himself and will not answer any review of systems questions nor give any history of why he is in the hospital.  He appears to have no insight into being DNR CC or possible hospice. Informant(s) for H&P: Patient and chart review    REVIEW OF SYSTEMS:  Patient will not answer. PMH:  Past Medical History:   Diagnosis Date    CAD (coronary artery disease)     Cardiomyopathy ([facility]     CHF (congestive heart failure) (H[facility])     Diabetes mellitus ([facility] [address]     Diverticulitis     Hyperlipidemia     Hypertension     MI, old     Pancreatitis     Prostate cancer ([facility] [address]     prostate    S/P CABG x 4     Skin cancer     Ventricular tachycardia [facility])        Surgical History:  Past Surgical History:   Procedure Laterality Date    BLADDER SURGERY      CARDIAC DEFIBRILLATOR PLACEMENT  04/19/2018    D-ICD GENT CHANGE    [facility]    CARDIAC PACEMAKER PLACEMENT  04/2010    [facility]     COLONOSCOPY      CORONARY ARTERY BYPASS GRAFT  12/24/2007    ELBOW SURGERY      HERNIA REPAIR      KIDNEY SURGERY      SKIN CANCER EXCISION  2019    nose and cheek       Medications Prior to Admission:    Prior to Admission medications    Medication Sig Start Date End Date Taking? Authorizing Provider   isosorbide dinitrate (ISORDIL) 20 MG tablet Take 1 tablet by mouth in the morning and 1 tablet at noon and 1 tablet before bedtime.  7/23/22   Zeynep Centeno MD   omeprazole (PRILOSEC) 40 MG delayed release capsule Take 1 capsule by mouth in the morning. 7/23/22   Zeynep Centeno MD   LINZESS 290 MCG CAPS capsule TAKE 1 CAPSULE BY MOUTH EVERY DAY 6/21/22   Historical Provider, MD   traZODone (DESYREL) 50 MG tablet TAKE 1 TABLET BY MOUTH AT BEDTIME 6/21/22 Historical Provider, MD   torsemide (DEMADEX) 20 MG tablet Take 1 tablet by mouth daily 6/30/22   Bernice Bile, DO   metoprolol succinate (TOPROL XL) 25 MG extended release tablet Take 1 tablet by mouth daily 6/30/22   Bernice Bile, DO   hydrALAZINE (APRESOLINE) 25 MG tablet Take 1 tablet by mouth in the morning and at bedtime 6/30/22   Bernice Bile, DO   isosorbide mononitrate (IMDUR) 30 MG extended release tablet Take 1 tablet by mouth daily 6/30/22 7/23/22  Bernice Bile, DO   nitroGLYCERIN (NITROSTAT) 0.4 MG SL tablet Place 1 tablet under the tongue every 5 minutes as needed for Chest pain 4/26/22   Bernice Bile, DO   acetaminophen (TYLENOL 8 HOUR ARTHRITIS PAIN) 650 MG extended release tablet Take 650 mg by mouth every 8 hours as needed for Pain    Historical Provider, MD   OXYGEN Inhale 4 L into the lungs as needed     Historical Provider, MD   finasteride (PROSCAR) 5 MG tablet take 1 tablet by mouth once daily 1/3/17   Historical Provider, MD   glipiZIDE (GLUCOTROL) 5 MG tablet Take 5 mg by mouth 2 times daily (before meals)  8/30/16   Historical Provider, MD   aspirin 81 MG tablet Take 81 mg by mouth daily. Historical Provider, MD   tamsulosin (FLOMAX) 0.4 MG capsule Take 0.4 mg by mouth daily     Historical Provider, MD       Allergies:    Patient has no known allergies. Social History:    reports that he has never smoked. He has never used smokeless tobacco. He reports that he does not drink alcohol and does not use drugs. Family History:   family history includes Cancer in his father.      PHYSICAL EXAM:  Vitals:  /73   Pulse 74   Temp 97.5 °F (36.4 °C) (Oral)   Resp 22   Ht 5' 11\" (1.803 m)   Wt 209 lb (94.8 kg)   SpO2 98%   BMI 29.15 kg/m²   General Appearance: alert and oriented to person, place and time and in no acute distress  Skin: warm and dry  Head: normocephalic and atraumatic  Eyes: pupils equal, round, and reactive to light, extraocular eye movements intact, conjunctivae normal  Neck: neck supple and non tender without mass   Pulmonary/Chest: crackles in bases likely  Cardiovascular: distant, seems regular, no murmur appreciated  Abdomen: full, uncomfortable exam but no point tenderness or guarding  Extremities:2 + bilateral LE edema  Neurologic: grossly non focal    LABS:  Recent Labs     10/01/22  1758      K 4.2      CO2 29   BUN 35*   CREATININE 1.1   GLUCOSE 70*   CALCIUM 9.0       Recent Labs     10/01/22  1758   WBC 9.1   RBC 4.75   HGB 11.9*   HCT 40.0   MCV 84.2   MCH 25.1*   MCHC 29.8*   RDW 18.0*      MPV 11.2       Radiology:   XR CHEST (2 VW)   Final Result   1. Cardiomegaly with findings of CHF   2. Small right pleural effusion and moderate left pleural effusion   3. EKG:  V pacing, with other complexes likely some PVC's and possibly idioventricular beats as I see no P waves    NOTE: This report was transcribed using voice recognition software. Every effort was made to ensure accuracy; however, inadvertent computerized transcription errors may be present.   Electronically signed by Analisa Medina MD on 10/1/2022 at 9:49 PM

## 2022-10-02 NOTE — CONSULTS
Inpatient Cardiology Consultation      Reason for Consult:  HFrEF    Consulting Physician: Dr Margo Hahn    Requesting Physician:  Dr Deepa Tovar    Date of Consultation: 10/2/2022    HISTORY OF PRESENT ILLNESS: 63 yo  male known to Dr Laure Moritz, and Dr Whitaker last seen by Dr Dianne Ferguson 7/23/2022. Also has been seen @ [facility] last time 6/2022 (admission or HFrEF). Per Dr Dianne Ferguson 7/23/2022->Based on his history and testing at [facility], he is not a candidate for any further cardiac intervention. We will continue guideline directed medical therapy. He has history of intolerance to ARNI, ACE and ARB's. PMH: CAD s/p CABG x 5 in 2007 s/p C 3/2021 [facility] showed severe disease in native vessels, all the grafts were occluded, recommended medical therapy only,  ICMP on TE 5/2022) EF 20-25%, mod MR, RV function reduced, RHC 5/16/22 >> increased biventricular filling pressures, mod PH and PCWP 26 mmHg, CI 2.73, ICD placed in 2010 with generator change in 2018, HTN, HLD, NSVT on interrogation 7/24/2022 (Toprol XL increased to 25 mg BID), CKD, PHTN, DM, previously a DNR-CCA, hx of intolerance to Entrestro/ACEI/ARB, and medical non compliance takes medications intermittently. 30 pound weight gain, BLE swelling, orthopnea/PND, and FOX. Denies CP, pressure, dizziness or LOC. Patient is an unreliable historian. [facility] ED 10/1/2022 /74 HR 70's SR, afebrile, and O2 saturation 99% on 3 liters. CXR CHF, small R pleural effusion and moderate L pleural effusion. , K+ 4.2, Bun/Cr 35/1.1, p-BNP 67947, troponin 64, Alk phos 151, Lipase 88,  D Karlo 0.6 rest of LFT's WNL, Hgb 11.2, WBC 9.1, Plt 132, BS 70, UA negative. Lasix 40 mg IV x 1 in ED (diuresed 1800 cc). Bumex 1 mg IV BID    ASA 81 mg QD, Hydralazine 25 mg BID, Isordil 20 mg TID, Toprol XL 25 mg QD,. Currently (0315) /64 HR 60's SR, afebrile, and O2 saturation 100% on 2 liters NC.     Patient is tachypneic during interview and really could not given specifics regarding when SOB/FOX and swelling started. Denies having CP. He lives alone and states his daughter who checks on him has been having a lot of problems herself. Please note: past medical records were reviewed per electronic medical record (EMR) - see detailed reports under Past Medical/ Surgical History. Past Medical History:    CAD  Status post CABG 12/24/2007 (LIMA-LAD, SVG-RI, OM1, OM2, PDA). Stress test ordered 3/5/2021: Cancelled due to patient having shingles. [facility], 3/16/2021: LMT: severe diffuse disease. LAD: severe diffuse disease. Additional Comment: Moderate caliber vessel with  in the proximal portion. The vessel is fed by patent LIMA graft. The distal portion is small in caliber and wraps the apex. There are L->R collaterals. LCX: severe diffuse disease. Additional Comment: Moderate caliber non-dominant vessel which is fed by SVG->RI/OM1. RAMUS: ostial Ramus - . Additional Comment: Fills in the mid-distal portion from SVG->RI graft. RCA Status: Not Applicable. Additional Comment: Occluded at the ostia. GRAFTS: LIMA Graft End to Side to the Left Anterior Descending. Additional Comments: patent. SVG End to Side to the RI. Additional  Comments - patent with mild-moderate disease in the mid-portion; supplies mid-distal LCx and backfills to partially supply LAD. SVG End to Side to the OM-2 Second Obtuse Marginal Branch. Additional Comments - occluded. SVG End to Side to the Right Posterior Descending Artery. Additional Comments - occluded. Impression:- Severe/occlusive native 3 vessel CAD with patent LIMA->LAD, SVG->RI/OM1. SVG->OM2 is occluded - Native RCA is occluded at the ostia as is the SVG->rPDA. The right is fed by L->R collaterals from the LIMA graft Recommended Treatment: Medical Therapy.    7/2022 Troponin 61-->69  ICMP/Chronic HFrEF   ACC/AHA stage D, NYHA functional class II  TTE 6/27/2012: Mildly dilated LV, LVEF 35%, 1-2+ MR.  TTE: 4/13/2018 (Dr. Daya Bailey):  EF estimated at 30%. The entire apex is severely hypokinetic. Overall ejection fraction severely decreased. Moderate left ventricular concentric hypertrophy noted. There is doppler evidence of stage I diastolic dysfunction. Normal right ventricle structure and function. Physiologic and/or trace mitral regurgitation is present. TTE: 3/15/2021 ([facility]) Exam indication: Abnormal Cardiac Biomarkers There is a resting wall motion abnormality in the territory of the LAD and RCA. - The left ventricle is dilated. Left ventricular systolic function is severely decreased. EF = 26 ± 5% (2D biplane) Grade III left ventricular diastolic dysfunction. The right ventricle is normal in size. Right ventricular systolic function is   moderately decreased. - The left atrial cavity is severely dilated. - The right atrial cavity is dilated. There is moderately severe (3+) mitral valve regurgitation due to restricted leaflet motion likely related to ischemic heart disease. TTE: 5/13/2022 ([facility]): - Technically difficult exam due to body habitus and suboptimal positioning. - Exam indication: Evaluation of known heart failure to guide therapy - The left ventricle is severely dilated. Left ventricular systolic function is severely decreased. EF = 20 ± 5% (visual est.) Definity contrast used for endocardial border detection. Left ventricular diastolic function was not evaluated due to an arrhythmia. The right ventricle is dilated. Right ventricular systolic function is moderately to severely decreased. - The left atrial cavity is severely dilated. - The right atrial cavity is dilated. There is moderate (2+) mitral valve regurgitation. - -Peak/mean gradients of 15/7 mmHg, gradients averaged due to arrhythmia. -Prior EF reported as 26% (3/2021). Exam was compared with the prior  echocardiographic exam performed on  3/14/2021. There is no significant change.  VHD: Moderate-severe ischemic MR (patient was recommended for further evaluation with EBONI and possible consideration of a clip 6/3/2022 at [facility] --> patient declined. 7/2022 p-BNP 01621  Status post ICD (implanted 4/19/2010 for primary prevention) with generator change 4/19/2018. ([facility])  ICD interrogation 7/4/2022: AV pacing 6.7%, RV pacing 3.2%, AT/AF burden 1%. Appropriate VT therapy: Successful. VT episodes detected on 7/3/2022 at 10:59 PM.  Duration 14 seconds with an average V rate of 181 bpm.  EGM is C/W VT and VT zone treated with burst ATP x1 which terminated arrhythmia. Telephone encounter: EP 7/5/2022 Toprol-XL was increased to 25 mg twice daily. PHTN:  RHC: 5/16/2022: [facility]: Elevated biventricular filling pressures (RA 10, PCWP 26), moderate pulmonary hypertension (most likely postcapillary), preserved cardiac index by assumed Sunni 2.73. Hypertension  Hyperlipidemia  Type 2 diabetes mellitus  History of NSVT  Anemia  History of prostate cancer specifics unknown. History of diverticulitis  History of pancreatitis  Medical noncompliance  History of hernia repair, elbow surgery, bladder surgery. History of skin melanoma (nose and right-sided cheek) status post excision 2019. DNR-CCA         Medications Prior to admit:  Prior to Admission medications    Medication Sig Start Date End Date Taking? Authorizing Provider   LORazepam (ATIVAN) 0.5 MG tablet Take 0.5 mg by mouth 3 times daily. Yes Historical Provider, MD   mirtazapine (REMERON) 30 MG tablet Take 30 mg by mouth nightly   Yes Historical Provider, MD   senna-docusate (PERICOLACE) 8.6-50 MG per tablet Take 2 tablets by mouth daily as needed for Constipation   Yes Historical Provider, MD   isosorbide dinitrate (ISORDIL) 20 MG tablet Take 1 tablet by mouth in the morning and 1 tablet at noon and 1 tablet before bedtime.  7/23/22   Brianne Powell MD   omeprazole (PRILOSEC) 40 MG delayed release capsule Take 1 capsule by mouth in the morning. 7/23/22   Brianne Powell MD   LINZESS 290 MCG CAPS capsule TAKE 1 CAPSULE BY MOUTH EVERY DAY 6/21/22   Historical Provider, MD   traZODone (DESYREL) 50 MG tablet TAKE 1 TABLET BY MOUTH AT BEDTIME 6/21/22   Historical Provider, MD   torsemide (DEMADEX) 20 MG tablet Take 1 tablet by mouth daily 6/30/22   Michele Melraa DO   metoprolol succinate (TOPROL XL) 25 MG extended release tablet Take 1 tablet by mouth daily 6/30/22   Michele Melara DO   hydrALAZINE (APRESOLINE) 25 MG tablet Take 1 tablet by mouth in the morning and at bedtime 6/30/22   Michele Melara DO   isosorbide mononitrate (IMDUR) 30 MG extended release tablet Take 1 tablet by mouth daily 6/30/22 7/23/22  Michele Melara DO   nitroGLYCERIN (NITROSTAT) 0.4 MG SL tablet Place 1 tablet under the tongue every 5 minutes as needed for Chest pain 4/26/22   Michele Melara DO   acetaminophen (TYLENOL 8 HOUR ARTHRITIS PAIN) 650 MG extended release tablet Take 650 mg by mouth every 8 hours as needed for Pain    Historical Provider, MD   OXYGEN Inhale 4 L into the lungs as needed     Historical Provider, MD   finasteride (PROSCAR) 5 MG tablet take 1 tablet by mouth once daily 1/3/17   Historical Provider, MD   glipiZIDE (GLUCOTROL) 5 MG tablet Take 5 mg by mouth 2 times daily (before meals)  8/30/16   Historical Provider, MD   aspirin 81 MG tablet Take 81 mg by mouth daily.     Historical Provider, MD   tamsulosin (FLOMAX) 0.4 MG capsule Take 0.4 mg by mouth daily     Historical Provider, MD       Current Medications:    Current Facility-Administered Medications: sodium chloride flush 0.9 % injection, , ,   aspirin EC tablet 81 mg, 81 mg, Oral, Daily  finasteride (PROSCAR) tablet 5 mg, 5 mg, Oral, Daily  glipiZIDE (GLUCOTROL) tablet 5 mg, 5 mg, Oral, BID AC  insulin lispro (HUMALOG) injection vial 0-4 Units, 0-4 Units, SubCUTAneous, TID WC  insulin lispro (HUMALOG) injection vial 0-4 Units, 0-4 Units, SubCUTAneous, Nightly  hydrALAZINE (APRESOLINE) tablet 25 mg, 25 mg, Oral, BID  isosorbide dinitrate (ISORDIL) tablet 20 mg, 20 mg, Oral, TID  linaclotide (LINZESS) capsule 290 mcg (Patient Supplied), 290 mcg, Oral, QAM AC  metoprolol succinate (TOPROL XL) extended release tablet 25 mg, 25 mg, Oral, Daily  pantoprazole (PROTONIX) tablet 40 mg, 40 mg, Oral, QAM AC  tamsulosin (FLOMAX) capsule 0.4 mg, 0.4 mg, Oral, Daily  traZODone (DESYREL) tablet 50 mg, 50 mg, Oral, Nightly  sodium chloride flush 0.9 % injection 5-40 mL, 5-40 mL, IntraVENous, 2 times per day  sodium chloride flush 0.9 % injection 5-40 mL, 5-40 mL, IntraVENous, PRN  0.9 % sodium chloride infusion, , IntraVENous, PRN  enoxaparin (LOVENOX) injection 40 mg, 40 mg, SubCUTAneous, Daily  polyethylene glycol (GLYCOLAX) packet 17 g, 17 g, Oral, Daily PRN  acetaminophen (TYLENOL) tablet 650 mg, 650 mg, Oral, Q6H PRN **OR** acetaminophen (TYLENOL) suppository 650 mg, 650 mg, Rectal, Q6H PRN  glucose chewable tablet 16 g, 4 tablet, Oral, PRN  dextrose bolus 10% 125 mL, 125 mL, IntraVENous, PRN **OR** dextrose bolus 10% 250 mL, 250 mL, IntraVENous, PRN  glucagon (rDNA) injection 1 mg, 1 mg, SubCUTAneous, PRN  dextrose 10 % infusion, , IntraVENous, Continuous PRN  bumetanide (BUMEX) injection 1 mg, 1 mg, IntraVENous, Q12H    Allergies:  NKDA per patient report    Social History:    Denies cigarettes, occasional cigar smoker  Denies ETOH/Illicit Drugs  Activity: Lives alone with [employer] home.  + Drives. Daughter checks in on him. Code Status: Full Code        Family History: Non contributory secondary to age      REVIEW OF SYSTEMS:   See HPI      PHYSICAL EXAM:   /64   Pulse 64   Temp 97.3 °F (36.3 °C) (Oral)   Resp 18   Ht 5' 11\" (1.803 m)   Wt 207 lb 1.6 oz (93.9 kg)   SpO2 100%   BMI 28.88 kg/m²   CONST:  Well developed, ill appearing disheveled  male who appears of stated age. Awake, alert and cooperative. No apparent distress.    HEENT:   Head- Normocephalic, atraumatic   Eyes- Conjunctivae pink, anicteric  Throat- Oral mucosa pink and moist  Neck-  No stridor, trachea midline, no jugular venous distention. No carotid bruit. CHEST: Chest symmetrical and non-tender to palpation. No accessory muscle use or intercostal retractions  RESPIRATORY: Lung sounds - crackles in the bases, on 3 liters NC.   CARDIOVASCULAR:     Heart Ausculation- Regular rate and rhythm, II/VI REJI. PV: No lower extremity edema. No varicosities. Pedal pulses palpable, no clubbing or cyanosis   ABDOMEN: Soft, non-tender to light palpation. Bowel sounds present. No palpable masses; no abdominal bruit  MS: Good muscle strength and tone. No atrophy or abnormal movements. : Hu clear yellow  SKIN: BLE reddened, swollen. Pale and dry no statis dermatitis or ulcers   NEURO / PSYCH: Oriented to person, place and time. Speech clear and appropriate. Follows all commands. Pleasant affect     DATA:    ECG  as per Dr Nuris Mitchell interpretation  Tele strips: SR with frequent PVC's. NSVT (runs range from 4-6 beats)    Diagnostic:  2 View CXR: Cardiomegaly with findings of CHF.  Small right pleural effusion and moderate left pleural effusion          Intake/Output Summary (Last 24 hours) at 10/2/2022 0654  Last data filed at 10/2/2022 0327  Gross per 24 hour   Intake --   Output 1800 ml   Net -1800 ml       Labs:   CBC:   Recent Labs     10/01/22  1758   WBC 9.1   HGB 11.9*   HCT 40.0        BMP:   Recent Labs     10/01/22  1758      K 4.2   CO2 29   BUN 35*   CREATININE 1.1   LABGLOM >60   CALCIUM 9.0       TFT:   Lab Results   Component Value Date    TSH 1.330 09/20/2021    B0HWRQU 7.9 10/10/2011      HgA1c:   Lab Results   Component Value Date/Time    LABA1C 5.5 09/20/2021 01:36 PM    LABA1C 6.3 07/31/2020 09:02 AM    LABA1C 7.3 05/14/2019 11:19 AM     proBNP:   Lab Results   Component Value Date/Time    PROBNP 26,231 10/01/2022 05:58 PM    PROBNP 36,737 07/21/2022 01:57 PM    PROBNP 6,024 02/24/2021 11:07 AM       TROPONIN:  Lab Results   Component Value Date/Time    TROPHS 64 10/01/2022 05:58 PM TROPHS 69 07/22/2022 12:26 AM    TROPHS 61 07/21/2022 01:57 PM     CK:  Lab Results   Component Value Date/Time    CKTOTAL 113 04/08/2014 09:20 AM    CKTOTAL 92 09/10/2013 12:02 PM     FASTING LIPID PANEL:  Lab Results   Component Value Date/Time    CHOL 224 09/20/2021 01:36 PM    HDL 33 09/20/2021 01:36 PM    [address] 119 09/20/2021 01:36 PM    TRIG 361 09/20/2021 01:36 PM     LIVER PROFILE:  Recent Labs     10/01/22  1758   AST 21   ALT 8   LABALBU 3.6     ASSESSMENT:  Acute on chronic decompensated HFrEF  ACC/AHA stage D, NYHA functional class II  ICMP  +/-5%  History of CAD status post CABG x5v (LIMA to LAD, SVG to OM1, SVG to OM2, SVG to RI and SVG to RPDA) in 2007 s/p LHC in 2021 ([facility]) with severe multivessel disease and all the grafts were occluded except for LIMA to LAD which was patent. Patient is not a candidate for any further cardiac intervention. Not a candidate for cardiac transplant or LVAD due to medication noncompliance. HTN: controlled  Hyperlipidemia  T2DM  History of NSVT  S/p ICD ([facility]) in situ and recent generator change in 2018  History of prostate cancer    PLAN:  IV diuresis: monitor renal function, strict I&O, daily weights. Continue Isordil/Hydralazine  Reassess for addition of aldactone  Continue Toprol XL  No plans for any invasive cardiac testing. TTE 5/2022 from [facility] reviewed      Discussed with Dr Brianne Jones  Electronically signed by Brando Day. MARIA C Hutson on 10/2/2022 at 6:54 AM      ____________________________________________________________________  I independently interviewed and examined the patient. I have reviewed the above documentation completed by the DEVEN. Please see my additional contributions to the HPI, physical exam, and assessment / medical decision making. HPI, ROS, PMH, PSH, [address], SH, and medications independently reviewed (agree; see above documentation)    History of Present Illness:  Weak-appearing.  Currently with no chest pain, respiratory distress, or palpitations.     Review of Systems:   Cardiac: As per HPI  General: No fever, chills  Pulmonary: As per HPI  HEENT: No visual disturbances, difficult swallowing  GI: No nausea, vomiting  : No dysuria, hematuria  Endocrine: No thyroid disease, +DM  Musculoskeletal: MENESES x 4, no focal motor deficits  Skin: Intact, no rashes  Neuro: No headache, seizures  Psych: Currently with no depression, anxiety    Physical Exam:  /62   Pulse 60   Temp 98.3 °F (36.8 °C) (Oral)   Resp 20   Ht 5' 11\" (1.803 m)   Wt 207 lb 1.6 oz (93.9 kg)   SpO2 98%   BMI 28.88 kg/m²   Wt Readings from Last 3 Encounters:   10/02/22 207 lb 1.6 oz (93.9 kg)   07/22/22 177 lb 14.6 oz (80.7 kg)   06/30/22 177 lb 14.4 oz (80.7 kg)     Appearance: Awake, no acute respiratory distress  Skin: Intact, no rash  Head: Normocephalic, atraumatic  Eyes: EOMI, no conjunctival erythema  ENMT: No pharyngeal erythema, MMM, no rhinorrhea  Neck: Supple, no carotid bruits  Lungs: Decreased BS B/L, no wheezing  Cardiac: Regular rate and rhythm, 2/6 systolic murmur  Abdomen: Soft, nontender, +bowel sounds  Extremities: Moves all extremities x 4, +lower extremity edema  Neurologic: No focal motor deficits apparent, normal mood and affect      Laboratory Tests:  Recent Labs     10/01/22  1758 10/02/22  0942    145   K 4.2 3.8    108*   CO2 29 26   BUN 35* 34*   CREATININE 1.1 1.1   GLUCOSE 70* 63*   CALCIUM 9.0 8.8     Lab Results   Component Value Date/Time    MG 1.9 07/21/2022 01:57 PM     Recent Labs     10/01/22  1758   ALKPHOS 151*   ALT 8   AST 21   PROT 6.4   BILITOT 1.0   BILIDIR 0.6*   LABALBU 3.6     Recent Labs     10/01/22  1758   WBC 9.1   RBC 4.75   HGB 11.9*   HCT 40.0   MCV 84.2   MCH 25.1*   MCHC 29.8*   RDW 18.0*      MPV 11.2     Lab Results   Component Value Date    CKTOTAL 113 04/08/2014    CKMB 2.8 04/08/2014    TROPONINI 0.03 02/24/2021    TROPONINI <0.01 04/14/2014    TROPONINI <0.01 04/08/2014     Recent Labs

## 2022-10-02 NOTE — PROGRESS NOTES
[facility] Progress Note    Admitting Date and Time: 10/1/2022  4:21 PM  Admit Dx: Congestive heart failure of unknown etiology ([facility] [I50.9]  Acute on chronic congestive heart failure, unspecified heart failure type ([facility] [I50.9]      Assessment:    Principal Problem:    Congestive heart failure of unknown etiology ([facility]  Resolved Problems:    * No resolved hospital problems. *      Plan:  1. Acute on chronic mixed systolic and diastolic CHF  - with ischemic cardiomyopathy with a EF of 20%. Patient also with moderate pulmonary hypertension and wedge pressure of 26     Has gained > 30 pounds, complaint of increased abdominal bloating, peripheral edema, SOB at rest    - Intolerant of ACE-I, ARBs, Entresto. Has been referred to [facility] and not a candidate for any surgical interventions. Continue IV diuresis, increased to TID per cardiology    Is on Metoprolol, Hydralazine, Isosorbide     2. DM  - continue Sliding scale    3. HTN  - Hydralazine, Isosorbide, Metoprolol     4. BPH  - finasteride and flomax    5. GERD  - PPI    6. Anxiety  - continue Ativan PRN and Remeron qhs    VTE prophylaxis: Lovenox    Code status: Form from his nursing facility states full code. When asked patient states he would want CPR -\" I want to live\"  Minimal probability he would survive ACLS given extent of his cardiac dysfunction. Will ask for palliative team to follow up. He should consider hospice      Subjective:  Patient is being followed for Congestive heart failure of unknown etiology ([facility] [I50.9]  Acute on chronic congestive heart failure, unspecified heart failure type ([facility] [I50.9]     No overnight events. Has had several bursts of v-tach on telemetry. Diuresed 1800 ml  Denies chest pressure or heaviness.   Still very SOB, coughing and uncomfortable     ROS: denies fever, chills,  n/v, HA unless stated above.      metoprolol succinate  25 mg Oral BID    bumetanide  1 mg IntraVENous TID    aspirin  81 mg Oral Daily    finasteride  5 mg Oral Daily    glipiZIDE  5 mg Oral BID AC    insulin lispro  0-4 Units SubCUTAneous TID WC    insulin lispro  0-4 Units SubCUTAneous Nightly    hydrALAZINE  25 mg Oral BID    isosorbide dinitrate  20 mg Oral TID    linaclotide  290 mcg Oral QAM AC    pantoprazole  40 mg Oral QAM AC    tamsulosin  0.4 mg Oral Daily    traZODone  50 mg Oral Nightly    sodium chloride flush  5-40 mL IntraVENous 2 times per day    enoxaparin  40 mg SubCUTAneous Daily     sodium chloride flush, 5-40 mL, PRN  sodium chloride, , PRN  polyethylene glycol, 17 g, Daily PRN  acetaminophen, 650 mg, Q6H PRN   Or  acetaminophen, 650 mg, Q6H PRN  glucose, 4 tablet, PRN  dextrose bolus, 125 mL, PRN   Or  dextrose bolus, 250 mL, PRN  glucagon (rDNA), 1 mg, PRN  dextrose, , Continuous PRN         Objective:    /62   Pulse 60   Temp 98.3 °F (36.8 °C) (Oral)   Resp 20   Ht 5' 11\" (1.803 m)   Wt 207 lb 1.6 oz (93.9 kg)   SpO2 98%   BMI 28.88 kg/m²     General Appearance: appears SOB and coughing, anxious \"I dont want to die yet\"  Skin: warm and dry  Head: normocephalic and atraumatic  Eyes: pupils equal, round, and reactive to light, extraocular eye movements intact, conjunctivae normal  Neck: neck supple and non tender without mass   Pulmonary/Chest: wheezing and crackles bilaterally  Cardiovascular: irregular rhythm, frequent PVCs on tele  Abdomen: soft, non-tender, non-distended, normal bowel sounds, no masses or organomegaly  Extremities: 2-3+ peripheral edema  Neurologic: no cranial nerve deficit and speech normal        Recent Labs     10/01/22  1758 10/02/22  0942    145   K 4.2 3.8    108*   CO2 29 26   BUN 35* 34*   CREATININE 1.1 1.1   GLUCOSE 70* 63*   CALCIUM 9.0 8.8       Recent Labs     10/01/22  1758   WBC 9.1   RBC 4.75   HGB 11.9*   HCT 40.0   MCV 84.2   MCH 25.1*   MCHC 29.8*   RDW 18.0*      MPV 11.2       Radiology:     Time spent reviewing chart, clinical exam, discussing case and answering questions with staff/consultants/patient/family = 20 minutes      NOTE: This report was transcribed using voice recognition software. Every effort was made to ensure accuracy; however, inadvertent computerized transcription errors may be present.   Electronically signed by GINA Morales CNP on 10/2/2022 at 1:34 PM

## 2022-10-02 NOTE — ED NOTES
Called [phone] twice and let ring over 10 times without an answer. Patient ready for transfer.      Hari Nascimento RN  10/01/22 2032

## 2022-10-02 NOTE — PROGRESS NOTES
Spoke with NP Ros Look after reviewing paperwork from nursing facility stating that patient was a full code and patient's code status was placed as a DNR-CCA. Per NP Ros Look, ok to make patient full code and to clarify with POA in morning. Will pass on in handoff to morning nurse.

## 2022-10-03 LAB
ANION GAP SERPL CALCULATED.3IONS-SCNC: 11 MMOL/L (ref 7–16)
BUN BLDV-MCNC: 36 MG/DL (ref 6–23)
CALCIUM SERPL-MCNC: 8.6 MG/DL (ref 8.6–10.2)
CHLORIDE BLD-SCNC: 106 MMOL/L (ref 98–107)
CO2: 27 MMOL/L (ref 22–29)
CREAT SERPL-MCNC: 1.3 MG/DL (ref 0.7–1.2)
GFR AFRICAN AMERICAN: >60
GFR NON-AFRICAN AMERICAN: 53 ML/MIN/1.73
GLUCOSE BLD-MCNC: 66 MG/DL (ref 74–99)
METER GLUCOSE: 70 MG/DL (ref 74–99)
METER GLUCOSE: 80 MG/DL (ref 74–99)
METER GLUCOSE: 82 MG/DL (ref 74–99)
METER GLUCOSE: 92 MG/DL (ref 74–99)
POTASSIUM REFLEX MAGNESIUM: 3.8 MMOL/L (ref 3.5–5)
PRO-BNP: ABNORMAL PG/ML (ref 0–450)
SODIUM BLD-SCNC: 144 MMOL/L (ref 132–146)

## 2022-10-03 PROCEDURE — 99233 SBSQ HOSP IP/OBS HIGH 50: CPT | Performed by: INTERNAL MEDICINE

## 2022-10-03 PROCEDURE — 99222 1ST HOSP IP/OBS MODERATE 55: CPT | Performed by: NURSE PRACTITIONER

## 2022-10-03 PROCEDURE — 2060000000 HC ICU INTERMEDIATE R&B

## 2022-10-03 PROCEDURE — 82962 GLUCOSE BLOOD TEST: CPT

## 2022-10-03 PROCEDURE — 80048 BASIC METABOLIC PNL TOTAL CA: CPT

## 2022-10-03 PROCEDURE — 36415 COLL VENOUS BLD VENIPUNCTURE: CPT

## 2022-10-03 PROCEDURE — 2580000003 HC RX 258: Performed by: INTERNAL MEDICINE

## 2022-10-03 PROCEDURE — 2700000000 HC OXYGEN THERAPY PER DAY

## 2022-10-03 PROCEDURE — 6360000002 HC RX W HCPCS: Performed by: INTERNAL MEDICINE

## 2022-10-03 PROCEDURE — 6370000000 HC RX 637 (ALT 250 FOR IP): Performed by: NURSE PRACTITIONER

## 2022-10-03 PROCEDURE — 83880 ASSAY OF NATRIURETIC PEPTIDE: CPT

## 2022-10-03 PROCEDURE — 6370000000 HC RX 637 (ALT 250 FOR IP): Performed by: INTERNAL MEDICINE

## 2022-10-03 PROCEDURE — 2500000003 HC RX 250 WO HCPCS: Performed by: NURSE PRACTITIONER

## 2022-10-03 PROCEDURE — 6370000000 HC RX 637 (ALT 250 FOR IP): Performed by: FAMILY MEDICINE

## 2022-10-03 RX ORDER — AMMONIUM LACTATE 12 G/100G
LOTION TOPICAL DAILY
Status: DISCONTINUED | OUTPATIENT
Start: 2022-10-03 | End: 2022-10-12 | Stop reason: HOSPADM

## 2022-10-03 RX ADMIN — SODIUM CHLORIDE, PRESERVATIVE FREE 10 ML: 5 INJECTION INTRAVENOUS at 21:07

## 2022-10-03 RX ADMIN — Medication: at 12:26

## 2022-10-03 RX ADMIN — SODIUM CHLORIDE, PRESERVATIVE FREE 10 ML: 5 INJECTION INTRAVENOUS at 08:26

## 2022-10-03 RX ADMIN — HYDRALAZINE HYDROCHLORIDE 25 MG: 25 TABLET, FILM COATED ORAL at 21:07

## 2022-10-03 RX ADMIN — GLIPIZIDE 5 MG: 5 TABLET ORAL at 06:46

## 2022-10-03 RX ADMIN — BUMETANIDE 1 MG: 0.25 INJECTION, SOLUTION INTRAMUSCULAR; INTRAVENOUS at 08:24

## 2022-10-03 RX ADMIN — MICONAZOLE NITRATE: 2 OINTMENT TOPICAL at 21:07

## 2022-10-03 RX ADMIN — TRAZODONE HYDROCHLORIDE 50 MG: 50 TABLET ORAL at 21:06

## 2022-10-03 RX ADMIN — LORAZEPAM 0.5 MG: 0.5 TABLET ORAL at 15:19

## 2022-10-03 RX ADMIN — PANTOPRAZOLE SODIUM 40 MG: 40 TABLET, DELAYED RELEASE ORAL at 06:46

## 2022-10-03 RX ADMIN — ISOSORBIDE DINITRATE 20 MG: 10 TABLET ORAL at 08:24

## 2022-10-03 RX ADMIN — BUMETANIDE 1 MG: 0.25 INJECTION, SOLUTION INTRAMUSCULAR; INTRAVENOUS at 21:07

## 2022-10-03 RX ADMIN — METOPROLOL SUCCINATE 25 MG: 25 TABLET, FILM COATED, EXTENDED RELEASE ORAL at 08:24

## 2022-10-03 RX ADMIN — ACETAMINOPHEN 650 MG: 325 TABLET ORAL at 00:33

## 2022-10-03 RX ADMIN — ASPIRIN 81 MG: 81 TABLET, COATED ORAL at 08:24

## 2022-10-03 RX ADMIN — ACETAMINOPHEN 650 MG: 325 TABLET ORAL at 08:24

## 2022-10-03 RX ADMIN — FINASTERIDE 5 MG: 5 TABLET, FILM COATED ORAL at 12:26

## 2022-10-03 RX ADMIN — BUMETANIDE 1 MG: 0.25 INJECTION, SOLUTION INTRAMUSCULAR; INTRAVENOUS at 15:19

## 2022-10-03 RX ADMIN — TAMSULOSIN HYDROCHLORIDE 0.4 MG: 0.4 CAPSULE ORAL at 08:25

## 2022-10-03 RX ADMIN — HYDRALAZINE HYDROCHLORIDE 25 MG: 25 TABLET, FILM COATED ORAL at 08:24

## 2022-10-03 RX ADMIN — ISOSORBIDE DINITRATE 20 MG: 10 TABLET ORAL at 15:19

## 2022-10-03 RX ADMIN — DOCUSATE SODIUM 50 MG AND SENNOSIDES 8.6 MG 2 TABLET: 8.6; 5 TABLET, FILM COATED ORAL at 21:07

## 2022-10-03 RX ADMIN — ISOSORBIDE DINITRATE 20 MG: 10 TABLET ORAL at 21:06

## 2022-10-03 RX ADMIN — METOPROLOL SUCCINATE 25 MG: 25 TABLET, FILM COATED, EXTENDED RELEASE ORAL at 21:06

## 2022-10-03 RX ADMIN — ENOXAPARIN SODIUM 40 MG: 100 INJECTION SUBCUTANEOUS at 08:23

## 2022-10-03 RX ADMIN — MICONAZOLE NITRATE: 2 OINTMENT TOPICAL at 12:26

## 2022-10-03 RX ADMIN — MIRTAZAPINE 30 MG: 15 TABLET, FILM COATED ORAL at 21:06

## 2022-10-03 ASSESSMENT — PAIN SCALES - GENERAL
PAINLEVEL_OUTOF10: 7
PAINLEVEL_OUTOF10: 0
PAINLEVEL_OUTOF10: 8
PAINLEVEL_OUTOF10: 7

## 2022-10-03 ASSESSMENT — PAIN DESCRIPTION - LOCATION
LOCATION: GENERALIZED
LOCATION: GENERALIZED

## 2022-10-03 ASSESSMENT — PAIN DESCRIPTION - DESCRIPTORS
DESCRIPTORS: SHARP;DISCOMFORT
DESCRIPTORS: SHARP

## 2022-10-03 NOTE — DISCHARGE INSTRUCTIONS
weight gain of 3 pounds or more in 1 day         OR 5 pounds or more in one week  More shortness of breath  More swelling of your stomach, legs, ankles or feet  Feeling more tired, No energy  Dry hacky cough  Dizziness  More chest pain / discomfort       (CALL 913 IF ANY OF THE FOLLOWING OCCURS  Chest pain (not relieved with nitroglycerine, if you have been prescribed this medication)  Severe shortness of breath  Faint / Pass out  Confusion / cannot think clearly  If symptoms get worse           SMOKING - TOBACCO USE:  * IF YOU SMOKE - STOP! Kick the habit. [address] Program is offered at [facility] [address] and [address] [facility]. Call [phone] extension [phone] for more information. ACTIVITY:   (Ask your doctor when you will be able to return to work and before starting any exercise program.  Do not drive unless unless your doctor has given you permission to do so). Start light exercise. Even if you can only do a small amount, exercise will help you get stronger, have more energy, help manage your weight and decrease  stress. Walking is an easy way to get exercise. Start out slowly and  increase the amount you walk as tolerated  If you become short of breath, dizzy or have chest pain; stop, sit down, and rest.  If you feel \"wiped out\" the day after you exercise, walk at a slower pace or for a shorter distance. You can gradually increase the pace or amount of time. (Do not exercise right after a meal or in extreme temperatures, such as above 85 degrees, if the air is really humid, or wind chill is less than 20 degrees)                                             ADDITIONAL INFORMATION:  Avoid getting sick from colds and the flu. Stay away from friends or family that you know may have a contagious illness  Get plenty of rest   Get a flu shot each year. Get a pneumococcal vaccine shot.  If you have had one before, ask your doctor whether you need another dose. My Goal for Self-management of Heart Failure Includes 5 steps :    1. Notice a change in symptoms ( weight gain, short of breath, leg swelling, decreased activity level, bloating. ...)    2. Evaluate the change: (use the Heart Failure Zones )     3. Decide to take action: decide what your options are, such as: (call your doctor for an extra visit, take a prescribed medication, such as your water pill if your doctor has given you directions to do so, [address])    4. Come up with a strategy:  (now you call the doctor for advice / appointment. This is where you take action!!! Do not wait, catch the symptom early and treat it before it worsens. 5. Evaluate the response: The next day, check your Heart Failure Zones: are you in the GREEN ZONE (safe zone)? Worsening symptoms of YELLOW ZONE? Or have you moved to the RED ZONE and need to call 911 or go to the Emergency Room for evaluation? Call your doctor's office to update them on your symptoms of heart failure. Follow up with [facility] (Dr. Alex Tan) in 2- 4 [phone].

## 2022-10-03 NOTE — CARE COORDINATION
10/3/2022 Social Work Discharge Planning:SW is unable to hold a discussion with Pt. Pt is from [facility] ([address] [facility]) [facility]. Voicemail left with [facility] liaison as to bed hold status. SW was unable to leave a message with Pts daughter in regards to Pt returning to [facility] [address]. DIMITRI and transport form are completed. Np precert is needed. COVID test will be needed on day of discharge.  Electronically signed by JARROD Conde on 10/3/2022 at 10:45 AM

## 2022-10-03 NOTE — PROGRESS NOTES
Wound / ostomy dept consulted for this Pt admitted with CHF. History includes: CHF, CAD, CABG, HTN. 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 Recommend Aloe Vesta to fungal areas, Dri-Go to groin and sling scrotum. Low air loss bed. SOS precautions. R leg has scales and dry scabbed areas. Recommend Amlactin lotion daily after bath. L back and buttocks. **Informed Consent**    The patient has given verbal consent to have photos taken of  L back and buttocks and inserted into their chart as part of their permanent medical record for purposes of documentation, treatment management and/or medical review. All Images taken on 10/3/22 of patient name: Olen Nyhan were transmitted and stored on secured [employer] located within [facility] by a registered [employer] Mobile Application Device.

## 2022-10-03 NOTE — DISCHARGE INSTR - COC
Continuity of Care Form    Patient Name: Ayo Brown   :  1945  MRN:  [id number]    Admit date:  10/1/2022  Discharge date:  10/12/2022    Code Status Order: Full Code   Advance Directives:     Admitting Physician:  Renny Kee MD  PCP: Michael Nam DO    Discharging Nurse:  Christopher Harris Unit/Room#: 4782/0266-H  Discharging Unit Phone Number: [phone]    Emergency Contact:   Extended Emergency Contact Information  Primary Emergency Contact: Stacy Small, [address] Phone: [phone]  Relation: Child  Secondary Emergency Contact: Edson Godoy, [address] Phone: [phone]  Mobile Phone: [phone]  Relation: Other    Past Surgical History:  Past Surgical History:   Procedure Laterality Date    BLADDER SURGERY      CARDIAC DEFIBRILLATOR PLACEMENT  2018    D-ICD GENT CHANGE    [facility]    CARDIAC PACEMAKER PLACEMENT  2010    [facility]     COLONOSCOPY      CORONARY ARTERY BYPASS GRAFT  2007    ELBOW SURGERY      HERNIA REPAIR      KIDNEY SURGERY      SKIN CANCER EXCISION      nose and cheek       Immunization History:   Immunization History   Administered Date(s) Administered    COVID-19, PFIZER GRAY top, DO NOT Dilute, (age 15 y+), IM, 30 mcg/0.3 mL 2022    COVID-19, PFIZER PURPLE top, DILUTE for use, (age 15 y+), 30mcg/0.3mL 2021, 2021, 2021       Active Problems:  Patient Active Problem List   Diagnosis Code    CHF (congestive heart failure) ([facility]) I50.9    S/P CABG x 4 Z95.1    CAD (coronary artery disease) I25.10    MI, old I25.2    Hyperlipidemia E78.5    Hypertension I10    Cardiomyopathy ([facility] I42.9    Ventricular tachycardia I47.20    Pancreatitis K85.90    AICD (automatic cardioverter/defibrillator) present Z95.810    Implantable cardioverter-defibrillator (ICD) at end of device life Z45.02    Chest pain, atypical R07.89    Failure to thrive in adult R62.7 Congestive heart failure of unknown etiology ([facility] [address] I50.9    Acute on chronic HFrEF (heart failure with reduced ejection fraction) (HCC) I50.23    VHD (valvular heart disease) I38       Isolation/Infection:   Isolation            No Isolation          Patient Infection Status       Infection Onset Added Last Indicated Last Indicated By Review Planned Expiration Resolved Resolved By    None active    Resolved    COVID-19 (Rule Out) 10/02/22 10/02/22 10/02/22 Respiratory Panel, Molecular, with COVID-19 (Restricted: peds pts or suitable admitted adults) (Ordered)   10/02/22 Rule-Out Test Resulted            Nurse Assessment:  Last Vital Signs: /83   Pulse 68   Temp 98.1 °F (36.7 °C)   Resp 20   Ht 5' 11\" (1.803 m)   Wt 200 lb 1.6 oz (90.8 kg)   SpO2 100%   BMI 27.91 kg/m²     Last documented pain score (0-10 scale): Pain Level: 7  Last Weight:   Wt Readings from Last 1 Encounters:   10/03/22 200 lb 1.6 oz (90.8 kg)     Mental Status:  oriented, alert, and confused at times    IV Access:  - None    Nursing Mobility/ADLs:  Walking   Dependent  Transfer  Dependent  Bathing  Dependent  Dressing  Dependent  Toileting  Dependent  Feeding  Assisted  Med Admin  Assisted  Med Delivery   whole    Wound Care Documentation and Therapy:  Wound 07/22/22 Heel Left (Active)   Number of days: 73       Wound Heel Right (Active)   Number of days:        Wound 07/22/22 Sacrum blanchable redness to coccyx (Active)   Number of days: 73       Wound 07/22/22 Arm Lower;Right;Dorsal bruising (Active)   Number of days: 73       Wound 07/22/22 Arm Left; Lower;Dorsal bruising (Active)   Number of days: 73        Elimination:  Continence:    Bowel: No  Bladder: No  Urinary Catheter: {Urinary Catheter:068807268}   Colostomy/Ileostomy/Ileal Conduit: No       Date of Last BM: 10/12/2022    Intake/Output Summary (Last 24 hours) at 10/3/2022 1047  Last data filed at 10/3/2022 0710  Gross per 24 hour   Intake --   Output 2650 ml   Net -2650 ml I/O last 3 completed shifts:  In: -   Out: 3500 [Urine:3500]    Safety Concerns: At Risk for Falls    Impairments/Disabilities:      [address] DIMITRI Impairments/Disabilities:[id number]}    Nutrition Therapy:  Current Nutrition Therapy:   - Oral Diet:  Carb Control 4 carbs/meal (1800kcals/day) and Low Sodium (2gm)    Routes of Feeding: Oral  Liquids: Thin Liquids  Daily Fluid Restriction: no  Last Modified Barium Swallow with Video (Video Swallowing Test): not done    Treatments at the Time of Hospital Discharge:   Respiratory Treatments: ***  Oxygen Therapy:  {Therapy; copd oxygen:12347}  Ventilator:    {MH CC Vent JLHB:154352838}    Rehab Therapies: {THERAPEUTIC INTERVENTION:7981497914}  Weight Bearing Status/Restrictions: [address] MOR[address] Weight Bearin}  Other Medical Equipment (for information only, NOT a DME order):  {EQUIPMENT:174454596}  Other Treatments: ***    Patient's personal belongings (please select all that are sent with patient):  {CHP DME Belongings:206296398}    RN SIGNATURE:  {Esignature:023384531}    CASE MANAGEMENT/SOCIAL WORK SECTION    Inpatient Status Date: 10/1/2022    Readmission Risk Assessment Score:  Readmission Risk              Risk of Unplanned Readmission:  17           Discharging to Facility/ Agency   Name: [address] [employer] [address] [address]  Phone: [phone]  Fax: [phone]    Dialysis Facility (if applicable)   Name:  Address:  Dialysis Schedule:  Phone:  Fax:    / signature: Electronically signed by JARROD Bourgeois Res on 10/3/2022 at 10:48 AM      PHYSICIAN SECTION    Prognosis: Fair    Condition at Discharge: Stable    Rehab Potential (if transferring to Rehab):  Fair    Recommended Labs or Other Treatments After Discharge: ***    Physician Certification: I certify the above information and transfer of Jose Morton  is necessary for the continuing treatment of the diagnosis listed and that he requires Skilled Nursing Facility for less 30 days. Update Admission H&P: {CHP DME Changes in RVKAE:645040876}    PHYSICIAN SIGNATURE:  Deidra Quintanilla,DO

## 2022-10-03 NOTE — PLAN OF CARE
Patient's chart updated to reflect:      . - HF care plan, HF education points and HF discharge instructions.  -Orders: 2 gram sodium diet, daily weights, I/O.  -PCP and cardiology follow up appointments to be scheduled within 7 days of hospital discharge. -CHF education session will be provided to the patient prior to hospital discharge.     Kelsey Beltre MSN, RN  Heart Failure Navigator

## 2022-10-03 NOTE — PROGRESS NOTES
INPATIENT CARDIOLOGY FOLLOW-UP    Name: Elisa Srivastava    Age: 64 y.o. Date of Admission: 10/1/2022  4:21 PM    Date of Service: 10/3/2022    Chief Complaint: Follow-up for HFrEF    Interim History:  No new overnight cardiac complaints and still has dyspnea, and feeling tired and would like to sleep and does not want to be disturbed. Currently with no complaints of CP, palpitations, dizziness, or lightheadedness. SR on telemetry. Review of Systems:   Cardiac: As per HPI  General: No fever, chills  Pulmonary: As per HPI  HEENT: No visual disturbances, difficult swallowing  GI: No nausea, vomiting  Endocrine: No thyroid disease or DM  Musculoskeletal: MENESES x 4, no focal motor deficits  Skin: Intact, no rashes  Neuro/Psych: No headache or seizures    Problem List:  Patient Active Problem List   Diagnosis    CHF (congestive heart failure) ([facility])    S/P CABG x 4    CAD (coronary artery disease)    MI, old    Hyperlipidemia    Hypertension    Cardiomyopathy ([facility] [address]    Ventricular tachycardia    Pancreatitis    AICD (automatic cardioverter/defibrillator) present    Implantable cardioverter-defibrillator (ICD) at end of device life    Chest pain, atypical    Failure to thrive in adult    Congestive heart failure of unknown etiology ([facility] [address]    Acute on chronic HFrEF (heart failure with reduced ejection fraction) ([facility])    VHD (valvular heart disease)       Allergies:  No Known Allergies    Current Medications:  Current Facility-Administered Medications   Medication Dose Route Frequency Provider Last Rate Last Admin    miconazole nitrate 2 % ointment   Topical BID Alvena Abram, DO   Given at 10/03/22 1226    ammonium lactate (LAC-HYDRIN) 12 % lotion   Topical Daily Alvena Abram, DO   Given at 10/03/22 1226    metoprolol succinate (TOPROL XL) extended release tablet 25 mg  25 mg Oral BID April ANTONIO StockN   25 mg at 10/03/22 0824    bumetanide (BUMEX) injection 1 mg  1 mg IntraVENous TID April ANTONIO StockN   1 mg at 10/03/22 1519    ipratropium-albuterol (DUONEB) nebulizer solution 1 ampule  1 ampule Inhalation Q4H PRN GINA Zaragoza CNP        LORazepam (ATIVAN) tablet 0.5 mg  0.5 mg Oral TID PRN GINA Zaragoza CNP   0.5 mg at 10/03/22 1519    mirtazapine (REMERON) tablet 30 mg  30 mg Oral Nightly GINA Zaragoza CNP   30 mg at 10/02/22 1941    sennosides-docusate sodium (SENOKOT-S) 8.6-50 MG tablet 2 tablet  2 tablet Oral Nightly GINA Zaragoza CNP   2 tablet at 10/02/22 1941    aspirin EC tablet 81 mg  81 mg Oral Daily Yumiko Dubois MD   81 mg at 10/03/22 0824    finasteride (PROSCAR) tablet 5 mg  5 mg Oral Daily Yumiko Dubois MD   5 mg at 10/03/22 1226    insulin lispro (HUMALOG) injection vial 0-4 Units  0-4 Units SubCUTAneous TID WC Yumiko Dubois MD        insulin lispro (HUMALOG) injection vial 0-4 Units  0-4 Units SubCUTAneous Nightly Yumiko Dubois MD        hydrALAZINE (APRESOLINE) tablet 25 mg  25 mg Oral BID Yumiko Dubois MD   25 mg at 10/03/22 2707    isosorbide dinitrate (ISORDIL) tablet 20 mg  20 mg Oral TID Yumiko Dubois MD   20 mg at 10/03/22 1519    pantoprazole (PROTONIX) tablet 40 mg  40 mg Oral QAM AC Yumiko Dubois MD   40 mg at 10/03/22 0646    tamsulosin (FLOMAX) capsule 0.4 mg  0.4 mg Oral Daily Yumiko Dubois MD   0.4 mg at 10/03/22 0825    traZODone (DESYREL) tablet 50 mg  50 mg Oral Nightly Yumiko Dubois MD   50 mg at 10/02/22 1941    sodium chloride flush 0.9 % injection 5-40 mL  5-40 mL IntraVENous 2 times per day Yumiko Dubois MD   10 mL at 10/03/22 0826    sodium chloride flush 0.9 % injection 5-40 mL  5-40 mL IntraVENous PRN Yumiko Dubois MD        0.9 % sodium chloride infusion   IntraVENous PRN Yumiko Dubois MD        enoxaparin (LOVENOX) injection 40 mg  40 mg SubCUTAneous Daily Yumiko Dubois MD   40 mg at 10/03/22 5766    polyethylene glycol (GLYCOLAX) packet 17 g  17 g Oral Daily PRN Yumiko Dubois MD        acetaminophen (TYLENOL) tablet 650 mg  650 mg Oral Q6H PRN Ramy Shahid MD   650 mg at 10/03/22 7539    Or    acetaminophen (TYLENOL) suppository 650 mg  650 mg Rectal Q6H PRN Ramy Shahid MD        glucose chewable tablet 16 g  4 tablet Oral PRN Ramy Shahid MD        dextrose bolus 10% 125 mL  125 mL IntraVENous PRN Ramy Shahid MD        Or    dextrose bolus 10% 250 mL  250 mL IntraVENous PRN Ramy Shahid MD        glucagon (rDNA) injection 1 mg  1 mg SubCUTAneous PRN Ramy Shahid MD        dextrose 10 % infusion   IntraVENous Continuous PRN Ramy Shahid MD          sodium chloride      dextrose         Physical Exam:  /77   Pulse 78   Temp 98.7 °F (37.1 °C) (Oral)   Resp 22   Ht 5' 11\" (1.803 m)   Wt 200 lb 1.6 oz (90.8 kg)   SpO2 100%   BMI 27.91 kg/m²   Wt Readings from Last 3 Encounters:   10/03/22 200 lb 1.6 oz (90.8 kg)   07/22/22 177 lb 14.6 oz (80.7 kg)   06/30/22 177 lb 14.4 oz (80.7 kg)     Appearance: Awake, alert, no acute respiratory distress  Skin: Intact, no rash  Head: Normocephalic, atraumatic  Eyes: EOMI, no conjunctival erythema  ENMT: No pharyngeal erythema, MMM, no rhinorrhea  Neck: Supple, no elevated JVP, no carotid bruits  Lungs: Clear to auscultation bilaterally. No wheezes, rales, or rhonchi.   Cardiac: Regular rate and rhythm, +S1S2, no murmurs apparent  Abdomen: Soft, nontender, +bowel sounds  Extremities: Moves all extremities x 4, 1+ lower extremity edema  Neurologic: No focal motor deficits apparent, normal mood and affect  Peripheral Pulses: Intact posterior tibial pulses bilaterally    Intake/Output:    Intake/Output Summary (Last 24 hours) at 10/3/2022 1737  Last data filed at 10/3/2022 0710  Gross per 24 hour   Intake --   Output 950 ml   Net -950 ml     I/O this shift:  In: -   Out: 950 [Urine:950]    Laboratory Tests:  Recent Labs     10/01/22  1758 10/02/22  0942 10/03/22  0330    145 144   K 4.2 3.8 3.8    108* 106   CO2 29 26 27   BUN 35* 34* 36* indication: Abnormal Cardiac Biomarkers There is a resting wall motion abnormality in the territory of the LAD and RCA. - The left ventricle is dilated. Left ventricular systolic function is severely decreased. EF = 26 ± 5% (2D biplane) Grade III left ventricular diastolic dysfunction. The right ventricle is normal in size. Right ventricular systolic function is   moderately decreased. - The left atrial cavity is severely dilated. - The right atrial cavity is dilated. There is moderately severe (3+) mitral valve regurgitation due to restricted leaflet motion likely related to ischemic heart disease. TTE: 5/13/2022 ([facility]): - Technically difficult exam due to body habitus and suboptimal positioning. - Exam indication: Evaluation of known heart failure to guide therapy - The left ventricle is severely dilated. Left ventricular systolic function is severely decreased. EF = 20 ± 5% (visual est.) Definity contrast used for endocardial border detection. Left ventricular diastolic function was not evaluated due to an arrhythmia. The right ventricle is dilated. Right ventricular systolic function is moderately to severely decreased. - The left atrial cavity is severely dilated. - The right atrial cavity is dilated. There is moderate (2+) mitral valve regurgitation. - -Peak/mean gradients of 15/7 mmHg, gradients averaged due to arrhythmia. -Prior EF reported as 26% (3/2021). Exam was compared with the prior  echocardiographic exam performed on  3/14/2021. There is no significant change. VHD: Moderate-severe ischemic MR (patient was recommended for further evaluation with EBONI and possible consideration of a clip 6/3/2022 at [facility] --> patient declined. Stress test:    [facility]: 5/16/2022: [facility]: Elevated biventricular filling pressures (RA 10, PCWP 26), moderate pulmonary hypertension (most likely postcapillary), preserved cardiac index by assumed Sunni 2.73. [facility], 3/16/2021: LMT: severe diffuse disease.  LAD: severe diffuse disease. Additional Comment: Moderate caliber vessel with  in the proximal portion. The vessel is fed by patent LIMA graft. The distal portion is small in caliber and wraps the apex. There are L->R collaterals. LCX: severe diffuse disease. Additional Comment: Moderate caliber non-dominant vessel which is fed by SVG->RI/OM1. RAMUS: ostial Ramus - . Additional Comment: Fills in the mid-distal portion from SVG->RI graft. RCA Status: Not Applicable. Additional Comment: Occluded at the ostia. GRAFTS: LIMA Graft End to Side to the Left Anterior Descending. Additional Comments: patent. SVG End to Side to the RI. Additional  Comments - patent with mild-moderate disease in the mid-portion; supplies mid-distal LCx and backfills to partially supply LAD. SVG End to Side to the OM-2 Second Obtuse Marginal Branch. Additional Comments - occluded. SVG End to Side to the Right Posterior Descending Artery. Additional Comments - occluded. Impression:- Severe/occlusive native 3 vessel CAD with patent LIMA->LAD, SVG->RI/OM1. SVG->OM2 is occluded - Native RCA is occluded at the ostia as is the SVG->rPDA. The right is fed by L->R collaterals from the LIMA graft Recommended Treatment: Medical Therapy. ASSESSMENT:  Acute on chronic decompensated HFrEF, making slow progress, UOP 1.7L,  net -3.5L  ACC/AHA stage D, NYHA functional class II  ICMP  +/-5%  History of CAD status post CABG x5v (LIMA to LAD, SVG to OM1, SVG to OM2, SVG to RI and SVG to RPDA) in 2007 s/p [facility] in 2021 ([facility]) with severe multivessel disease and all the grafts were occluded except for LIMA to LAD which was patent. Patient is not a candidate for any further cardiac intervention. Not a candidate for cardiac transplant or LVAD due to medication noncompliance.   HTN: controlled  Hyperlipidemia  T2DM  History of NSVT  S/p ICD ([facility]) in situ and recent generator change in 2018  History of prostate cancer       Plan:   Continue IV diuresis with close monitoring of renal functions and electrolytes  Strict input output charting  Continue GDMT for HFrEF/ischemic cardiomyopathy hydralazine/Isordil history of intolerance to ACE/ARB, Toprol-XL. We will consider adding Aldactone if his renal function remains stable  No plans for invasive cardiac testing and prior records from including transthoracic echo and cardiac cath were reviewed, as above. Continue aspirin and consider adding statin therapy statin  Consider palliative care consult    Luisito Thayer MD., Lucy Kendrick.   [facility]) Cardiology

## 2022-10-03 NOTE — PLAN OF CARE
Problem: Skin/Tissue Integrity  Goal: Absence of new skin breakdown  Description: 1. Monitor for areas of redness and/or skin breakdown  2. Assess vascular access sites hourly  3. Every 4-6 hours minimum:  Change oxygen saturation probe site  4. Every 4-6 hours:  If on nasal continuous positive airway pressure, respiratory therapy assess nares and determine need for appliance change or resting period.   Outcome: Progressing     Problem: Safety - Adult  Goal: Free from fall injury  Outcome: Progressing     Problem: Chronic Conditions and Co-morbidities  Goal: Patient's chronic conditions and co-morbidity symptoms are monitored and maintained or improved  Outcome: Progressing     Problem: Pain  Goal: Verbalizes/displays adequate comfort level or baseline comfort level  Outcome: Progressing

## 2022-10-03 NOTE — PROGRESS NOTES
Subjective: The patient is awake and alert. No problems overnight. On O2 support     Objective:    /83   Pulse 68   Temp 98.1 °F (36.7 °C)   Resp 20   Ht 5' 11\" (1.803 m)   Wt 200 lb 1.6 oz (90.8 kg)   SpO2 100%   BMI 27.91 kg/m²     Heart:  RRR, no murmurs, gallops, or rubs. Lungs: Scattered coarse BS   Abd: bowel sounds present, nontender, nondistended, no masses  Extrem:  No clubbing, cyanosis, or edema    Labs:  CBC:   Lab Results   Component Value Date/Time    WBC 9.1 10/01/2022 05:58 PM    RBC 4.75 10/01/2022 05:58 PM    HGB 11.9 10/01/2022 05:58 PM    HCT 40.0 10/01/2022 05:58 PM    MCV 84.2 10/01/2022 05:58 PM    MCH 25.1 10/01/2022 05:58 PM    MCHC 29.8 10/01/2022 05:58 PM    RDW 18.0 10/01/2022 05:58 PM     10/01/2022 05:58 PM    MPV 11.2 10/01/2022 05:58 PM     CMP:    Lab Results   Component Value Date/Time     10/03/2022 03:30 AM    K 3.8 10/03/2022 03:30 AM     10/03/2022 03:30 AM    CO2 27 10/03/2022 03:30 AM    BUN 36 10/03/2022 03:30 AM    CREATININE 1.3 10/03/2022 03:30 AM    GFRAA >60 10/03/2022 03:30 AM    LABGLOM 53 10/03/2022 03:30 AM    GLUCOSE 66 10/03/2022 03:30 AM    GLUCOSE 90 10/10/2011 10:15 AM    PROT 6.4 10/01/2022 05:58 PM    LABALBU 3.6 10/01/2022 05:58 PM    LABALBU 4.8 10/10/2011 10:15 AM    CALCIUM 8.6 10/03/2022 03:30 AM    BILITOT 1.0 10/01/2022 05:58 PM    ALKPHOS 151 10/01/2022 05:58 PM    AST 21 10/01/2022 05:58 PM    ALT 8 10/01/2022 05:58 PM     PT/INR:    Lab Results   Component Value Date/Time    PROTIME 13.3 04/08/2014 09:20 AM    INR 1.2 04/08/2014 09:20 AM          Current Facility-Administered Medications:     metoprolol succinate (TOPROL XL) extended release tablet 25 mg, 25 mg, Oral, BID, MARIA C Rivas, 25 mg at 10/03/22 0824    bumetanide (BUMEX) injection 1 mg, 1 mg, IntraVENous, TID, Madison Tolentino APN, 1 mg at 10/03/22 0824    ipratropium-albuterol (DUONEB) nebulizer solution 1 ampule, 1 ampule, Inhalation, Q4H PRN, Elray Dust, APRN - CNP    LORazepam (ATIVAN) tablet 0.5 mg, 0.5 mg, Oral, TID PRN, Elray Dust, APRN - CNP, 0.5 mg at 10/02/22 2146    mirtazapine (REMERON) tablet 30 mg, 30 mg, Oral, Nightly, Elray Dust, APRN - CNP, 30 mg at 10/02/22 1941    sennosides-docusate sodium (SENOKOT-S) 8.6-50 MG tablet 2 tablet, 2 tablet, Oral, Nightly, Elray Dust, APRN - CNP, 2 tablet at 10/02/22 1941    aspirin EC tablet 81 mg, 81 mg, Oral, Daily, Get Hoyt MD, 81 mg at 10/03/22 6821    finasteride (PROSCAR) tablet 5 mg, 5 mg, Oral, Daily, Get Hoyt MD, 5 mg at 10/02/22 0800    insulin lispro (HUMALOG) injection vial 0-4 Units, 0-4 Units, SubCUTAneous, TID WC, Get Hoyt MD    insulin lispro (HUMALOG) injection vial 0-4 Units, 0-4 Units, SubCUTAneous, Nightly, Get Hoyt MD    hydrALAZINE (APRESOLINE) tablet 25 mg, 25 mg, Oral, BID, Get Hoyt MD, 25 mg at 10/03/22 8085    isosorbide dinitrate (ISORDIL) tablet 20 mg, 20 mg, Oral, TID, Get Hoyt MD, 20 mg at 10/03/22 0824    pantoprazole (PROTONIX) tablet 40 mg, 40 mg, Oral, QAM AC, Get Hoyt MD, 40 mg at 10/03/22 0646    tamsulosin (FLOMAX) capsule 0.4 mg, 0.4 mg, Oral, Daily, Get Hoyt MD, 0.4 mg at 10/03/22 0825    traZODone (DESYREL) tablet 50 mg, 50 mg, Oral, Nightly, Get Hoyt MD, 50 mg at 10/02/22 1941    sodium chloride flush 0.9 % injection 5-40 mL, 5-40 mL, IntraVENous, 2 times per day, Get Hoyt MD, 10 mL at 10/03/22 0826    sodium chloride flush 0.9 % injection 5-40 mL, 5-40 mL, IntraVENous, PRN, Get Hoyt MD    0.9 % sodium chloride infusion, , IntraVENous, PRN, Get Hoyt MD    enoxaparin (LOVENOX) injection 40 mg, 40 mg, SubCUTAneous, Daily, Get Hoyt MD, 40 mg at 10/03/22 3156    polyethylene glycol (GLYCOLAX) packet 17 g, 17 g, Oral, Daily PRN, Get Hoyt MD    acetaminophen (TYLENOL) tablet 650 mg, 650 mg, Oral, Q6H PRN, 650 mg at 10/03/22 0824 **OR** acetaminophen (TYLENOL) suppository 650 mg, 650 mg, Rectal, Q6H PRN, Marcos Ballesteros MD    glucose chewable tablet 16 g, 4 tablet, Oral, PRN, Marcos Ballesteros MD    dextrose bolus 10% 125 mL, 125 mL, IntraVENous, PRN **OR** dextrose bolus 10% 250 mL, 250 mL, IntraVENous, PRN, Marcos Ballesteros MD    glucagon (rDNA) injection 1 mg, 1 mg, SubCUTAneous, PRN, Marcos Ballesteros MD    dextrose 10 % infusion, , IntraVENous, Continuous PRN, Marcos Ballesteros MD    Assessment:  Acute/chronic HF  See below    Patient Active Problem List   Diagnosis    CHF (congestive heart failure) ([facility])    S/P CABG x 4    CAD (coronary artery disease)    MI, old    Hyperlipidemia    Hypertension    Cardiomyopathy ([facility] [address]    Ventricular tachycardia    Pancreatitis    AICD (automatic cardioverter/defibrillator) present    Implantable cardioverter-defibrillator (ICD) at end of device life    Chest pain, atypical    Failure to thrive in adult    Congestive heart failure of unknown etiology ([facility] [address]    Acute on chronic HFrEF (heart failure with reduced ejection fraction) ([facility])    VHD (valvular heart disease)       Plan:  See orders  Labs,meds noted  Consult SS for possible placement  Notes reviewed  Attempt to increase activity        Roberto Sanders DO  9:16 AM  10/3/2022

## 2022-10-03 NOTE — CONSULTS
Palliative Care Department  [phone]  Palliative Care Initial Consult  Provider GINA Morrison - [address]  [id number]  Hospital Day: 3  Date of Initial Consult: 10/2/2022  Referring Provider: GINA Martin, CNP  Palliative Medicine was consulted for assistance with: Goals of care and CODE STATUS discussion    HPI:   Shanel Ovalle is a 64 y.o. with a medical history of CHF, CAD s/p CABG in 2007, ischemic cardiomyopathy with EF 20%,  ICD, Hx prostate CA who was admitted on 10/1/2022 from SNF with a CHIEF COMPLAINT of SOB. Patient seen earlier this year at [facility] clinic but is not a candidate for heart surgery, patient would essentially require a heart transplant. CXR showing cardiomegaly with findings of CHF, small right pleural effusion and moderate left pleural effusion. Palliative medicine was consulted for goals of care and CODE STATUS discussion. ASSESSMENT/PLAN:     Pertinent Hospital Diagnoses     HFrEF, EF 20%  CAD s/p CABG 2007  Hx prostate cancer    Palliative Care Encounter / Counseling Regarding Goals of Care  Please see detailed goals of care discussion as below  At this time, Shanel Ovalle, Does Not have capacity for medical decision-making.   Capacity is time limited and situation/question specific  During encounter Paula was surrogate medical decision-maker  Outcome of goals of care meeting:   Continue current medical management   Code status Full Code  Advanced Directives: no POA or living will in [facility]  Surrogate/Legal NOK:  Gladys Gutierrez (daughter) [phone]    Spiritual assessment: no spiritual distress identified  Bereavement and grief: to be determined  Referrals to: none today  SUBJECTIVE:     Current medical issues leading to Palliative Medicine involvement include   Active Hospital Problems    Diagnosis Date Noted    Acute on chronic HFrEF (heart failure with reduced ejection fraction) ([facility] [address] [I50.23] 10/02/2022     Priority: Medium    VHD (valvular heart disease) Eduardo Boudreaux 10/02/2022     Priority: Medium    Congestive heart failure of unknown etiology ([facility] [address] [I50.9] 10/01/2022     Priority: Medium       Details of Conversation:    Chart reviewed. Update received from nursing. Patient seen lying in bed, slightly agitated and confused. Alert to self. Unable to hold meaningful conversation. Spoke with daughter, [employer], on the phone. Role of palliative medicine introduced. In-depth discussion regarding current condition to include CHF with ischemic cardiomyopathy with EF of 20%. In-depth discussion regarding goals of care and CODE STATUS discussions. Daughter states her father does seem more confused since going into the nursing facility. States \" if I would asked my dad I know he would say he would want everything done however I am not sure he understands the repercussions of resuscitation. \"  In-depth discussion regarding comfort care options. Daughter states that she will try and talk with her father later today or tomorrow regarding CODE STATUS options. Daughter states that she knows her dad wants to return home however she is unable to care for him. Emotional support given and all questions addressed. We will continue to follow for ongoing goals of care discussion as well as support for the patient and family.   OBJECTIVE:   Prognosis: Guarded    Physical Exam:  /83   Pulse 68   Temp 98.1 °F (36.7 °C)   Resp 20   Ht 5' 11\" (1.803 m)   Wt 200 lb 1.6 oz (90.8 kg)   SpO2 100%   BMI 27.91 kg/m²   Constitutional:  Awake, slightly agitated and intermittently confused  Lungs:  CTA bilaterally, no audible rhonchi or wheezes noted, respirations unlabored, no retractions  Heart: RRR, distant heart tones, no murmur, rub, or gallop noted during exam  Abd:  Soft, non tender, non distended, bowel sounds present  Ext:  Moving all extremities, no edema, pulses present  Skin:  Warm and dry, no rashes on visible skin  Neuro:  Alert, grossly nonfocal; following commands    Objective data reviewed: labs, images, records, medication use, vitals, and chart    Discussed patient and the plan of care with the other IDT members: Palliative Medicine IDT Team, Floor Nurse, Patient, and Family    Time/Communication  Greater than 50% of time spent, total 50 minutes in counseling and coordination of care at the bedside regarding goals of care and diagnosis and prognosis. Thank you for allowing Palliative Medicine to participate in the care of Jaime Son.

## 2022-10-03 NOTE — PLAN OF CARE
Dr. Yaya Maharaj office called for a hospital follow up appt. Office requested that patient call on day of discharge to schedule appointment.

## 2022-10-03 NOTE — CONSULTS
CHF NURSE NAVIGATOR CONSULT NOTE:      Patient currently admitted with diagnosis of Chronic Systolic heart failure. Pt was awake and alert lying in bed during the consultation. He was not engaged and was resistant to receiving HF education. He stated he was \"sick and not interested in the education, I already know about heart failure. Scheduling with the CHF clinic No: not interested in following with the clinic . Pt follows with [facility]. Future Appointments   Date Time Provider Negro Morrison   10/10/2022  8:30 AM Adelita Meza, APRN - CNP [facility]       Barriers to Care:  Contributing risk factors for Heart Failure are identified as overwhelmed by complexity of regimen and multiple comorbidities. HF book left at the bedside for pt and family to that reviews the introduction to Heart Failure, the HF zones, signs and symptoms to report on day 1 of onset, medications, medication compliance, the importance of obtaining daily weights, following a low sodium diet, reading food labels for the sodium content, keeping physician appointments, and smoking cessation. The patient is ordered:  Diet: ADULT DIET; Regular; 4 carb choices (60 gm/meal);  Low Sodium (2 gm)   Sodium controlled diet Yes  Fluid restriction daily ordered (fluid restriction recommended if patient is hyponatremic and/or diuretic is initiated or increased) No  FR:   Daily Weights: Patient Vitals for the past 96 hrs (Last 3 readings):   Weight   10/03/22 0705 200 lb 1.6 oz (90.8 kg)   10/02/22 0624 207 lb 1.6 oz (93.9 kg)   10/01/22 1628 209 lb (94.8 kg)     I/O:   Intake/Output Summary (Last 24 hours) at 10/3/2022 1500  Last data filed at 10/3/2022 0710  Gross per 24 hour   Intake --   Output 1650 ml   Net -1650 ml        Alcides Plants MSN, RN  Heart Failure Navigator         CONGESTIVE HEART FAILURE (CHF) AHA GUIDELINES  (Must be completed for Primary Diagnosis CHF or History of CHF)    Discharge Plan:  I placed the Heart Failure Home Instructions in patient's discharge instructions. Per Heart Failure GWTG, the patient should have a follow-up appointment made within 7 days of discharge.     New Diagnosis No    ECHO Results most recent: 4/12/2018 EF 30%  Lab Results   Component Value Date    LVEF 35 04/19/2018    LVEFMODE Echo 06/27/2012                                        Social History     Tobacco Use   Smoking Status Never   Smokeless Tobacco Never   Tobacco Comments    used to smoke occ cigar        Immunization History   Administered Date(s) Administered    COVID-19, PFIZER GRAY top, DO NOT Dilute, (age 15 y+), IM, 30 mcg/0.3 mL 04/20/2022    COVID-19, PFIZER PURPLE top, DILUTE for use, (age 15 y+), 30mcg/0.3mL 01/29/2021, 02/19/2021, 08/17/2021        HFrEF 30%  Angiotensin-Converting-Enzyme (ACE) inhibitor ordered:  [] Yes  [x] No (specify contraindication):    [] Contraindicated  [] Hypotensive patient who was at immediate risk of cardiogenic shock  [] Hospitalized patient who experienced marked azotemia  [] Other Contraindications  [] Not Eligible  [x] Not Tolerant  [] Patient Reason  [] System Reason  [] Other Reason    Angiotensin II receptor blockers (ARB) ordered:  [] Yes  [x] No (specify contraindication):    [] Contraindicated  [] Hypotensive patient who was at immediate risk of cardiogenic shock  [] Hospitalized patient who experienced marked azotemia  [] Other Contraindications    ARNI - Angiotensin Receptor Neprilysin Inhibitor ordered:  [] Yes  [x] No (specify contraindication):    [] ACE inhibitor use within the prior 36 hours  [] Allergy  [] Hyperkalemia  [] Hypotension  [] Renal dysfunction defined as creatinine > 2.5 mg/dL in men or > 2.0 mg/dL in women  [] Other Contraindications  [] Not Eligible  [x] Not Tolerant  [] Patient Reason  []System Reason  []Other Reason      Beta Blocker ordered:    [x] Yes Toprol XL  [] No (specify contraindication):    [] Contraindicated  [] Asthma  [] Fluid Overload  [] Low Blood Pressure  [] Patient recently treated with an intravenous positive inotropic agent  [] Other Contraindications  [] Not Eligible  [] Not Tolerant  [] Patient Reason  [] System Reason    SGLT2 Inhibitor ordered:  [] Yes  [x] No (specify contraindication):    [] Contraindicated  [] Patient currently on dialysis  [] Ketoacidosis  [] Known hypersensitivity to the medication  [] Type I diabetes (not approved for use in patients with Type I diabetes due to increased risk of ketoacidosis)  [] Other Contraindications  [] Not Eligible  [] Not Tolerant  [] Patient Reason  [] System Reason  [x] Other Reason    Aldosterone Antagonist ordered:  [] Yes  [x] No (specify contraindication):    [] Contraindicated  [] Allergy due to aldosterone receptor antagonist  [] Hyperkalemia  [] Renal dysfunction defined as creatinine >2.5 mg/dL in men or >2.0 mg/dL in women.   [] Other contraindications  [] Not Eligible  [] Not Tolerant  [] Patient Reason  [] System Reason  [x] Other Reason

## 2022-10-04 LAB
ANION GAP SERPL CALCULATED.3IONS-SCNC: 9 MMOL/L (ref 7–16)
BUN BLDV-MCNC: 35 MG/DL (ref 6–23)
CALCIUM SERPL-MCNC: 8.6 MG/DL (ref 8.6–10.2)
CHLORIDE BLD-SCNC: 105 MMOL/L (ref 98–107)
CO2: 30 MMOL/L (ref 22–29)
CREAT SERPL-MCNC: 1.2 MG/DL (ref 0.7–1.2)
EKG ATRIAL RATE: 63 BPM
EKG Q-T INTERVAL: 400 MS
EKG QRS DURATION: 96 MS
EKG QTC CALCULATION (BAZETT): 452 MS
EKG R AXIS: 81 DEGREES
EKG T AXIS: -89 DEGREES
EKG VENTRICULAR RATE: 77 BPM
GFR AFRICAN AMERICAN: >60
GFR NON-AFRICAN AMERICAN: 58 ML/MIN/1.73
GLUCOSE BLD-MCNC: 93 MG/DL (ref 74–99)
METER GLUCOSE: 115 MG/DL (ref 74–99)
METER GLUCOSE: 144 MG/DL (ref 74–99)
METER GLUCOSE: 88 MG/DL (ref 74–99)
METER GLUCOSE: 93 MG/DL (ref 74–99)
POTASSIUM REFLEX MAGNESIUM: 4.1 MMOL/L (ref 3.5–5)
SODIUM BLD-SCNC: 144 MMOL/L (ref 132–146)

## 2022-10-04 PROCEDURE — 6370000000 HC RX 637 (ALT 250 FOR IP): Performed by: NURSE PRACTITIONER

## 2022-10-04 PROCEDURE — 97165 OT EVAL LOW COMPLEX 30 MIN: CPT

## 2022-10-04 PROCEDURE — 2060000000 HC ICU INTERMEDIATE R&B

## 2022-10-04 PROCEDURE — 80048 BASIC METABOLIC PNL TOTAL CA: CPT

## 2022-10-04 PROCEDURE — 82962 GLUCOSE BLOOD TEST: CPT

## 2022-10-04 PROCEDURE — 6370000000 HC RX 637 (ALT 250 FOR IP): Performed by: INTERNAL MEDICINE

## 2022-10-04 PROCEDURE — 2700000000 HC OXYGEN THERAPY PER DAY

## 2022-10-04 PROCEDURE — 36415 COLL VENOUS BLD VENIPUNCTURE: CPT

## 2022-10-04 PROCEDURE — 2500000003 HC RX 250 WO HCPCS: Performed by: NURSE PRACTITIONER

## 2022-10-04 PROCEDURE — 2580000003 HC RX 258: Performed by: INTERNAL MEDICINE

## 2022-10-04 PROCEDURE — 99233 SBSQ HOSP IP/OBS HIGH 50: CPT | Performed by: INTERNAL MEDICINE

## 2022-10-04 PROCEDURE — 97161 PT EVAL LOW COMPLEX 20 MIN: CPT

## 2022-10-04 RX ORDER — SPIRONOLACTONE 25 MG/1
12.5 TABLET ORAL DAILY
Status: DISCONTINUED | OUTPATIENT
Start: 2022-10-04 | End: 2022-10-12 | Stop reason: HOSPADM

## 2022-10-04 RX ADMIN — SODIUM CHLORIDE, PRESERVATIVE FREE 10 ML: 5 INJECTION INTRAVENOUS at 20:41

## 2022-10-04 RX ADMIN — ISOSORBIDE DINITRATE 20 MG: 10 TABLET ORAL at 14:25

## 2022-10-04 RX ADMIN — BUMETANIDE 1 MG: 0.25 INJECTION, SOLUTION INTRAMUSCULAR; INTRAVENOUS at 14:26

## 2022-10-04 RX ADMIN — Medication: at 08:26

## 2022-10-04 RX ADMIN — MICONAZOLE NITRATE: 2 OINTMENT TOPICAL at 21:58

## 2022-10-04 RX ADMIN — METOPROLOL SUCCINATE 25 MG: 25 TABLET, FILM COATED, EXTENDED RELEASE ORAL at 20:41

## 2022-10-04 RX ADMIN — ACETAMINOPHEN 650 MG: 325 TABLET ORAL at 22:41

## 2022-10-04 RX ADMIN — DOCUSATE SODIUM 50 MG AND SENNOSIDES 8.6 MG 2 TABLET: 8.6; 5 TABLET, FILM COATED ORAL at 20:41

## 2022-10-04 RX ADMIN — HYDRALAZINE HYDROCHLORIDE 25 MG: 25 TABLET, FILM COATED ORAL at 20:40

## 2022-10-04 RX ADMIN — PANTOPRAZOLE SODIUM 40 MG: 40 TABLET, DELAYED RELEASE ORAL at 05:52

## 2022-10-04 RX ADMIN — ACETAMINOPHEN 650 MG: 325 TABLET ORAL at 15:43

## 2022-10-04 RX ADMIN — TRAZODONE HYDROCHLORIDE 50 MG: 50 TABLET ORAL at 20:41

## 2022-10-04 RX ADMIN — SODIUM CHLORIDE, PRESERVATIVE FREE 10 ML: 5 INJECTION INTRAVENOUS at 08:28

## 2022-10-04 RX ADMIN — SPIRONOLACTONE 12.5 MG: 25 TABLET ORAL at 11:29

## 2022-10-04 RX ADMIN — ISOSORBIDE DINITRATE 20 MG: 10 TABLET ORAL at 20:40

## 2022-10-04 RX ADMIN — MIRTAZAPINE 30 MG: 15 TABLET, FILM COATED ORAL at 20:41

## 2022-10-04 RX ADMIN — BUMETANIDE 1 MG: 0.25 INJECTION, SOLUTION INTRAMUSCULAR; INTRAVENOUS at 20:40

## 2022-10-04 RX ADMIN — MICONAZOLE NITRATE: 2 OINTMENT TOPICAL at 08:26

## 2022-10-04 RX ADMIN — LORAZEPAM 0.5 MG: 0.5 TABLET ORAL at 22:41

## 2022-10-04 RX ADMIN — LORAZEPAM 0.5 MG: 0.5 TABLET ORAL at 00:48

## 2022-10-04 ASSESSMENT — PAIN DESCRIPTION - ONSET: ONSET: ON-GOING

## 2022-10-04 ASSESSMENT — PAIN DESCRIPTION - DESCRIPTORS
DESCRIPTORS: PINS AND NEEDLES
DESCRIPTORS: ACHING

## 2022-10-04 ASSESSMENT — PAIN DESCRIPTION - FREQUENCY: FREQUENCY: CONTINUOUS

## 2022-10-04 ASSESSMENT — PAIN DESCRIPTION - LOCATION
LOCATION: GENERALIZED;FOOT
LOCATION: GENERALIZED

## 2022-10-04 ASSESSMENT — PAIN DESCRIPTION - PAIN TYPE: TYPE: CHRONIC PAIN

## 2022-10-04 ASSESSMENT — PAIN SCALES - GENERAL
PAINLEVEL_OUTOF10: 8
PAINLEVEL_OUTOF10: 3
PAINLEVEL_OUTOF10: 0

## 2022-10-04 NOTE — PROGRESS NOTES
Occupational Therapy  OCCUPATIONAL THERAPY INITIAL EVALUATION    [facility] [place], [place]        Date:10/4/2022                                                  Patient Name: Olen Nyhan    MRN: [id number]    : 1945    Room: [address] [place] [address]      Evaluating OT: Mario Salcido OTR/L [id number]      Referring Provider: Mayuri Joseph DO    Specific Provider Orders/Date:OT eval and treat 10/03/22      Diagnosis:  Congestive heart failure of unknown etiology ([facility] [address] [I50.9]  Acute on chronic congestive heart failure, unspecified heart failure type ([facility] [address] [I50.9]      Pertinent Medical History: CAD, CHF, DM, HTN, MI, prostate CA       Precautions:  Fall Risk, alarm, confusion     Assessment of current deficits    [x] Functional mobility  [x]ADLs  [x] Strength               [x]Cognition    [x] Functional transfers   [x] IADLs         [x] Safety Awareness   [x]Endurance    [x] Fine Coordination              [x] Balance      [] Vision/perception   []Sensation     []Gross Motor Coordination  [] ROM  [] Delirium                   [] Motor Control     OT PLAN OF CARE   OT POC based on physician orders, patient diagnosis and results of clinical assessment    Frequency/Duration 1-3 days/wk for 2 weeks PRN   Specific OT Treatment Interventions to include:   * Instruction/training on adapted ADL techniques and AE recommendations to increase functional independence within precautions       * Training on energy conservation strategies, correct breathing pattern and techniques to improve independence/tolerance for self-care routine  * Functional transfer/mobility training/DME recommendations for increased independence, safety, and fall prevention  * Patient/Family education to increase follow through with safety techniques and functional independence  * Recommendation of environmental modifications for increased safety with functional transfers/mobility and ADLs  * Cognitive retraining/development of therapeutic activities to improve problem solving, judgement, memory, and attention for increased safety/participation in ADL/IADL tasks  * Therapeutic exercise to improve motor endurance, ROM, and functional strength for ADLs/functional transfers  * Therapeutic activities to facilitate/challenge dynamic balance, stand tolerance for increased safety and independence with ADLs  * Therapeutic activities to facilitate gross/fine motor skills for increased independence with ADLs  * Neuro-muscular re-education: facilitation of righting/equilibrium reactions, midline orientation, scapular stability/mobility, normalization of muscle tone, and facilitation of volitional active controled movement  * Positioning to improve skin integrity, interaction with environment and functional independence  * Delirium prevention/treatment        Recommended Adaptive Equipment: TBD     Home Living: Pt unable to report PLOF. Per chart, pt admitted from [place] [id number]. Pain Level: pt reported generalized pain throughout body  Cognition: A&O: self; inconsistently follows commands. Pt confused throughout session and responding to questions inconsistently. Memory:  poor   Sequencing:  poor   Problem solving:  poor   Judgement/safety:  poor     Functional Assessment:  AM-PAC Daily Activity Raw Score: 9/24   Initial Eval Status  Date: 10/4/22 Treatment Status  Date: STGs = LTGs  Time frame: 10-14 days   Feeding Min A  Sup   Grooming Mod A/Min A, bed level  D/t cognition  SBA   UB Dressing Max A/Mod A  SBA   LB Dressing Dependent    Mod A-with use of AD as appropriate/needed   Bathing Max A  Mod A -with use of AD as appropriate/needed   Toileting Dependent  Mod A    Bed Mobility  Supine to sit: dependent X2   Sit to supine: dependent X2  Pt shouting in pain once bedside, so returned to supine.    Mod A   Functional Transfers NT  Max A    Functional Mobility NT     Balance Sitting: dependent X2 (briefly EOB)  Standing: NT  Mod A for sitting balance to maximize independence with ADLs and functional task completion   Activity Tolerance Poor with light activity. Pt limited by pain, weakness, and cognition. Fair- with ADL activity. Visual/  Perceptual Glasses: yes                Additional long-term goal: Pt will increase functional independence to PLOF to allow pt to live in least restrictive environment. Hand Dominance R   AROM (PROM) Strength   RUE  AROM shoulder ~90  Distally WFL Shoulder: 3-/5  Distally: 3+/5   LUE AROM shoulder ~90  Distally WFL Shoulder: 3-/5  Distally: 3+/5     Hearing: WFL   Sensation:  No c/o numbness or tingling   Tone: WFL   Edema: BLE    Comments: Upon arrival patient lying in bed. At end of session, patient lying in bed with call light and phone within reach, all lines and tubes intact, and alarm set. Overall patient demonstrated decreased independence and safety during completion of ADL/functional transfer/mobility tasks. Pt would benefit from continued skilled OT to increase safety and independence with completion of ADL/IADL tasks for functional independence and quality of life. Rehab Potential: Guarded for established goals     Patient / Family Goal: none stated    Patient and/or family were instructed on functional diagnosis, prognosis/goals and OT plan of care. Demonstrated poor understanding.      Eval Complexity: Low    Time In: 1107  Time Out: 1117    Min Units   OT Eval Low 97165  x  1   OT Eval Medium 97177      OT Eval High 91197      OT Re-Eval [id number]       Therapeutic Ex [id number]       Therapeutic Activities 94621       ADL/Self Care [id number]       Orthotic Management 94420       Manual [id number]     Neuro Re-Ed 10312       Non-Billable Time          Evaluation Time additionally includes thorough review of current medical information, gathering information on past medical history/social history and prior level of function, interpretation of standardized testing/informal observation of tasks, assessment of data and development of plan of care and goals.             Colette Neely, OTR/L, [id number]

## 2022-10-04 NOTE — PLAN OF CARE
Problem: Discharge Planning  Goal: Discharge to home or other facility with appropriate resources  10/4/2022 1715 by Lynnette Prado  Outcome: Progressing  10/4/2022 1233 by Amy Cantrell RN  Outcome: Progressing     Problem: Skin/Tissue Integrity  Goal: Absence of new skin breakdown  Description: 1. Monitor for areas of redness and/or skin breakdown  2. Assess vascular access sites hourly  3. Every 4-6 hours minimum:  Change oxygen saturation probe site  4. Every 4-6 hours:  If on nasal continuous positive airway pressure, respiratory therapy assess nares and determine need for appliance change or resting period.   10/4/2022 1715 by Lynnette Prado  Outcome: Progressing  10/4/2022 1233 by Amy Cantrell RN  Outcome: Progressing     Problem: Safety - Adult  Goal: Free from fall injury  10/4/2022 1715 by Lynnette Prado  Outcome: Progressing  10/4/2022 1233 by Amy Cantrell RN  Outcome: Progressing     Problem: Chronic Conditions and Co-morbidities  Goal: Patient's chronic conditions and co-morbidity symptoms are monitored and maintained or improved  10/4/2022 1715 by Lynnette Prado  Outcome: Progressing  10/4/2022 1233 by Amy Cantrell RN  Outcome: Progressing     Problem: Pain  Goal: Verbalizes/displays adequate comfort level or baseline comfort level  10/4/2022 1715 by Lynnette Prado  Outcome: Progressing  10/4/2022 1233 by Amy Cantrell RN  Outcome: Progressing

## 2022-10-04 NOTE — PLAN OF CARE
Problem: Discharge Planning  Goal: Discharge to home or other facility with appropriate resources  Outcome: Progressing     Problem: Skin/Tissue Integrity  Goal: Absence of new skin breakdown  Description: 1. Monitor for areas of redness and/or skin breakdown  2. Assess vascular access sites hourly  3. Every 4-6 hours minimum:  Change oxygen saturation probe site  4. Every 4-6 hours:  If on nasal continuous positive airway pressure, respiratory therapy assess nares and determine need for appliance change or resting period.   Outcome: Progressing     Problem: Safety - Adult  Goal: Free from fall injury  10/4/2022 1233 by Mirza Brewster RN  Outcome: Progressing  10/4/2022 0003 by Armen Henson RN  Outcome: Progressing     Problem: Chronic Conditions and Co-morbidities  Goal: Patient's chronic conditions and co-morbidity symptoms are monitored and maintained or improved  Outcome: Progressing     Problem: Pain  Goal: Verbalizes/displays adequate comfort level or baseline comfort level  Outcome: Progressing

## 2022-10-04 NOTE — PROGRESS NOTES
Palliative Care Department  [phone]  Palliative Care Progress Note  Provider GINA Urena - [address]  [id number]  Hospital Day: 4  Date of Initial Consult: 10/2/2022  Referring Provider: GINA Armstrong, CNP  Palliative Medicine was consulted for assistance with: Goals of care and CODE STATUS discussion    HPI:   Dwain Riley is a 64 y.o. with a medical history of CHF, CAD s/p CABG in 2007, ischemic cardiomyopathy with EF 20%,  ICD, Hx prostate CA who was admitted on 10/1/2022 from SNF with a CHIEF COMPLAINT of SOB. Patient seen earlier this year at [facility] but is not a candidate for heart surgery, patient would essentially require a heart transplant. CXR showing cardiomegaly with findings of CHF, small right pleural effusion and moderate left pleural effusion. Palliative medicine was consulted for goals of care and CODE STATUS discussion. ASSESSMENT/PLAN:     Pertinent Hospital Diagnoses     HFrEF, EF 20%  CAD s/p CABG 2007  Hx prostate cancer    Palliative Care Encounter / Counseling Regarding Goals of Care  Please see detailed goals of care discussion as below  At this time, Dwain Riley, Does Not have capacity for medical decision-making.   Capacity is time limited and situation/question specific  During encounter Paula was surrogate medical decision-maker  Outcome of goals of care meeting:   Continue current medical management   Code status Full Code  Advanced Directives: no POA or living will in epic  Surrogate/Legal NOK:  Jessy Brownlee (daughter) [phone]    Spiritual assessment: no spiritual distress identified  Bereavement and grief: to be determined  Referrals to: none today  SUBJECTIVE:     Current medical issues leading to Palliative Medicine involvement include   Active Hospital Problems    Diagnosis Date Noted    Acute on chronic HFrEF (heart failure with reduced ejection fraction) ([facility] [facility] [I50.23] 10/02/2022     Priority: Medium    VHD (valvular heart disease) Abdirashid Guerra 10/02/2022     Priority: Medium    Congestive heart failure of unknown etiology ([facility] [facility] [I50.9] 10/01/2022     Priority: Medium       Details of Conversation:    Chart reviewed. Update received from nursing. Patient seen Climmie Nose in bed, slightly agitated and confused. Unable to hold meaningful conversation. Spoke with daughter, Megan Rabago, on the phone. Reiterated previous palliative medicine discussion regarding goals of care and CODE STATUS options. Daughter states she was not able to come up to the hospital yesterday [facility]. At this time she has no questions or concerns. Plan is to continue current medical management and full CODE STATUS. Emotional support given and all questions addressed. We will continue to follow for ongoing goals of care discussion as well as support for the patient and family.     OBJECTIVE:   Prognosis: Guarded    Physical Exam:  BP (!) 108/58   Pulse 75   Temp 97.5 °F (36.4 °C) (Oral)   Resp 20   Ht 5' 11\" (1.803 m)   Wt 200 lb 1.6 oz (90.8 kg)   SpO2 98%   BMI 27.91 kg/m²   Constitutional:  Awake, slightly agitated and intermittently confused  Lungs:  CTA bilaterally, no audible rhonchi or wheezes noted, respirations unlabored, no retractions  Heart: RRR, distant heart tones, no murmur, rub, or gallop noted during exam  Abd:  Soft, non tender, non distended, bowel sounds present  Ext:  Moving all extremities, no edema, pulses present  Skin:  Warm and dry, no rashes on visible skin  Neuro:  Alert, grossly nonfocal; following commands    Objective data reviewed: labs, images, records, medication use, vitals, and chart    Discussed patient and the plan of care with the other IDT members: Palliative Medicine IDT Team, Floor Nurse, Patient, and Family    Time/Communication  Greater than 50% of time spent, total 15 minutes in counseling and coordination of care at the bedside regarding goals of care and diagnosis and prognosis. Thank you for allowing Palliative Medicine to participate in the care of [place].

## 2022-10-04 NOTE — CARE COORDINATION
10/4/2022  Social Work Discharge Planning:Pt is from [facility] [address] Per liaison, Pt is a bedhold. SW was unable to leave a message with Pts daughter in regards to Pt returning to [address]. DIMITRI and transport form are completed. Np precert is needed. COVID test will be needed on day of discharge. Awaiting therapy evals. Electronically signed by JARROD Perez on 10/3/2022 at 10:45 AM

## 2022-10-04 NOTE — PROGRESS NOTES
Physical Therapy  Facility/Department: [address] INTERNAL MEDICINE 2  Physical Therapy Initial Assessment    Name: Lucy Stiles  : 1945  MRN: [id number]  Date of Service: 10/4/2022      Patient Diagnosis(es): The encounter diagnosis was Acute on chronic congestive heart failure, unspecified heart failure type ([facility] Past Medical History:  has a past medical history of CAD (coronary artery disease), Cardiomyopathy ([facility] [address] CHF (congestive heart failure) ([facility] [address] Diabetes mellitus ([facility] [address] Diverticulitis, Hyperlipidemia, Hypertension, MI, old, Pancreatitis, Prostate cancer ([facility] S/P CABG x 4, Skin cancer, and Ventricular tachycardia ([facility] Past Surgical History:  has a past surgical history that includes Kidney surgery; Bladder surgery; Elbow surgery; Coronary artery bypass graft (2007); Colonoscopy; hernia repair; Cardiac pacemaker placement (2010); Cardiac defibrillator placement (2018); and Skin cancer excision (). Evaluating Therapist: Ela Stephens PT    Room #:  6665/9893-J  Diagnosis:  Congestive heart failure of unknown etiology ([facility] [I50.9]  Acute on chronic congestive heart failure, unspecified heart failure type ([facility] [I50.9]  PMHx/PSHx:  CAD, CHF  Precautions:  falls, O2, alarm      Social:  Pt admitted from [place] [address]. States he walks without a device but pt questionable historian. Initial Evaluation  Date: 10/4/22 Treatment      Short Term/ Long Term   Goals   Was pt agreeable to Eval/treatment? With encouragement     Does pt have pain?  Pain all over     Bed Mobility  Rolling: dependent  Supine to sit: dependent of 2  Sit to supine: dependent of 2  Scooting: dependent of 2  Mod assist   Transfers NT, pt only able to tolerate sitting briefly edge of bed  Max assist   Ambulation    NT   TBA   Stair Negotiation  Ascended and descended  NT    TBA   LE strength     2/5    3/5   balance      Sitting balance dependent     AM-PAC Raw score                        Pt is alert, confused  LE ROM: WFL  Sensation: NT  Edema: B LEs  Endurance: poor       ASSESSMENT:    Pt displays functional ability as noted in the objective portion of this evaluation. Patient education  Pt educated on Importance of mobility    Patient response to education:   Pt verbalized understanding Pt demonstrated skill Pt requires further education in this area   No            Comments:  Pt with poor activity tolerance. Only able to tolerate sitting briefly edge of bed. Once sitting up pt yells \"put me down\"        Pt's/ family goals   1. None stated    Conditions Requiring Skilled Therapeutic Intervention:    [x]Decreased strength     []Decreased ROM  [x]Decreased functional mobility  [x]Decreased balance   [x]Decreased endurance   []Decreased posture  []Decreased sensation  []Decreased coordination   []Decreased vision  []Decreased safety awareness   [x]Increased pain       Patient and or family understand(s) diagnosis, prognosis, and plan of care.  no  Prognosis is fair for reaching above PT goals    PHYSICAL THERAPY PLAN OF CARE:    PT POC is established based on physician order and patient diagnosis     Referring provider/PT Order: Stephan Anderson, DO/ PT eval and treat      Current Treatment Recommendations:     [x] Strengthening to improve independence with functional mobility   [] ROM to improve independence with functional mobility   [x] Balance Training to improve static/dynamic balance and to reduce fall risk  [x] Endurance Training to improve activity tolerance during functional mobility   [x] Transfer Training to improve safety and independence with all functional transfers   [] Gait Training to improve gait mechanics, endurance and assess need for appropriate assistive device  [] Stair Training in preparation for safe discharge home and/or into the community   [x] Positioning to prevent skin breakdown and contractures  [x] Safety and Education Training   [x] Patient/Caregiver Education   [] HEP  [] Other PT long term treatment goals are located in above grid    Frequency of treatments: 2-5x/week x 5 days. Time in  1108  Time out  1118        Evaluation Time includes thorough review of current medical information, gathering information on past medical history/social history and prior level of function, completion of standardized testing/informal observation of tasks, assessment of data and education on plan of care and goals.       CPT codes:  [x] Low Complexity PT evaluation 45918  [] Moderate Complexity PT evaluation 32973  [] High Complexity PT evaluation 54800  [] PT Re-evaluation 82370  [] Gait training 04413 minutes  [] Manual therapy 97898 minutes  [] Therapeutic activities 34798 minutes  [] Therapeutic exercises 47679 minutes  [] Neuromuscular reeducation 82405 minutes     [facility] PSY[facility]AT[facility] PT [id number]

## 2022-10-04 NOTE — PROGRESS NOTES
Moved patient to low air loss mattress, patient refusing the mattress at this time. Educated patient and transferred to old mattress.

## 2022-10-04 NOTE — PROGRESS NOTES
Subjective: The patient is lethargic  No problems overnight. Objective:    BP (!) 104/58   Pulse 75   Temp 97.5 °F (36.4 °C) (Oral)   Resp 20   Ht 5' 11\" (1.803 m)   Wt 200 lb 1.6 oz (90.8 kg)   SpO2 98%   BMI 27.91 kg/m²     Heart:  RRR, no murmurs, gallops, or rubs.   Lungs:  Scattered coarse sounds   Abd: bowel sounds present, nontender, nondistended, no masses  Extrem:  edema  present    Labs:  CBC:   Lab Results   Component Value Date/Time    WBC 9.1 10/01/2022 05:58 PM    RBC 4.75 10/01/2022 05:58 PM    HGB 11.9 10/01/2022 05:58 PM    HCT 40.0 10/01/2022 05:58 PM    MCV 84.2 10/01/2022 05:58 PM    MCH 25.1 10/01/2022 05:58 PM    MCHC 29.8 10/01/2022 05:58 PM    RDW 18.0 10/01/2022 05:58 PM     10/01/2022 05:58 PM    MPV 11.2 10/01/2022 05:58 PM     CMP:    Lab Results   Component Value Date/Time     10/04/2022 04:15 AM    K 4.1 10/04/2022 04:15 AM     10/04/2022 04:15 AM    CO2 30 10/04/2022 04:15 AM    BUN 35 10/04/2022 04:15 AM    CREATININE 1.2 10/04/2022 04:15 AM    GFRAA >60 10/04/2022 04:15 AM    LABGLOM 58 10/04/2022 04:15 AM    GLUCOSE 93 10/04/2022 04:15 AM    GLUCOSE 90 10/10/2011 10:15 AM    PROT 6.4 10/01/2022 05:58 PM    LABALBU 3.6 10/01/2022 05:58 PM    LABALBU 4.8 10/10/2011 10:15 AM    CALCIUM 8.6 10/04/2022 04:15 AM    BILITOT 1.0 10/01/2022 05:58 PM    ALKPHOS 151 10/01/2022 05:58 PM    AST 21 10/01/2022 05:58 PM    ALT 8 10/01/2022 05:58 PM     PT/INR:    Lab Results   Component Value Date/Time    PROTIME 13.3 04/08/2014 09:20 AM    INR 1.2 04/08/2014 09:20 AM          Current Facility-Administered Medications:     spironolactone (ALDACTONE) tablet 12.5 mg, 12.5 mg, Oral, Daily, [place] MD    miconazole nitrate 2 % ointment, , Topical, BID, Shari Bathe, DO, Given at 10/04/22 0826    ammonium lactate (LAC-HYDRIN) 12 % lotion, , Topical, Daily, Shari Bathe, DO, Given at 10/04/22 0826    metoprolol succinate (TOPROL XL) extended release tablet 25 mg, 25 mg, Oral, BID, Rick Honder, APN, 25 mg at 10/03/22 2106    bumetanide (BUMEX) injection 1 mg, 1 mg, IntraVENous, TID, Rick Hua  Ginder, APN, 1 mg at 10/03/22 2107    ipratropium-albuterol (DUONEB) nebulizer solution 1 ampule, 1 ampule, Inhalation, Q4H PRN, GINA Alvarado CNP    LORazepam (ATIVAN) tablet 0.5 mg, 0.5 mg, Oral, TID PRN, GINA Alvarado CNP, 0.5 mg at 10/04/22 0048    mirtazapine (REMERON) tablet 30 mg, 30 mg, Oral, Nightly, GINA Alvarado CNP, 30 mg at 10/03/22 2106    sennosides-docusate sodium (SENOKOT-S) 8.6-50 MG tablet 2 tablet, 2 tablet, Oral, Nightly, GINA Alvarado CNP, 2 tablet at 10/03/22 2107    aspirin EC tablet 81 mg, 81 mg, Oral, Daily, Nohemi Martin MD, 81 mg at 10/03/22 5675    finasteride (PROSCAR) tablet 5 mg, 5 mg, Oral, Daily, Nohemi Martin MD, 5 mg at 10/03/22 1226    insulin lispro (HUMALOG) injection vial 0-4 Units, 0-4 Units, SubCUTAneous, TID WC, Nohemi Martin MD    insulin lispro (HUMALOG) injection vial 0-4 Units, 0-4 Units, SubCUTAneous, Nightly, Nohemi Martin MD    hydrALAZINE (APRESOLINE) tablet 25 mg, 25 mg, Oral, BID, Nohemi Martin MD, 25 mg at 10/03/22 2107    isosorbide dinitrate (ISORDIL) tablet 20 mg, 20 mg, Oral, TID, Nohemi Martin MD, 20 mg at 10/03/22 2106    pantoprazole (PROTONIX) tablet 40 mg, 40 mg, Oral, QAM AC, Nohemi Martin MD, 40 mg at 10/04/22 9869    tamsulosin (FLOMAX) capsule 0.4 mg, 0.4 mg, Oral, Daily, Nohemi Martin MD, 0.4 mg at 10/03/22 0825    traZODone (DESYREL) tablet 50 mg, 50 mg, Oral, Nightly, Nohemi Martin MD, 50 mg at 10/03/22 2106    sodium chloride flush 0.9 % injection 5-40 mL, 5-40 mL, IntraVENous, 2 times per day, Nohemi Martin MD, 10 mL at 10/04/22 0828    sodium chloride flush 0.9 % injection 5-40 mL, 5-40 mL, IntraVENous, PRN, Nohemi Martin MD    0.9 % sodium chloride infusion, , IntraVENous, PRN, Nohemi Martin MD    enoxaparin (LOVENOX) injection 40 mg, 40 mg, SubCUTAneous, Daily, Suhail Barrios MD, 40 mg at 10/03/22 8549    polyethylene glycol (GLYCOLAX) packet 17 g, 17 g, Oral, Daily PRN, Suhail Barrios MD    acetaminophen (TYLENOL) tablet 650 mg, 650 mg, Oral, Q6H PRN, 650 mg at 10/03/22 0812 **OR** acetaminophen (TYLENOL) suppository 650 mg, 650 mg, Rectal, Q6H PRN, Suhail Barrios MD    glucose chewable tablet 16 g, 4 tablet, Oral, PRN, Suhail Barrios MD    dextrose bolus 10% 125 mL, 125 mL, IntraVENous, PRN **OR** dextrose bolus 10% 250 mL, 250 mL, IntraVENous, PRN, Suhial Barrios MD    glucagon (rDNA) injection 1 mg, 1 mg, SubCUTAneous, PRN, Suhail Barrios MD    dextrose 10 % infusion, , IntraVENous, Continuous PRN, Suhail Barrios MD    Assessment:  Acute on Chronic HF  See below     Patient Active Problem List   Diagnosis    CHF (congestive heart failure) ([facility]    S/P CABG x 4    CAD (coronary artery disease)    MI, old    Hyperlipidemia    Hypertension    Cardiomyopathy ([facility]    Ventricular tachycardia    Pancreatitis    AICD (automatic cardioverter/defibrillator) present    Implantable cardioverter-defibrillator (ICD) at end of device life    Chest pain, atypical    Failure to thrive in adult    Congestive heart failure of unknown etiology ([facility]    Acute on chronic HFrEF (heart failure with reduced ejection fraction) ([facility]    VHD (valvular heart disease)       Plan:  See orders  Labs,meds noted  Notes reviewed  Plan is to return to SNF   Palliative care   Attempt to increase activity        Miah Gustafson DO  9:16 AM  10/4/2022

## 2022-10-04 NOTE — PROGRESS NOTES
INPATIENT CARDIOLOGY FOLLOW-UP    Name: Jaime Son    Age: 64 y.o. Date of Admission: 10/1/2022  4:21 PM    Date of Service: 10/4/2022    Chief Complaint: Follow-up for HFrEF    Interim History:  No new overnight cardiac complaints and still has dyspnea, and feeling tired and would like to sleep and does not want to talk this morning and would like to sleep. Currently with no complaints of CP, palpitations, dizziness, or lightheadedness. SR on telemetry. Review of Systems:   Cardiac: As per HPI  General: No fever, chills  Pulmonary: As per HPI  HEENT: No visual disturbances, difficult swallowing  GI: No nausea, vomiting  Endocrine: No thyroid disease or DM  Musculoskeletal: MENESES x 4, no focal motor deficits  Skin: Intact, no rashes  Neuro/Psych: No headache or seizures    Problem List:  Patient Active Problem List   Diagnosis    CHF (congestive heart failure) ([facility])    S/P CABG x 4    CAD (coronary artery disease)    MI, old    Hyperlipidemia    Hypertension    Cardiomyopathy ([facility] [address]    Ventricular tachycardia    Pancreatitis    AICD (automatic cardioverter/defibrillator) present    Implantable cardioverter-defibrillator (ICD) at end of device life    Chest pain, atypical    Failure to thrive in adult    Congestive heart failure of unknown etiology ([facility] [address]    Acute on chronic HFrEF (heart failure with reduced ejection fraction) ([facility])    VHD (valvular heart disease)       Allergies:  No Known Allergies    Current Medications:  Current Facility-Administered Medications   Medication Dose Route Frequency Provider Last Rate Last Admin    miconazole nitrate 2 % ointment   Topical BID [facility], DO   Given at 10/03/22 2107    ammonium lactate (LAC-HYDRIN) 12 % lotion   Topical Daily [facility], DO   Given at 10/03/22 1226    metoprolol succinate (TOPROL XL) extended release tablet 25 mg  25 mg Oral BID MARIA C Villatoro   25 mg at 10/03/22 2106    bumetanide (BUMEX) injection 1 mg  1 mg IntraVENous TID MARIA C Tapia   1 mg at 10/03/22 2107    ipratropium-albuterol (DUONEB) nebulizer solution 1 ampule  1 ampule Inhalation Q4H PRN Dierdre Ying, APRN - CNP        LORazepam (ATIVAN) tablet 0.5 mg  0.5 mg Oral TID PRN Dierdre Ying, APRN - CNP   0.5 mg at 10/04/22 0048    mirtazapine (REMERON) tablet 30 mg  30 mg Oral Nightly Dierdre Ying, APRN - CNP   30 mg at 10/03/22 2106    sennosides-docusate sodium (SENOKOT-S) 8.6-50 MG tablet 2 tablet  2 tablet Oral Nightly Dierdre Ying, APRN - CNP   2 tablet at 10/03/22 2107    aspirin EC tablet 81 mg  81 mg Oral Daily Renetta Quinonez MD   81 mg at 10/03/22 7062    finasteride (PROSCAR) tablet 5 mg  5 mg Oral Daily Renetta Quinonez MD   5 mg at 10/03/22 1226    insulin lispro (HUMALOG) injection vial 0-4 Units  0-4 Units SubCUTAneous TID WC Renetta Quinonez MD        insulin lispro (HUMALOG) injection vial 0-4 Units  0-4 Units SubCUTAneous Nightly Renetta Quinonez MD        hydrALAZINE (APRESOLINE) tablet 25 mg  25 mg Oral BID Renetta Quinonez MD   25 mg at 10/03/22 2107    isosorbide dinitrate (ISORDIL) tablet 20 mg  20 mg Oral TID Renetta Quinonez MD   20 mg at 10/03/22 2106    pantoprazole (PROTONIX) tablet 40 mg  40 mg Oral QAM AC Renetta Quinonez MD   40 mg at 10/04/22 0552    tamsulosin (FLOMAX) capsule 0.4 mg  0.4 mg Oral Daily Renetta Quinonez MD   0.4 mg at 10/03/22 0825    traZODone (DESYREL) tablet 50 mg  50 mg Oral Nightly Renetta Quinonez MD   50 mg at 10/03/22 2106    sodium chloride flush 0.9 % injection 5-40 mL  5-40 mL IntraVENous 2 times per day Renetta Quinonez MD   10 mL at 10/03/22 2107    sodium chloride flush 0.9 % injection 5-40 mL  5-40 mL IntraVENous PRN Renetta Quinonez MD        0.9 % sodium chloride infusion   IntraVENous PRN Renetta Quinonez MD        enoxaparin (LOVENOX) injection 40 mg  40 mg SubCUTAneous Daily Renetta Quinonez MD   40 mg at 10/03/22 0823    polyethylene glycol (GLYCOLAX) packet 17 g  17 g Oral Daily PRN Renetta Quinonez MD acetaminophen (TYLENOL) tablet 650 mg  650 mg Oral Q6H PRN Marcos Ballesteros MD   650 mg at 10/03/22 0931    Or    acetaminophen (TYLENOL) suppository 650 mg  650 mg Rectal Q6H PRN Marcos Ballesteros MD        glucose chewable tablet 16 g  4 tablet Oral PRN Marcos Ballesteros MD        dextrose bolus 10% 125 mL  125 mL IntraVENous PRN Marcos Ballesteros MD        Or    dextrose bolus 10% 250 mL  250 mL IntraVENous PRN Marcos Ballesteros MD        glucagon (rDNA) injection 1 mg  1 mg SubCUTAneous PRN Marcos Ballesteros MD        dextrose 10 % infusion   IntraVENous Continuous PRN Marcos Ballesteros MD          sodium chloride      dextrose         Physical Exam:  /60   Pulse 70   Temp 97.5 °F (36.4 °C) (Rectal)   Resp 20   Ht 5' 11\" (1.803 m)   Wt 200 lb 1.6 oz (90.8 kg)   SpO2 100%   BMI 27.91 kg/m²   Wt Readings from Last 3 Encounters:   10/03/22 200 lb 1.6 oz (90.8 kg)   07/22/22 177 lb 14.6 oz (80.7 kg)   06/30/22 177 lb 14.4 oz (80.7 kg)     Appearance: Awake, alert, no acute respiratory distress but appears chronically ill. Skin: Intact, no rash  Head: Normocephalic, atraumatic  Eyes: EOMI, no conjunctival erythema  ENMT: No pharyngeal erythema, MMM, no rhinorrhea  Neck: Supple, no elevated JVP, no carotid bruits  Lungs: Clear to auscultation bilaterally. No wheezes, rales, or rhonchi. Cardiac: Regular rate and rhythm, +S1S2, no murmurs apparent  Abdomen: Soft, nontender, +bowel sounds  Extremities: Moves all extremities x 4, 1+ lower extremity edema  Neurologic: No focal motor deficits apparent, normal mood and affect  Peripheral Pulses: Intact posterior tibial pulses bilaterally    Intake/Output:    Intake/Output Summary (Last 24 hours) at 10/4/2022 0823  Last data filed at 10/4/2022 0132  Gross per 24 hour   Intake --   Output 2300 ml   Net -2300 ml     No intake/output data recorded.     Laboratory Tests:  Recent Labs     10/02/22  0942 10/03/22  0330 10/04/22  0415    144 144   K 3.8 3.8 4.1   * 106 105   CO2 26 27 30*   BUN 34* 36* 35*   CREATININE 1.1 1.3* 1.2   GLUCOSE 63* 66* 93   CALCIUM 8.8 8.6 8.6     Lab Results   Component Value Date/Time    MG 1.9 07/21/2022 01:57 PM     Recent Labs     10/01/22  1758   ALKPHOS 151*   ALT 8   AST 21   PROT 6.4   BILITOT 1.0   BILIDIR 0.6*   LABALBU 3.6     Recent Labs     10/01/22  1758   WBC 9.1   RBC 4.75   HGB 11.9*   HCT 40.0   MCV 84.2   MCH 25.1*   MCHC 29.8*   RDW 18.0*      MPV 11.2     Lab Results   Component Value Date    CKTOTAL 113 04/08/2014    CKMB 2.8 04/08/2014    TROPONINI 0.03 02/24/2021    TROPONINI <0.01 04/14/2014    TROPONINI <0.01 04/08/2014     Lab Results   Component Value Date    INR 1.2 04/08/2014    PROTIME 13.3 (H) 04/08/2014     Lab Results   Component Value Date    TSH 2.500 10/01/2022     Lab Results   Component Value Date    LABA1C 5.9 (H) 10/01/2022     No results found for: EAG  Lab Results   Component Value Date    CHOL 105 10/01/2022    CHOL 224 (H) 09/20/2021    CHOL 296 (H) 08/12/2014     Lab Results   Component Value Date    TRIG 58 10/01/2022    TRIG 361 (H) 09/20/2021    TRIG 1,001 (H) 08/12/2014     Lab Results   Component Value Date    HDL 40 10/01/2022    HDL 33 09/20/2021    HDL 25 08/12/2014     Lab Results   Component Value Date    LDLCALC 53 10/01/2022    LDLCALC 119 (H) 09/20/2021    LDLCALC - (AA) 08/12/2014     Lab Results   Component Value Date    LABVLDL 12 10/01/2022    LABVLDL 72 09/20/2021    LABVLDL - (AA) 08/12/2014     No results found for: CHOLHDLRATIO    Cardiac Tests:  ECG: V - paced with frequent PVCs      Telemetry findings reviewed: Normal sinus rhythm in the 80s with multiple episodes of nonsustained VT. Vitals and labs were reviewed: As above blood pressure 110/60 heart rate 70 sats 100% 2 L    Labs-BUNs/creatinine 34/1.1>> 36/1.3>>35/1.2, proBNP 26,231>> 17,000 521, H&H 11.9/40, respiratory panel was negative.       Chest X-ray: Cardiomegaly with findings of CHF, small right pleural effusion and moderate left pleural effusion. TTE: 3/15/2021 ([facility]) Exam indication: Abnormal Cardiac Biomarkers There is a resting wall motion abnormality in the territory of the LAD and RCA. - The left ventricle is dilated. Left ventricular systolic function is severely decreased. EF = 26 ± 5% (2D biplane) Grade III left ventricular diastolic dysfunction. The right ventricle is normal in size. Right ventricular systolic function is   moderately decreased. - The left atrial cavity is severely dilated. - The right atrial cavity is dilated. There is moderately severe (3+) mitral valve regurgitation due to restricted leaflet motion likely related to ischemic heart disease. TTE: 5/13/2022 ([facility]): - Technically difficult exam due to body habitus and suboptimal positioning. - Exam indication: Evaluation of known heart failure to guide therapy - The left ventricle is severely dilated. Left ventricular systolic function is severely decreased. EF = 20 ± 5% (visual est.) Definity contrast used for endocardial border detection. Left ventricular diastolic function was not evaluated due to an arrhythmia. The right ventricle is dilated. Right ventricular systolic function is moderately to severely decreased. - The left atrial cavity is severely dilated. - The right atrial cavity is dilated. There is moderate (2+) mitral valve regurgitation. - -Peak/mean gradients of 15/7 mmHg, gradients averaged due to arrhythmia. -Prior EF reported as 26% (3/2021). Exam was compared with the prior  echocardiographic exam performed on  3/14/2021. There is no significant change. VHD: Moderate-severe ischemic MR (patient was recommended for further evaluation with EBONI and possible consideration of a clip 6/3/2022 at [facility] --> patient declined.       Stress test:    RHC: 5/16/2022: [facility]: Elevated biventricular filling pressures (RA 10, PCWP 26), moderate pulmonary hypertension (most likely postcapillary), preserved cardiac index by assumed in 2018  History of prostate cancer       Plan:   Continue IV diuresis with Bumex 1 mg IV TID with close monitoring of renal functions and electrolytes, had good urine out put over the last 24 hours  Strict input output charting  Continue GDMT for HFrEF/ischemic cardiomyopathy hydralazine/Isordil history of intolerance to ACE/ARB, Toprol-XL. Add Aldactone 12.5 mg po daily with close monitoring of renal functions and holding parameters. No plans for invasive cardiac testing and prior records from including transthoracic echo and cardiac cath were reviewed, as above. Continue aspirin and consider adding statin therapy statin  Palliative care input noted and appreciated. Jairon Romero MD., Loyda Mars.   [facility]) Cardiology

## 2022-10-05 PROBLEM — Z71.89 GOALS OF CARE, COUNSELING/DISCUSSION: Status: ACTIVE | Noted: 2022-10-05

## 2022-10-05 PROBLEM — Z51.5 PALLIATIVE CARE ENCOUNTER: Status: ACTIVE | Noted: 2022-10-05

## 2022-10-05 PROBLEM — Z71.89 DNR (DO NOT RESUSCITATE) DISCUSSION: Status: ACTIVE | Noted: 2022-10-05

## 2022-10-05 LAB
ANION GAP SERPL CALCULATED.3IONS-SCNC: 9 MMOL/L (ref 7–16)
BUN BLDV-MCNC: 35 MG/DL (ref 6–23)
CALCIUM SERPL-MCNC: 8.9 MG/DL (ref 8.6–10.2)
CHLORIDE BLD-SCNC: 103 MMOL/L (ref 98–107)
CO2: 30 MMOL/L (ref 22–29)
CREAT SERPL-MCNC: 1.2 MG/DL (ref 0.7–1.2)
GFR AFRICAN AMERICAN: >60
GFR NON-AFRICAN AMERICAN: 58 ML/MIN/1.73
GLUCOSE BLD-MCNC: 92 MG/DL (ref 74–99)
METER GLUCOSE: 105 MG/DL (ref 74–99)
METER GLUCOSE: 114 MG/DL (ref 74–99)
METER GLUCOSE: 91 MG/DL (ref 74–99)
METER GLUCOSE: 95 MG/DL (ref 74–99)
POTASSIUM REFLEX MAGNESIUM: 4 MMOL/L (ref 3.5–5)
PRO-BNP: ABNORMAL PG/ML (ref 0–450)
SODIUM BLD-SCNC: 142 MMOL/L (ref 132–146)

## 2022-10-05 PROCEDURE — 80048 BASIC METABOLIC PNL TOTAL CA: CPT

## 2022-10-05 PROCEDURE — 83880 ASSAY OF NATRIURETIC PEPTIDE: CPT

## 2022-10-05 PROCEDURE — 6370000000 HC RX 637 (ALT 250 FOR IP): Performed by: NURSE PRACTITIONER

## 2022-10-05 PROCEDURE — 6370000000 HC RX 637 (ALT 250 FOR IP): Performed by: INTERNAL MEDICINE

## 2022-10-05 PROCEDURE — 36415 COLL VENOUS BLD VENIPUNCTURE: CPT

## 2022-10-05 PROCEDURE — 99233 SBSQ HOSP IP/OBS HIGH 50: CPT | Performed by: INTERNAL MEDICINE

## 2022-10-05 PROCEDURE — 2700000000 HC OXYGEN THERAPY PER DAY

## 2022-10-05 PROCEDURE — 82962 GLUCOSE BLOOD TEST: CPT

## 2022-10-05 PROCEDURE — 2060000000 HC ICU INTERMEDIATE R&B

## 2022-10-05 PROCEDURE — 6370000000 HC RX 637 (ALT 250 FOR IP): Performed by: FAMILY MEDICINE

## 2022-10-05 PROCEDURE — 6360000002 HC RX W HCPCS: Performed by: INTERNAL MEDICINE

## 2022-10-05 PROCEDURE — 2580000003 HC RX 258: Performed by: INTERNAL MEDICINE

## 2022-10-05 PROCEDURE — 99232 SBSQ HOSP IP/OBS MODERATE 35: CPT | Performed by: NURSE PRACTITIONER

## 2022-10-05 PROCEDURE — 2500000003 HC RX 250 WO HCPCS: Performed by: NURSE PRACTITIONER

## 2022-10-05 RX ORDER — ROSUVASTATIN CALCIUM 20 MG/1
20 TABLET, COATED ORAL NIGHTLY
Status: DISCONTINUED | OUTPATIENT
Start: 2022-10-05 | End: 2022-10-12 | Stop reason: HOSPADM

## 2022-10-05 RX ADMIN — ISOSORBIDE DINITRATE 20 MG: 10 TABLET ORAL at 08:11

## 2022-10-05 RX ADMIN — ROSUVASTATIN CALCIUM 20 MG: 20 TABLET, FILM COATED ORAL at 21:27

## 2022-10-05 RX ADMIN — ISOSORBIDE DINITRATE 20 MG: 10 TABLET ORAL at 15:23

## 2022-10-05 RX ADMIN — HYDRALAZINE HYDROCHLORIDE 25 MG: 25 TABLET, FILM COATED ORAL at 08:11

## 2022-10-05 RX ADMIN — MICONAZOLE NITRATE: 2 OINTMENT TOPICAL at 12:15

## 2022-10-05 RX ADMIN — ACETAMINOPHEN 650 MG: 325 TABLET ORAL at 05:23

## 2022-10-05 RX ADMIN — BUMETANIDE 1 MG: 0.25 INJECTION, SOLUTION INTRAMUSCULAR; INTRAVENOUS at 15:23

## 2022-10-05 RX ADMIN — SPIRONOLACTONE 12.5 MG: 25 TABLET ORAL at 08:12

## 2022-10-05 RX ADMIN — MICONAZOLE NITRATE: 2 OINTMENT TOPICAL at 21:40

## 2022-10-05 RX ADMIN — ASPIRIN 81 MG: 81 TABLET, COATED ORAL at 08:11

## 2022-10-05 RX ADMIN — SODIUM CHLORIDE, PRESERVATIVE FREE 10 ML: 5 INJECTION INTRAVENOUS at 08:12

## 2022-10-05 RX ADMIN — TAMSULOSIN HYDROCHLORIDE 0.4 MG: 0.4 CAPSULE ORAL at 08:11

## 2022-10-05 RX ADMIN — MIRTAZAPINE 30 MG: 15 TABLET, FILM COATED ORAL at 21:23

## 2022-10-05 RX ADMIN — ENOXAPARIN SODIUM 40 MG: 100 INJECTION SUBCUTANEOUS at 08:12

## 2022-10-05 RX ADMIN — BUMETANIDE 1 MG: 0.25 INJECTION, SOLUTION INTRAMUSCULAR; INTRAVENOUS at 08:12

## 2022-10-05 RX ADMIN — ACETAMINOPHEN 650 MG: 325 TABLET ORAL at 15:56

## 2022-10-05 RX ADMIN — METOPROLOL SUCCINATE 25 MG: 25 TABLET, FILM COATED, EXTENDED RELEASE ORAL at 08:12

## 2022-10-05 RX ADMIN — SODIUM CHLORIDE, PRESERVATIVE FREE 10 ML: 5 INJECTION INTRAVENOUS at 21:20

## 2022-10-05 RX ADMIN — HYDRALAZINE HYDROCHLORIDE 25 MG: 25 TABLET, FILM COATED ORAL at 21:20

## 2022-10-05 RX ADMIN — ACETAMINOPHEN 650 MG: 325 TABLET ORAL at 21:26

## 2022-10-05 RX ADMIN — TRAZODONE HYDROCHLORIDE 50 MG: 50 TABLET ORAL at 21:20

## 2022-10-05 RX ADMIN — METOPROLOL SUCCINATE 25 MG: 25 TABLET, FILM COATED, EXTENDED RELEASE ORAL at 21:20

## 2022-10-05 RX ADMIN — BUMETANIDE 1 MG: 0.25 INJECTION, SOLUTION INTRAMUSCULAR; INTRAVENOUS at 21:19

## 2022-10-05 RX ADMIN — PANTOPRAZOLE SODIUM 40 MG: 40 TABLET, DELAYED RELEASE ORAL at 06:23

## 2022-10-05 RX ADMIN — Medication: at 08:12

## 2022-10-05 RX ADMIN — DOCUSATE SODIUM 50 MG AND SENNOSIDES 8.6 MG 2 TABLET: 8.6; 5 TABLET, FILM COATED ORAL at 21:20

## 2022-10-05 RX ADMIN — FINASTERIDE 5 MG: 5 TABLET, FILM COATED ORAL at 08:12

## 2022-10-05 RX ADMIN — ISOSORBIDE DINITRATE 20 MG: 10 TABLET ORAL at 21:20

## 2022-10-05 ASSESSMENT — PAIN DESCRIPTION - LOCATION
LOCATION: PENIS
LOCATION: PENIS
LOCATION: GENERALIZED

## 2022-10-05 ASSESSMENT — PAIN SCALES - GENERAL
PAINLEVEL_OUTOF10: 7
PAINLEVEL_OUTOF10: 8
PAINLEVEL_OUTOF10: 7

## 2022-10-05 ASSESSMENT — PAIN DESCRIPTION - PAIN TYPE: TYPE: CHRONIC PAIN

## 2022-10-05 ASSESSMENT — PAIN DESCRIPTION - DESCRIPTORS
DESCRIPTORS: ACHING;DISCOMFORT
DESCRIPTORS: BURNING;DISCOMFORT
DESCRIPTORS: BURNING

## 2022-10-05 ASSESSMENT — PAIN DESCRIPTION - FREQUENCY: FREQUENCY: CONTINUOUS

## 2022-10-05 ASSESSMENT — PAIN DESCRIPTION - ONSET: ONSET: ON-GOING

## 2022-10-05 ASSESSMENT — PAIN DESCRIPTION - DIRECTION: RADIATING_TOWARDS: CON

## 2022-10-05 ASSESSMENT — PAIN - FUNCTIONAL ASSESSMENT: PAIN_FUNCTIONAL_ASSESSMENT: ACTIVITIES ARE NOT PREVENTED

## 2022-10-05 NOTE — PROGRESS NOTES
Spoke with Pharmacist. B&O suppositories are on national back order and our hospital does not have any in house. Attempted to page Dr. Paula Velarde but was unable to be connected. Will attempt at another time.   Isabel Min RN

## 2022-10-05 NOTE — PROGRESS NOTES
INPATIENT CARDIOLOGY FOLLOW-UP    Name: Salina Schaeffer    Age: 64 y.o. Date of Admission: 10/1/2022  4:21 PM    Date of Service: 10/5/2022    Chief Complaint: Follow-up for HFrEF    Interim History:  No new overnight cardiac complaints except for dyspnea. He is complaining of pain all over body including arthritis and pain over the extremities secondary to neuropathy. Currently with no complaints of CP, palpitations, dizziness, or lightheadedness. SR on telemetry.     Review of Systems:   Cardiac: As per HPI  General: No fever, chills  Pulmonary: As per HPI  HEENT: No visual disturbances, difficult swallowing  GI: No nausea, vomiting  Endocrine: No thyroid disease or DM  Musculoskeletal: MENESES x 4, no focal motor deficits  Skin: Intact, no rashes  Neuro/Psych: No headache or seizures    Problem List:  Patient Active Problem List   Diagnosis    CHF (congestive heart failure) ([facility])    S/P CABG x 4    CAD (coronary artery disease)    MI, old    Hyperlipidemia    Hypertension    Cardiomyopathy ([facility] [address]    Ventricular tachycardia    Pancreatitis    AICD (automatic cardioverter/defibrillator) present    Implantable cardioverter-defibrillator (ICD) at end of device life    Chest pain, atypical    Failure to thrive in adult    Congestive heart failure of unknown etiology ([facility] [address]    Acute on chronic HFrEF (heart failure with reduced ejection fraction) ([facility])    VHD (valvular heart disease)       Allergies:  No Known Allergies    Current Medications:  Current Facility-Administered Medications   Medication Dose Route Frequency Provider Last Rate Last Admin    spironolactone (ALDACTONE) tablet 12.5 mg  12.5 mg Oral Daily Arabella Galindo MD   12.5 mg at 10/05/22 0215    miconazole nitrate 2 % ointment   Topical BID Joni Plain, DO   Given at 10/04/22 2158    ammonium lactate (LAC-HYDRIN) 12 % lotion   Topical Daily Joni Plain, DO   Given at 10/05/22 1184    metoprolol succinate (TOPROL XL) extended release tablet 25 mg  25 mg Oral BID Denise Angeline. Georgiander, APN   25 mg at 10/05/22 0812    bumetanide (BUMEX) injection 1 mg  1 mg IntraVENous TID Denise Angeline.  Rogerer, APN   1 mg at 10/05/22 4883    ipratropium-albuterol (DUONEB) nebulizer solution 1 ampule  1 ampule Inhalation Q4H PRN Dierdre Ying, APRN - CNP        LORazepam (ATIVAN) tablet 0.5 mg  0.5 mg Oral TID PRN Dierdre Ying, APRN - CNP   0.5 mg at 10/04/22 2241    mirtazapine (REMERON) tablet 30 mg  30 mg Oral Nightly Dierdre Ying, APRN - CNP   30 mg at 10/04/22 2041    sennosides-docusate sodium (SENOKOT-S) 8.6-50 MG tablet 2 tablet  2 tablet Oral Nightly Dierdre Ying, APRN - CNP   2 tablet at 10/04/22 2041    aspirin EC tablet 81 mg  81 mg Oral Daily Renetta Quinonez MD   81 mg at 10/05/22 8751    finasteride (PROSCAR) tablet 5 mg  5 mg Oral Daily Renetta Quinonez MD   5 mg at 10/05/22 8861    insulin lispro (HUMALOG) injection vial 0-4 Units  0-4 Units SubCUTAneous TID WC Renetta Quinonez MD        insulin lispro (HUMALOG) injection vial 0-4 Units  0-4 Units SubCUTAneous Nightly Renetta Quinonez MD        hydrALAZINE (APRESOLINE) tablet 25 mg  25 mg Oral BID Renetta Quinonez MD   25 mg at 10/05/22 6248    isosorbide dinitrate (ISORDIL) tablet 20 mg  20 mg Oral TID Renetta Quinonez MD   20 mg at 10/05/22 0811    pantoprazole (PROTONIX) tablet 40 mg  40 mg Oral QAM AC Renetta Quinonez MD   40 mg at 10/05/22 5329    tamsulosin (FLOMAX) capsule 0.4 mg  0.4 mg Oral Daily Renetta Quinonez MD   0.4 mg at 10/05/22 1218    traZODone (DESYREL) tablet 50 mg  50 mg Oral Nightly Renetta Quinonez MD   50 mg at 10/04/22 2041    sodium chloride flush 0.9 % injection 5-40 mL  5-40 mL IntraVENous 2 times per day Renetta Quinonez MD   10 mL at 10/05/22 3055    sodium chloride flush 0.9 % injection 5-40 mL  5-40 mL IntraVENous PRN Renetta Quinonez MD        0.9 % sodium chloride infusion   IntraVENous PRN Renetta Quinonez MD        enoxaparin (LOVENOX) injection 40 mg  40 mg SubCUTAneous Daily Renetta Quinonez, MD   40 mg at 10/05/22 0812    polyethylene glycol (GLYCOLAX) packet 17 g  17 g Oral Daily PRN Elin Harris MD        acetaminophen (TYLENOL) tablet 650 mg  650 mg Oral Q6H PRN Elin Harris MD   650 mg at 10/05/22 5832    Or    acetaminophen (TYLENOL) suppository 650 mg  650 mg Rectal Q6H PRN Elin Harris MD        glucose chewable tablet 16 g  4 tablet Oral PRN Elin Harris MD        dextrose bolus 10% 125 mL  125 mL IntraVENous PRN Elin Harris MD        Or    dextrose bolus 10% 250 mL  250 mL IntraVENous PRN Elin Harris MD        glucagon (rDNA) injection 1 mg  1 mg SubCUTAneous PRN Elin Harris MD        dextrose 10 % infusion   IntraVENous Continuous PRN Elin Harris MD          sodium chloride      dextrose         Physical Exam:  /68   Pulse 98   Temp 97.5 °F (36.4 °C) (Oral)   Resp 18   Ht 5' 11\" (1.803 m)   Wt 200 lb 1.6 oz (90.8 kg)   SpO2 99%   BMI 27.91 kg/m²   Wt Readings from Last 3 Encounters:   10/03/22 200 lb 1.6 oz (90.8 kg)   07/22/22 177 lb 14.6 oz (80.7 kg)   06/30/22 177 lb 14.4 oz (80.7 kg)     Appearance: Awake, alert, no acute respiratory distress but appears chronically ill. Skin: Intact, no rash  Head: Normocephalic, atraumatic  Eyes: EOMI, no conjunctival erythema  ENMT: No pharyngeal erythema, MMM, no rhinorrhea  Neck: Supple, elevated JVP, no carotid bruits  Lungs: Clear to auscultation bilaterally. No wheezes, rales, or rhonchi. Cardiac: Regular rate and rhythm, +S1S2, no murmurs apparent  Abdomen: Soft, nontender, +bowel sounds  Extremities: Moves all extremities x 4, 1+ lower extremity edema  Neurologic: No focal motor deficits apparent, normal mood and affect  Peripheral Pulses: Intact posterior tibial pulses bilaterally    Intake/Output:    Intake/Output Summary (Last 24 hours) at 10/5/2022 1037  Last data filed at 10/5/2022 0522  Gross per 24 hour   Intake --   Output 2125 ml   Net -2125 ml     No intake/output data recorded.     Laboratory Tests:  Recent Labs     10/03/22  0330 10/04/22  0415    144   K 3.8 4.1    105   CO2 27 30*   BUN 36* 35*   CREATININE 1.3* 1.2   GLUCOSE 66* 93   CALCIUM 8.6 8.6     Lab Results   Component Value Date/Time    MG 1.9 07/21/2022 01:57 PM     No results for input(s): ALKPHOS, ALT, AST, PROT, BILITOT, BILIDIR, LABALBU in the last 72 hours. No results for input(s): WBC, RBC, HGB, HCT, MCV, MCH, MCHC, RDW, PLT, MPV in the last 72 hours. Lab Results   Component Value Date    CKTOTAL 113 04/08/2014    CKMB 2.8 04/08/2014    TROPONINI 0.03 02/24/2021    TROPONINI <0.01 04/14/2014    TROPONINI <0.01 04/08/2014     Lab Results   Component Value Date    INR 1.2 04/08/2014    PROTIME 13.3 (H) 04/08/2014     Lab Results   Component Value Date    TSH 2.500 10/01/2022     Lab Results   Component Value Date    LABA1C 5.9 (H) 10/01/2022     No results found for: EAG  Lab Results   Component Value Date    CHOL 105 10/01/2022    CHOL 224 (H) 09/20/2021    CHOL 296 (H) 08/12/2014     Lab Results   Component Value Date    TRIG 58 10/01/2022    TRIG 361 (H) 09/20/2021    TRIG 1,001 (H) 08/12/2014     Lab Results   Component Value Date    HDL 40 10/01/2022    HDL 33 09/20/2021    HDL 25 08/12/2014     Lab Results   Component Value Date    LDLCALC 53 10/01/2022    LDLCALC 119 (H) 09/20/2021    LDLCALC - (AA) 08/12/2014     Lab Results   Component Value Date    LABVLDL 12 10/01/2022    LABVLDL 72 09/20/2021    LABVLDL - (AA) 08/12/2014     No results found for: CHOLHDLRATIO    Cardiac Tests:  ECG: V - paced with frequent PVCs      Telemetry findings reviewed: Normal sinus rhythm in the 100s with multiple episodes of nonsustained VT. Vitals and labs were reviewed: As above blood pressure 128/68 heart rate 98 sats 99 % 2 L    Labs-BUNs/creatinine 34/1.1>> 36/1.3>>35/1.2, proBNP 26,231>> 17,000 521, H&H 11.9/40, respiratory panel was negative. No labs for today.       Chest X-ray: Cardiomegaly with findings of CHF, small right pleural effusion and moderate left pleural effusion. TTE: 3/15/2021 ([facility]) Exam indication: Abnormal Cardiac Biomarkers There is a resting wall motion abnormality in the territory of the LAD and RCA. - The left ventricle is dilated. Left ventricular systolic function is severely decreased. EF = 26 ± 5% (2D biplane) Grade III left ventricular diastolic dysfunction. The right ventricle is normal in size. Right ventricular systolic function is   moderately decreased. - The left atrial cavity is severely dilated. - The right atrial cavity is dilated. There is moderately severe (3+) mitral valve regurgitation due to restricted leaflet motion likely related to ischemic heart disease. TTE: 5/13/2022 ([facility]): - Technically difficult exam due to body habitus and suboptimal positioning. - Exam indication: Evaluation of known heart failure to guide therapy - The left ventricle is severely dilated. Left ventricular systolic function is severely decreased. EF = 20 ± 5% (visual est.) Definity contrast used for endocardial border detection. Left ventricular diastolic function was not evaluated due to an arrhythmia. The right ventricle is dilated. Right ventricular systolic function is moderately to severely decreased. - The left atrial cavity is severely dilated. - The right atrial cavity is dilated. There is moderate (2+) mitral valve regurgitation. - -Peak/mean gradients of 15/7 mmHg, gradients averaged due to arrhythmia. -Prior EF reported as 26% (3/2021). Exam was compared with the prior  echocardiographic exam performed on  3/14/2021. There is no significant change. VHD: Moderate-severe ischemic MR (patient was recommended for further evaluation with EBONI and possible consideration of a clip 6/3/2022 at [facility] --> patient declined.       Stress test:    RHC: 5/16/2022: [facility]: Elevated biventricular filling pressures (RA 10, PCWP 26), moderate pulmonary hypertension (most likely postcapillary), preserved cardiac index by assumed Sunni 2.73. [facility], 3/16/2021: LMT: severe diffuse disease. LAD: severe diffuse disease. Additional Comment: Moderate caliber vessel with  in the proximal portion. The vessel is fed by patent LIMA graft. The distal portion is small in caliber and wraps the apex. There are L->R collaterals. LCX: severe diffuse disease. Additional Comment: Moderate caliber non-dominant vessel which is fed by SVG->RI/OM1. RAMUS: ostial Ramus - . Additional Comment: Fills in the mid-distal portion from SVG->RI graft. RCA Status: Not Applicable. Additional Comment: Occluded at the ostia. GRAFTS: LIMA Graft End to Side to the Left Anterior Descending. Additional Comments: patent. SVG End to Side to the RI. Additional  Comments - patent with mild-moderate disease in the mid-portion; supplies mid-distal LCx and backfills to partially supply LAD. SVG End to Side to the OM-2 Second Obtuse [place]. Additional Comments - occluded. SVG End to Side to the Right Posterior Descending Artery. Additional Comments - occluded. Impression:- Severe/occlusive native 3 vessel CAD with patent LIMA->LAD, SVG->RI/OM1. SVG->OM2 is occluded - Native RCA is occluded at the ostia as is the SVG->rPDA. The right is fed by L->R collaterals from the LIMA graft Recommended Treatment: Medical Therapy. ASSESSMENT:  Acute on chronic decompensated HFrEF, making slow progress, UOP 2.1 L,  net - 8.8 L, untreated obstructive CAD is responsible for intermittent episodes of decompensated heart failure  ACC/AHA stage D, NYHA functional class II  ICMP EF 20+/-5%  History of CAD status post CABG x5v (LIMA to LAD, SVG to OM1, SVG to OM2, SVG to RI and SVG to RPDA) in 2007 s/p [facility] in 2021 ([facility]) with severe multivessel disease and all the grafts were occluded except for LIMA to LAD which was patent. Patient is not a candidate for any further cardiac intervention.   Not a candidate for cardiac transplant or LVAD due to medication noncompliance. HTN: controlled  Hyperlipidemia  T2DM  History of NSVT  S/p ICD ([facility]) in situ and recent generator change in 2018  History of prostate cancer  Poor prognosis       Plan:   Continue IV diuresis with Bumex 1 mg IV TID with close monitoring of renal functions and electrolytes, labs were not done, check BMP daily and check proBNP in a.m. Strict input output charting  Continue GDMT for HFrEF/ischemic cardiomyopathy hydralazine/Isordil history of intolerance to ACE/ARB, Toprol-XL. Continue Aldactone 12.5 mg po daily with close monitoring of renal functions and holding parameters. No plans for invasive cardiac testing and prior records from including transthoracic echo and cardiac cath were reviewed, as above. Continue aspirin and consider adding statin therapy statin  Palliative care input noted and appreciated. Maureen Fabian MD., Bradly Rucker.   [facility]) Cardiology

## 2022-10-05 NOTE — PROGRESS NOTES
Palliative Care Department  [phone]  Palliative Care Progress Note  Provider GINA Shabazz - [address]  [id number]  Hospital Day: 5  Date of Initial Consult: 10/2/2022  Referring Provider: GINA Lanier, CNP  Palliative Medicine was consulted for assistance with: Goals of care and CODE STATUS discussion    HPI:   Manju Kirkpatrick is a 64 y.o. with a medical history of CHF, CAD s/p CABG in 2007, ischemic cardiomyopathy with EF 20%,  ICD, Hx prostate CA who was admitted on 10/1/2022 from SNF with a CHIEF COMPLAINT of SOB. Patient seen earlier this year at [facility] but is not a candidate for heart surgery, patient would essentially require a heart transplant. CXR showing cardiomegaly with findings of CHF, small right pleural effusion and moderate left pleural effusion. Palliative medicine was consulted for goals of care and CODE STATUS discussion. ASSESSMENT/PLAN:     Pertinent Hospital Diagnoses     HFrEF, EF 20%  CAD s/p CABG 2007  Hx prostate cancer    Palliative Care Encounter / Counseling Regarding Goals of Care  Please see detailed goals of care discussion as below  At this time, Manju Kirkpatrick, Does Not have capacity for medical decision-making.   Capacity is time limited and situation/question specific  During encounter Paula was surrogate medical decision-maker  Outcome of goals of care meeting:   Continue current medical management   Code status Full Code  Advanced Directives: no POA or living will in [facility]  Surrogate/Legal NOK:  Christiano Matthews (daughter) [phone]    Spiritual assessment: no spiritual distress identified  Bereavement and grief: to be determined  Referrals to: none today  SUBJECTIVE:     Current medical issues leading to Palliative Medicine involvement include   Active Hospital Problems    Diagnosis Date Noted    Acute on chronic HFrEF (heart failure with reduced ejection fraction) ([facility] [address] [I50.23] 10/02/2022     Priority: Medium    VHD (valvular heart disease) Raffy Floyd 10/02/2022     Priority: Medium    Congestive heart failure of unknown etiology ([facility] [address] [I50.9] 10/01/2022     Priority: Medium       Details of Conversation:    Chart reviewed. Update received from nursing. Patient seen Gia Dunn in bed, remains slightly agitated and confused. Spoke with daughter, Elba Clinton, on the phone updated on current condition. Paula states she was at the hospital yesterday to visit her dad and she understands his condition is poor. After further conversation Paula states she would like to change CODE STATUS to limited/DNR CCA/DNI and she would like to speak with hospice for information. Her ultimate goal would to be to bring him home with hospice. Emotional support given and all questions addressed. We will continue to follow for ongoing goals of care discussion as well as support for the patient and family. OBJECTIVE:   Prognosis: Guarded    Physical Exam:  /61   Pulse 87   Temp 97.6 °F (36.4 °C) (Oral)   Resp 20   Ht 5' 11\" (1.803 m)   Wt 200 lb 1.6 oz (90.8 kg)   SpO2 98%   BMI 27.91 kg/m²   Constitutional:  Awake, slightly agitated and intermittently confused  Lungs:  CTA bilaterally, no audible rhonchi or wheezes noted, respirations unlabored, no retractions  Heart: RRR, distant heart tones, no murmur, rub, or gallop noted during exam  Abd:  Soft, non tender, non distended, bowel sounds present  Ext:  Moving all extremities, no edema, pulses present  Skin:  Warm and dry, no rashes on visible skin  Neuro:  Alert, grossly nonfocal; following commands    Objective data reviewed: labs, images, records, medication use, vitals, and chart    Discussed patient and the plan of care with the other IDT members: Palliative Medicine IDT Team, Floor Nurse, Patient, and Family    Time/Communication  Greater than 50% of time spent, total 25 minutes in counseling and coordination of care at the bedside regarding goals of care and diagnosis and prognosis.     Thank you for allowing Palliative Medicine to participate in the care of Jaime Adelaida.

## 2022-10-05 NOTE — CONSULTS
[facility]  [address]. [facility], [facility] [facility]  [phone]  FAX [phone]        REASON FOR CONSULTATION:      Leaking from around Hu catheter, history of prostate cancer    HISTORY OF PRESENT ILLNESS:      The patient is a 64 y.o. male patient who is admitted to [facility]. The patient complains of feeling \"awful \"and fatigue. He has a history of prostate cancer per his medical record but he is unable to communicate which urologist he has seen and when his diagnosis was made. He also is unable to relay what treatment he had for this prostate cancer. He currently has an indwelling Hu catheter and is unable to relate if he has a catheter chronically at [place] [address] or if this is a new catheter during this hospitalization. Per the medical record this was placed in the emergency room. He is having leaking from around the Hu catheter likely secondary to bladder spasm. Per his nurse the catheter is draining urine into the bag. Past Medical History:   Diagnosis Date    CAD (coronary artery disease)     Cardiomyopathy ([facility] [address]     CHF (congestive heart failure) (HCC)     Diabetes mellitus ([facility] [address]     Diverticulitis     Hyperlipidemia     Hypertension     MI, old     Pancreatitis     Prostate cancer ([facility] [address]     prostate    S/P CABG x 4     Skin cancer     Ventricular tachycardia [facility])          Past Surgical History:   Procedure Laterality Date    BLADDER SURGERY      CARDIAC DEFIBRILLATOR PLACEMENT  04/19/2018    [facility]    CARDIAC PACEMAKER PLACEMENT  04/2010    [facility]     COLONOSCOPY      CORONARY ARTERY BYPASS GRAFT  12/24/2007    ELBOW SURGERY      HERNIA REPAIR      KIDNEY SURGERY      SKIN CANCER EXCISION  2019    nose and cheek       Medications Prior to Admission:    Medications Prior to Admission: LORazepam (ATIVAN) 0.5 MG tablet, Take 0.5 mg by mouth 3 times daily.   mirtazapine (REMERON) 30 MG tablet, Take 30 mg by mouth nightly  senna-docusate (PERICOLACE) 8.6-50 MG per tablet, Take 2 tablets by mouth daily as needed for Constipation  isosorbide dinitrate (ISORDIL) 20 MG tablet, Take 1 tablet by mouth in the morning and 1 tablet at noon and 1 tablet before bedtime. omeprazole (PRILOSEC) 40 MG delayed release capsule, Take 1 capsule by mouth in the morning. LINZESS 290 MCG CAPS capsule, TAKE 1 CAPSULE BY MOUTH EVERY DAY  traZODone (DESYREL) 50 MG tablet, TAKE 1 TABLET BY MOUTH AT BEDTIME  torsemide (DEMADEX) 20 MG tablet, Take 1 tablet by mouth daily  metoprolol succinate (TOPROL XL) 25 MG extended release tablet, Take 1 tablet by mouth daily  hydrALAZINE (APRESOLINE) 25 MG tablet, Take 1 tablet by mouth in the morning and at bedtime  nitroGLYCERIN (NITROSTAT) 0.4 MG SL tablet, Place 1 tablet under the tongue every 5 minutes as needed for Chest pain  acetaminophen (TYLENOL 8 HOUR ARTHRITIS PAIN) 650 MG extended release tablet, Take 650 mg by mouth every 8 hours as needed for Pain  OXYGEN, Inhale 4 L into the lungs as needed   finasteride (PROSCAR) 5 MG tablet, take 1 tablet by mouth once daily  glipiZIDE (GLUCOTROL) 5 MG tablet, Take 5 mg by mouth 2 times daily (before meals)   aspirin 81 MG tablet, Take 81 mg by mouth daily. tamsulosin (FLOMAX) 0.4 MG capsule, Take 0.4 mg by mouth daily     Allergies:    Patient has no known allergies. Social History:    reports that he has never smoked. He has never used smokeless tobacco. He reports that he does not drink alcohol and does not use drugs. Family History:   Non-contributory to this Urological problem  family history includes Cancer in his father. REVIEW OF SYSTEMS:  Poor historian. Unable to give reliable review of systems.     PHYSICAL EXAM:    Vitals:  /61   Pulse 87   Temp 97.6 °F (36.4 °C) (Oral)   Resp 20   Ht 5' 11\" (1.803 m)   Wt 200 lb 1.6 oz (90.8 kg)   SpO2 98%   BMI 27.91 kg/m²     General:  Awake, alert, oriented X 3.  Well developed, poorly nourished, poorly groomed, poor hygiene. No apparent distress. HEENT:  Normocephalic, atraumatic. Pupils equal, round. No scleral icterus. No conjunctival injection. No nasal discharge. Neck:  Supple, no masses. Heart:  RRR  Lungs:  No audible wheezing. Respirations symmetric and non-labored. Oxygen via nasal cannula  Abdomen:  soft, nontender, no masses, no organomegaly, no peritoneal signs  Extremities:  No clubbing, cyanosis, or edema  Skin:  Warm and dry, no open lesions or rashes  Neuro:  Cranial nerves 2-12 intact, no focal deficits  Rectal: deferred  Genitalia: Hu catheter draining clear, yellow urine in the tubing and small amounts    LABS:    Lab Results   Component Value Date    WBC 9.1 10/01/2022    HGB 11.9 (L) 10/01/2022    HCT 40.0 10/01/2022    MCV 84.2 10/01/2022     10/01/2022       Lab Results   Component Value Date    CREATININE 1.2 10/05/2022       Lab Results   Component Value Date    PSA <0.03 09/20/2021    PSA <0.03 07/31/2020    PSA <0.01 01/22/2020       No results found for: LABURIN    No results found for: BC    No results found for: Beverley Santillan / PLAN:      1. Bladder spasm causing urine to leak around Hu catheter. Recommend opium and belladonna alkaloid suppositories as needed to help with this. Uncertain as to why patient has a Hu catheter. Will need to give trial of voiding prior to discharge to ensure that he empties his bladder. 2.  History of prostate cancer. Last PSA on record is undetectable 1 year ago. Patient is unable to tell which treatment he had or who his urologist is or was. We will check another PSA with next blood draw.       Jannette Chaudhari MD, M.D.  2:46 PM  10/5/2022

## 2022-10-05 NOTE — PLAN OF CARE
Problem: Discharge Planning  Goal: Discharge to home or other facility with appropriate resources  Outcome: Progressing     Problem: Skin/Tissue Integrity  Goal: Absence of new skin breakdown  Description: 1. Monitor for areas of redness and/or skin breakdown  2. Assess vascular access sites hourly  3. Every 4-6 hours minimum:  Change oxygen saturation probe site  4. Every 4-6 hours:  If on nasal continuous positive airway pressure, respiratory therapy assess nares and determine need for appliance change or resting period.   Outcome: Progressing     Problem: Safety - Adult  Goal: Free from fall injury  Outcome: Progressing     Problem: Chronic Conditions and Co-morbidities  Goal: Patient's chronic conditions and co-morbidity symptoms are monitored and maintained or improved  Outcome: Progressing     Problem: Pain  Goal: Verbalizes/displays adequate comfort level or baseline comfort level  Outcome: Progressing

## 2022-10-05 NOTE — PROGRESS NOTES
HOSPICE [facility]    Placed call to Jacey noyola. She requested this nurse call her back [phone] as she is currently at work. Electronically signed by Evans Savage RN on 10/5/2022 at 4:27 PM   [phone] - called Jacey noyola. No answer unable to leave voice mail. [phone] - received a call back from Jacey Michael. Hospice care and philosophy explained. Reviewed hospice at home including physician, nurses, aids,  and . Jacey Noyola is unsure the best option at this time. She is adamant her father does not return to the nursing home. She stated he hated it there. She informed this nurse the utilities were turned off at his house so she is going to get those turned back on, reach out to other family to see if they can help with him at home. She was overwhelmed but stated she is going to get working on everything right away. She does not know if she is ready for hospice, but agreeable to a follow-up on Friday. Will updated nursing and .

## 2022-10-05 NOTE — CARE COORDINATION
10/5/2022  Social Work Discharge Planning:Plan is to return to [address] at discharge. Pt is a bedhold. DIMITRI and transport form are completed. Np precert is needed. COVID test will be needed on day of discharge. Electronically signed by JARROD Dickerson on 10/5/2022 at 9:14 AM

## 2022-10-05 NOTE — PROGRESS NOTES
Subjective: The patient is resting comfortably, O2 support  No problems overnight. Objective:    /68   Pulse 98   Temp 97.5 °F (36.4 °C) (Oral)   Resp 18   Ht 5' 11\" (1.803 m)   Wt 200 lb 1.6 oz (90.8 kg)   SpO2 99%   BMI 27.91 kg/m²     Heart:  RRR, no murmurs, gallops, or rubs.   Lungs:  Scattered coarse sounds  Abd: bowel sounds present, nontender, nondistended, no masses  Extrem:  edema  present     Labs:  CBC:   Lab Results   Component Value Date/Time    WBC 9.1 10/01/2022 05:58 PM    RBC 4.75 10/01/2022 05:58 PM    HGB 11.9 10/01/2022 05:58 PM    HCT 40.0 10/01/2022 05:58 PM    MCV 84.2 10/01/2022 05:58 PM    MCH 25.1 10/01/2022 05:58 PM    MCHC 29.8 10/01/2022 05:58 PM    RDW 18.0 10/01/2022 05:58 PM     10/01/2022 05:58 PM    MPV 11.2 10/01/2022 05:58 PM     CMP:    Lab Results   Component Value Date/Time     10/04/2022 04:15 AM    K 4.1 10/04/2022 04:15 AM     10/04/2022 04:15 AM    CO2 30 10/04/2022 04:15 AM    BUN 35 10/04/2022 04:15 AM    CREATININE 1.2 10/04/2022 04:15 AM    GFRAA >60 10/04/2022 04:15 AM    LABGLOM 58 10/04/2022 04:15 AM    GLUCOSE 93 10/04/2022 04:15 AM    GLUCOSE 90 10/10/2011 10:15 AM    PROT 6.4 10/01/2022 05:58 PM    LABALBU 3.6 10/01/2022 05:58 PM    LABALBU 4.8 10/10/2011 10:15 AM    CALCIUM 8.6 10/04/2022 04:15 AM    BILITOT 1.0 10/01/2022 05:58 PM    ALKPHOS 151 10/01/2022 05:58 PM    AST 21 10/01/2022 05:58 PM    ALT 8 10/01/2022 05:58 PM     PT/INR:    Lab Results   Component Value Date/Time    PROTIME 13.3 04/08/2014 09:20 AM    INR 1.2 04/08/2014 09:20 AM          Current Facility-Administered Medications:     spironolactone (ALDACTONE) tablet 12.5 mg, 12.5 mg, Oral, Daily, Jessenia Banda MD, 12.5 mg at 10/05/22 4371    miconazole nitrate 2 % ointment, , Topical, BID, Arabella Gandhi DO, Given at 10/04/22 2158    ammonium lactate (LAC-HYDRIN) 12 % lotion, , Topical, Daily, Arabella Gandhi DO, Given at 10/05/22 0812    metoprolol succinate (TOPROL XL) extended release tablet 25 mg, 25 mg, Oral, BID, [facility]. Ginder, APN, 25 mg at 10/05/22 0812    bumetanide (BUMEX) injection 1 mg, 1 mg, IntraVENous, TID, [facility].  Ginder, APN, 1 mg at 10/05/22 4364    ipratropium-albuterol (DUONEB) nebulizer solution 1 ampule, 1 ampule, Inhalation, Q4H PRN, Cherly Kanner, APRN - CNP    LORazepam (ATIVAN) tablet 0.5 mg, 0.5 mg, Oral, TID PRN, Cherly Kanner, APRN - CNP, 0.5 mg at 10/04/22 2241    mirtazapine (REMERON) tablet 30 mg, 30 mg, Oral, Nightly, Cherly Kanner, APRN - CNP, 30 mg at 10/04/22 2041    sennosides-docusate sodium (SENOKOT-S) 8.6-50 MG tablet 2 tablet, 2 tablet, Oral, Nightly, Cherly Kanner, APRN - CNP, 2 tablet at 10/04/22 2041    aspirin EC tablet 81 mg, 81 mg, Oral, Daily, Nini Hamilton MD, 81 mg at 10/05/22 4719    finasteride (PROSCAR) tablet 5 mg, 5 mg, Oral, Daily, Nini Hamilton MD, 5 mg at 10/05/22 4494    insulin lispro (HUMALOG) injection vial 0-4 Units, 0-4 Units, SubCUTAneous, TID WC, Nini Hamilton MD    insulin lispro (HUMALOG) injection vial 0-4 Units, 0-4 Units, SubCUTAneous, Nightly, Nini Hamilton MD    hydrALAZINE (APRESOLINE) tablet 25 mg, 25 mg, Oral, BID, Nini Hamilton MD, 25 mg at 10/05/22 0240    isosorbide dinitrate (ISORDIL) tablet 20 mg, 20 mg, Oral, TID, Nini Hamilton MD, 20 mg at 10/05/22 0811    pantoprazole (PROTONIX) tablet 40 mg, 40 mg, Oral, QAM AC, Nini Hamilton MD, 40 mg at 10/05/22 5213    tamsulosin (FLOMAX) capsule 0.4 mg, 0.4 mg, Oral, Daily, Nini Hamilton MD, 0.4 mg at 10/05/22 8572    traZODone (DESYREL) tablet 50 mg, 50 mg, Oral, Nightly, Nini Hamilton MD, 50 mg at 10/04/22 2041    sodium chloride flush 0.9 % injection 5-40 mL, 5-40 mL, IntraVENous, 2 times per day, Nini Hamilton MD, 10 mL at 10/05/22 2348    sodium chloride flush 0.9 % injection 5-40 mL, 5-40 mL, IntraVENous, PRN, Nini Hamilton MD    0.9 % sodium chloride infusion, , IntraVENous, PRN, Nini Hamilton MD    enoxaparin (LOVENOX) injection 40 mg, 40 mg, SubCUTAneous, Daily, Elijah Ruiz MD, 40 mg at 10/05/22 1022    polyethylene glycol (GLYCOLAX) packet 17 g, 17 g, Oral, Daily PRN, Elijah Ruiz MD    acetaminophen (TYLENOL) tablet 650 mg, 650 mg, Oral, Q6H PRN, 650 mg at 10/05/22 0523 **OR** acetaminophen (TYLENOL) suppository 650 mg, 650 mg, Rectal, Q6H PRN, Elijah Ruiz MD    glucose chewable tablet 16 g, 4 tablet, Oral, PRN, Elijah Ruiz MD    dextrose bolus 10% 125 mL, 125 mL, IntraVENous, PRN **OR** dextrose bolus 10% 250 mL, 250 mL, IntraVENous, PRN, Elijah Ruiz MD    glucagon (rDNA) injection 1 mg, 1 mg, SubCUTAneous, PRN, Elijah Ruiz MD    dextrose 10 % infusion, , IntraVENous, Continuous PRN, Elijah Ruiz MD    Assessment:  See below, Acute on Chronic HF  Non- compliance with meds     Patient Active Problem List   Diagnosis    CHF (congestive heart failure) ([facility])    S/P CABG x 4    CAD (coronary artery disease)    MI, old    Hyperlipidemia    Hypertension    Cardiomyopathy ([facility] [address]    Ventricular tachycardia    Pancreatitis    AICD (automatic cardioverter/defibrillator) present    Implantable cardioverter-defibrillator (ICD) at end of device life    Chest pain, atypical    Failure to thrive in adult    Congestive heart failure of unknown etiology ([facility] [address]    Acute on chronic HFrEF (heart failure with reduced ejection fraction) ([facility])    VHD (valvular heart disease)       Plan:  See orders  Continue IV Bumex   ATB per ID  Labs,meds noted Add Simvastatin 20mg   Cardiology input reviewe  Plan D/C [facility]  Urology consult          Erin Sin DO  9:31 AM  10/5/2022

## 2022-10-05 NOTE — PLAN OF CARE
Problem: Discharge Planning  Goal: Discharge to home or other facility with appropriate resources  10/4/2022 1715 by Marcelina Keating  Outcome: Progressing  10/4/2022 1233 by Charisse Jiang, RN  Outcome: Progressing     Problem: Skin/Tissue Integrity  Goal: Absence of new skin breakdown  Description: 1. Monitor for areas of redness and/or skin breakdown  2. Assess vascular access sites hourly  3. Every 4-6 hours minimum:  Change oxygen saturation probe site  4. Every 4-6 hours:  If on nasal continuous positive airway pressure, respiratory therapy assess nares and determine need for appliance change or resting period.   10/4/2022 2304 by Ameya Pulliam RN  Outcome: Progressing  10/4/2022 1715 by Marcelina Keating  Outcome: Progressing  10/4/2022 1233 by Charisse Jiang, RN  Outcome: Progressing

## 2022-10-06 LAB
ANION GAP SERPL CALCULATED.3IONS-SCNC: 11 MMOL/L (ref 7–16)
BUN BLDV-MCNC: 37 MG/DL (ref 6–23)
CALCIUM SERPL-MCNC: 9 MG/DL (ref 8.6–10.2)
CHLORIDE BLD-SCNC: 103 MMOL/L (ref 98–107)
CO2: 27 MMOL/L (ref 22–29)
CREAT SERPL-MCNC: 1.2 MG/DL (ref 0.7–1.2)
GFR AFRICAN AMERICAN: >60
GFR NON-AFRICAN AMERICAN: 58 ML/MIN/1.73
GLUCOSE BLD-MCNC: 107 MG/DL (ref 74–99)
METER GLUCOSE: 101 MG/DL (ref 74–99)
METER GLUCOSE: 110 MG/DL (ref 74–99)
METER GLUCOSE: 122 MG/DL (ref 74–99)
METER GLUCOSE: 94 MG/DL (ref 74–99)
POTASSIUM SERPL-SCNC: 4.1 MMOL/L (ref 3.5–5)
PRO-BNP: ABNORMAL PG/ML (ref 0–450)
PROSTATE SPECIFIC ANTIGEN: <0.01 NG/ML (ref 0–4)
SODIUM BLD-SCNC: 141 MMOL/L (ref 132–146)

## 2022-10-06 PROCEDURE — 36415 COLL VENOUS BLD VENIPUNCTURE: CPT

## 2022-10-06 PROCEDURE — 6370000000 HC RX 637 (ALT 250 FOR IP): Performed by: INTERNAL MEDICINE

## 2022-10-06 PROCEDURE — 2060000000 HC ICU INTERMEDIATE R&B

## 2022-10-06 PROCEDURE — 83880 ASSAY OF NATRIURETIC PEPTIDE: CPT

## 2022-10-06 PROCEDURE — 84153 ASSAY OF PSA TOTAL: CPT

## 2022-10-06 PROCEDURE — 2500000003 HC RX 250 WO HCPCS: Performed by: NURSE PRACTITIONER

## 2022-10-06 PROCEDURE — 2700000000 HC OXYGEN THERAPY PER DAY

## 2022-10-06 PROCEDURE — 6370000000 HC RX 637 (ALT 250 FOR IP): Performed by: FAMILY MEDICINE

## 2022-10-06 PROCEDURE — 6370000000 HC RX 637 (ALT 250 FOR IP): Performed by: NURSE PRACTITIONER

## 2022-10-06 PROCEDURE — 80048 BASIC METABOLIC PNL TOTAL CA: CPT

## 2022-10-06 PROCEDURE — 82962 GLUCOSE BLOOD TEST: CPT

## 2022-10-06 PROCEDURE — 2580000003 HC RX 258: Performed by: INTERNAL MEDICINE

## 2022-10-06 PROCEDURE — 99233 SBSQ HOSP IP/OBS HIGH 50: CPT | Performed by: INTERNAL MEDICINE

## 2022-10-06 PROCEDURE — 6360000002 HC RX W HCPCS: Performed by: INTERNAL MEDICINE

## 2022-10-06 RX ORDER — HYOSCYAMINE SULFATE 0.125 MG
125 TABLET ORAL EVERY 6 HOURS PRN
Status: DISPENSED | OUTPATIENT
Start: 2022-10-06 | End: 2022-10-08

## 2022-10-06 RX ADMIN — ASPIRIN 81 MG: 81 TABLET, COATED ORAL at 10:28

## 2022-10-06 RX ADMIN — ISOSORBIDE DINITRATE 20 MG: 10 TABLET ORAL at 10:28

## 2022-10-06 RX ADMIN — ROSUVASTATIN CALCIUM 20 MG: 20 TABLET, FILM COATED ORAL at 20:13

## 2022-10-06 RX ADMIN — MICONAZOLE NITRATE: 2 OINTMENT TOPICAL at 20:16

## 2022-10-06 RX ADMIN — DOCUSATE SODIUM 50 MG AND SENNOSIDES 8.6 MG 2 TABLET: 8.6; 5 TABLET, FILM COATED ORAL at 20:13

## 2022-10-06 RX ADMIN — PANTOPRAZOLE SODIUM 40 MG: 40 TABLET, DELAYED RELEASE ORAL at 06:21

## 2022-10-06 RX ADMIN — MIRTAZAPINE 30 MG: 15 TABLET, FILM COATED ORAL at 20:13

## 2022-10-06 RX ADMIN — ACETAMINOPHEN 650 MG: 325 TABLET ORAL at 10:28

## 2022-10-06 RX ADMIN — BUMETANIDE 1 MG: 0.25 INJECTION, SOLUTION INTRAMUSCULAR; INTRAVENOUS at 10:27

## 2022-10-06 RX ADMIN — Medication: at 10:30

## 2022-10-06 RX ADMIN — TAMSULOSIN HYDROCHLORIDE 0.4 MG: 0.4 CAPSULE ORAL at 10:38

## 2022-10-06 RX ADMIN — HYDRALAZINE HYDROCHLORIDE 25 MG: 25 TABLET, FILM COATED ORAL at 10:29

## 2022-10-06 RX ADMIN — FINASTERIDE 5 MG: 5 TABLET, FILM COATED ORAL at 10:28

## 2022-10-06 RX ADMIN — SODIUM CHLORIDE, PRESERVATIVE FREE 10 ML: 5 INJECTION INTRAVENOUS at 15:08

## 2022-10-06 RX ADMIN — HYOSCYAMINE SULFATE 125 MCG: 0.12 TABLET ORAL at 10:29

## 2022-10-06 RX ADMIN — ENOXAPARIN SODIUM 40 MG: 100 INJECTION SUBCUTANEOUS at 10:27

## 2022-10-06 RX ADMIN — BUMETANIDE 1 MG: 0.25 INJECTION, SOLUTION INTRAMUSCULAR; INTRAVENOUS at 20:12

## 2022-10-06 RX ADMIN — BUMETANIDE 1 MG: 0.25 INJECTION, SOLUTION INTRAMUSCULAR; INTRAVENOUS at 15:08

## 2022-10-06 RX ADMIN — TRAZODONE HYDROCHLORIDE 50 MG: 50 TABLET ORAL at 20:13

## 2022-10-06 RX ADMIN — SODIUM CHLORIDE, PRESERVATIVE FREE 10 ML: 5 INJECTION INTRAVENOUS at 20:15

## 2022-10-06 RX ADMIN — SPIRONOLACTONE 12.5 MG: 25 TABLET ORAL at 10:38

## 2022-10-06 RX ADMIN — SODIUM CHLORIDE, PRESERVATIVE FREE 10 ML: 5 INJECTION INTRAVENOUS at 10:29

## 2022-10-06 RX ADMIN — MICONAZOLE NITRATE: 2 OINTMENT TOPICAL at 10:32

## 2022-10-06 RX ADMIN — HYDRALAZINE HYDROCHLORIDE 25 MG: 25 TABLET, FILM COATED ORAL at 21:19

## 2022-10-06 RX ADMIN — METOPROLOL SUCCINATE 25 MG: 25 TABLET, FILM COATED, EXTENDED RELEASE ORAL at 10:38

## 2022-10-06 RX ADMIN — ISOSORBIDE DINITRATE 20 MG: 10 TABLET ORAL at 20:13

## 2022-10-06 RX ADMIN — ISOSORBIDE DINITRATE 20 MG: 10 TABLET ORAL at 15:08

## 2022-10-06 ASSESSMENT — PAIN SCALES - GENERAL: PAINLEVEL_OUTOF10: 8

## 2022-10-06 NOTE — CARE COORDINATION
10/6/2022  Social Work Discharge Planning:Pt is from [facility], a bedhold and plan was to [facility], per daughter Pt doesn't want to return and she is going to reach out to her family and see if they can help Pt at home. HOV will be meeting with daughter. If Pt were to return to [facility], [address] and transport form are completed and Pt would need  a COVID test. Electronically signed by Guillermo Schroeder [facility] on 10/6/2022 at 9:22 AM    10/6/2022Social Work Discharge Planning: Richard just spoke to Pts daughter Paula, who said she is still considering HOV but still undecided. She said that Pts water is turned on but there are problems with the pipes and she is trying to get it resolved. Paula stated Pt cannot come to her home because it too far away. Richard informed that Pt will not be able to stay at the hospital until repaires are done to the home if its going to be a long process and that Pt will need to return to [facility]  until she can take him back home. Paula asked to have a day to make some phone calls to see what she can accomplish so she can bring Pt home. RICHARD also gave the number for [employer]-waiver program for Paula to call and see if Pt can get assistance in the home.  Electronically signed by JARROD Schroeder on 10/6/2022 at 10:44 AM

## 2022-10-06 NOTE — PLAN OF CARE
Problem: Skin/Tissue Integrity  Goal: Absence of new skin breakdown  Description: 1. Monitor for areas of redness and/or skin breakdown  2. Assess vascular access sites hourly  3. Every 4-6 hours minimum:  Change oxygen saturation probe site  4. Every 4-6 hours:  If on nasal continuous positive airway pressure, respiratory therapy assess nares and determine need for appliance change or resting period.   10/5/2022 2248 by Gilmar Love RN  Outcome: Progressing  10/5/2022 1615 by Thurman Cockayne, RN  Outcome: Progressing     Problem: Safety - Adult  Goal: Free from fall injury  10/5/2022 2248 by Gilmar Love RN  Outcome: Progressing  10/5/2022 1615 by Thurman Cockayne, RN  Outcome: Progressing

## 2022-10-06 NOTE — PROGRESS NOTES
12.5 mg Oral Daily Veronica Milligan MD   12.5 mg at 10/05/22 8650    miconazole nitrate 2 % ointment   Topical BID Mary Francisco, DO   Given at 10/05/22 2140    ammonium lactate (LAC-HYDRIN) 12 % lotion   Topical Daily Mary Francisco, DO   Given at 10/05/22 2290    metoprolol succinate (TOPROL XL) extended release tablet 25 mg  25 mg Oral BID Knijal Bravo. Georgiander APN   25 mg at 10/05/22 2120    bumetanide (BUMEX) injection 1 mg  1 mg IntraVENous TID Kinjal Bravo.  Rajan APN   1 mg at 10/05/22 2119    ipratropium-albuterol (DUONEB) nebulizer solution 1 ampule  1 ampule Inhalation Q4H PRN GINA Allen CNP        LORazepam (ATIVAN) tablet 0.5 mg  0.5 mg Oral TID PRN GINA Allen CNP   0.5 mg at 10/04/22 2241    mirtazapine (REMERON) tablet 30 mg  30 mg Oral Nightly GINA Allen CNP   30 mg at 10/05/22 2123    sennosides-docusate sodium (SENOKOT-S) 8.6-50 MG tablet 2 tablet  2 tablet Oral Nightly GINA Allen CNP   2 tablet at 10/05/22 2120    aspirin EC tablet 81 mg  81 mg Oral Daily Mirza Gutierrez MD   81 mg at 10/05/22 1443    finasteride (PROSCAR) tablet 5 mg  5 mg Oral Daily Mirza Gutierrez MD   5 mg at 10/05/22 9313    insulin lispro (HUMALOG) injection vial 0-4 Units  0-4 Units SubCUTAneous TID WC Mirza Gutierrez MD        insulin lispro (HUMALOG) injection vial 0-4 Units  0-4 Units SubCUTAneous Nightly Mirza Gutierrez MD        hydrALAZINE (APRESOLINE) tablet 25 mg  25 mg Oral BID Mirza Gutierrez MD   25 mg at 10/05/22 2120    isosorbide dinitrate (ISORDIL) tablet 20 mg  20 mg Oral TID Mirza Gutierrez MD   20 mg at 10/05/22 2120    pantoprazole (PROTONIX) tablet 40 mg  40 mg Oral QAM AC Mirza Gutierrez MD   40 mg at 10/06/22 4897    tamsulosin (FLOMAX) capsule 0.4 mg  0.4 mg Oral Daily Mirza Gutierrez MD   0.4 mg at 10/05/22 0811    traZODone (DESYREL) tablet 50 mg  50 mg Oral Nightly Mirza Gutierrez MD   50 mg at 10/05/22 0070    sodium chloride flush 0.9 % injection 5-40 mL 5-40 mL IntraVENous 2 times per day Jerod Forbes MD   10 mL at 10/05/22 2120    sodium chloride flush 0.9 % injection 5-40 mL  5-40 mL IntraVENous PRN Jerod Forbes MD        0.9 % sodium chloride infusion   IntraVENous PRN Jerod Forbes MD        enoxaparin (LOVENOX) injection 40 mg  40 mg SubCUTAneous Daily Jerod Forbes MD   40 mg at 10/05/22 6439    polyethylene glycol (GLYCOLAX) packet 17 g  17 g Oral Daily PRN Jerod Forbes MD        acetaminophen (TYLENOL) tablet 650 mg  650 mg Oral Q6H PRN Jerdo Forbes MD   650 mg at 10/05/22 2126    Or    acetaminophen (TYLENOL) suppository 650 mg  650 mg Rectal Q6H PRN Jerod Forbes MD        glucose chewable tablet 16 g  4 tablet Oral PRN Jerod Forbes MD        dextrose bolus 10% 125 mL  125 mL IntraVENous PRN Jerod Forbes MD        Or    dextrose bolus 10% 250 mL  250 mL IntraVENous PRN Jerod Forbes MD        glucagon (rDNA) injection 1 mg  1 mg SubCUTAneous PRN Jerod Forbes MD        dextrose 10 % infusion   IntraVENous Continuous PRN Jerod Forbes MD          sodium chloride      dextrose         Physical Exam:  BP (!) 126/55   Pulse (!) 46   Temp 98.1 °F (36.7 °C) (Oral)   Resp 20   Ht 5' 11\" (1.803 m)   Wt 200 lb 1.6 oz (90.8 kg)   SpO2 95%   BMI 27.91 kg/m²   Wt Readings from Last 3 Encounters:   10/03/22 200 lb 1.6 oz (90.8 kg)   07/22/22 177 lb 14.6 oz (80.7 kg)   06/30/22 177 lb 14.4 oz (80.7 kg)     Appearance: Awake, alert, no acute respiratory distress but appears chronically ill. Skin: Intact, no rash  Head: Normocephalic, atraumatic  Eyes: EOMI, no conjunctival erythema  ENMT: No pharyngeal erythema, MMM, no rhinorrhea  Neck: Supple, elevated JVP, no carotid bruits  Lungs: Clear to auscultation bilaterally. No wheezes, rales, or rhonchi.   Cardiac: Regular rate and rhythm, +S1S2, no murmurs apparent  Abdomen: Soft, nontender, +bowel sounds  Extremities: Moves all extremities x 4, 1+ lower extremity edema  Neurologic: No focal motor deficits apparent, normal mood and affect  Peripheral Pulses: Intact posterior tibial pulses bilaterally    Intake/Output:    Intake/Output Summary (Last 24 hours) at 10/6/2022 0917  Last data filed at 10/6/2022 0908  Gross per 24 hour   Intake 180 ml   Output 500 ml   Net -320 ml     I/O this shift:  In: 180 [P.O.:180]  Out: -     Laboratory Tests:  Recent Labs     10/04/22  0415 10/05/22  1100 10/06/22  0658    142 141   K 4.1 4.0 4.1    103 103   CO2 30* 30* 27   BUN 35* 35* 37*   CREATININE 1.2 1.2 1.2   GLUCOSE 93 92 107*   CALCIUM 8.6 8.9 9.0     Lab Results   Component Value Date/Time    MG 1.9 07/21/2022 01:57 PM     No results for input(s): ALKPHOS, ALT, AST, PROT, BILITOT, BILIDIR, LABALBU in the last 72 hours. No results for input(s): WBC, RBC, HGB, HCT, MCV, MCH, MCHC, RDW, PLT, MPV in the last 72 hours.     Lab Results   Component Value Date    CKTOTAL 113 04/08/2014    CKMB 2.8 04/08/2014    TROPONINI 0.03 02/24/2021    TROPONINI <0.01 04/14/2014    TROPONINI <0.01 04/08/2014     Lab Results   Component Value Date    INR 1.2 04/08/2014    PROTIME 13.3 (H) 04/08/2014     Lab Results   Component Value Date    TSH 2.500 10/01/2022     Lab Results   Component Value Date    LABA1C 5.9 (H) 10/01/2022     No results found for: EAG  Lab Results   Component Value Date    CHOL 105 10/01/2022    CHOL 224 (H) 09/20/2021    CHOL 296 (H) 08/12/2014     Lab Results   Component Value Date    TRIG 58 10/01/2022    TRIG 361 (H) 09/20/2021    TRIG 1,001 (H) 08/12/2014     Lab Results   Component Value Date    HDL 40 10/01/2022    HDL 33 09/20/2021    HDL 25 08/12/2014     Lab Results   Component Value Date    LDLCALC 53 10/01/2022    LDLCALC 119 (H) 09/20/2021    LDLCALC - (AA) 08/12/2014     Lab Results   Component Value Date    LABVLDL 12 10/01/2022    LABVLDL 72 09/20/2021    LABVLDL - (AA) 08/12/2014     No results found for: CHOLHDLRATIO    Cardiac Tests:  ECG: V - paced with frequent PVCs      Telemetry findings reviewed: Normal sinus rhythm in the 90s with multiple episodes of nonsustained VT. Vitals and labs were reviewed: As above blood pressure 126/55 heart rate 97  sats 99 % 2 L    Labs-BUNs/creatinine 34/1.1>> 36/1.3>>35/1.2>>37/1.2, proBNP 17,521>> 23,702>> 20,551, H&H 11.9/40, respiratory panel was negative. No labs for today. Chest X-ray: Cardiomegaly with findings of CHF, small right pleural effusion and moderate left pleural effusion. TTE: 3/15/2021 ([facility]) Exam indication: Abnormal Cardiac Biomarkers There is a resting wall motion abnormality in the territory of the LAD and RCA. - The left ventricle is dilated. Left ventricular systolic function is severely decreased. EF = 26 ± 5% (2D biplane) Grade III left ventricular diastolic dysfunction. The right ventricle is normal in size. Right ventricular systolic function is   moderately decreased. - The left atrial cavity is severely dilated. - The right atrial cavity is dilated. There is moderately severe (3+) mitral valve regurgitation due to restricted leaflet motion likely related to ischemic heart disease. TTE: 5/13/2022 ([facility]): - Technically difficult exam due to body habitus and suboptimal positioning. - Exam indication: Evaluation of known heart failure to guide therapy - The left ventricle is severely dilated. Left ventricular systolic function is severely decreased. EF = 20 ± 5% (visual est.) Definity contrast used for endocardial border detection. Left ventricular diastolic function was not evaluated due to an arrhythmia. The right ventricle is dilated. Right ventricular systolic function is moderately to severely decreased. - The left atrial cavity is severely dilated. - The right atrial cavity is dilated. There is moderate (2+) mitral valve regurgitation. - -Peak/mean gradients of 15/7 mmHg, gradients averaged due to arrhythmia. -Prior EF reported as 26% (3/2021).   Exam was compared with the prior  echocardiographic exam performed on  3/14/2021. There is no significant change. VHD: Moderate-severe ischemic MR (patient was recommended for further evaluation with EBONI and possible consideration of a clip 6/3/2022 at [facility] --> patient declined. Stress test:    [facility]: 5/16/2022: [facility]: Elevated biventricular filling pressures (RA 10, PCWP 26), moderate pulmonary hypertension (most likely postcapillary), preserved cardiac index by assumed Sunni 2.73. [facility], 3/16/2021: LMT: severe diffuse disease. LAD: severe diffuse disease. Additional Comment: Moderate caliber vessel with  in the proximal portion. The vessel is fed by patent LIMA graft. The distal portion is small in caliber and wraps the apex. There are L->R collaterals. LCX: severe diffuse disease. Additional Comment: Moderate caliber non-dominant vessel which is fed by SVG->RI/OM1. RAMUS: ostial Ramus - . Additional Comment: Fills in the mid-distal portion from SVG->RI graft. RCA Status: Not Applicable. Additional Comment: Occluded at the ostia. GRAFTS: LIMA Graft End to Side to the Left Anterior Descending. Additional Comments: patent. SVG End to Side to the RI. Additional  Comments - patent with mild-moderate disease in the mid-portion; supplies mid-distal LCx and backfills to partially supply LAD. SVG End to Side to the OM-2 Second Obtuse [place]. Additional Comments - occluded. SVG End to Side to the Right Posterior Descending Artery. Additional Comments - occluded. Impression:- Severe/occlusive native 3 vessel CAD with patent LIMA->LAD, SVG->RI/OM1. SVG->OM2 is occluded - Native RCA is occluded at the ostia as is the SVG->rPDA. The right is fed by L->R collaterals from the LIMA graft Recommended Treatment: Medical Therapy.        ASSESSMENT:  Acute on chronic decompensated HFrEF, making slow progress, UOP not accurate  Obstructive CAD is responsible for intermittent episodes of decompensated heart failure  ACC/AHA stage D, NYHA functional class II  ICMP EF 20+/-5%  History of CAD status post CABG x5v (LIMA to LAD, SVG to OM1, SVG to OM2, SVG to RI and SVG to RPDA) in 2007 s/p [facility] in 2021 ([facility]) with severe multivessel disease and all the grafts were occluded except for LIMA to LAD which was patent. Patient is not a candidate for any further cardiac intervention. Not a candidate for cardiac transplant or LVAD due to medication noncompliance. HTN: controlled  Hyperlipidemia  T2DM  History of NSVT  S/p ICD ([facility]) in situ and recent generator change in 2018  History of prostate cancer  Poor prognosis       Plan:   Continue IV diuresis with Bumex 1 mg IV TID with close monitoring of renal functions and electrolytes, labs were not done, check BMP daily and check proBNP in a.m, improving. Strict input output charting  Continue GDMT for HFrEF/ischemic cardiomyopathy hydralazine/Isordil history of intolerance to ACE/ARB, Toprol-XL. Continue Aldactone 12.5 mg po daily with close monitoring of renal functions and holding parameters. No plans for invasive cardiac testing and prior records including transthoracic echo and cardiac cath were reviewed, as above. Continue aspirin and consider adding statin therapy statin  Palliative care input noted and appreciated. Elizabeth Underwood MD., Claire Hannah.   [facility]) Cardiology

## 2022-10-06 NOTE — PROGRESS NOTES
Subjective: The patient is resting comfortably. No issues overnight      Objective:    BP (!) 126/55   Pulse (!) 46   Temp 98.1 °F (36.7 °C) (Oral)   Resp 20   Ht 5' 11\" (1.803 m)   Wt 200 lb 1.6 oz (90.8 kg)   SpO2 95%   BMI 27.91 kg/m²     Heart:  RRR, no murmurs, gallops, or rubs.   Lungs:  Decreased BS at the bases  Abd: bowel sounds present, nontender, nondistended, no masses  Extrem:  edema present    Labs:  CBC:   Lab Results   Component Value Date/Time    WBC 9.1 10/01/2022 05:58 PM    RBC 4.75 10/01/2022 05:58 PM    HGB 11.9 10/01/2022 05:58 PM    HCT 40.0 10/01/2022 05:58 PM    MCV 84.2 10/01/2022 05:58 PM    MCH 25.1 10/01/2022 05:58 PM    MCHC 29.8 10/01/2022 05:58 PM    RDW 18.0 10/01/2022 05:58 PM     10/01/2022 05:58 PM    MPV 11.2 10/01/2022 05:58 PM     CMP:    Lab Results   Component Value Date/Time     10/06/2022 06:58 AM    K 4.1 10/06/2022 06:58 AM    K 4.0 10/05/2022 11:00 AM     10/06/2022 06:58 AM    CO2 27 10/06/2022 06:58 AM    BUN 37 10/06/2022 06:58 AM    CREATININE 1.2 10/06/2022 06:58 AM    GFRAA >60 10/06/2022 06:58 AM    LABGLOM 58 10/06/2022 06:58 AM    GLUCOSE 107 10/06/2022 06:58 AM    GLUCOSE 90 10/10/2011 10:15 AM    PROT 6.4 10/01/2022 05:58 PM    LABALBU 3.6 10/01/2022 05:58 PM    LABALBU 4.8 10/10/2011 10:15 AM    CALCIUM 9.0 10/06/2022 06:58 AM    BILITOT 1.0 10/01/2022 05:58 PM    ALKPHOS 151 10/01/2022 05:58 PM    AST 21 10/01/2022 05:58 PM    ALT 8 10/01/2022 05:58 PM     PT/INR:    Lab Results   Component Value Date/Time    PROTIME 13.3 04/08/2014 09:20 AM    INR 1.2 04/08/2014 09:20 AM          Current Facility-Administered Medications:     rosuvastatin (CRESTOR) tablet 20 mg, 20 mg, Oral, Nightly, Bradley Quintanilla DO, 20 mg at 10/05/22 2127    spironolactone (ALDACTONE) tablet 12.5 mg, 12.5 mg, Oral, Daily, Juliette Sheriff MD, 12.5 mg at 10/05/22 1645    miconazole nitrate 2 % ointment, , Topical, BID, Franklin Finn DO, Given at 10/05/22 2140 ammonium lactate (LAC-HYDRIN) 12 % lotion, , Topical, Daily, Minal Crabtree DO, Given at 10/05/22 0181    metoprolol succinate (TOPROL XL) extended release tablet 25 mg, 25 mg, Oral, BID, Floy [place]. Ginder, APN, 25 mg at 10/05/22 2120    bumetanide (BUMEX) injection 1 mg, 1 mg, IntraVENous, TID, Floy [place].  Ginder, APN, 1 mg at 10/05/22 2119    ipratropium-albuterol (DUONEB) nebulizer solution 1 ampule, 1 ampule, Inhalation, Q4H PRN, Omayra Pancake, APRN - CNP    LORazepam (ATIVAN) tablet 0.5 mg, 0.5 mg, Oral, TID PRN, Omayra Pancake, APRN - CNP, 0.5 mg at 10/04/22 2241    mirtazapine (REMERON) tablet 30 mg, 30 mg, Oral, Nightly, Omayra Pancake, APRN - CNP, 30 mg at 10/05/22 2123    sennosides-docusate sodium (SENOKOT-S) 8.6-50 MG tablet 2 tablet, 2 tablet, Oral, Nightly, Omayra Pancake, APRN - CNP, 2 tablet at 10/05/22 2120    aspirin EC tablet 81 mg, 81 mg, Oral, Daily, Stacy Wong MD, 81 mg at 10/05/22 2212    finasteride (PROSCAR) tablet 5 mg, 5 mg, Oral, Daily, Stacy Wong MD, 5 mg at 10/05/22 7537    insulin lispro (HUMALOG) injection vial 0-4 Units, 0-4 Units, SubCUTAneous, TID WC, Stacy Wong MD    insulin lispro (HUMALOG) injection vial 0-4 Units, 0-4 Units, SubCUTAneous, Nightly, Stacy Wong MD    hydrALAZINE (APRESOLINE) tablet 25 mg, 25 mg, Oral, BID, Stacy Wong MD, 25 mg at 10/05/22 2120    isosorbide dinitrate (ISORDIL) tablet 20 mg, 20 mg, Oral, TID, Stacy Wong MD, 20 mg at 10/05/22 2120    pantoprazole (PROTONIX) tablet 40 mg, 40 mg, Oral, QAM AC, Stacy Wong MD, 40 mg at 10/06/22 3474    tamsulosin (FLOMAX) capsule 0.4 mg, 0.4 mg, Oral, Daily, Stacy Wong MD, 0.4 mg at 10/05/22 4715    traZODone (DESYREL) tablet 50 mg, 50 mg, Oral, Nightly, Stacy Wnog MD, 50 mg at 10/05/22 2120    sodium chloride flush 0.9 % injection 5-40 mL, 5-40 mL, IntraVENous, 2 times per day, Stacy Wong MD, 10 mL at 10/05/22 2120    sodium chloride flush 0.9 % injection 5-40 mL, 5-40 mL, IntraVENous, PRN, Yumiko Dubois MD    0.9 % sodium chloride infusion, , IntraVENous, PRN, Yumiko Dubois MD    enoxaparin (LOVENOX) injection 40 mg, 40 mg, SubCUTAneous, Daily, Yumiko Dubois MD, 40 mg at 10/05/22 1484    polyethylene glycol (GLYCOLAX) packet 17 g, 17 g, Oral, Daily PRN, Yumiko Dubois MD    acetaminophen (TYLENOL) tablet 650 mg, 650 mg, Oral, Q6H PRN, 650 mg at 10/05/22 2126 **OR** acetaminophen (TYLENOL) suppository 650 mg, 650 mg, Rectal, Q6H PRN, Yumiko Dubois MD    glucose chewable tablet 16 g, 4 tablet, Oral, PRN, Yumiko Dubois MD    dextrose bolus 10% 125 mL, 125 mL, IntraVENous, PRN **OR** dextrose bolus 10% 250 mL, 250 mL, IntraVENous, PRN, Yumiko Dubois MD    glucagon (rDNA) injection 1 mg, 1 mg, SubCUTAneous, PRN, Yumiko Dubois MD    dextrose 10 % infusion, , IntraVENous, Continuous PRN, Yumiko Dubois MD    Assessment:  See below, Acute on Chronic HF     Patient Active Problem List   Diagnosis    CHF (congestive heart failure) (HCC)    S/P CABG x 4    CAD (coronary artery disease)    MI, old    Hyperlipidemia    Hypertension    Cardiomyopathy ([address]    Ventricular tachycardia    Pancreatitis    AICD (automatic cardioverter/defibrillator) present    Implantable cardioverter-defibrillator (ICD) at end of device life    Chest pain, atypical    Failure to thrive in adult    Congestive heart failure of unknown etiology ([address]    Acute on chronic HFrEF (heart failure with reduced ejection fraction) ([address]    VHD (valvular heart disease)    Palliative care encounter    Goals of care, counseling/discussion    DNR (do not resuscitate) discussion       Plan:  See orders  Labs,meds noted,   Notes reviewed  IV Bumex  Urology consult reviewed  D/C planning to  SNF  Palliative care   Poor prognosis        Jovanna Hendricks DO  8:52 AM  10/6/2022

## 2022-10-07 LAB
ANION GAP SERPL CALCULATED.3IONS-SCNC: 7 MMOL/L (ref 7–16)
BUN BLDV-MCNC: 40 MG/DL (ref 6–23)
CALCIUM SERPL-MCNC: 8.8 MG/DL (ref 8.6–10.2)
CHLORIDE BLD-SCNC: 102 MMOL/L (ref 98–107)
CO2: 28 MMOL/L (ref 22–29)
CREAT SERPL-MCNC: 1.2 MG/DL (ref 0.7–1.2)
GFR AFRICAN AMERICAN: >60
GFR NON-AFRICAN AMERICAN: 58 ML/MIN/1.73
GLUCOSE BLD-MCNC: 108 MG/DL (ref 74–99)
MAGNESIUM: 2.3 MG/DL (ref 1.6–2.6)
MAGNESIUM: 2.3 MG/DL (ref 1.6–2.6)
METER GLUCOSE: 105 MG/DL (ref 74–99)
METER GLUCOSE: 108 MG/DL (ref 74–99)
METER GLUCOSE: 109 MG/DL (ref 74–99)
METER GLUCOSE: 130 MG/DL (ref 74–99)
POTASSIUM SERPL-SCNC: 3.7 MMOL/L (ref 3.5–5)
POTASSIUM SERPL-SCNC: 4.4 MMOL/L (ref 3.5–5)
POTASSIUM SERPL-SCNC: 5.7 MMOL/L (ref 3.5–5)
SODIUM BLD-SCNC: 137 MMOL/L (ref 132–146)

## 2022-10-07 PROCEDURE — 6370000000 HC RX 637 (ALT 250 FOR IP): Performed by: NURSE PRACTITIONER

## 2022-10-07 PROCEDURE — 2580000003 HC RX 258: Performed by: INTERNAL MEDICINE

## 2022-10-07 PROCEDURE — 6360000002 HC RX W HCPCS: Performed by: INTERNAL MEDICINE

## 2022-10-07 PROCEDURE — 6370000000 HC RX 637 (ALT 250 FOR IP): Performed by: INTERNAL MEDICINE

## 2022-10-07 PROCEDURE — 82962 GLUCOSE BLOOD TEST: CPT

## 2022-10-07 PROCEDURE — 2060000000 HC ICU INTERMEDIATE R&B

## 2022-10-07 PROCEDURE — 84132 ASSAY OF SERUM POTASSIUM: CPT

## 2022-10-07 PROCEDURE — 99233 SBSQ HOSP IP/OBS HIGH 50: CPT | Performed by: INTERNAL MEDICINE

## 2022-10-07 PROCEDURE — 2700000000 HC OXYGEN THERAPY PER DAY

## 2022-10-07 PROCEDURE — 80048 BASIC METABOLIC PNL TOTAL CA: CPT

## 2022-10-07 PROCEDURE — 2500000003 HC RX 250 WO HCPCS: Performed by: NURSE PRACTITIONER

## 2022-10-07 PROCEDURE — 36415 COLL VENOUS BLD VENIPUNCTURE: CPT

## 2022-10-07 PROCEDURE — 6370000000 HC RX 637 (ALT 250 FOR IP): Performed by: FAMILY MEDICINE

## 2022-10-07 PROCEDURE — 83735 ASSAY OF MAGNESIUM: CPT

## 2022-10-07 RX ORDER — METOPROLOL SUCCINATE 50 MG/1
50 TABLET, EXTENDED RELEASE ORAL 2 TIMES DAILY
Status: DISCONTINUED | OUTPATIENT
Start: 2022-10-07 | End: 2022-10-12 | Stop reason: HOSPADM

## 2022-10-07 RX ORDER — POTASSIUM CHLORIDE 20 MEQ/1
40 TABLET, EXTENDED RELEASE ORAL ONCE
Status: COMPLETED | OUTPATIENT
Start: 2022-10-07 | End: 2022-10-07

## 2022-10-07 RX ORDER — HYOSCYAMINE SULFATE 0.125 MG
125 TABLET,DISINTEGRATING ORAL EVERY 6 HOURS PRN
Status: DISCONTINUED | OUTPATIENT
Start: 2022-10-08 | End: 2022-10-12 | Stop reason: HOSPADM

## 2022-10-07 RX ADMIN — Medication: at 08:50

## 2022-10-07 RX ADMIN — POTASSIUM CHLORIDE 40 MEQ: 1500 TABLET, EXTENDED RELEASE ORAL at 06:50

## 2022-10-07 RX ADMIN — METOPROLOL SUCCINATE 25 MG: 25 TABLET, FILM COATED, EXTENDED RELEASE ORAL at 08:51

## 2022-10-07 RX ADMIN — LORAZEPAM 0.5 MG: 0.5 TABLET ORAL at 13:23

## 2022-10-07 RX ADMIN — PANTOPRAZOLE SODIUM 40 MG: 40 TABLET, DELAYED RELEASE ORAL at 05:59

## 2022-10-07 RX ADMIN — HYDRALAZINE HYDROCHLORIDE 25 MG: 25 TABLET, FILM COATED ORAL at 08:51

## 2022-10-07 RX ADMIN — SPIRONOLACTONE 12.5 MG: 25 TABLET ORAL at 08:50

## 2022-10-07 RX ADMIN — BUMETANIDE 1 MG: 0.25 INJECTION, SOLUTION INTRAMUSCULAR; INTRAVENOUS at 13:24

## 2022-10-07 RX ADMIN — MICONAZOLE NITRATE: 2 OINTMENT TOPICAL at 21:11

## 2022-10-07 RX ADMIN — MICONAZOLE NITRATE: 2 OINTMENT TOPICAL at 08:50

## 2022-10-07 RX ADMIN — BUMETANIDE 1 MG: 0.25 INJECTION, SOLUTION INTRAMUSCULAR; INTRAVENOUS at 08:51

## 2022-10-07 RX ADMIN — DOCUSATE SODIUM 50 MG AND SENNOSIDES 8.6 MG 2 TABLET: 8.6; 5 TABLET, FILM COATED ORAL at 21:08

## 2022-10-07 RX ADMIN — HYOSCYAMINE SULFATE 125 MCG: 0.12 TABLET ORAL at 08:51

## 2022-10-07 RX ADMIN — HYOSCYAMINE SULFATE 125 MCG: 0.12 TABLET ORAL at 13:24

## 2022-10-07 RX ADMIN — TAMSULOSIN HYDROCHLORIDE 0.4 MG: 0.4 CAPSULE ORAL at 08:50

## 2022-10-07 RX ADMIN — BUMETANIDE 1 MG: 0.25 INJECTION, SOLUTION INTRAMUSCULAR; INTRAVENOUS at 21:11

## 2022-10-07 RX ADMIN — SODIUM CHLORIDE, PRESERVATIVE FREE 10 ML: 5 INJECTION INTRAVENOUS at 21:12

## 2022-10-07 RX ADMIN — ENOXAPARIN SODIUM 40 MG: 100 INJECTION SUBCUTANEOUS at 08:50

## 2022-10-07 RX ADMIN — HYOSCYAMINE SULFATE 125 MCG: 0.12 TABLET ORAL at 21:08

## 2022-10-07 RX ADMIN — LORAZEPAM 0.5 MG: 0.5 TABLET ORAL at 00:55

## 2022-10-07 RX ADMIN — ISOSORBIDE DINITRATE 20 MG: 10 TABLET ORAL at 08:51

## 2022-10-07 RX ADMIN — FINASTERIDE 5 MG: 5 TABLET, FILM COATED ORAL at 08:51

## 2022-10-07 RX ADMIN — ROSUVASTATIN CALCIUM 20 MG: 20 TABLET, FILM COATED ORAL at 21:09

## 2022-10-07 RX ADMIN — MIRTAZAPINE 30 MG: 15 TABLET, FILM COATED ORAL at 21:09

## 2022-10-07 RX ADMIN — ASPIRIN 81 MG: 81 TABLET, COATED ORAL at 08:51

## 2022-10-07 RX ADMIN — LORAZEPAM 0.5 MG: 0.5 TABLET ORAL at 21:09

## 2022-10-07 RX ADMIN — SODIUM CHLORIDE, PRESERVATIVE FREE 10 ML: 5 INJECTION INTRAVENOUS at 08:50

## 2022-10-07 RX ADMIN — ISOSORBIDE DINITRATE 20 MG: 10 TABLET ORAL at 21:09

## 2022-10-07 RX ADMIN — TRAZODONE HYDROCHLORIDE 50 MG: 50 TABLET ORAL at 21:08

## 2022-10-07 RX ADMIN — METOPROLOL SUCCINATE 50 MG: 50 TABLET, EXTENDED RELEASE ORAL at 21:10

## 2022-10-07 RX ADMIN — ISOSORBIDE DINITRATE 20 MG: 10 TABLET ORAL at 13:24

## 2022-10-07 NOTE — PROGRESS NOTES
INPATIENT CARDIOLOGY FOLLOW-UP    Name: Jose Armando Munroe    Age: 64 y.o. Date of Admission: 10/1/2022  4:21 PM    Date of Service: 10/7/2022    Chief Complaint: Follow-up for HFrEF    Interim History:  No new overnight cardiac complaints, feeling better and denies any shortness, wants to rest and requesting not to disturb. He is complaining of pain all over body including arthritis and pain over the extremities secondary to neuropathy. Currently with no complaints of CP, palpitations, dizziness, or lightheadedness. SR on telemetry.     Review of Systems:   Cardiac: As per HPI  General: No fever, chills  Pulmonary: As per HPI  HEENT: No visual disturbances, difficult swallowing  GI: No nausea, vomiting  Endocrine: No thyroid disease or DM  Musculoskeletal: MENESES x 4, no focal motor deficits  Skin: Intact, no rashes  Neuro/Psych: No headache or seizures    Problem List:  Patient Active Problem List   Diagnosis    CHF (congestive heart failure) ([facility])    S/P CABG x 4    CAD (coronary artery disease)    MI, old    Hyperlipidemia    Hypertension    Cardiomyopathy ([facility]    Ventricular tachycardia    Pancreatitis    AICD (automatic cardioverter/defibrillator) present    Implantable cardioverter-defibrillator (ICD) at end of device life    Chest pain, atypical    Failure to thrive in adult    Congestive heart failure of unknown etiology ([facility]    Acute on chronic HFrEF (heart failure with reduced ejection fraction) ([facility])    VHD (valvular heart disease)    Palliative care encounter    Goals of care, counseling/discussion    DNR (do not resuscitate) discussion       Allergies:  No Known Allergies    Current Medications:  Current Facility-Administered Medications   Medication Dose Route Frequency Provider Last Rate Last Admin    hyoscyamine (ANASPAZ;LEVSIN) tablet 125 mcg  125 mcg Oral Q6H PRN Erin Sin, DO   125 mcg at 10/07/22 1324    rosuvastatin (CRESTOR) tablet 20 mg  20 mg Oral Nightly Bradley Quintanilla, DO   20 mg at 10/06/22 2013    spironolactone (ALDACTONE) tablet 12.5 mg  12.5 mg Oral Daily Kenzie Alvarado MD   12.5 mg at 10/07/22 0850    miconazole nitrate 2 % ointment   Topical BID [facility], DO   Given at 10/07/22 0850    ammonium lactate (LAC-HYDRIN) 12 % lotion   Topical Daily [facility], DO   Given at 10/07/22 0850    metoprolol succinate (TOPROL XL) extended release tablet 25 mg  25 mg Oral BID [place] Party. Rogerer, APN   25 mg at 10/07/22 0851    bumetanide (BUMEX) injection 1 mg  1 mg IntraVENous TID Sue Party.  Rajan APN   1 mg at 10/07/22 1324    ipratropium-albuterol (DUONEB) nebulizer solution 1 ampule  1 ampule Inhalation Q4H PRN William Amish, APRN - CNP        LORazepam (ATIVAN) tablet 0.5 mg  0.5 mg Oral TID PRN William Amish, APRN - CNP   0.5 mg at 10/07/22 1323    mirtazapine (REMERON) tablet 30 mg  30 mg Oral Nightly [place] Amish, APRN - CNP   30 mg at 10/06/22 2013    sennosides-docusate sodium (SENOKOT-S) 8.6-50 MG tablet 2 tablet  2 tablet Oral Nightly William Amish, APRN - CNP   2 tablet at 10/06/22 2013    aspirin EC tablet 81 mg  81 mg Oral Daily Wero Block MD   81 mg at 10/07/22 0851    finasteride (PROSCAR) tablet 5 mg  5 mg Oral Daily Wero Block MD   5 mg at 10/07/22 0851    insulin lispro (HUMALOG) injection vial 0-4 Units  0-4 Units SubCUTAneous TID WC Wero Block MD        insulin lispro (HUMALOG) injection vial 0-4 Units  0-4 Units SubCUTAneous Nightly Wero Block MD        hydrALAZINE (APRESOLINE) tablet 25 mg  25 mg Oral BID Wero Block MD   25 mg at 10/07/22 0851    isosorbide dinitrate (ISORDIL) tablet 20 mg  20 mg Oral TID Wero Block MD   20 mg at 10/07/22 1324    pantoprazole (PROTONIX) tablet 40 mg  40 mg Oral QAM AC Wero Block MD   40 mg at 10/07/22 0559    tamsulosin (FLOMAX) capsule 0.4 mg  0.4 mg Oral Daily Wero Block MD   0.4 mg at 10/07/22 0850    traZODone (DESYREL) tablet 50 mg  50 mg Oral Nightly Wero Block MD   50 mg at 10/06/22 2013    sodium chloride flush 0.9 % injection 5-40 mL  5-40 mL IntraVENous 2 times per day Ana Norman MD   10 mL at 10/07/22 0850    sodium chloride flush 0.9 % injection 5-40 mL  5-40 mL IntraVENous PRN Ana Norman MD   10 mL at 10/06/22 1508    0.9 % sodium chloride infusion   IntraVENous PRN Ana Norman MD        enoxaparin (LOVENOX) injection 40 mg  40 mg SubCUTAneous Daily Ana Norman MD   40 mg at 10/07/22 0850    polyethylene glycol (GLYCOLAX) packet 17 g  17 g Oral Daily PRN Ana Norman MD        acetaminophen (TYLENOL) tablet 650 mg  650 mg Oral Q6H PRN Ana Norman MD   650 mg at 10/06/22 1028    Or    acetaminophen (TYLENOL) suppository 650 mg  650 mg Rectal Q6H PRN Ana Norman MD        glucose chewable tablet 16 g  4 tablet Oral PRN Ana Norman MD        dextrose bolus 10% 125 mL  125 mL IntraVENous PRMELISA Norman MD        Or    dextrose bolus 10% 250 mL  250 mL IntraVENous PRN Ana Norman MD        glucagon (rDNA) injection 1 mg  1 mg SubCUTAneous PRN Ana Norman MD        dextrose 10 % infusion   IntraVENous Continuous PRN Ana Norman MD          sodium chloride      dextrose         Physical Exam:  BP (!) 140/60   Pulse 64   Temp 97.8 °F (36.6 °C) (Oral)   Resp 18   Ht 5' 11\" (1.803 m)   Wt 200 lb 11.2 oz (91 kg)   SpO2 98%   BMI 27.99 kg/m²   Wt Readings from Last 3 Encounters:   10/07/22 200 lb 11.2 oz (91 kg)   07/22/22 177 lb 14.6 oz (80.7 kg)   06/30/22 177 lb 14.4 oz (80.7 kg)     Appearance: Awake, alert, no acute respiratory distress but appears chronically ill. Skin: Intact, no rash  Head: Normocephalic, atraumatic  Eyes: EOMI, no conjunctival erythema  ENMT: No pharyngeal erythema, MMM, no rhinorrhea  Neck: Supple, elevated JVP, no carotid bruits  Lungs: Clear to auscultation bilaterally. No wheezes, rales, or rhonchi.   Cardiac: Regular rate and rhythm, +S1S2, no murmurs apparent  Abdomen: Soft, nontender, +bowel sounds  Extremities: Moves all extremities x 4, 1+ lower extremity edema  Neurologic: No focal motor deficits apparent, normal mood and affect  Peripheral Pulses: Intact posterior tibial pulses bilaterally    Intake/Output:    Intake/Output Summary (Last 24 hours) at 10/7/2022 1621  Last data filed at 10/7/2022 1327  Gross per 24 hour   Intake 580 ml   Output 2825 ml   Net -2245 ml     I/O this shift:  In: 300 [P.O.:300]  Out: 650 [Urine:650]    Laboratory Tests:  Recent Labs     10/05/22  1100 10/06/22  0658 10/07/22  0530 10/07/22  1530    141 137  --    K 4.0 4.1 3.7 5.7*    103 102  --    CO2 30* 27 28  --    BUN 35* 37* 40*  --    CREATININE 1.2 1.2 1.2  --    GLUCOSE 92 107* 108*  --    CALCIUM 8.9 9.0 8.8  --      Lab Results   Component Value Date/Time    MG 2.3 10/07/2022 03:30 PM     No results for input(s): ALKPHOS, ALT, AST, PROT, BILITOT, BILIDIR, LABALBU in the last 72 hours. No results for input(s): WBC, RBC, HGB, HCT, MCV, MCH, MCHC, RDW, PLT, MPV in the last 72 hours.     Lab Results   Component Value Date    CKTOTAL 113 04/08/2014    CKMB 2.8 04/08/2014    TROPONINI 0.03 02/24/2021    TROPONINI <0.01 04/14/2014    TROPONINI <0.01 04/08/2014     Lab Results   Component Value Date    INR 1.2 04/08/2014    PROTIME 13.3 (H) 04/08/2014     Lab Results   Component Value Date    TSH 2.500 10/01/2022     Lab Results   Component Value Date    LABA1C 5.9 (H) 10/01/2022     No results found for: EAG  Lab Results   Component Value Date    CHOL 105 10/01/2022    CHOL 224 (H) 09/20/2021    CHOL 296 (H) 08/12/2014     Lab Results   Component Value Date    TRIG 58 10/01/2022    TRIG 361 (H) 09/20/2021    TRIG 1,001 (H) 08/12/2014     Lab Results   Component Value Date    HDL 40 10/01/2022    HDL 33 09/20/2021    HDL 25 08/12/2014     Lab Results   Component Value Date    LDLCALC 53 10/01/2022    LDLCALC 119 (H) 09/20/2021    LDLCALC - (AA) 08/12/2014     Lab Results   Component Value Date    LABVLDL 12 10/01/2022    LABVLDL 72 09/20/2021    LABVLDL - (AA) 08/12/2014     No results found for: CHOLHDLRATIO    Cardiac Tests:  ECG: V - paced with frequent PVCs      Telemetry findings reviewed: Normal sinus rhythm in the 90s with multiple episodes of nonsustained VT. Vitals and labs were reviewed: As above blood pressure 140/60  heart rate 64  sats 98 % 1 L    Labs-BUNs/creatinine 34/1.1>> 36/1.3>>35/1.2>>37/1.2, proBNP 17,521>> 23,702>> 20,551, H&H 11.9/40, respiratory panel was negative. No labs for today. Mag       Chest X-ray: Cardiomegaly with findings of CHF, small right pleural effusion and moderate left pleural effusion. TTE: 3/15/2021 ([facility]) Exam indication: Abnormal Cardiac Biomarkers There is a resting wall motion abnormality in the territory of the LAD and RCA. - The left ventricle is dilated. Left ventricular systolic function is severely decreased. EF = 26 ± 5% (2D biplane) Grade III left ventricular diastolic dysfunction. The right ventricle is normal in size. Right ventricular systolic function is   moderately decreased. - The left atrial cavity is severely dilated. - The right atrial cavity is dilated. There is moderately severe (3+) mitral valve regurgitation due to restricted leaflet motion likely related to ischemic heart disease. TTE: 5/13/2022 ([facility]): - Technically difficult exam due to body habitus and suboptimal positioning. - Exam indication: Evaluation of known heart failure to guide therapy - The left ventricle is severely dilated. Left ventricular systolic function is severely decreased. EF = 20 ± 5% (visual est.) Definity contrast used for endocardial border detection. Left ventricular diastolic function was not evaluated due to an arrhythmia. The right ventricle is dilated. Right ventricular systolic function is moderately to severely decreased. - The left atrial cavity is severely dilated. - The right atrial cavity is dilated.   There is moderate (2+) mitral valve regurgitation. - -Peak/mean gradients of 15/7 mmHg, gradients averaged due to arrhythmia. -Prior EF reported as 26% (3/2021). Exam was compared with the prior  echocardiographic exam performed on  3/14/2021. There is no significant change. VHD: Moderate-severe ischemic MR (patient was recommended for further evaluation with EBONI and possible consideration of a clip 6/3/2022 at [facility] --> patient declined. Stress test:    [facility]: 5/16/2022: [facility]: Elevated biventricular filling pressures (RA 10, PCWP 26), moderate pulmonary hypertension (most likely postcapillary), preserved cardiac index by assumed Sunni 2.73. [facility], 3/16/2021: LMT: severe diffuse disease. LAD: severe diffuse disease. Additional Comment: Moderate caliber vessel with  in the proximal portion. The vessel is fed by patent LIMA graft. The distal portion is small in caliber and wraps the apex. There are L->R collaterals. LCX: severe diffuse disease. Additional Comment: Moderate caliber non-dominant vessel which is fed by SVG->RI/OM1. RAMUS: ostial Ramus - . Additional Comment: Fills in the mid-distal portion from SVG->RI graft. RCA Status: Not Applicable. Additional Comment: Occluded at the ostia. GRAFTS: LIMA Graft End to Side to the Left Anterior Descending. Additional Comments: patent. SVG End to Side to the RI. Additional  Comments - patent with mild-moderate disease in the mid-portion; supplies mid-distal LCx and backfills to partially supply LAD. SVG End to Side to the OM-2 Second Obtuse Marginal Branch. Additional Comments - occluded. SVG End to Side to the Right Posterior Descending Artery. Additional Comments - occluded. Impression:- Severe/occlusive native 3 vessel CAD with patent LIMA->LAD, SVG->RI/OM1. SVG->OM2 is occluded - Native RCA is occluded at the ostia as is the SVG->rPDA. The right is fed by L->R collaterals from the LIMA graft Recommended Treatment: Medical Therapy.        ASSESSMENT:  Acute on chronic decompensated HFrEF, making slow progress, UOP not accurate  Obstructive CAD is responsible for intermittent episodes of decompensated heart failure  ACC/AHA stage D, NYHA functional class II  ICMP EF 20+/-5%  History of CAD status post CABG x5v (LIMA to LAD, SVG to OM1, SVG to OM2, SVG to RI and SVG to RPDA) in 2007 s/p C in 2021 ([facility]) with severe multivessel disease and all the grafts were occluded except for LIMA to LAD which was patent. Patient is not a candidate for any further cardiac intervention. Not a candidate for cardiac transplant or LVAD due to medication noncompliance. HTN: controlled  Hyperlipidemia  T2DM  History of NSVT and still having recurrent episodes of nonsustained VT. Potassium and magnesium are okay. S/p ICD ([facility]) in situ and recent generator change in 2018, no ICD shocks,   History of prostate cancer  Poor prognosis       Plan:   Continue IV diuresis with Bumex 1 mg IV TID with close monitoring of renal functions and electrolytes, labs were not done, check BMP daily and check proBNP in a.m, improving. Strict input output charting  Continue GDMT for HFrEF/ischemic cardiomyopathy hydralazine/Isordil history of intolerance to ACE/ARB, Toprol-XL. Continue Aldactone 12.5 mg po daily with close monitoring of renal functions and holding parameters. Increase Toprol-XL 50 mg p.o. twice daily for recurrent episodes of nonsustained VT. No plans for invasive cardiac testing and prior records including transthoracic echo and cardiac cath were reviewed, as above. Continue aspirin and consider adding statin therapy statin  Palliative care input noted and appreciated. Donald Veloz MD., Beverly Reyes.   [facility]) Cardiology

## 2022-10-07 NOTE — PROGRESS NOTES
Subjective: The patient is awake moaning appears comfortable    Objective:    /70   Pulse 69   Temp 97.6 °F (36.4 °C) (Oral)   Resp 18   Ht 5' 11\" (1.803 m)   Wt 200 lb 11.2 oz (91 kg)   SpO2 95%   BMI 27.99 kg/m²     Heart:  RRR, no murmurs, gallops, or rubs.   Lungs: Decreased BS at the bases  Abd: bowel sounds present, nontender, nondistended, no masses  Extrem:  No clubbing, cyanosis, or edema    Labs:  CBC:   Lab Results   Component Value Date/Time    WBC 9.1 10/01/2022 05:58 PM    RBC 4.75 10/01/2022 05:58 PM    HGB 11.9 10/01/2022 05:58 PM    HCT 40.0 10/01/2022 05:58 PM    MCV 84.2 10/01/2022 05:58 PM    MCH 25.1 10/01/2022 05:58 PM    MCHC 29.8 10/01/2022 05:58 PM    RDW 18.0 10/01/2022 05:58 PM     10/01/2022 05:58 PM    MPV 11.2 10/01/2022 05:58 PM     CMP:    Lab Results   Component Value Date/Time     10/07/2022 05:30 AM    K 3.7 10/07/2022 05:30 AM    K 4.0 10/05/2022 11:00 AM     10/07/2022 05:30 AM    CO2 28 10/07/2022 05:30 AM    BUN 40 10/07/2022 05:30 AM    CREATININE 1.2 10/07/2022 05:30 AM    GFRAA >60 10/07/2022 05:30 AM    LABGLOM 58 10/07/2022 05:30 AM    GLUCOSE 108 10/07/2022 05:30 AM    GLUCOSE 90 10/10/2011 10:15 AM    PROT 6.4 10/01/2022 05:58 PM    LABALBU 3.6 10/01/2022 05:58 PM    LABALBU 4.8 10/10/2011 10:15 AM    CALCIUM 8.8 10/07/2022 05:30 AM    BILITOT 1.0 10/01/2022 05:58 PM    ALKPHOS 151 10/01/2022 05:58 PM    AST 21 10/01/2022 05:58 PM    ALT 8 10/01/2022 05:58 PM     PT/INR:    Lab Results   Component Value Date/Time    PROTIME 13.3 04/08/2014 09:20 AM    INR 1.2 04/08/2014 09:20 AM          Current Facility-Administered Medications:     hyoscyamine (ANASPAZ;LEVSIN) tablet 125 mcg, 125 mcg, Oral, Q6H PRN, Roula Quintanilla DO, 125 mcg at 10/06/22 1029    rosuvastatin (CRESTOR) tablet 20 mg, 20 mg, Oral, Nightly, Bradley Quintanilla DO, 20 mg at 10/06/22 2013    spironolactone (ALDACTONE) tablet 12.5 mg, 12.5 mg, Oral, Daily, Arabella Galindo MD, 12.5 mg at 10/06/22 1038    miconazole nitrate 2 % ointment, , Topical, BID, Maurisio Malone, DO, Given at 10/06/22 2016    ammonium lactate (LAC-HYDRIN) 12 % lotion, , Topical, Daily, Maurisio Malone, DO, Given at 10/06/22 1030    metoprolol succinate (TOPROL XL) extended release tablet 25 mg, 25 mg, Oral, BID, Marce Tolentino APN, 25 mg at 10/06/22 1038    bumetanide (BUMEX) injection 1 mg, 1 mg, IntraVENous, TID, Marce Stephens  Ginder, APN, 1 mg at 10/06/22 2012    ipratropium-albuterol (DUONEB) nebulizer solution 1 ampule, 1 ampule, Inhalation, Q4H PRN, Deitra Patch, APRN - CNP    LORazepam (ATIVAN) tablet 0.5 mg, 0.5 mg, Oral, TID PRN, Deitra Patch, APRN - CNP, 0.5 mg at 10/07/22 0055    mirtazapine (REMERON) tablet 30 mg, 30 mg, Oral, Nightly, Deitra Patch, APRN - CNP, 30 mg at 10/06/22 2013    sennosides-docusate sodium (SENOKOT-S) 8.6-50 MG tablet 2 tablet, 2 tablet, Oral, Nightly, Deitra Patch, APRN - CNP, 2 tablet at 10/06/22 2013    aspirin EC tablet 81 mg, 81 mg, Oral, Daily, Valerie Villarreal MD, 81 mg at 10/06/22 1028    finasteride (PROSCAR) tablet 5 mg, 5 mg, Oral, Daily, Valerie Villarreal MD, 5 mg at 10/06/22 1028    insulin lispro (HUMALOG) injection vial 0-4 Units, 0-4 Units, SubCUTAneous, TID WC, Valerie Villarreal MD    insulin lispro (HUMALOG) injection vial 0-4 Units, 0-4 Units, SubCUTAneous, Nightly, Valerie Villarreal MD    hydrALAZINE (APRESOLINE) tablet 25 mg, 25 mg, Oral, BID, Valerie Villarreal MD, 25 mg at 10/06/22 2119    isosorbide dinitrate (ISORDIL) tablet 20 mg, 20 mg, Oral, TID, Valerie Villarreal MD, 20 mg at 10/06/22 2013    pantoprazole (PROTONIX) tablet 40 mg, 40 mg, Oral, QAM AC, Valerie Villarreal MD, 40 mg at 10/07/22 0559    tamsulosin (FLOMAX) capsule 0.4 mg, 0.4 mg, Oral, Daily, Valerie Villarreal MD, 0.4 mg at 10/06/22 1038    traZODone (DESYREL) tablet 50 mg, 50 mg, Oral, Nightly, Valerie Villarreal MD, 50 mg at 10/06/22 2013    sodium chloride flush 0.9 % injection 5-40 mL, 5-40 mL, IntraVENous, 2 times per day, Joan Goldman MD, 10 mL at 10/06/22 2015    sodium chloride flush 0.9 % injection 5-40 mL, 5-40 mL, IntraVENous, PRN, Joan Goldman MD, 10 mL at 10/06/22 1508    0.9 % sodium chloride infusion, , IntraVENous, PRN, Joan Goldman MD    enoxaparin (LOVENOX) injection 40 mg, 40 mg, SubCUTAneous, Daily, Joan Goldman MD, 40 mg at 10/06/22 1027    polyethylene glycol (GLYCOLAX) packet 17 g, 17 g, Oral, Daily PRN, Joan Goldman MD    acetaminophen (TYLENOL) tablet 650 mg, 650 mg, Oral, Q6H PRN, 650 mg at 10/06/22 1028 **OR** acetaminophen (TYLENOL) suppository 650 mg, 650 mg, Rectal, Q6H PRN, Joan Goldman MD    glucose chewable tablet 16 g, 4 tablet, Oral, PRN, Joan Goldman MD    dextrose bolus 10% 125 mL, 125 mL, IntraVENous, PRN **OR** dextrose bolus 10% 250 mL, 250 mL, IntraVENous, PRN, Joan Goldman MD    glucagon (rDNA) injection 1 mg, 1 mg, SubCUTAneous, PRN, Joan Goldman MD    dextrose 10 % infusion, , IntraVENous, Continuous PRN, Joan Goldman MD    Assessment:  See below, Acute on Chronic HF  Non-compliance    Patient Active Problem List   Diagnosis    CHF (congestive heart failure) ([facility])    S/P CABG x 4    CAD (coronary artery disease)    MI, old    Hyperlipidemia    Hypertension    Cardiomyopathy ([facility] [address]    Ventricular tachycardia    Pancreatitis    AICD (automatic cardioverter/defibrillator) present    Implantable cardioverter-defibrillator (ICD) at end of device life    Chest pain, atypical    Failure to thrive in adult    Congestive heart failure of unknown etiology ([facility]    Acute on chronic HFrEF (heart failure with reduced ejection fraction) ([facility])    VHD (valvular heart disease)    Palliative care encounter    Goals of care, counseling/discussion    DNR (do not resuscitate) discussion       Plan:  See orders  Labs,meds X-ray findings reviewed  Refusing Palliative care service  IV Bumex per Cardiology   Monitor labs         Elias New DO  7:45 AM  10/7/2022

## 2022-10-07 NOTE — CARE COORDINATION
10/7/2022  Social Work Discharge Planning:RICHARD spoke to Pts daughter Paula this morning regarding decision on discharge planning. Paula states that she has an appointment for the [facility] [address] to come out and resolve the water pipe issue this coming Tuesday at Pts home. Paula continues to want to bring Pt home at discharge per his request and will likely follow up with Daya with [facility] a  that time but wont commit to this right now. Paula said she plans on private paying for someone to sit with Pt at home when she is unable to be there. Paula declined a private pay [employer] [place] [id number] list.RICHARD made a referral to [facility]. Order will be needed. Pt is currently on 2l o2 here and per guerda Pt had home o2 prior to admit to [facility] but unsure what company and said the equipment is no longer in his home. RICHARD found out Pt was receiving home o2 prior to [facility] via [facility]. They are following for possible home o2. Pulse ox testing and new order would be needed. Pt came from [address] [place] [address] [address], is a bedhold and plan was to return;however, per daughter Pt doesn't want to return and she is going to reach out to her family and see if they can also help Pt at home.   If discharge plan changes and Pt were to return to [facility], [address] and transport form are completed and Pt would need a COVID test.  Electronically signed by Megha Alonzo on 10/7/2022 at 9:33 AM

## 2022-10-07 NOTE — PLAN OF CARE
Problem: Discharge Planning  Goal: Discharge to home or other facility with appropriate resources  10/6/2022 2139 by Brendon Wilson RN  Outcome: Progressing  10/6/2022 1727 by Sandra Aragon RN  Outcome: Progressing     Problem: Skin/Tissue Integrity  Goal: Absence of new skin breakdown  Description: 1. Monitor for areas of redness and/or skin breakdown  2. Assess vascular access sites hourly  3. Every 4-6 hours minimum:  Change oxygen saturation probe site  4. Every 4-6 hours:  If on nasal continuous positive airway pressure, respiratory therapy assess nares and determine need for appliance change or resting period.   10/6/2022 2139 by Brendon Wilson RN  Outcome: Progressing  10/6/2022 1727 by Sandra Aragon RN  Outcome: Progressing     Problem: Safety - Adult  Goal: Free from fall injury  10/6/2022 2139 by Brendon Wilson RN  Outcome: Progressing  10/6/2022 1727 by Sandra Aragon RN  Outcome: Progressing     Problem: Chronic Conditions and Co-morbidities  Goal: Patient's chronic conditions and co-morbidity symptoms are monitored and maintained or improved  10/6/2022 2139 by Brendon Wilson RN  Outcome: Progressing  10/6/2022 1727 by Sandra Aragon RN  Outcome: Progressing     Problem: Pain  Goal: Verbalizes/displays adequate comfort level or baseline comfort level  10/6/2022 2139 by Brendon Wilson RN  Outcome: Progressing  10/6/2022 1727 by Sandra Aragon RN  Outcome: Progressing

## 2022-10-07 NOTE — PROGRESS NOTES
10/01/2022 05:58 PM    HCT 40.0 10/01/2022 05:58 PM     10/6/22 0658 9/20/21 1336 7/31/20 0902 1/22/20 2200 6/6/19 1021 5/14/19 1119 11/26/18 1129     PSA 0.00 - 4.00 ng/mL <0.01  <0.03  <0.03  <0.01  <0.01  <0.01  <0.01         [facility] Reference Range & Units 10/1/22 17:58   Color, UA Straw/Yellow  Yellow   Clarity, UA Clear  Clear   Glucose, UA Negative mg/dL Negative   Bilirubin, Urine Negative  Negative   Ketones, Urine Negative mg/dL Negative   Specific Gravity, UA 1.005 - 1.030  1.015   Blood, Urine Negative  Negative   pH, UA 5.0 - 9.0  5.5   Protein, UA Negative mg/dL Negative   Urobilinogen, Urine <2.0 E.U./dL 0.2   Nitrite, Urine Negative  Negative   Leukocyte Esterase, Urine Negative  Negative   WBC, UA 0 - 5 /HPF NONE   RBC, UA 0 - 2 /HPF NONE   Bacteria, UA None Seen /HPF NONE SEEN         Assessment/Plan:    Bladder spasms causing leakage from around his catheter  B&O suppositories not available. Anaspaz:Levsin has been substituted. Prostate cancer with unknown treatment. PSA is undetectable  Unclear why he has a marrero catheter. Plan VT prior to discharge to see if he empties his bladder.        Rolando Vanessa, APRN - CNP   [facility]  Urology

## 2022-10-07 NOTE — DISCHARGE INSTR - DIET
Good nutrition is important when healing from an illness, injury, or surgery. Follow any nutrition recommendations given to you during your hospital stay. If you were given an oral nutrition supplement while in the hospital, continue to take this supplement at home. You can take it with meals, in-between meals, and/or before bedtime. These supplements can be purchased at most local grocery stores, pharmacies, and chain super-stores. If you have any questions about your diet or nutrition, call the hospital and ask for the dietitian. Cardiac, Diabetic Diet  A heart-healthy and consistent carbohydrate diet is recommended to manage heart disease and diabetes. To follow a heart-healthy and consistent carbohydrate diet,  Eat a balanced diet with whole grains, fruits and vegetables, and lean protein sources. Choose heart-healthy unsaturated fats. Limit saturated fats, trans fats, and cholesterol intake. Eat more plant-based or vegetarian meals using beans and soy foods for protein. Eat whole, unprocessed foods to limit the amount of sodium (salt) you eat. Choose a consistent amount of carbohydrate at each meal and snack. Limit refined carbohydrates especially sugar, sweets and sugar-sweetened beverages. If you drink alcohol, do so in moderation: one serving per day (women) and two servings per day (men). o One serving is equivalent to 12 ounces beer, 5 ounces wine, or 1.5 ounces distilled spirits  Tips:  Tips for Choosing Heart-Healthy Fats  Choose lean protein and low-fat dairy foods to reduce saturated fat intake. Saturated fat is usually found in animal-based protein and is associated with certain health risks. Saturated fat is the biggest contributor to raise low-density lipoprotein (LDL) cholesterol levels. Research shows that limiting saturated fat lowers unhealthy cholesterol levels. Eat no more than 7% of your total calories each day from saturated fat.    There are many foods that do not contain large amounts of saturated fats. Swapping these foods to replace foods high in saturated fats will help you limit the saturated fat you eat and improve your cholesterol levels. You can also try eating more plant-based or vegetarian meals. Instead of Try:   Whole milk, cheese, yogurt, and ice cream 1% or skim milk, low-fat cheese, non-fat yogurt, and low-fat ice cream   Fatty, marbled beef and pork Lean beef, pork, or venison   Poultry with skin Poultry without skin   Butter, stick margarine Reduced-fat, whipped, or liquid spreads   Coconut oil, palm oil Liquid vegetable oils: corn, canola, olive, soybean and safflower oils     Avoid foods that contain trans fats. Trans fats increase levels of LDL-cholesterol. Hydrogenated fat in processed foods is the main source of trans fats in foods. Trans fats can be found in stick margarine, shortening, processed sweets, baked goods, some fried foods, and packaged foods made with hydrogenated oils. Avoid foods with partially hydrogenated oil on the ingredient list such as: cookies, pastries, baked goods, biscuits, crackers, microwave popcorn, and frozen dinners. Choose foods with heart healthy fats. Polyunsaturated and monounsaturated fat are unsaturated fats that may help lower your blood cholesterol level when used in place of saturated fat in your diet. Ask your RDN about taking a dietary supplement with plant sterols and stanols to help lower your cholesterol level. Research shows that substituting saturated fats with unsaturated fats is beneficial to cholesterol levels. Try these easy swaps:      Instead of Try:   Butter, stick margarine, or solid shortening Reduced-fat, whipped, or liquid spreads   Beef, pork, or poultry with skin    Fish and seafood   Chips, crackers, snack foods Raw or unsalted nuts and seeds or nut butters  Hummus with vegetables  Avocado on toast   Coconut oil, palm oil Liquid vegetable oils: corn, canola, olive, soybean and safflower oils Limit the amount of cholesterol you eat to less than 200 milligrams per day. Cholesterol is a substance carried through the bloodstream via lipoproteins, which are known as transporters of fat. Some body functions need cholesterol to work properly, but too much cholesterol in the bloodstream can damage arteries and build up blood vessel linings (which can lead to heart attack and stroke). You should eat less than 200 milligrams cholesterol per day. People respond differently to eating cholesterol. There is no test available right now that can figure out which people will respond more to dietary cholesterol and which will respond less. For individuals with high intake of dietary cholesterol, different types of increase (none, small, moderate, large) in LDL-cholesterol levels are all possible. Food sources of cholesterol include egg yolks and organ meats such as liver, gizzards. Limit egg yolks to two to four per week and avoid organ meats like liver and gizzards to control cholesterol intake. Tips for Choosing Heart-Healthy Carbohydrates  Consume a consistent amount of carbohydrate  It is important to eat foods with carbohydrates in moderation because they impact your blood glucose level. Carbohydrates can be found in many foods such as:  Grains (breads, crackers, rice, pasta, and cereals)  Starchy Vegetables (potatoes, corn, and peas)  Beans and legumes  Milk, soy milk, and yogurt  Fruit and fruit juice  Sweets (cakes, cookies, ice cream, jam and jelly)  For many adults, eating 3 to 5 servings of carbohydrate foods at each meal and 1 or 2 carbohydrate servings for each snack works well. Check your blood glucose level regularly. It can tell you if you need to adjust when you eat carbohydrates. Choose foods rich in viscous (soluble) fiber  Viscous, or soluble, is found in the walls of plant cells. Viscous fiber is found only in plant-based foods.  Eating foods with fiber helps to lower your unhealthy cholesterol and keep your blood glucose in range    Rich sources of viscous fiber include vegetables (asparagus, [place] sprouts, sweet potatoes, turnips) fruit (apricots, mangoes, oranges), legumes, and whole grains (barley, oats, and oat bran). As you increase your fiber intake gradually, also increase the amount of water you drink. This will help prevent constipation. If you have difficulty achieving this goal, ask your RDN about fiber laxatives. Choose fiber supplements made with viscous fibers such as psyllium seed husks or methylcellulose to help lower unhealthy cholesterol. Limit refined carbohydrates   There are three types of carbohydrates: starches, sugar, and fiber. Some carbohydrates occur naturally in food, like the starches in rice or corn or the sugars in fruits and milk. Refined carbohydrates--foods with high amounts of simple sugars--can raise triglyceride levels. High triglyceride levels are associated with coronary heart disease. Some examples of refined carbohydrate foods are table sugar, sweets, and beverages sweetened with added sugar. Tips for Reducing Sodium (Salt)  Although sodium is important for your body to function, too much sodium can be harmful for people with high blood pressure. As sodium and fluid buildup in your tissues and bloodstream, your blood pressure increases. High blood pressure may cause damage to other organs and increase your risk for a stroke. Even if you take a pill for blood pressure or a water pill (diuretic) to remove fluid, it is still important to have less salt in your diet. Avoid processed foods. Eat more fresh foods. Fresh fruits and vegetables are naturally low in sodium, as well as frozen vegetables and fruits that have no added juices or sauces. Fresh meats are lower in sodium than processed meats, such as marie, sausage, and hotdogs. Read the nutrition label or ask your  to help you find a fresh meat that is low in sodium.   Eat less salt--at the table and when cooking. A single teaspoon of table salt has 2,300 mg of sodium. Leave the salt out of recipes for pasta, casseroles, and soups. Ask your RDN how to cook your favorite recipes without sodium  Be a smart . Look for food packages that say salt-free or sodium-free.  These items contain less than 5 milligrams of sodium per serving. Very low-sodium products contain less than 35 milligrams of sodium per serving. Low-sodium products contain less than 140 milligrams of sodium per serving. Beware for Unsalted or No Added Salt products. These items may still be high in sodium. Check the nutrition label. Add flavors to your food without adding sodium. Try lemon juice, lime juice, fruit juice or vinegar. Dry or fresh herbs add flavor. Try basil, bay leaf, dill, rosemary, parsley, jennifer, dry mustard, nutmeg, thyme, and paprika. Pepper, red pepper flakes, and cayenne pepper can add spice t your meals without adding sodium. Hot sauce contains sodium, but if you use just a drop or two, it will not add up to much. Buy a sodium-free seasoning blend or make your own at home. Additional Lifestyle Tips  Achieve and maintain a healthy weight. Talk with your RDN or your doctor about what is a healthy weight for you. Set goals to reach and maintain that weight. To lose weight, reduce your calorie intake along with increasing your physical activity. A weight loss of 10 to 15 pounds could reduce LDL-cholesterol by 5 milligrams per deciliter. Participate in physical activity. Talk with your health care team to find out what types of physical activity are best for you. Set a plan to get about 30 minutes of exercise on most days.   Foods Recommended  Food Group Foods Recommended   Grains Whole grain breads and cereals, including whole wheat, barley, rye, buckwheat, corn, teff, quinoa, millet, amaranth, brown or wild rice, sorghum, and oats  Pasta, especially whole wheat or other whole grain types   [employer], quinoa or wild rice  Whole grain crackers, bread, rolls, pitas  Home-made bread with reduced-sodium baking soda   Protein Foods Lean cuts of beef and pork (loin, leg, round, extra lean hamburger)   Skinless poultry  Fish  Venison and other wild game  Dried beans and peas  Nuts and nut butters  Meat alternatives made with soy or textured vegetable protein   Egg whites or egg substitute  Cold cuts made with lean meat or soy protein   Dairy Nonfat (skim), low-fat, or 1%-fat milk   Nonfat or low-fat yogurt or cottage cheese  Fat-free and low-fat cheese   Vegetables Fresh, frozen, or canned vegetables without added fat or salt    Fruits Fresh, frozen, canned, or dried fruit    Oils Unsaturated oils (corn, olive, peanut, soy, sunflower, canola)   Soft or liquid margarines and vegetable oil spreads   Salad dressings  Seeds and nuts   Avocado     Foods Not Recommended  Food Group Foods Not Recommended   Grains Breads or crackers topped with salt  Cereals (hot or cold) with more than 300 mg sodium per serving  Biscuits, cornbread, and other quick breads prepared with baking soda  Bread crumbs or stuffing mix from a store  High-fat bakery products, such as doughnuts, biscuits, croissants, [place] pastries, pies, cookies  Instant cooking foods to which you add hot water and stir--potatoes, noodles, rice, etc.  Packaged starchy foods--seasoned noodle or rice dishes, stuffing mix, macaroni and cheese dinner  Snacks made with partially hydrogenated oils, including chips, cheese puffs, snack mixes, regular crackers, butter-flavored popcorn   Protein Foods Higher-fat cuts of meats (ribs, t-bone steak, regular hamburger)  Alvarez, sausage, or hot dogs  Cold cuts, such as salami or bologna, deli meats, cured meats, corned beef  Organ meats (liver, brains, gizzards, sweetbreads)  Poultry with skin  Fried or smoked meat, poultry, and fish  Whole eggs and egg yolks (more than 2-4 per week)  Salted legumes, nuts, seeds, or nut/seed butters  Meat alternatives with high levels of sodium (>300 mg per serving) or saturated fat (>5 g per serving)   Dairy Whole milk, 2% fat milk, buttermilk  Whole milk yogurt or ice cream  Cream  Half-&-half  Cream cheese  Sour cream  Cheese   Vegetables Canned or frozen vegetables with salt, fresh vegetables prepared with salt, butter, cheese, or cream sauce  Fried vegetables  Pickled vegetables such as olives, pickles, or sauerkraut   Fruits Fried fruits  Fruits served with butter or cream   Oils Butter, stick margarine, shortening  Partially hydrogenated oils or trans fats  Tropical oils (coconut, palm, palm kernel oils)   Other Candy, sugar sweetened soft drinks and desserts  Salt, sea salt, garlic salt, and seasoning mixes containing salt  Bouillon cubes  Ketchup, barbecue sauce, Worcestershire sauce, soy sauce, teriyaki sauce  Miso  Salsa  Pickles, olives, relish     Heart Healthy Consistent Carbohydrate Sample 1-Day Menu  Breakfast 1 cup cooked oatmeal (2 carbohydrate servings)  3/4 cup blueberries (1 carbohydrate serving)  1 ounce almonds  1 cup skim milk (1 carbohydrate serving)  1 cup coffee   Morning Snack 1 cup sugar-free nonfat yogurt (1 carbohydrate serving)   Lunch 2 slices whole-wheat bread (2 carbohydrate servings)  2 ounces lean turkey breast  1 ounce low-fat Swiss cheese  1 teaspoon mustard  1 slice tomato  1 lettuce leaf  1 small pear (1 carbohydrate serving)  1 cup skim milk (1 carbohydrate serving)   Afternoon Snack 1 ounce trail mix with unsalted nuts, seeds, and raisins (1 carbohydrate serving)   Evening Meal 3 ounces salmon  2/3 cup cooked brown rice (2 carbohydrate servings)  1 teaspoon soft margarine  1 cup cooked broccoli with 1/2 cup cooked carrots (1 carbohydrate serving  Carrots, cooked, boiled, drained, without salt  1 cup lettuce  1 teaspoon olive oil with vinegar for dressing  1 small whole grain roll (1 carbohydrate serving)  1 teaspoon soft margarine  1 cup unsweetened tea   Evening Snack 1 extra-small banana (1 carbohydrate serving)

## 2022-10-07 NOTE — PROGRESS NOTES
[facility]    Follow-up call to daughter Paula. She reached out to someone for community resources to help her bring her dad Lv Collins) home. She tried to get his belongings from the nursing home and they refused to give them to her and said they would send them to the hospital. She was wanting to reach out to his mycare to see if there was any support but needs his card to call. She stated she was sent home from work sick yesterday and is trying to get everything arranged for him to come home once discharged. Updated case management and requested they reach out to her to see if they could assist her. If Kristel Burton does go home with hospice his AICD will need deactivated.     Electronically signed by Jorge Luis Stallings RN on 10/7/2022 at 9:05 AM   [phone]

## 2022-10-08 LAB
ANION GAP SERPL CALCULATED.3IONS-SCNC: 8 MMOL/L (ref 7–16)
BUN BLDV-MCNC: 42 MG/DL (ref 6–23)
CALCIUM SERPL-MCNC: 8.9 MG/DL (ref 8.6–10.2)
CHLORIDE BLD-SCNC: 101 MMOL/L (ref 98–107)
CO2: 29 MMOL/L (ref 22–29)
CREAT SERPL-MCNC: 1.2 MG/DL (ref 0.7–1.2)
GFR AFRICAN AMERICAN: >60
GFR NON-AFRICAN AMERICAN: 58 ML/MIN/1.73
GLUCOSE BLD-MCNC: 121 MG/DL (ref 74–99)
METER GLUCOSE: 102 MG/DL (ref 74–99)
METER GLUCOSE: 103 MG/DL (ref 74–99)
METER GLUCOSE: 114 MG/DL (ref 74–99)
METER GLUCOSE: 143 MG/DL (ref 74–99)
POTASSIUM SERPL-SCNC: 4 MMOL/L (ref 3.5–5)
PRO-BNP: ABNORMAL PG/ML (ref 0–450)
SODIUM BLD-SCNC: 138 MMOL/L (ref 132–146)

## 2022-10-08 PROCEDURE — 6370000000 HC RX 637 (ALT 250 FOR IP): Performed by: NURSE PRACTITIONER

## 2022-10-08 PROCEDURE — 6360000002 HC RX W HCPCS: Performed by: INTERNAL MEDICINE

## 2022-10-08 PROCEDURE — 99233 SBSQ HOSP IP/OBS HIGH 50: CPT | Performed by: FAMILY MEDICINE

## 2022-10-08 PROCEDURE — 6370000000 HC RX 637 (ALT 250 FOR IP): Performed by: INTERNAL MEDICINE

## 2022-10-08 PROCEDURE — 2500000003 HC RX 250 WO HCPCS: Performed by: NURSE PRACTITIONER

## 2022-10-08 PROCEDURE — 83880 ASSAY OF NATRIURETIC PEPTIDE: CPT

## 2022-10-08 PROCEDURE — 36415 COLL VENOUS BLD VENIPUNCTURE: CPT

## 2022-10-08 PROCEDURE — 6370000000 HC RX 637 (ALT 250 FOR IP): Performed by: FAMILY MEDICINE

## 2022-10-08 PROCEDURE — 99233 SBSQ HOSP IP/OBS HIGH 50: CPT | Performed by: INTERNAL MEDICINE

## 2022-10-08 PROCEDURE — 2580000003 HC RX 258: Performed by: INTERNAL MEDICINE

## 2022-10-08 PROCEDURE — 82962 GLUCOSE BLOOD TEST: CPT

## 2022-10-08 PROCEDURE — 2060000000 HC ICU INTERMEDIATE R&B

## 2022-10-08 PROCEDURE — 2700000000 HC OXYGEN THERAPY PER DAY

## 2022-10-08 PROCEDURE — 80048 BASIC METABOLIC PNL TOTAL CA: CPT

## 2022-10-08 RX ADMIN — METOPROLOL SUCCINATE 50 MG: 50 TABLET, EXTENDED RELEASE ORAL at 21:04

## 2022-10-08 RX ADMIN — PANTOPRAZOLE SODIUM 40 MG: 40 TABLET, DELAYED RELEASE ORAL at 05:42

## 2022-10-08 RX ADMIN — FINASTERIDE 5 MG: 5 TABLET, FILM COATED ORAL at 09:54

## 2022-10-08 RX ADMIN — ISOSORBIDE DINITRATE 20 MG: 10 TABLET ORAL at 09:54

## 2022-10-08 RX ADMIN — SODIUM CHLORIDE, PRESERVATIVE FREE 10 ML: 5 INJECTION INTRAVENOUS at 09:53

## 2022-10-08 RX ADMIN — METOPROLOL SUCCINATE 50 MG: 50 TABLET, EXTENDED RELEASE ORAL at 09:54

## 2022-10-08 RX ADMIN — BUMETANIDE 1 MG: 0.25 INJECTION, SOLUTION INTRAMUSCULAR; INTRAVENOUS at 13:41

## 2022-10-08 RX ADMIN — BUMETANIDE 1 MG: 0.25 INJECTION, SOLUTION INTRAMUSCULAR; INTRAVENOUS at 09:54

## 2022-10-08 RX ADMIN — TRAZODONE HYDROCHLORIDE 50 MG: 50 TABLET ORAL at 20:29

## 2022-10-08 RX ADMIN — ENOXAPARIN SODIUM 40 MG: 100 INJECTION SUBCUTANEOUS at 09:53

## 2022-10-08 RX ADMIN — Medication: at 10:03

## 2022-10-08 RX ADMIN — ROSUVASTATIN CALCIUM 20 MG: 20 TABLET, FILM COATED ORAL at 20:29

## 2022-10-08 RX ADMIN — MIRTAZAPINE 30 MG: 15 TABLET, FILM COATED ORAL at 20:30

## 2022-10-08 RX ADMIN — MICONAZOLE NITRATE: 2 OINTMENT TOPICAL at 10:03

## 2022-10-08 RX ADMIN — SODIUM CHLORIDE, PRESERVATIVE FREE 10 ML: 5 INJECTION INTRAVENOUS at 20:32

## 2022-10-08 RX ADMIN — TAMSULOSIN HYDROCHLORIDE 0.4 MG: 0.4 CAPSULE ORAL at 09:54

## 2022-10-08 RX ADMIN — SPIRONOLACTONE 12.5 MG: 25 TABLET ORAL at 09:54

## 2022-10-08 RX ADMIN — ASPIRIN 81 MG: 81 TABLET, COATED ORAL at 09:55

## 2022-10-08 RX ADMIN — LORAZEPAM 0.5 MG: 0.5 TABLET ORAL at 22:06

## 2022-10-08 RX ADMIN — MICONAZOLE NITRATE: 2 OINTMENT TOPICAL at 20:30

## 2022-10-08 RX ADMIN — DOCUSATE SODIUM 50 MG AND SENNOSIDES 8.6 MG 2 TABLET: 8.6; 5 TABLET, FILM COATED ORAL at 20:30

## 2022-10-08 NOTE — PROGRESS NOTES
INPATIENT CARDIOLOGY FOLLOW-UP    Name: Manju Kirkpatrick    Age: 64 y.o. Date of Admission: 10/1/2022  4:21 PM    Date of Service: 10/8/2022    Chief Complaint: Follow-up for HFrEF    Interim History:  No new overnight cardiac complaints, feeling better and denies any shortness, wants to rest and requesting not to disturb. He is complaining of pain all over body including arthritis and pain over the extremities secondary to neuropathy. Currently with no complaints of CP, palpitations, dizziness, or lightheadedness. SR on telemetry. 9.78 L negative so far.     Review of Systems:   Cardiac: As per HPI  General: No fever, chills  Pulmonary: As per HPI  HEENT: No visual disturbances, difficult swallowing  GI: No nausea, vomiting  Endocrine: No thyroid disease or DM  Musculoskeletal: MENESES x 4, no focal motor deficits  Skin: Intact, no rashes  Neuro/Psych: No headache or seizures    Problem List:  Patient Active Problem List   Diagnosis    CHF (congestive heart failure) ([facility])    S/P CABG x 4    CAD (coronary artery disease)    MI, old    Hyperlipidemia    Hypertension    Cardiomyopathy ([facility]    Ventricular tachycardia    Pancreatitis    AICD (automatic cardioverter/defibrillator) present    Implantable cardioverter-defibrillator (ICD) at end of device life    Chest pain, atypical    Failure to thrive in adult    Congestive heart failure of unknown etiology ([facility] [address]    Acute on chronic HFrEF (heart failure with reduced ejection fraction) ([facility])    VHD (valvular heart disease)    Palliative care encounter    Goals of care, counseling/discussion    DNR (do not resuscitate) discussion       Allergies:  No Known Allergies    Current Medications:  Current Facility-Administered Medications   Medication Dose Route Frequency Provider Last Rate Last Admin    metoprolol succinate (TOPROL XL) extended release tablet 50 mg  50 mg Oral BID Addy Obregon MD   50 mg at 10/08/22 0954    hyoscyamine (OSCIMIN) dispersible tablet 125 mcg  125 mcg Oral Q6H PRN Alvena Abram, DO        rosuvastatin (CRESTOR) tablet 20 mg  20 mg Oral Nightly Bradley Quintanilla, DO   20 mg at 10/07/22 2109    spironolactone (ALDACTONE) tablet 12.5 mg  12.5 mg Oral Daily Jerzy Goldstein MD   12.5 mg at 10/08/22 0954    miconazole nitrate 2 % ointment   Topical BID Alvena Abram, DO   Given at 10/08/22 1003    ammonium lactate (LAC-HYDRIN) 12 % lotion   Topical Daily Alvena Abram, DO   Given at 10/08/22 1003    bumetanide (BUMEX) injection 1 mg  1 mg IntraVENous TID MARIA C Montiel   1 mg at 10/08/22 0954    ipratropium-albuterol (DUONEB) nebulizer solution 1 ampule  1 ampule Inhalation Q4H PRN Marissa Bourchristina, APRN - CNP        LORazepam (ATIVAN) tablet 0.5 mg  0.5 mg Oral TID PRN Marissa Valdezurdon, APRN - CNP   0.5 mg at 10/07/22 2109    mirtazapine (REMERON) tablet 30 mg  30 mg Oral Nightly Marissa Bourdon, APRN - CNP   30 mg at 10/07/22 2109    sennosides-docusate sodium (SENOKOT-S) 8.6-50 MG tablet 2 tablet  2 tablet Oral Nightly Marissa Bourdon, APRN - CNP   2 tablet at 10/07/22 2108    aspirin EC tablet 81 mg  81 mg Oral Daily Jonathan Hinojosa MD   81 mg at 10/08/22 0955    finasteride (PROSCAR) tablet 5 mg  5 mg Oral Daily Jonathan Hinojosa MD   5 mg at 10/08/22 0954    insulin lispro (HUMALOG) injection vial 0-4 Units  0-4 Units SubCUTAneous TID  Jonathan Hinojosa MD        insulin lispro (HUMALOG) injection vial 0-4 Units  0-4 Units SubCUTAneous Nightly Jonathan Hinojosa MD        hydrALAZINE (APRESOLINE) tablet 25 mg  25 mg Oral BID Jonathan Hinojosa MD   25 mg at 10/07/22 0851    isosorbide dinitrate (ISORDIL) tablet 20 mg  20 mg Oral TID Jonathan Hinojosa MD   20 mg at 10/08/22 0954    pantoprazole (PROTONIX) tablet 40 mg  40 mg Oral QAM AC Jonathan Hinojosa MD   40 mg at 10/08/22 0542    tamsulosin (FLOMAX) capsule 0.4 mg  0.4 mg Oral Daily Jonathan Hinojosa MD   0.4 mg at 10/08/22 0954    traZODone (DESYREL) tablet 50 mg  50 mg Oral Nightly Jonathan Hinojosa MD   50 mg at sounds  Extremities: Moves all extremities x 4, 1+ lower extremity edema  Neurologic: No focal motor deficits apparent, normal mood and affect  Peripheral Pulses: Intact posterior tibial pulses bilaterally    Intake/Output:    Intake/Output Summary (Last 24 hours) at 10/8/2022 1324  Last data filed at 10/8/2022 0945  Gross per 24 hour   Intake 0 ml   Output 2700 ml   Net -2700 ml     No intake/output data recorded. Laboratory Tests:  Recent Labs     10/06/22  0658 10/07/22  0530 10/07/22  1530 10/07/22  1630 10/08/22  0445    137  --   --  138   K 4.1 3.7 5.7* 4.4 4.0    102  --   --  101   CO2 27 28  --   --  29   BUN 37* 40*  --   --  42*   CREATININE 1.2 1.2  --   --  1.2   GLUCOSE 107* 108*  --   --  121*   CALCIUM 9.0 8.8  --   --  8.9     Lab Results   Component Value Date/Time    MG 2.3 10/07/2022 03:30 PM     No results for input(s): ALKPHOS, ALT, AST, PROT, BILITOT, BILIDIR, LABALBU in the last 72 hours. No results for input(s): WBC, RBC, HGB, HCT, MCV, MCH, MCHC, RDW, PLT, MPV in the last 72 hours.     Lab Results   Component Value Date    CKTOTAL 113 04/08/2014    CKMB 2.8 04/08/2014    TROPONINI 0.03 02/24/2021    TROPONINI <0.01 04/14/2014    TROPONINI <0.01 04/08/2014     Lab Results   Component Value Date    INR 1.2 04/08/2014    PROTIME 13.3 (H) 04/08/2014     Lab Results   Component Value Date    TSH 2.500 10/01/2022     Lab Results   Component Value Date    LABA1C 5.9 (H) 10/01/2022     No results found for: EAG  Lab Results   Component Value Date    CHOL 105 10/01/2022    CHOL 224 (H) 09/20/2021    CHOL 296 (H) 08/12/2014     Lab Results   Component Value Date    TRIG 58 10/01/2022    TRIG 361 (H) 09/20/2021    TRIG 1,001 (H) 08/12/2014     Lab Results   Component Value Date    HDL 40 10/01/2022    HDL 33 09/20/2021    HDL 25 08/12/2014     Lab Results   Component Value Date    LDLCALC 53 10/01/2022    LDLCALC 119 (H) 09/20/2021    LDLCALC - (AA) 08/12/2014     Lab Results Component Value Date    LABVLDL 12 10/01/2022    LABVLDL 72 09/20/2021    LABVLDL - (AA) 08/12/2014     No results found for: CHOLHDLRATIO    Cardiac Tests:  ECG: V - paced with frequent PVCs      Telemetry findings reviewed: Normal sinus rhythm in the 90s with multiple episodes of nonsustained VT. Vitals and labs were reviewed: As above blood pressure 140/60  heart rate 64  sats 98 % 1 L    Labs-BUNs/creatinine 34/1.1>> 36/1.3>>35/1.2>>37/1.2, proBNP 17,521>> 23,702>> 20,551, H&H 11.9/40, respiratory panel was negative. No labs for today. Mag       Chest X-ray: Cardiomegaly with findings of CHF, small right pleural effusion and moderate left pleural effusion. TTE: 3/15/2021 ([facility]) Exam indication: Abnormal Cardiac Biomarkers There is a resting wall motion abnormality in the territory of the LAD and RCA. - The left ventricle is dilated. Left ventricular systolic function is severely decreased. EF = 26 ± 5% (2D biplane) Grade III left ventricular diastolic dysfunction. The right ventricle is normal in size. Right ventricular systolic function is   moderately decreased. - The left atrial cavity is severely dilated. - The right atrial cavity is dilated. There is moderately severe (3+) mitral valve regurgitation due to restricted leaflet motion likely related to ischemic heart disease. TTE: 5/13/2022 ([facility]): - Technically difficult exam due to body habitus and suboptimal positioning. - Exam indication: Evaluation of known heart failure to guide therapy - The left ventricle is severely dilated. Left ventricular systolic function is severely decreased. EF = 20 ± 5% (visual est.) Definity contrast used for endocardial border detection. Left ventricular diastolic function was not evaluated due to an arrhythmia. The right ventricle is dilated. Right ventricular systolic function is moderately to severely decreased. - The left atrial cavity is severely dilated. - The right atrial cavity is dilated.   There is moderate (2+) mitral valve regurgitation. - -Peak/mean gradients of 15/7 mmHg, gradients averaged due to arrhythmia. -Prior EF reported as 26% (3/2021). Exam was compared with the prior  echocardiographic exam performed on  3/14/2021. There is no significant change. VHD: Moderate-severe ischemic MR (patient was recommended for further evaluation with EBONI and possible consideration of a clip 6/3/2022 at [facility] --> patient declined. Stress test:    [facility]: 5/16/2022: [facility]: Elevated biventricular filling pressures (RA 10, PCWP 26), moderate pulmonary hypertension (most likely postcapillary), preserved cardiac index by assumed Sunni 2.73. [facility], 3/16/2021: LMT: severe diffuse disease. LAD: severe diffuse disease. Additional Comment: Moderate caliber vessel with  in the proximal portion. The vessel is fed by patent LIMA graft. The distal portion is small in caliber and wraps the apex. There are L->R collaterals. LCX: severe diffuse disease. Additional Comment: Moderate caliber non-dominant vessel which is fed by SVG->RI/OM1. RAMUS: ostial Ramus - . Additional Comment: Fills in the mid-distal portion from SVG->RI graft. RCA Status: Not Applicable. Additional Comment: Occluded at the ostia. GRAFTS: LIMA Graft End to Side to the Left Anterior Descending. Additional Comments: patent. SVG End to Side to the RI. Additional  Comments - patent with mild-moderate disease in the mid-portion; supplies mid-distal LCx and backfills to partially supply LAD. SVG End to Side to the OM-2 Second Obtuse [place]. Additional Comments - occluded. SVG End to Side to the Right Posterior Descending Artery. Additional Comments - occluded. Impression:- Severe/occlusive native 3 vessel CAD with patent LIMA->LAD, SVG->RI/OM1. SVG->OM2 is occluded - Native RCA is occluded at the ostia as is the SVG->rPDA. The right is fed by L->R collaterals from the LIMA graft Recommended Treatment: Medical Therapy.        ASSESSMENT:  Acute on

## 2022-10-08 NOTE — PROGRESS NOTES
[facility] Progress Note    Admitting Date and Time: 10/1/2022  4:21 PM  Admit Dx: Congestive heart failure of unknown etiology ([facility] [I50.9]  Acute on chronic congestive heart failure, unspecified heart failure type ([facility] [I50.9]    Subjective:  Patient is being followed for Congestive heart failure of unknown etiology ([facility] [I50.9]  Acute on chronic congestive heart failure, unspecified heart failure type ([facility] [I50.9]   Pt feels sleepy. Awakens to say doing okay. No needs. Per RN: As above.     ROS: denies fever, chills, cp, sob, n/v, HA unless stated above.      metoprolol succinate  50 mg Oral BID    rosuvastatin  20 mg Oral Nightly    spironolactone  12.5 mg Oral Daily    miconazole nitrate   Topical BID    ammonium lactate   Topical Daily    bumetanide  1 mg IntraVENous TID    mirtazapine  30 mg Oral Nightly    sennosides-docusate sodium  2 tablet Oral Nightly    aspirin  81 mg Oral Daily    finasteride  5 mg Oral Daily    insulin lispro  0-4 Units SubCUTAneous TID WC    insulin lispro  0-4 Units SubCUTAneous Nightly    hydrALAZINE  25 mg Oral BID    isosorbide dinitrate  20 mg Oral TID    pantoprazole  40 mg Oral QAM AC    tamsulosin  0.4 mg Oral Daily    traZODone  50 mg Oral Nightly    sodium chloride flush  5-40 mL IntraVENous 2 times per day    enoxaparin  40 mg SubCUTAneous Daily     hyoscyamine, 125 mcg, Q6H PRN  ipratropium-albuterol, 1 ampule, Q4H PRN  LORazepam, 0.5 mg, TID PRN  sodium chloride flush, 5-40 mL, PRN  sodium chloride, , PRN  polyethylene glycol, 17 g, Daily PRN  acetaminophen, 650 mg, Q6H PRN   Or  acetaminophen, 650 mg, Q6H PRN  glucose, 4 tablet, PRN  dextrose bolus, 125 mL, PRN   Or  dextrose bolus, 250 mL, PRN  glucagon (rDNA), 1 mg, PRN  dextrose, , Continuous PRN    Objective:    /74   Pulse 77   Temp 97.4 °F (36.3 °C) (Oral)   Resp 20   Ht 5' 11\" (1.803 m)   Wt 200 lb 6.4 oz (90.9 kg)   SpO2 94%   BMI 27.95 kg/m²     General Appearance: sleepy and in no acute distress  Skin: warm and dry  Head: normocephalic and atraumatic  Eyes: pupils equal, round, and reactive to light, extraocular eye movements intact, conjunctivae normal  Neck: neck supple and non tender without mass   Pulmonary/Chest: clear to auscultation bilaterally- no wheezes, rales or rhonchi, normal air movement, no respiratory distress  Cardiovascular: normal rate, normal S1 and S2 and no carotid bruits  Abdomen: soft, non-tender, non-distended, normal bowel sounds, no masses or organomegaly  Extremities: no cyanosis, no clubbing and no edema  Neurologic: no cranial nerve deficit and speech normal    Recent Labs     10/06/22  0658 10/07/22  0530 10/07/22  1530 10/07/22  1630 10/08/22  0445    137  --   --  138   K 4.1 3.7 5.7* 4.4 4.0    102  --   --  101   CO2 27 28  --   --  29   BUN 37* 40*  --   --  42*   CREATININE 1.2 1.2  --   --  1.2   GLUCOSE 107* 108*  --   --  121*   CALCIUM 9.0 8.8  --   --  8.9     Radiology:   No results found. Assessment:    Principal Problem:    Congestive heart failure of unknown etiology ([facility] [address]  Active Problems:    Acute on chronic HFrEF (heart failure with reduced ejection fraction) (HCC)    VHD (valvular heart disease)    Palliative care encounter    Goals of care, counseling/discussion    DNR (do not resuscitate) discussion  Resolved Problems:    * No resolved hospital problems. *    Plan:  1.    HFrEF - continue cardiac meds. Cardiology seeing. Toprol XL increased to 50 mg po bid. Tolerating. Continue telemetry. Trend labs and treat accordingly. 2.    Bladder spasms - Urology on board and managing. VT before discharge. 3.    T2DM - well controlled blood sugars. Continue meds. Total care time:  16 minutes    NOTE: This report was transcribed using voice recognition software. Every effort was made to ensure accuracy; however, inadvertent computerized transcription errors may be present.     Electronically signed by Raji Perkins MD on 10/8/2022 at 10:39 AM

## 2022-10-08 NOTE — PLAN OF CARE
Problem: Safety - Adult  Goal: Free from fall injury  10/7/2022 2242 by Haim Rosenbaum RN  Outcome: Progressing

## 2022-10-08 NOTE — PLAN OF CARE
Plan of care reviewed  Problem: Discharge Planning  Goal: Discharge to home or other facility with appropriate resources  Outcome: Progressing  Flowsheets (Taken 10/8/2022 6002)  Discharge to home or other facility with appropriate resources:   Identify barriers to discharge with patient and caregiver   Arrange for needed discharge resources and transportation as appropriate   Identify discharge learning needs (meds, wound care, etc)     Problem: Skin/Tissue Integrity  Goal: Absence of new skin breakdown  Description: 1. Monitor for areas of redness and/or skin breakdown  2. Assess vascular access sites hourly  3. Every 4-6 hours minimum:  Change oxygen saturation probe site  4. Every 4-6 hours:  If on nasal continuous positive airway pressure, respiratory therapy assess nares and determine need for appliance change or resting period.   Outcome: Progressing     Problem: Pain  Goal: Verbalizes/displays adequate comfort level or baseline comfort level  Outcome: Progressing

## 2022-10-09 PROBLEM — N32.89 BLADDER SPASMS: Status: ACTIVE | Noted: 2022-10-09

## 2022-10-09 PROBLEM — E11.9 DIABETES MELLITUS (HCC): Status: ACTIVE | Noted: 2022-10-09

## 2022-10-09 LAB
ANION GAP SERPL CALCULATED.3IONS-SCNC: 9 MMOL/L (ref 7–16)
BASOPHILS ABSOLUTE: 0.03 E9/L (ref 0–0.2)
BASOPHILS RELATIVE PERCENT: 0.4 % (ref 0–2)
BUN BLDV-MCNC: 42 MG/DL (ref 6–23)
CALCIUM SERPL-MCNC: 8.7 MG/DL (ref 8.6–10.2)
CHLORIDE BLD-SCNC: 101 MMOL/L (ref 98–107)
CO2: 30 MMOL/L (ref 22–29)
CREAT SERPL-MCNC: 1.3 MG/DL (ref 0.7–1.2)
EOSINOPHILS ABSOLUTE: 0.06 E9/L (ref 0.05–0.5)
EOSINOPHILS RELATIVE PERCENT: 0.9 % (ref 0–6)
GFR AFRICAN AMERICAN: >60
GFR NON-AFRICAN AMERICAN: 53 ML/MIN/1.73
GLUCOSE BLD-MCNC: 153 MG/DL (ref 74–99)
HCT VFR BLD CALC: 38.8 % (ref 37–54)
HEMOGLOBIN: 11.6 G/DL (ref 12.5–16.5)
IMMATURE GRANULOCYTES #: 0.01 E9/L
IMMATURE GRANULOCYTES %: 0.1 % (ref 0–5)
LYMPHOCYTES ABSOLUTE: 1.07 E9/L (ref 1.5–4)
LYMPHOCYTES RELATIVE PERCENT: 15.3 % (ref 20–42)
MCH RBC QN AUTO: 25.2 PG (ref 26–35)
MCHC RBC AUTO-ENTMCNC: 29.9 % (ref 32–34.5)
MCV RBC AUTO: 84.2 FL (ref 80–99.9)
METER GLUCOSE: 103 MG/DL (ref 74–99)
METER GLUCOSE: 107 MG/DL (ref 74–99)
METER GLUCOSE: 112 MG/DL (ref 74–99)
METER GLUCOSE: 128 MG/DL (ref 74–99)
MONOCYTES ABSOLUTE: 0.47 E9/L (ref 0.1–0.95)
MONOCYTES RELATIVE PERCENT: 6.7 % (ref 2–12)
NEUTROPHILS ABSOLUTE: 5.37 E9/L (ref 1.8–7.3)
NEUTROPHILS RELATIVE PERCENT: 76.6 % (ref 43–80)
PDW BLD-RTO: 17.8 FL (ref 11.5–15)
PLATELET # BLD: 114 E9/L (ref 130–450)
PMV BLD AUTO: 13.3 FL (ref 7–12)
POTASSIUM SERPL-SCNC: 4.2 MMOL/L (ref 3.5–5)
PRO-BNP: ABNORMAL PG/ML (ref 0–450)
RBC # BLD: 4.61 E12/L (ref 3.8–5.8)
SODIUM BLD-SCNC: 140 MMOL/L (ref 132–146)
WBC # BLD: 7 E9/L (ref 4.5–11.5)

## 2022-10-09 PROCEDURE — 99233 SBSQ HOSP IP/OBS HIGH 50: CPT | Performed by: FAMILY MEDICINE

## 2022-10-09 PROCEDURE — 6370000000 HC RX 637 (ALT 250 FOR IP): Performed by: INTERNAL MEDICINE

## 2022-10-09 PROCEDURE — 85025 COMPLETE CBC W/AUTO DIFF WBC: CPT

## 2022-10-09 PROCEDURE — 2060000000 HC ICU INTERMEDIATE R&B

## 2022-10-09 PROCEDURE — 2500000003 HC RX 250 WO HCPCS: Performed by: NURSE PRACTITIONER

## 2022-10-09 PROCEDURE — 82962 GLUCOSE BLOOD TEST: CPT

## 2022-10-09 PROCEDURE — 6370000000 HC RX 637 (ALT 250 FOR IP): Performed by: FAMILY MEDICINE

## 2022-10-09 PROCEDURE — 6370000000 HC RX 637 (ALT 250 FOR IP): Performed by: NURSE PRACTITIONER

## 2022-10-09 PROCEDURE — 2700000000 HC OXYGEN THERAPY PER DAY

## 2022-10-09 PROCEDURE — 36415 COLL VENOUS BLD VENIPUNCTURE: CPT

## 2022-10-09 PROCEDURE — 2580000003 HC RX 258: Performed by: INTERNAL MEDICINE

## 2022-10-09 PROCEDURE — 80048 BASIC METABOLIC PNL TOTAL CA: CPT

## 2022-10-09 PROCEDURE — 83880 ASSAY OF NATRIURETIC PEPTIDE: CPT

## 2022-10-09 PROCEDURE — 6360000002 HC RX W HCPCS: Performed by: INTERNAL MEDICINE

## 2022-10-09 PROCEDURE — 99233 SBSQ HOSP IP/OBS HIGH 50: CPT | Performed by: INTERNAL MEDICINE

## 2022-10-09 RX ORDER — FLAVOXATE HYDROCHLORIDE 100 MG/1
100 TABLET ORAL 3 TIMES DAILY PRN
Status: DISCONTINUED | OUTPATIENT
Start: 2022-10-09 | End: 2022-10-09

## 2022-10-09 RX ADMIN — METOPROLOL SUCCINATE 50 MG: 50 TABLET, EXTENDED RELEASE ORAL at 22:40

## 2022-10-09 RX ADMIN — SPIRONOLACTONE 12.5 MG: 25 TABLET ORAL at 08:54

## 2022-10-09 RX ADMIN — HYOSCYAMINE SULFATE 125 MCG: 0.12 TABLET, ORALLY DISINTEGRATING ORAL at 14:15

## 2022-10-09 RX ADMIN — FINASTERIDE 5 MG: 5 TABLET, FILM COATED ORAL at 08:54

## 2022-10-09 RX ADMIN — MICONAZOLE NITRATE: 2 OINTMENT TOPICAL at 22:44

## 2022-10-09 RX ADMIN — BUMETANIDE 1 MG: 0.25 INJECTION, SOLUTION INTRAMUSCULAR; INTRAVENOUS at 08:54

## 2022-10-09 RX ADMIN — MICONAZOLE NITRATE: 2 OINTMENT TOPICAL at 08:59

## 2022-10-09 RX ADMIN — ENOXAPARIN SODIUM 40 MG: 100 INJECTION SUBCUTANEOUS at 08:54

## 2022-10-09 RX ADMIN — LORAZEPAM 0.5 MG: 0.5 TABLET ORAL at 22:40

## 2022-10-09 RX ADMIN — ISOSORBIDE DINITRATE 20 MG: 10 TABLET ORAL at 08:55

## 2022-10-09 RX ADMIN — Medication: at 09:00

## 2022-10-09 RX ADMIN — DOCUSATE SODIUM 50 MG AND SENNOSIDES 8.6 MG 2 TABLET: 8.6; 5 TABLET, FILM COATED ORAL at 22:41

## 2022-10-09 RX ADMIN — SODIUM CHLORIDE, PRESERVATIVE FREE 10 ML: 5 INJECTION INTRAVENOUS at 22:41

## 2022-10-09 RX ADMIN — HYDRALAZINE HYDROCHLORIDE 25 MG: 25 TABLET, FILM COATED ORAL at 08:54

## 2022-10-09 RX ADMIN — SODIUM CHLORIDE, PRESERVATIVE FREE 10 ML: 5 INJECTION INTRAVENOUS at 08:58

## 2022-10-09 RX ADMIN — ACETAMINOPHEN 650 MG: 325 TABLET ORAL at 09:05

## 2022-10-09 RX ADMIN — LORAZEPAM 0.5 MG: 0.5 TABLET ORAL at 09:05

## 2022-10-09 RX ADMIN — TRAZODONE HYDROCHLORIDE 50 MG: 50 TABLET ORAL at 22:41

## 2022-10-09 RX ADMIN — MIRTAZAPINE 30 MG: 15 TABLET, FILM COATED ORAL at 22:40

## 2022-10-09 RX ADMIN — BUMETANIDE 1 MG: 0.25 INJECTION, SOLUTION INTRAMUSCULAR; INTRAVENOUS at 14:15

## 2022-10-09 RX ADMIN — BUMETANIDE 1 MG: 0.25 INJECTION, SOLUTION INTRAMUSCULAR; INTRAVENOUS at 22:41

## 2022-10-09 RX ADMIN — ROSUVASTATIN CALCIUM 20 MG: 20 TABLET, FILM COATED ORAL at 23:07

## 2022-10-09 RX ADMIN — ASPIRIN 81 MG: 81 TABLET, COATED ORAL at 08:54

## 2022-10-09 RX ADMIN — ISOSORBIDE DINITRATE 20 MG: 10 TABLET ORAL at 22:40

## 2022-10-09 RX ADMIN — PANTOPRAZOLE SODIUM 40 MG: 40 TABLET, DELAYED RELEASE ORAL at 05:34

## 2022-10-09 RX ADMIN — HYOSCYAMINE SULFATE 125 MCG: 0.12 TABLET, ORALLY DISINTEGRATING ORAL at 22:40

## 2022-10-09 RX ADMIN — TAMSULOSIN HYDROCHLORIDE 0.4 MG: 0.4 CAPSULE ORAL at 08:54

## 2022-10-09 RX ADMIN — METOPROLOL SUCCINATE 50 MG: 50 TABLET, EXTENDED RELEASE ORAL at 08:54

## 2022-10-09 ASSESSMENT — PAIN SCALES - GENERAL
PAINLEVEL_OUTOF10: 3
PAINLEVEL_OUTOF10: 8

## 2022-10-09 ASSESSMENT — PAIN DESCRIPTION - LOCATION
LOCATION: BACK
LOCATION: GENERALIZED

## 2022-10-09 ASSESSMENT — PAIN SCALES - WONG BAKER
WONGBAKER_NUMERICALRESPONSE: 0
WONGBAKER_NUMERICALRESPONSE: 2

## 2022-10-09 NOTE — PROGRESS NOTES
Notified Dr. Mayelin Vasquez of hypotension with scheduled medications. Instructed to hold hydralazine, isordil, and bumex at this time. Okay to give Toprol XL per physician. 24 hour chart check complete.  Brian May RN

## 2022-10-09 NOTE — PROGRESS NOTES
INPATIENT CARDIOLOGY FOLLOW-UP    Name: Shanel Ovalle    Age: 64 y.o. Date of Admission: 10/1/2022  4:21 PM    Date of Service: 10/9/2022    Chief Complaint: Follow-up for HFrEF    Interim History:  No new overnight cardiac complaints, feeling better and denies any shortness, wants to rest and requesting not to disturb. He is complaining of pain all over body including arthritis and pain over the extremities secondary to neuropathy. Currently with no complaints of CP, palpitations, dizziness, or lightheadedness. SR on telemetry. 15 L negative so far.     Review of Systems:   Cardiac: As per HPI  General: No fever, chills  Pulmonary: As per HPI  HEENT: No visual disturbances, difficult swallowing  GI: No nausea, vomiting  Endocrine: No thyroid disease or DM  Musculoskeletal: MENESES x 4, no focal motor deficits  Skin: Intact, no rashes  Neuro/Psych: No headache or seizures    Problem List:  Patient Active Problem List   Diagnosis    CHF (congestive heart failure) ([facility])    S/P CABG x 4    CAD (coronary artery disease)    MI, old    Hyperlipidemia    Hypertension    Cardiomyopathy ([facility] [address]    Ventricular tachycardia    Pancreatitis    AICD (automatic cardioverter/defibrillator) present    Implantable cardioverter-defibrillator (ICD) at end of device life    Chest pain, atypical    Failure to thrive in adult    Congestive heart failure of unknown etiology ([facility] [address]    Acute on chronic HFrEF (heart failure with reduced ejection fraction) ([facility])    VHD (valvular heart disease)    Palliative care encounter    Goals of care, counseling/discussion    DNR (do not resuscitate) discussion       Allergies:  No Known Allergies    Current Medications:  Current Facility-Administered Medications   Medication Dose Route Frequency Provider Last Rate Last Admin    metoprolol succinate (TOPROL XL) extended release tablet 50 mg  50 mg Oral BID Suresh Fernandes MD   50 mg at 10/09/22 0854    hyoscyamine (OSCIMIN) dispersible tablet 125 mcg  125 mcg Oral Q6H PRN Henok Mills, DO        rosuvastatin (CRESTOR) tablet 20 mg  20 mg Oral Nightly Bradley Quintanilla DO   20 mg at 10/08/22 2029    spironolactone (ALDACTONE) tablet 12.5 mg  12.5 mg Oral Daily Omar Abdalla MD   12.5 mg at 10/09/22 0854    miconazole nitrate 2 % ointment   Topical BID Henok Mills, DO   Given at 10/09/22 0859    ammonium lactate (LAC-HYDRIN) 12 % lotion   Topical Daily Henok Mills, DO   Given at 10/09/22 0900    bumetanide (BUMEX) injection 1 mg  1 mg IntraVENous TID MARIA C Rea   1 mg at 10/09/22 0854    ipratropium-albuterol (DUONEB) nebulizer solution 1 ampule  1 ampule Inhalation Q4H PRN Sanjuana Mills, APRN - CNP        LORazepam (ATIVAN) tablet 0.5 mg  0.5 mg Oral TID PRN Sanjuana Mills APRN - CNP   0.5 mg at 10/09/22 0905    mirtazapine (REMERON) tablet 30 mg  30 mg Oral Nightly Sanjuana Mills, APRN - CNP   30 mg at 10/08/22 2030    sennosides-docusate sodium (SENOKOT-S) 8.6-50 MG tablet 2 tablet  2 tablet Oral Nightly Julane Reynae, APRN - CNP   2 tablet at 10/08/22 2030    aspirin EC tablet 81 mg  81 mg Oral Daily Alysia Sky MD   81 mg at 10/09/22 0854    finasteride (PROSCAR) tablet 5 mg  5 mg Oral Daily Alysia Sky MD   5 mg at 10/09/22 0854    insulin lispro (HUMALOG) injection vial 0-4 Units  0-4 Units SubCUTAneous TID  Alysia Sky MD        insulin lispro (HUMALOG) injection vial 0-4 Units  0-4 Units SubCUTAneous Nightly Alysia Sky MD        hydrALAZINE (APRESOLINE) tablet 25 mg  25 mg Oral BID Alysia Sky MD   25 mg at 10/09/22 0854    isosorbide dinitrate (ISORDIL) tablet 20 mg  20 mg Oral TID Alysia Sky MD   20 mg at 10/09/22 0855    pantoprazole (PROTONIX) tablet 40 mg  40 mg Oral QAM AC Alysia Sky MD   40 mg at 10/09/22 0534    tamsulosin (FLOMAX) capsule 0.4 mg  0.4 mg Oral Daily Alysia Sky MD   0.4 mg at 10/09/22 0854    traZODone (DESYREL) tablet 50 mg  50 mg Oral Nightly Alysia Sky MD   50 mg at 10/08/22 2029    sodium chloride flush 0.9 % injection 5-40 mL  5-40 mL IntraVENous 2 times per day Libia Soto MD   10 mL at 10/09/22 0858    sodium chloride flush 0.9 % injection 5-40 mL  5-40 mL IntraVENous PRN Libia Soto MD   10 mL at 10/06/22 1508    0.9 % sodium chloride infusion   IntraVENous PRN Libia Soto MD        enoxaparin (LOVENOX) injection 40 mg  40 mg SubCUTAneous Daily Libia Soto MD   40 mg at 10/09/22 0854    polyethylene glycol (GLYCOLAX) packet 17 g  17 g Oral Daily PRN Libia Soto MD        acetaminophen (TYLENOL) tablet 650 mg  650 mg Oral Q6H PRN Libia Soto MD   650 mg at 10/09/22 0553    Or    acetaminophen (TYLENOL) suppository 650 mg  650 mg Rectal Q6H PRN Libia Soto MD        glucose chewable tablet 16 g  4 tablet Oral PRN Libia Soto MD        dextrose bolus 10% 125 mL  125 mL IntraVENous PRN Libia Soto MD        Or    dextrose bolus 10% 250 mL  250 mL IntraVENous PRN Libia Soto MD        glucagon (rDNA) injection 1 mg  1 mg SubCUTAneous PRN Libia Soto MD        dextrose 10 % infusion   IntraVENous Continuous PRN Libia Soto MD          sodium chloride      dextrose         Physical Exam:  /73   Pulse 62   Temp 97 °F (36.1 °C) (Oral)   Resp 18   Ht 5' 11\" (1.803 m)   Wt 195 lb 8 oz (88.7 kg)   SpO2 98%   BMI 27.27 kg/m²   Wt Readings from Last 3 Encounters:   10/09/22 195 lb 8 oz (88.7 kg)   07/22/22 177 lb 14.6 oz (80.7 kg)   06/30/22 177 lb 14.4 oz (80.7 kg)     Appearance: Awake, alert, no acute respiratory distress but appears chronically ill. Skin: Intact, no rash  Head: Normocephalic, atraumatic  Eyes: EOMI, no conjunctival erythema  ENMT: No pharyngeal erythema, MMM, no rhinorrhea  Neck: Supple, elevated JVP, no carotid bruits  Lungs: Clear to auscultation bilaterally. No wheezes, rales, or rhonchi.   Cardiac: Regular rate and rhythm, +S1S2, no murmurs apparent  Abdomen: Soft, nontender, +bowel sounds  Extremities: Moves all extremities x 4, 1+ lower extremity edema  Neurologic: No focal motor deficits apparent, normal mood and affect  Peripheral Pulses: Intact posterior tibial pulses bilaterally    Intake/Output:    Intake/Output Summary (Last 24 hours) at 10/9/2022 1302  Last data filed at 10/9/2022 0541  Gross per 24 hour   Intake 300 ml   Output 2250 ml   Net -1950 ml     No intake/output data recorded. Laboratory Tests:  Recent Labs     10/07/22  0530 10/07/22  1530 10/07/22  1630 10/08/22  0445 10/09/22  0850     --   --  138 140   K 3.7   < > 4.4 4.0 4.2     --   --  101 101   CO2 28  --   --  29 30*   BUN 40*  --   --  42* 42*   CREATININE 1.2  --   --  1.2 1.3*   GLUCOSE 108*  --   --  121* 153*   CALCIUM 8.8  --   --  8.9 8.7    < > = values in this interval not displayed. Lab Results   Component Value Date/Time    MG 2.3 10/07/2022 03:30 PM     No results for input(s): ALKPHOS, ALT, AST, PROT, BILITOT, BILIDIR, LABALBU in the last 72 hours.     Recent Labs     10/09/22  0850   WBC 7.0   RBC 4.61   HGB 11.6*   HCT 38.8   MCV 84.2   MCH 25.2*   MCHC 29.9*   RDW 17.8*   *   MPV 13.3*       Lab Results   Component Value Date    CKTOTAL 113 04/08/2014    CKMB 2.8 04/08/2014    TROPONINI 0.03 02/24/2021    TROPONINI <0.01 04/14/2014    TROPONINI <0.01 04/08/2014     Lab Results   Component Value Date    INR 1.2 04/08/2014    PROTIME 13.3 (H) 04/08/2014     Lab Results   Component Value Date    TSH 2.500 10/01/2022     Lab Results   Component Value Date    LABA1C 5.9 (H) 10/01/2022     No results found for: EAG  Lab Results   Component Value Date    CHOL 105 10/01/2022    CHOL 224 (H) 09/20/2021    CHOL 296 (H) 08/12/2014     Lab Results   Component Value Date    TRIG 58 10/01/2022    TRIG 361 (H) 09/20/2021    TRIG 1,001 (H) 08/12/2014     Lab Results   Component Value Date    HDL 40 10/01/2022    HDL 33 09/20/2021    HDL 25 08/12/2014     Lab Results   Component Value Date LDLCALC 53 10/01/2022    LDLCALC 119 (H) 09/20/2021    LDLCALC - (AA) 08/12/2014     Lab Results   Component Value Date    LABVLDL 12 10/01/2022    LABVLDL 72 09/20/2021    LABVLDL - (AA) 08/12/2014     No results found for: CHOLHDLRATIO    Cardiac Tests:  ECG: V - paced with frequent PVCs      Telemetry findings reviewed: Normal sinus rhythm in the 90s with multiple episodes of nonsustained VT. Vitals and labs were reviewed: As above blood pressure 140/60  heart rate 64  sats 98 % 1 L    Labs-BUNs/creatinine 34/1.1>> 36/1.3>>35/1.2>>37/1.2, proBNP 17,521>> 23,702>> 20,551, H&H 11.9/40, respiratory panel was negative. No labs for today. Mag       Chest X-ray: Cardiomegaly with findings of CHF, small right pleural effusion and moderate left pleural effusion. TTE: 3/15/2021 ([facility]) Exam indication: Abnormal Cardiac Biomarkers There is a resting wall motion abnormality in the territory of the LAD and RCA. - The left ventricle is dilated. Left ventricular systolic function is severely decreased. EF = 26 ± 5% (2D biplane) Grade III left ventricular diastolic dysfunction. The right ventricle is normal in size. Right ventricular systolic function is   moderately decreased. - The left atrial cavity is severely dilated. - The right atrial cavity is dilated. There is moderately severe (3+) mitral valve regurgitation due to restricted leaflet motion likely related to ischemic heart disease. TTE: 5/13/2022 ([facility]): - Technically difficult exam due to body habitus and suboptimal positioning. - Exam indication: Evaluation of known heart failure to guide therapy - The left ventricle is severely dilated. Left ventricular systolic function is severely decreased. EF = 20 ± 5% (visual est.) Definity contrast used for endocardial border detection. Left ventricular diastolic function was not evaluated due to an arrhythmia. The right ventricle is dilated. Right ventricular systolic function is moderately to severely decreased. - The left atrial cavity is severely dilated. - The right atrial cavity is dilated. There is moderate (2+) mitral valve regurgitation. - -Peak/mean gradients of 15/7 mmHg, gradients averaged due to arrhythmia. -Prior EF reported as 26% (3/2021). Exam was compared with the prior  echocardiographic exam performed on  3/14/2021. There is no significant change. VHD: Moderate-severe ischemic MR (patient was recommended for further evaluation with EBONI and possible consideration of a clip 6/3/2022 at [facility] --> patient declined. Stress test:    [facility]: 5/16/2022: [facility]: Elevated biventricular filling pressures (RA 10, PCWP 26), moderate pulmonary hypertension (most likely postcapillary), preserved cardiac index by assumed Sunni 2.73. [facility], 3/16/2021: LMT: severe diffuse disease. LAD: severe diffuse disease. Additional Comment: Moderate caliber vessel with  in the proximal portion. The vessel is fed by patent LIMA graft. The distal portion is small in caliber and wraps the apex. There are L->R collaterals. LCX: severe diffuse disease. Additional Comment: Moderate caliber non-dominant vessel which is fed by SVG->RI/OM1. RAMUS: ostial Ramus - . Additional Comment: Fills in the mid-distal portion from SVG->RI graft. RCA Status: Not Applicable. Additional Comment: Occluded at the ostia. GRAFTS: LIMA Graft End to Side to the Left Anterior Descending. Additional Comments: patent. SVG End to Side to the RI. Additional  Comments - patent with mild-moderate disease in the mid-portion; supplies mid-distal LCx and backfills to partially supply LAD. SVG End to Side to the OM-2 Second Obtuse [place]. Additional Comments - occluded. SVG End to Side to the Right Posterior Descending Artery. Additional Comments - occluded. Impression:- Severe/occlusive native 3 vessel CAD with patent LIMA->LAD, SVG->RI/OM1. SVG->OM2 is occluded - Native RCA is occluded at the ostia as is the SVG->rPDA.  The right is fed by L->R collaterals from the LIMA graft Recommended Treatment: Medical Therapy. ASSESSMENT:  Acute on chronic decompensated HFrEF, making slow progress, UOP not accurate  Obstructive CAD is responsible for intermittent episodes of decompensated heart failure  ACC/AHA stage D, NYHA functional class II  ICMP EF 20+/-5%  History of CAD status post CABG x5v (LIMA to LAD, SVG to OM1, SVG to OM2, SVG to RI and SVG to RPDA) in 2007 s/p LHC in 2021 ([facility]) with severe multivessel disease and all the grafts were occluded except for LIMA to LAD which was patent. Patient is not a candidate for any further cardiac intervention. Not a candidate for cardiac transplant or LVAD due to medication noncompliance. HTN: controlled  Hyperlipidemia  T2DM  History of NSVT and still having recurrent episodes of nonsustained VT. Potassium and magnesium are okay. S/p ICD ([facility]) in situ and recent generator change in 2018, no ICD shocks,   History of prostate cancer  Poor prognosis       Plan:   Continue IV diuresis with Bumex 1 mg IV TID with close monitoring of renal functions and electrolytes, proBNP still elevated at 22,000. Strict input output charting  Continue GDMT for HFrEF/ischemic cardiomyopathy hydralazine/Isordil history of intolerance to ACE/ARB, Toprol-XL. Continue Aldactone 12.5 mg po daily with close monitoring of renal functions and holding parameters. Increase Toprol-XL 50 mg p.o. twice daily for recurrent episodes of nonsustained VT. No plans for invasive cardiac testing and prior records including transthoracic echo and cardiac cath were reviewed, as above. Continue aspirin and consider adding statin therapy statin  Palliative care input noted and appreciated. Will consider Jardiance initiation over the next few days. Juan Manuel Zepeda MD, [facility] Cardiology     NOTE: This report was transcribed using voice recognition software.  Every effort was made to ensure accuracy; however, inadvertent computerized transcription errors may be present.

## 2022-10-10 LAB
ANION GAP SERPL CALCULATED.3IONS-SCNC: 7 MMOL/L (ref 7–16)
BUN BLDV-MCNC: 39 MG/DL (ref 6–23)
CALCIUM SERPL-MCNC: 8.7 MG/DL (ref 8.6–10.2)
CHLORIDE BLD-SCNC: 100 MMOL/L (ref 98–107)
CO2: 30 MMOL/L (ref 22–29)
CREAT SERPL-MCNC: 1.3 MG/DL (ref 0.7–1.2)
GFR AFRICAN AMERICAN: >60
GFR NON-AFRICAN AMERICAN: 53 ML/MIN/1.73
GLUCOSE BLD-MCNC: 122 MG/DL (ref 74–99)
METER GLUCOSE: 103 MG/DL (ref 74–99)
METER GLUCOSE: 125 MG/DL (ref 74–99)
METER GLUCOSE: 152 MG/DL (ref 74–99)
METER GLUCOSE: 90 MG/DL (ref 74–99)
POTASSIUM SERPL-SCNC: 4 MMOL/L (ref 3.5–5)
SODIUM BLD-SCNC: 137 MMOL/L (ref 132–146)

## 2022-10-10 PROCEDURE — 6370000000 HC RX 637 (ALT 250 FOR IP): Performed by: NURSE PRACTITIONER

## 2022-10-10 PROCEDURE — 2700000000 HC OXYGEN THERAPY PER DAY

## 2022-10-10 PROCEDURE — 6370000000 HC RX 637 (ALT 250 FOR IP): Performed by: INTERNAL MEDICINE

## 2022-10-10 PROCEDURE — 99233 SBSQ HOSP IP/OBS HIGH 50: CPT | Performed by: INTERNAL MEDICINE

## 2022-10-10 PROCEDURE — 82962 GLUCOSE BLOOD TEST: CPT

## 2022-10-10 PROCEDURE — 2500000003 HC RX 250 WO HCPCS: Performed by: NURSE PRACTITIONER

## 2022-10-10 PROCEDURE — 2060000000 HC ICU INTERMEDIATE R&B

## 2022-10-10 PROCEDURE — 36415 COLL VENOUS BLD VENIPUNCTURE: CPT

## 2022-10-10 PROCEDURE — 6370000000 HC RX 637 (ALT 250 FOR IP): Performed by: FAMILY MEDICINE

## 2022-10-10 PROCEDURE — 80048 BASIC METABOLIC PNL TOTAL CA: CPT

## 2022-10-10 PROCEDURE — 6360000002 HC RX W HCPCS: Performed by: INTERNAL MEDICINE

## 2022-10-10 PROCEDURE — 99232 SBSQ HOSP IP/OBS MODERATE 35: CPT | Performed by: NURSE PRACTITIONER

## 2022-10-10 PROCEDURE — 2580000003 HC RX 258: Performed by: INTERNAL MEDICINE

## 2022-10-10 RX ADMIN — BUMETANIDE 1 MG: 0.25 INJECTION, SOLUTION INTRAMUSCULAR; INTRAVENOUS at 08:59

## 2022-10-10 RX ADMIN — HYDRALAZINE HYDROCHLORIDE 25 MG: 25 TABLET, FILM COATED ORAL at 21:25

## 2022-10-10 RX ADMIN — ASPIRIN 81 MG: 81 TABLET, COATED ORAL at 09:00

## 2022-10-10 RX ADMIN — MIRTAZAPINE 30 MG: 15 TABLET, FILM COATED ORAL at 21:26

## 2022-10-10 RX ADMIN — TAMSULOSIN HYDROCHLORIDE 0.4 MG: 0.4 CAPSULE ORAL at 09:00

## 2022-10-10 RX ADMIN — ROSUVASTATIN CALCIUM 20 MG: 20 TABLET, FILM COATED ORAL at 21:25

## 2022-10-10 RX ADMIN — ISOSORBIDE DINITRATE 20 MG: 10 TABLET ORAL at 08:59

## 2022-10-10 RX ADMIN — ISOSORBIDE DINITRATE 20 MG: 10 TABLET ORAL at 14:34

## 2022-10-10 RX ADMIN — DOCUSATE SODIUM 50 MG AND SENNOSIDES 8.6 MG 2 TABLET: 8.6; 5 TABLET, FILM COATED ORAL at 21:24

## 2022-10-10 RX ADMIN — Medication: at 08:58

## 2022-10-10 RX ADMIN — METOPROLOL SUCCINATE 50 MG: 50 TABLET, EXTENDED RELEASE ORAL at 21:26

## 2022-10-10 RX ADMIN — PANTOPRAZOLE SODIUM 40 MG: 40 TABLET, DELAYED RELEASE ORAL at 05:47

## 2022-10-10 RX ADMIN — ISOSORBIDE DINITRATE 20 MG: 10 TABLET ORAL at 21:27

## 2022-10-10 RX ADMIN — BUMETANIDE 1 MG: 0.25 INJECTION, SOLUTION INTRAMUSCULAR; INTRAVENOUS at 14:34

## 2022-10-10 RX ADMIN — BUMETANIDE 1 MG: 0.25 INJECTION, SOLUTION INTRAMUSCULAR; INTRAVENOUS at 21:31

## 2022-10-10 RX ADMIN — MICONAZOLE NITRATE: 2 OINTMENT TOPICAL at 21:33

## 2022-10-10 RX ADMIN — SODIUM CHLORIDE, PRESERVATIVE FREE 10 ML: 5 INJECTION INTRAVENOUS at 21:32

## 2022-10-10 RX ADMIN — MICONAZOLE NITRATE: 2 OINTMENT TOPICAL at 09:00

## 2022-10-10 RX ADMIN — LORAZEPAM 0.5 MG: 0.5 TABLET ORAL at 08:59

## 2022-10-10 RX ADMIN — ACETAMINOPHEN 650 MG: 325 TABLET ORAL at 21:39

## 2022-10-10 RX ADMIN — ENOXAPARIN SODIUM 40 MG: 100 INJECTION SUBCUTANEOUS at 08:59

## 2022-10-10 RX ADMIN — SODIUM CHLORIDE, PRESERVATIVE FREE 10 ML: 5 INJECTION INTRAVENOUS at 09:01

## 2022-10-10 RX ADMIN — FINASTERIDE 5 MG: 5 TABLET, FILM COATED ORAL at 08:59

## 2022-10-10 RX ADMIN — TRAZODONE HYDROCHLORIDE 50 MG: 50 TABLET ORAL at 21:26

## 2022-10-10 ASSESSMENT — PAIN SCALES - GENERAL: PAINLEVEL_OUTOF10: 3

## 2022-10-10 ASSESSMENT — PAIN DESCRIPTION - ORIENTATION: ORIENTATION: LEFT;RIGHT

## 2022-10-10 ASSESSMENT — PAIN DESCRIPTION - DESCRIPTORS: DESCRIPTORS: ACHING

## 2022-10-10 ASSESSMENT — PAIN DESCRIPTION - LOCATION: LOCATION: FOOT

## 2022-10-10 NOTE — PROGRESS NOTES
Palliative Care Department  [phone]  Palliative Care Progress Note  Provider GINA Salas CNP    Ayo Brown  [id number]  Hospital Day: 10  Date of Initial Consult: 10/2/2022  Referring Provider: GINA Gregg CNP  Palliative Medicine was consulted for assistance with: Goals of care and CODE STATUS discussion    HPI:   Ayo Brown is a 64 y.o. with a medical history of CHF, CAD s/p CABG in 2007, ischemic cardiomyopathy with EF 20%,  ICD, Hx prostate CA who was admitted on 10/1/2022 from SNF with a CHIEF COMPLAINT of SOB. Patient seen earlier this year at [facility] [employer] clinic but is not a candidate for heart surgery, patient would essentially require a heart transplant. CXR showing cardiomegaly with findings of CHF, small right pleural effusion and moderate left pleural effusion. Palliative medicine was consulted for goals of care and CODE STATUS discussion. ASSESSMENT/PLAN:     Pertinent Hospital Diagnoses     HFrEF, EF 20%  CAD s/p CABG 2007  Hx prostate cancer    Palliative Care Encounter / Counseling Regarding Goals of Care  Please see detailed goals of care discussion as below  At this time, Ayo Brown, Does Not have capacity for medical decision-making.   Capacity is time limited and situation/question specific  During encounter Paula was surrogate medical decision-maker  Outcome of goals of care meeting:   Continue limited code  Family meeting tomorrow at 1 PM to discuss possible hospice  Code status Limited : no intubation, no compressions, no defibrillation, no resuscitative medications  Advanced Directives: no POA or living will in [facility]  Surrogate/Legal NOK:  Donya Arvizu (daughter) [phone]    Spiritual assessment: no spiritual distress identified  Bereavement and grief: to be determined  Referrals to: none today  SUBJECTIVE:     Current medical issues leading to Palliative Medicine involvement include   Active Hospital Problems    Diagnosis Date Noted    Bladder spasms [N32.89] 10/09/2022     Priority: Medium    Diabetes mellitus ([facility] [address] [E11.9] 10/09/2022     Priority: Medium    Palliative care encounter [Z51.5] 10/05/2022     Priority: Medium    Goals of care, counseling/discussion [Z71.89] 10/05/2022     Priority: Medium    DNR (do not resuscitate) discussion [Z71.89] 10/05/2022     Priority: Medium    Acute on chronic HFrEF (heart failure with reduced ejection fraction) ([facility] [address] [I50.23] 10/02/2022     Priority: Medium    VHD (valvular heart disease) Marysol Richard 10/02/2022     Priority: Medium    Congestive heart failure of unknown etiology ([facility] [address] [I50.9] 10/01/2022     Priority: Medium       Details of Conversation:    Chart reviewed and patient seen. Patient resting in bed, no family present at bedside. Attempted to discuss goals of care and CODE STATUS with patient. Patient does not engage in conversation. Call placed to patient's daughter, Thelma Finn. Paula is familiar with the role of palliative medicine. We had an at length discussion regarding patient's current condition and prognosis. Paula states that she was under the impression patient was discharging with hospice care. She expresses that she has been confused and needs to come to the bedside to speak face-to-face with patient and request that palliative medicine be present for conversation. Plan is for family meeting tomorrow at 1 PM to discuss goals of care: Hospice versus home health care. Will follow.     OBJECTIVE:   Prognosis: Guarded    Physical Exam:  BP (!) 98/50   Pulse 64   Temp 97.8 °F (36.6 °C) (Axillary)   Resp 20   Ht 5' 11\" (1.803 m)   Wt 195 lb 8 oz (88.7 kg)   SpO2 99%   BMI 27.27 kg/m²   Constitutional:  Awake, slightly agitated and intermittently confused  Lungs: NC, unlabored respirations  Heart:  RRR  Abd:  Soft, non tender, non distended  Ext:  +edema, pulses present  Skin:  Warm and dry, no rashes on visible skin  Neuro:  Alert, grossly nonfocal; following commands    Objective data reviewed: labs, images, records, medication use, vitals, and chart    Discussed patient and the plan of care with the other IDT members: Palliative Medicine IDT Team, Floor Nurse, Patient, and Family    Time/Communication  Greater than 50% of time spent, total 25 minutes in counseling and coordination of care at the bedside regarding goals of care and diagnosis and prognosis. Thank you for allowing Palliative Medicine to participate in the care of Codey Cao.

## 2022-10-10 NOTE — PROGRESS NOTES
Subjective: The patient is awake and alert. Yelling out/  O2 support    Objective:    BP (!) 98/50   Pulse 64   Temp 97.8 °F (36.6 °C) (Axillary)   Resp 20   Ht 5' 11\" (1.803 m)   Wt 195 lb 8 oz (88.7 kg)   SpO2 99%   BMI 27.27 kg/m²     Heart:  RRR, no murmurs, gallops, or rubs.   Lungs:Decreased BS at the bases  Abd: bowel sounds present, nontender, nondistended, no masses  Extrem:  No clubbing, cyanosis, or edema    Labs:  CBC:   Lab Results   Component Value Date/Time    WBC 7.0 10/09/2022 08:50 AM    RBC 4.61 10/09/2022 08:50 AM    HGB 11.6 10/09/2022 08:50 AM    HCT 38.8 10/09/2022 08:50 AM    MCV 84.2 10/09/2022 08:50 AM    MCH 25.2 10/09/2022 08:50 AM    MCHC 29.9 10/09/2022 08:50 AM    RDW 17.8 10/09/2022 08:50 AM     10/09/2022 08:50 AM    MPV 13.3 10/09/2022 08:50 AM     CMP:    Lab Results   Component Value Date/Time     10/09/2022 08:50 AM    K 4.2 10/09/2022 08:50 AM    K 4.0 10/05/2022 11:00 AM     10/09/2022 08:50 AM    CO2 30 10/09/2022 08:50 AM    BUN 42 10/09/2022 08:50 AM    CREATININE 1.3 10/09/2022 08:50 AM    GFRAA >60 10/09/2022 08:50 AM    LABGLOM 53 10/09/2022 08:50 AM    GLUCOSE 153 10/09/2022 08:50 AM    GLUCOSE 90 10/10/2011 10:15 AM    PROT 6.4 10/01/2022 05:58 PM    LABALBU 3.6 10/01/2022 05:58 PM    LABALBU 4.8 10/10/2011 10:15 AM    CALCIUM 8.7 10/09/2022 08:50 AM    BILITOT 1.0 10/01/2022 05:58 PM    ALKPHOS 151 10/01/2022 05:58 PM    AST 21 10/01/2022 05:58 PM    ALT 8 10/01/2022 05:58 PM     PT/INR:    Lab Results   Component Value Date/Time    PROTIME 13.3 04/08/2014 09:20 AM    INR 1.2 04/08/2014 09:20 AM          Current Facility-Administered Medications:     metoprolol succinate (TOPROL XL) extended release tablet 50 mg, 50 mg, Oral, BID, Addy Obregon MD, 50 mg at 10/09/22 2240    hyoscyamine (OSCIMIN) dispersible tablet 125 mcg, 125 mcg, Oral, Q6H PRN, Radha Jin DO, 125 mcg at 10/09/22 2240    rosuvastatin (CRESTOR) tablet 20 mg, 20 mg, Oral, Nightly, [facility], DO, 20 mg at 10/09/22 2307    spironolactone (ALDACTONE) tablet 12.5 mg, 12.5 mg, Oral, Daily, Paul Sutton MD, 12.5 mg at 10/09/22 0854    miconazole nitrate 2 % ointment, , Topical, BID, [facility], DO, Given at 10/10/22 0900    ammonium lactate (LAC-HYDRIN) 12 % lotion, , Topical, Daily, [facility], DO, Given at 10/10/22 0858    bumetanide (BUMEX) injection 1 mg, 1 mg, IntraVENous, TID, Kandyce Boas.  Ginder, APN, 1 mg at 10/10/22 0859    ipratropium-albuterol (DUONEB) nebulizer solution 1 ampule, 1 ampule, Inhalation, Q4H PRN, GINA Hopson - CNP    LORazepam (ATIVAN) tablet 0.5 mg, 0.5 mg, Oral, TID PRN, GINA Hopson - CNP, 0.5 mg at 10/10/22 0859    mirtazapine (REMERON) tablet 30 mg, 30 mg, Oral, Nightly, GINA Hopson - CNP, 30 mg at 10/09/22 2240    sennosides-docusate sodium (SENOKOT-S) 8.6-50 MG tablet 2 tablet, 2 tablet, Oral, Nightly, GINA Hopson - CNP, 2 tablet at 10/09/22 2241    aspirin EC tablet 81 mg, 81 mg, Oral, Daily, Jean Sawant MD, 81 mg at 10/10/22 0900    finasteride (PROSCAR) tablet 5 mg, 5 mg, Oral, Daily, Jean Sawant MD, 5 mg at 10/10/22 0859    insulin lispro (HUMALOG) injection vial 0-4 Units, 0-4 Units, SubCUTAneous, TID WC, Jean Sawant MD    insulin lispro (HUMALOG) injection vial 0-4 Units, 0-4 Units, SubCUTAneous, Nightly, Jean Sawant MD    hydrALAZINE (APRESOLINE) tablet 25 mg, 25 mg, Oral, BID, Jean Sawant MD, 25 mg at 10/09/22 0854    isosorbide dinitrate (ISORDIL) tablet 20 mg, 20 mg, Oral, TID, Jean Sawant MD, 20 mg at 10/10/22 0859    pantoprazole (PROTONIX) tablet 40 mg, 40 mg, Oral, QAM AC, Jean Sawant MD, 40 mg at 10/10/22 0547    tamsulosin (FLOMAX) capsule 0.4 mg, 0.4 mg, Oral, Daily, Jean Sawant MD, 0.4 mg at 10/10/22 0900    traZODone (DESYREL) tablet 50 mg, 50 mg, Oral, Nightly, Jean Sawant MD, 50 mg at 10/09/22 2241    sodium chloride flush 0.9 % injection 5-40 mL, 5-40 mL, IntraVENous, 2 times per day, Shahram Butler MD, 10 mL at 10/10/22 0901    sodium chloride flush 0.9 % injection 5-40 mL, 5-40 mL, IntraVENous, PRN, Shahram Butler MD, 10 mL at 10/06/22 1508    0.9 % sodium chloride infusion, , IntraVENous, PRN, Shahram Butler MD    enoxaparin (LOVENOX) injection 40 mg, 40 mg, SubCUTAneous, Daily, Shahram Butler MD, 40 mg at 10/10/22 0859    polyethylene glycol (GLYCOLAX) packet 17 g, 17 g, Oral, Daily PRN, Shahram Butler MD    acetaminophen (TYLENOL) tablet 650 mg, 650 mg, Oral, Q6H PRN, 650 mg at 10/09/22 0905 **OR** acetaminophen (TYLENOL) suppository 650 mg, 650 mg, Rectal, Q6H PRN, Shahram Butler MD    glucose chewable tablet 16 g, 4 tablet, Oral, PRN, Shahram Butler MD    dextrose bolus 10% 125 mL, 125 mL, IntraVENous, PRN **OR** dextrose bolus 10% 250 mL, 250 mL, IntraVENous, PRN, Shahram Butler MD    glucagon (rDNA) injection 1 mg, 1 mg, SubCUTAneous, PRN, Shahram Butler MD    dextrose 10 % infusion, , IntraVENous, Continuous PRN, Shahram Butler MD    Assessment:  Acute on Chronic HV   Renal Insufficiency  Volume Overload, HX of non-compliance    Patient Active Problem List   Diagnosis    CHF (congestive heart failure) ([facility]    S/P CABG x 4    CAD (coronary artery disease)    MI, old    Hyperlipidemia    Hypertension    Cardiomyopathy ([facility]    Ventricular tachycardia    Pancreatitis    AICD (automatic cardioverter/defibrillator) present    Implantable cardioverter-defibrillator (ICD) at end of device life    Chest pain, atypical    Failure to thrive in adult    Congestive heart failure of unknown etiology ([address]    Acute on chronic HFrEF (heart failure with reduced ejection fraction) ([facility]    VHD (valvular heart disease)    Palliative care encounter    Goals of care, counseling/discussion    DNR (do not resuscitate) discussion    Bladder spasms    Diabetes mellitus ([facility]       Plan:  See orders  Labs,meds noted  I/O noted  Cardiology note reiewed  Monitor lytes, continue diuretic Tx per Cardiology  Plan is SNF    Refusing [address] DO  9:21 AM  10/10/2022

## 2022-10-10 NOTE — PROGRESS NOTES
[facility]    Spoke with case management and palliative care. Daughter, Paula plans to take [place] home. She has things arranged. She is confused with the difference of C and hospice. Plan is for palliative care to meet at 1300 tomorrow to review options with [place] and [employer] together. Hospice will be available to meet with them after as well. If going hospice will need AICD deactivated ([place]).      Electronically signed by Dustin Dowling RN on 10/10/2022 at 2:49 PM   [phone]

## 2022-10-10 NOTE — PLAN OF CARE
Problem: Safety - Adult  Goal: Free from fall injury  10/10/2022 0204 by Arabella Romano RN  Outcome: Progressing

## 2022-10-10 NOTE — CARE COORDINATION
Phone conversation w/ daughter [employer] [phone]. Discharge planning discussed. From [facility] [place] [facility], plan is for her father to return home to return home on discharge- family is going to private pay a friend as a caregiver. [facility] following- [facility] order on chart-notify when pt is discharging. . Per [employer], the water company is coming tomorrow to fix the pipes but she stated the house is ready for him to return whenever he is discharged. Continues on Bumex iv tid. O2 weaned off- [employer] [facility] following. Will need ambulance ride home. Lavern Dietrich transport form on chart.  Will follow Roderick Saldana RN case manager

## 2022-10-10 NOTE — PLAN OF CARE
Problem: Discharge Planning  Goal: Discharge to home or other facility with appropriate resources  Outcome: Progressing     Problem: Skin/Tissue Integrity  Goal: Absence of new skin breakdown  Description: 1. Monitor for areas of redness and/or skin breakdown  2. Assess vascular access sites hourly  3. Every 4-6 hours minimum:  Change oxygen saturation probe site  4. Every 4-6 hours:  If on nasal continuous positive airway pressure, respiratory therapy assess nares and determine need for appliance change or resting period.   Outcome: Progressing     Problem: Safety - Adult  Goal: Free from fall injury  10/10/2022 0955 by Jacinta Rowley RN  Outcome: Progressing  10/10/2022 0204 by Anna Umaña RN  Outcome: Progressing

## 2022-10-10 NOTE — PROGRESS NOTES
INPATIENT CARDIOLOGY FOLLOW-UP    Name: Baldo Mccabe    Age: 64 y.o. Date of Admission: 10/1/2022  4:21 PM    Date of Service: 10/10/2022    Chief Complaint: Follow-up for acute superimposed upon chronic systolic heart failure, coronary atherosclerosis, ischemic cardiomyopathy, ventricular tachycardia, hypertension, chronic kidney disease    Interim History: The patient presently denies active complaints in the face of his severe left ventricular systolic dysfunction with appropriate fluid mobilization presently occurring and repeat assessment of renal function electrolytes pending. Arrhythmia monitoring demonstrates asymptomatic nonsustained ventricular tachycardia. Review of Systems: The remainder of a complete multisystem review including consitutional, central nervous, respiratory, circulatory, gastrointestinal, genitourinary, endocrinologic, hematologic, musculoskeletal and psychiatric are negative.     Problem List:  Patient Active Problem List   Diagnosis    CHF (congestive heart failure) ([facility])    S/P CABG x 4    CAD (coronary artery disease)    MI, old    Hyperlipidemia    Hypertension    Cardiomyopathy ([facility]    Ventricular tachycardia    Pancreatitis    AICD (automatic cardioverter/defibrillator) present    Implantable cardioverter-defibrillator (ICD) at end of device life    Chest pain, atypical    Failure to thrive in adult    Congestive heart failure of unknown etiology ([facility]    Acute on chronic HFrEF (heart failure with reduced ejection fraction) ([facility])    VHD (valvular heart disease)    Palliative care encounter    Goals of care, counseling/discussion    DNR (do not resuscitate) discussion    Bladder spasms    Diabetes mellitus ([facility]       Allergies:  No Known Allergies    Current Medications:  Current Facility-Administered Medications   Medication Dose Route Frequency Provider Last Rate Last Admin    metoprolol succinate (TOPROL XL) extended release tablet 50 mg  50 mg Oral BID [facility] Angella Lopez MD   50 mg at 10/09/22 2240    hyoscyamine (OSCIMIN) dispersible tablet 125 mcg  125 mcg Oral Q6H PRN Arabella Gandhi DO   125 mcg at 10/09/22 2240    rosuvastatin (CRESTOR) tablet 20 mg  20 mg Oral Nightly Bradley Quintanilla, DO   20 mg at 10/09/22 2307    spironolactone (ALDACTONE) tablet 12.5 mg  12.5 mg Oral Daily Jessenia Banda MD   12.5 mg at 10/09/22 0854    miconazole nitrate 2 % ointment   Topical BID Arabella Gandhi, DO   Given at 10/09/22 2244    ammonium lactate (LAC-HYDRIN) 12 % lotion   Topical Daily Arabella Gandhi DO   Given at 10/09/22 0900    bumetanide (BUMEX) injection 1 mg  1 mg IntraVENous TID MARIA C Pedersen   1 mg at 10/09/22 2241    ipratropium-albuterol (DUONEB) nebulizer solution 1 ampule  1 ampule Inhalation Q4H PRN Andrea Hayes APRN - CNP        LORazepam (ATIVAN) tablet 0.5 mg  0.5 mg Oral TID PRN GINA Alvarado - CNP   0.5 mg at 10/09/22 2240    mirtazapine (REMERON) tablet 30 mg  30 mg Oral Nightly Andrea Hayes APRN - CNP   30 mg at 10/09/22 2240    sennosides-docusate sodium (SENOKOT-S) 8.6-50 MG tablet 2 tablet  2 tablet Oral Nightly GINA Alvarado - CNP   2 tablet at 10/09/22 2241    aspirin EC tablet 81 mg  81 mg Oral Daily Nohmei Martin MD   81 mg at 10/09/22 0854    finasteride (PROSCAR) tablet 5 mg  5 mg Oral Daily Nohemi Martin MD   5 mg at 10/09/22 0854    insulin lispro (HUMALOG) injection vial 0-4 Units  0-4 Units SubCUTAneous TID WC Nohemi Martin MD        insulin lispro (HUMALOG) injection vial 0-4 Units  0-4 Units SubCUTAneous Nightly Nohemi Martin MD        hydrALAZINE (APRESOLINE) tablet 25 mg  25 mg Oral BID Nohemi Martin MD   25 mg at 10/09/22 0854    isosorbide dinitrate (ISORDIL) tablet 20 mg  20 mg Oral TID Nohemi Martin MD   20 mg at 10/09/22 2240    pantoprazole (PROTONIX) tablet 40 mg  40 mg Oral QAM AC Nohemi Martni MD   40 mg at 10/10/22 0547    tamsulosin (FLOMAX) capsule 0.4 mg  0.4 mg Oral Daily Nohemi Martin MD   0.4 mg at 10/09/22 0854    traZODone (DESYREL) tablet 50 mg  50 mg Oral Nightly Elijah Ruiz MD   50 mg at 10/09/22 2241    sodium chloride flush 0.9 % injection 5-40 mL  5-40 mL IntraVENous 2 times per day Elijah Ruiz MD   10 mL at 10/09/22 2241    sodium chloride flush 0.9 % injection 5-40 mL  5-40 mL IntraVENous PRN Elijah Ruiz MD   10 mL at 10/06/22 1508    0.9 % sodium chloride infusion   IntraVENous PRN Elijah Ruiz MD        enoxaparin (LOVENOX) injection 40 mg  40 mg SubCUTAneous Daily Elijah Ruiz MD   40 mg at 10/09/22 0854    polyethylene glycol (GLYCOLAX) packet 17 g  17 g Oral Daily PRN Elijah Ruiz MD        acetaminophen (TYLENOL) tablet 650 mg  650 mg Oral Q6H PRN Elijah Ruiz MD   650 mg at 10/09/22 9104    Or    acetaminophen (TYLENOL) suppository 650 mg  650 mg Rectal Q6H PRN Elijah Ruiz MD        glucose chewable tablet 16 g  4 tablet Oral PRN Elijah Ruiz MD        dextrose bolus 10% 125 mL  125 mL IntraVENous PRN Elijah Ruiz MD        Or    dextrose bolus 10% 250 mL  250 mL IntraVENous PRN Elijah Ruiz MD        glucagon (rDNA) injection 1 mg  1 mg SubCUTAneous PRN Elijah Ruiz MD        dextrose 10 % infusion   IntraVENous Continuous PRMELISA Ruiz MD          sodium chloride      dextrose         Physical Exam:  /64   Pulse 60   Temp 97.5 °F (36.4 °C) (Oral)   Resp 20   Ht 5' 11\" (1.803 m)   Wt 195 lb 8 oz (88.7 kg)   SpO2 96%   BMI 27.27 kg/m²   Weight change: Wt Readings from Last 3 Encounters:   10/09/22 195 lb 8 oz (88.7 kg)   07/22/22 177 lb 14.6 oz (80.7 kg)   06/30/22 177 lb 14.4 oz (80.7 kg)     The patient is awake, alert and in no discomfort or distress. He appears chronically ill and frail no gross musculoskeletal deformity is present. No significant skin or nail changes are present. Gross examination of head, eyes, nose and throat are negative. Jugular venous pressure is normal and no carotid bruits are present.  Normal respiratory effort is noted with no accessory muscle usage present. Lung fields are clear to ascultation. Cardiac examination is notable for a regular rate and rhythm with no palpable thrill. No gallop rhythm or cardiac murmur are identified. A benign abdominal examination is present with no masses or organomegaly. Intact pulses are present throughout all extremities and mild pretibial and pedal edema is present. No focal neurologic deficits are present. Intake/Output:    Intake/Output Summary (Last 24 hours) at 10/10/2022 0754  Last data filed at 10/10/2022 0553  Gross per 24 hour   Intake 420 ml   Output 2200 ml   Net -1780 ml     No intake/output data recorded. Laboratory Tests:  Lab Results   Component Value Date    CREATININE 1.3 (H) 10/09/2022    BUN 42 (H) 10/09/2022     10/09/2022    K 4.2 10/09/2022     10/09/2022    CO2 30 (H) 10/09/2022     No results for input(s): CKTOTAL, CKMB in the last 72 hours. Invalid input(s): TROPONONI  No results found for: BNP  Lab Results   Component Value Date/Time    WBC 7.0 10/09/2022 08:50 AM    RBC 4.61 10/09/2022 08:50 AM    HGB 11.6 10/09/2022 08:50 AM    HCT 38.8 10/09/2022 08:50 AM    MCV 84.2 10/09/2022 08:50 AM    MCH 25.2 10/09/2022 08:50 AM    MCHC 29.9 10/09/2022 08:50 AM    RDW 17.8 10/09/2022 08:50 AM     10/09/2022 08:50 AM    MPV 13.3 10/09/2022 08:50 AM     No results for input(s): ALKPHOS, ALT, AST, PROT, BILITOT, BILIDIR, LABALBU in the last 72 hours.   Lab Results   Component Value Date/Time    MG 2.3 10/07/2022 03:30 PM     Lab Results   Component Value Date/Time    PROTIME 13.3 04/08/2014 09:20 AM    INR 1.2 04/08/2014 09:20 AM     Lab Results   Component Value Date/Time    TSH 2.500 10/01/2022 05:58 PM     No components found for: CHLPL  Lab Results   Component Value Date    TRIG 58 10/01/2022    TRIG 361 (H) 09/20/2021    TRIG 1,001 (H) 08/12/2014     Lab Results   Component Value Date    HDL 40 10/01/2022    HDL 33 09/20/2021    HDL 25 08/12/2014     Lab Results   Component Value Date    LDLCALC 53 10/01/2022    LDLCALC 119 (H) 09/20/2021    LDLCALC - (AA) 08/12/2014       Cardiac Tests:  Telemetry findings reviewed: sinus rhythm with 3 beat asymptomatic nonsustained ventricular tachycardia, no new tachy/bradyarrhythmias overnight      ASSESSMENT / PLAN: On a clinical basis, the patient continues to demonstrate evidence of hypervolemia in spite of additional fluid mobilization with persistent elevation of proBNP levels and repeat assessment of renal function and electrolytes pending in the face of his known severe left ventricular systolic dysfunction. Ongoing optimal medical therapy will be maintained with present blood pressure and heart rate presently precluding further optimization of medication dosing and recommended addition of a SGLT2 inhibitor following further stabilization to further optimize heart failure management. Ongoing nutritional support will be essential to fluid mobilization reduce risk of progressive debilitation. Ongoing aggressive risk factor modification of blood pressure, diabetes and serum lipids will remain essential to reducing risk of future atherosclerotic development. His overall prognosis remains guarded with ongoing recommendation of palliative care to assist advance care directives. Note: This report was completed utilizing computer voice recognition software. Every effort has been made to ensure accuracy, however; inadvertent computerized transcription errors may be present. Justin Montes.  Zohra Shahid, [address] [place] [address]

## 2022-10-11 LAB
ANION GAP SERPL CALCULATED.3IONS-SCNC: 7 MMOL/L (ref 7–16)
BUN BLDV-MCNC: 40 MG/DL (ref 6–23)
CALCIUM SERPL-MCNC: 8.6 MG/DL (ref 8.6–10.2)
CHLORIDE BLD-SCNC: 101 MMOL/L (ref 98–107)
CO2: 33 MMOL/L (ref 22–29)
CREAT SERPL-MCNC: 1.3 MG/DL (ref 0.7–1.2)
GFR AFRICAN AMERICAN: >60
GFR NON-AFRICAN AMERICAN: 53 ML/MIN/1.73
GLUCOSE BLD-MCNC: 139 MG/DL (ref 74–99)
METER GLUCOSE: 106 MG/DL (ref 74–99)
METER GLUCOSE: 109 MG/DL (ref 74–99)
METER GLUCOSE: 131 MG/DL (ref 74–99)
METER GLUCOSE: 93 MG/DL (ref 74–99)
POTASSIUM SERPL-SCNC: 3.4 MMOL/L (ref 3.5–5)
SODIUM BLD-SCNC: 141 MMOL/L (ref 132–146)

## 2022-10-11 PROCEDURE — 2700000000 HC OXYGEN THERAPY PER DAY

## 2022-10-11 PROCEDURE — 2580000003 HC RX 258: Performed by: INTERNAL MEDICINE

## 2022-10-11 PROCEDURE — 99233 SBSQ HOSP IP/OBS HIGH 50: CPT | Performed by: INTERNAL MEDICINE

## 2022-10-11 PROCEDURE — 6370000000 HC RX 637 (ALT 250 FOR IP): Performed by: INTERNAL MEDICINE

## 2022-10-11 PROCEDURE — 6370000000 HC RX 637 (ALT 250 FOR IP): Performed by: NURSE PRACTITIONER

## 2022-10-11 PROCEDURE — 80048 BASIC METABOLIC PNL TOTAL CA: CPT

## 2022-10-11 PROCEDURE — 6360000002 HC RX W HCPCS: Performed by: INTERNAL MEDICINE

## 2022-10-11 PROCEDURE — 6370000000 HC RX 637 (ALT 250 FOR IP): Performed by: FAMILY MEDICINE

## 2022-10-11 PROCEDURE — 82962 GLUCOSE BLOOD TEST: CPT

## 2022-10-11 PROCEDURE — 2500000003 HC RX 250 WO HCPCS: Performed by: NURSE PRACTITIONER

## 2022-10-11 PROCEDURE — 36415 COLL VENOUS BLD VENIPUNCTURE: CPT

## 2022-10-11 PROCEDURE — 2060000000 HC ICU INTERMEDIATE R&B

## 2022-10-11 PROCEDURE — 99232 SBSQ HOSP IP/OBS MODERATE 35: CPT | Performed by: NURSE PRACTITIONER

## 2022-10-11 RX ORDER — BUMETANIDE 0.25 MG/ML
1 INJECTION, SOLUTION INTRAMUSCULAR; INTRAVENOUS 2 TIMES DAILY
Status: DISCONTINUED | OUTPATIENT
Start: 2022-10-12 | End: 2022-10-12

## 2022-10-11 RX ORDER — POTASSIUM CHLORIDE 20 MEQ/1
40 TABLET, EXTENDED RELEASE ORAL ONCE
Status: COMPLETED | OUTPATIENT
Start: 2022-10-11 | End: 2022-10-11

## 2022-10-11 RX ADMIN — HYDRALAZINE HYDROCHLORIDE 25 MG: 25 TABLET, FILM COATED ORAL at 22:23

## 2022-10-11 RX ADMIN — POTASSIUM CHLORIDE 40 MEQ: 1500 TABLET, EXTENDED RELEASE ORAL at 22:00

## 2022-10-11 RX ADMIN — PANTOPRAZOLE SODIUM 40 MG: 40 TABLET, DELAYED RELEASE ORAL at 05:34

## 2022-10-11 RX ADMIN — ISOSORBIDE DINITRATE 20 MG: 10 TABLET ORAL at 22:24

## 2022-10-11 RX ADMIN — METOPROLOL SUCCINATE 50 MG: 50 TABLET, EXTENDED RELEASE ORAL at 22:22

## 2022-10-11 RX ADMIN — ASPIRIN 81 MG: 81 TABLET, COATED ORAL at 09:22

## 2022-10-11 RX ADMIN — ROSUVASTATIN CALCIUM 20 MG: 20 TABLET, FILM COATED ORAL at 22:52

## 2022-10-11 RX ADMIN — HYOSCYAMINE SULFATE 125 MCG: 0.12 TABLET, ORALLY DISINTEGRATING ORAL at 09:28

## 2022-10-11 RX ADMIN — BUMETANIDE 1 MG: 0.25 INJECTION, SOLUTION INTRAMUSCULAR; INTRAVENOUS at 15:36

## 2022-10-11 RX ADMIN — MIRTAZAPINE 30 MG: 15 TABLET, FILM COATED ORAL at 22:22

## 2022-10-11 RX ADMIN — DOCUSATE SODIUM 50 MG AND SENNOSIDES 8.6 MG 2 TABLET: 8.6; 5 TABLET, FILM COATED ORAL at 22:21

## 2022-10-11 RX ADMIN — ISOSORBIDE DINITRATE 20 MG: 10 TABLET ORAL at 15:36

## 2022-10-11 RX ADMIN — TRAZODONE HYDROCHLORIDE 50 MG: 50 TABLET ORAL at 22:20

## 2022-10-11 RX ADMIN — BUMETANIDE 1 MG: 0.25 INJECTION, SOLUTION INTRAMUSCULAR; INTRAVENOUS at 09:23

## 2022-10-11 RX ADMIN — Medication: at 09:50

## 2022-10-11 RX ADMIN — MICONAZOLE NITRATE: 2 OINTMENT TOPICAL at 09:22

## 2022-10-11 RX ADMIN — LORAZEPAM 0.5 MG: 0.5 TABLET ORAL at 01:53

## 2022-10-11 RX ADMIN — FINASTERIDE 5 MG: 5 TABLET, FILM COATED ORAL at 09:22

## 2022-10-11 RX ADMIN — MICONAZOLE NITRATE: 2 OINTMENT TOPICAL at 22:23

## 2022-10-11 RX ADMIN — ISOSORBIDE DINITRATE 20 MG: 10 TABLET ORAL at 09:22

## 2022-10-11 RX ADMIN — HYOSCYAMINE SULFATE 125 MCG: 0.12 TABLET, ORALLY DISINTEGRATING ORAL at 20:10

## 2022-10-11 RX ADMIN — ENOXAPARIN SODIUM 40 MG: 100 INJECTION SUBCUTANEOUS at 09:23

## 2022-10-11 RX ADMIN — SODIUM CHLORIDE, PRESERVATIVE FREE 10 ML: 5 INJECTION INTRAVENOUS at 09:29

## 2022-10-11 RX ADMIN — ACETAMINOPHEN 650 MG: 325 TABLET ORAL at 22:24

## 2022-10-11 RX ADMIN — TAMSULOSIN HYDROCHLORIDE 0.4 MG: 0.4 CAPSULE ORAL at 09:22

## 2022-10-11 RX ADMIN — METOPROLOL SUCCINATE 50 MG: 50 TABLET, EXTENDED RELEASE ORAL at 09:23

## 2022-10-11 RX ADMIN — LORAZEPAM 0.5 MG: 0.5 TABLET ORAL at 09:50

## 2022-10-11 RX ADMIN — SODIUM CHLORIDE, PRESERVATIVE FREE 10 ML: 5 INJECTION INTRAVENOUS at 22:26

## 2022-10-11 RX ADMIN — SPIRONOLACTONE 12.5 MG: 25 TABLET ORAL at 09:22

## 2022-10-11 RX ADMIN — HYDRALAZINE HYDROCHLORIDE 25 MG: 25 TABLET, FILM COATED ORAL at 09:23

## 2022-10-11 RX ADMIN — ACETAMINOPHEN 650 MG: 325 TABLET ORAL at 05:33

## 2022-10-11 ASSESSMENT — PAIN SCALES - GENERAL
PAINLEVEL_OUTOF10: 3
PAINLEVEL_OUTOF10: 3
PAINLEVEL_OUTOF10: 0
PAINLEVEL_OUTOF10: 3
PAINLEVEL_OUTOF10: 0
PAINLEVEL_OUTOF10: 3

## 2022-10-11 ASSESSMENT — PAIN DESCRIPTION - LOCATION
LOCATION: PENIS
LOCATION: BACK

## 2022-10-11 ASSESSMENT — PAIN SCALES - WONG BAKER
WONGBAKER_NUMERICALRESPONSE: 0
WONGBAKER_NUMERICALRESPONSE: 0

## 2022-10-11 ASSESSMENT — PAIN DESCRIPTION - PAIN TYPE: TYPE: ACUTE PAIN

## 2022-10-11 ASSESSMENT — PAIN DESCRIPTION - DESCRIPTORS: DESCRIPTORS: BURNING

## 2022-10-11 ASSESSMENT — PAIN DESCRIPTION - ONSET: ONSET: ON-GOING

## 2022-10-11 ASSESSMENT — PAIN DESCRIPTION - FREQUENCY: FREQUENCY: CONTINUOUS

## 2022-10-11 ASSESSMENT — PAIN - FUNCTIONAL ASSESSMENT: PAIN_FUNCTIONAL_ASSESSMENT: PREVENTS OR INTERFERES SOME ACTIVE ACTIVITIES AND ADLS

## 2022-10-11 NOTE — PROGRESS NOTES
Palliative Care Department  [phone]  Palliative Care Progress Note  Provider GINA Mcdaniel CNP    Rafa Mccall  [id number]  Hospital Day: 11  Date of Initial Consult: 10/2/2022  Referring Provider: GINA Nolan CNP  Palliative Medicine was consulted for assistance with: Goals of care and CODE STATUS discussion    HPI:   Rafa Mccall is a 64 y.o. with a medical history of CHF, CAD s/p CABG in 2007, ischemic cardiomyopathy with EF 20%,  ICD, Hx prostate CA who was admitted on 10/1/2022 from SNF with a CHIEF COMPLAINT of SOB. Patient seen earlier this year at [facility] clinic but is not a candidate for heart surgery, patient would essentially require a heart transplant. CXR showing cardiomegaly with findings of CHF, small right pleural effusion and moderate left pleural effusion. Palliative medicine was consulted for goals of care and CODE STATUS discussion. ASSESSMENT/PLAN:     Pertinent Hospital Diagnoses     HFrEF, EF 20%  CAD s/p CABG 2007  Hx prostate cancer    Palliative Care Encounter / Counseling Regarding Goals of Care  Please see detailed goals of care discussion as below  At this time, Rafa Mccall, Does Not have capacity for medical decision-making.   Capacity is time limited and situation/question specific  During encounter Paula was surrogate medical decision-maker  Outcome of goals of care meeting:   Continue limited code  Plan is discharge back to [address] [place] [address] status Limited : no intubation, no compressions, no defibrillation, no resuscitative medications  Advanced Directives: no POA or living will in [facility]  Surrogate/Legal NOK:  Josias Mercado (daughter) [phone]    Spiritual assessment: no spiritual distress identified  Bereavement and grief: to be determined  Referrals to: none today  SUBJECTIVE:     Current medical issues leading to Palliative Medicine involvement include   Active Hospital Problems    Diagnosis Date Noted    Bladder spasms [N32.89] 10/09/2022     Priority: Medium    Di[facility] mellitus [facility] [E11.9] 10/09/2022     Priority: Medium    Palliative care encounter [Z51.5] 10/05/2022     Priority: Medium    Goals of care, counseling/discussion [Z71.89] 10/05/2022     Priority: Medium    DNR (do not resuscitate) discussion [Z71.89] 10/05/2022     Priority: Medium    Acute on chronic HFrEF (heart failure with reduced ejection fraction) ([facility] [I50.23] 10/02/2022     Priority: Medium    VHD (valvular heart disease) [place] 10/02/2022     Priority: Medium    Congestive heart failure of unknown etiology ([facility] [I50.9] 10/01/2022     Priority: Medium       Details of Conversation:    Chart reviewed and patient seen. Patient resting in bed. Patient's daughter, Jethro West, is at the bedside. Patient refusing to speak with palliative medicine and Paula. Patient yelling out \" I am not talking to you today\". Case management also present for conversation. After much discussion with patient, patient has decided to discharge back [facility]. He is not interested in hospice services at this time. Goals of care and CODE STATUS have been established. We will sign off.     OBJECTIVE:   Prognosis: Guarded    Physical Exam:  /67   Pulse 84   Temp 97.4 °F (36.3 °C) (Oral)   Resp 18   Ht 5' 11\" (1.803 m)   Wt 195 lb 8 oz (88.7 kg)   SpO2 100%   BMI 27.27 kg/m²   Constitutional:  Awake, slightly agitated and intermittently confused  Lungs: NC, unlabored respirations  Heart:  RRR  Abd:  Soft, non tender, non distended  Ext:  +edema, pulses present  Skin:  Warm and dry, no rashes on visible skin  Neuro:  Alert, grossly nonfocal; following commands    Objective data reviewed: labs, images, records, medication use, vitals, and chart    Discussed patient and the plan of care with the other IDT members: Palliative Medicine IDT Team, Floor Nurse, Patient, and Family    Time/Communication  Greater than 50% of time spent, total 25 minutes in counseling and coordination of care at the bedside regarding goals of care and diagnosis and prognosis. Thank you for allowing Palliative Medicine to participate in the care of Manju Kirkpatrick.

## 2022-10-11 NOTE — PROGRESS NOTES
INPATIENT CARDIOLOGY FOLLOW-UP    Name: Zaid Zamudio    Age: 64 y.o. Date of Admission: 10/1/2022  4:21 PM    Date of Service: 10/11/2022    Chief Complaint: Follow-up for acute on chronic HFrEF    Interim History:  Weak-appearing. Currently with no chest pain, respiratory distress, or palpitations. No new overnight cardiac complaints. SR / paced rhythm on telemetry.     Review of Systems:   Cardiac: As per HPI  General: No fever, chills  Pulmonary: As per HPI  HEENT: No visual disturbances, difficult swallowing  GI: No nausea, vomiting  : No dysuria, hematuria  Endocrine: No thyroid disease, +DM  Musculoskeletal: MENESES x 4, no focal motor deficits  Skin: Intact, no rashes  Neuro: No headache, seizures  Psych: Currently with no depression, anxiety    Problem List:  Patient Active Problem List   Diagnosis    CHF (congestive heart failure) ([facility])    S/P CABG x 4    CAD (coronary artery disease)    MI, old    Hyperlipidemia    Hypertension    Cardiomyopathy ([facility] [address]    Ventricular tachycardia    Pancreatitis    AICD (automatic cardioverter/defibrillator) present    Implantable cardioverter-defibrillator (ICD) at end of device life    Chest pain, atypical    Failure to thrive in adult    Congestive heart failure of unknown etiology ([address]    Acute on chronic HFrEF (heart failure with reduced ejection fraction) ([facility])    VHD (valvular heart disease)    Palliative care encounter    Goals of care, counseling/discussion    DNR (do not resuscitate) discussion    Bladder spasms    Diabetes mellitus ([facility] [address]       Allergies:  No Known Allergies    Current Medications:  Current Facility-Administered Medications   Medication Dose Route Frequency Provider Last Rate Last Admin    metoprolol succinate (TOPROL XL) extended release tablet 50 mg  50 mg Oral BID Cedric Ziegler MD   50 mg at 10/11/22 0923    hyoscyamine (OSCIMIN) dispersible tablet 125 mcg  125 mcg Oral Q6H PRN Geri Body, DO   125 mcg at 10/11/22 0520 rosuvastatin (CRESTOR) tablet 20 mg  20 mg Oral Nightly Bradley A Rich, DO   20 mg at 10/10/22 2125    spironolactone (ALDACTONE) tablet 12.5 mg  12.5 mg Oral Daily Cedric Ziegler MD   12.5 mg at 10/11/22 9089    miconazole nitrate 2 % ointment   Topical BID Geri Body, DO   Given at 10/11/22 4749    ammonium lactate (LAC-HYDRIN) 12 % lotion   Topical Daily Geri Body, DO   Given at 10/11/22 0950    bumetanide (BUMEX) injection 1 mg  1 mg IntraVENous TID MARIA C Sagastume   1 mg at 10/11/22 1536    ipratropium-albuterol (DUONEB) nebulizer solution 1 ampule  1 ampule Inhalation Q4H PRN Letha Sensor, APRN - CNP        LORazepam (ATIVAN) tablet 0.5 mg  0.5 mg Oral TID PRN Letha Sensor, APRN - CNP   0.5 mg at 10/11/22 0950    mirtazapine (REMERON) tablet 30 mg  30 mg Oral Nightly Letha Sensor, APRN - CNP   30 mg at 10/10/22 2126    sennosides-docusate sodium (SENOKOT-S) 8.6-50 MG tablet 2 tablet  2 tablet Oral Nightly Letha Sensor, APRN - CNP   2 tablet at 10/10/22 2124    aspirin EC tablet 81 mg  81 mg Oral Daily Analisa Medina MD   81 mg at 10/11/22 7321    finasteride (PROSCAR) tablet 5 mg  5 mg Oral Daily Analisa Medina MD   5 mg at 10/11/22 8536    insulin lispro (HUMALOG) injection vial 0-4 Units  0-4 Units SubCUTAneous TID WC Analisa Medina MD        insulin lispro (HUMALOG) injection vial 0-4 Units  0-4 Units SubCUTAneous Nightly Analisa Medina MD        hydrALAZINE (APRESOLINE) tablet 25 mg  25 mg Oral BID Analisa Medina MD   25 mg at 10/11/22 3005    isosorbide dinitrate (ISORDIL) tablet 20 mg  20 mg Oral TID Analisa Medina MD   20 mg at 10/11/22 1536    pantoprazole (PROTONIX) tablet 40 mg  40 mg Oral QAM AC Analisa Medina MD   40 mg at 10/11/22 0534    tamsulosin (FLOMAX) capsule 0.4 mg  0.4 mg Oral Daily Analisa Medina MD   0.4 mg at 10/11/22 4464    traZODone (DESYREL) tablet 50 mg  50 mg Oral Nightly Analisa Medina MD   50 mg at 10/10/22 3624    sodium chloride flush 0.9 % injection 5-40 mL  5-40 mL IntraVENous 2 times per day Joan Goldman MD   10 mL at 10/11/22 0929    sodium chloride flush 0.9 % injection 5-40 mL  5-40 mL IntraVENous PRN Joan Goldman MD   10 mL at 10/06/22 1508    0.9 % sodium chloride infusion   IntraVENous PRN Joan Goldman MD        enoxaparin (LOVENOX) injection 40 mg  40 mg SubCUTAneous Daily Joan Goldman MD   40 mg at 10/11/22 7988    polyethylene glycol (GLYCOLAX) packet 17 g  17 g Oral Daily PRN Joan Goldman MD        acetaminophen (TYLENOL) tablet 650 mg  650 mg Oral Q6H PRN Joan Goldman MD   650 mg at 10/11/22 0533    Or    acetaminophen (TYLENOL) suppository 650 mg  650 mg Rectal Q6H PRN Joan Goldman MD        glucose chewable tablet 16 g  4 tablet Oral PRN Joan Goldman MD        dextrose bolus 10% 125 mL  125 mL IntraVENous PRN Joan Goldman MD        Or    dextrose bolus 10% 250 mL  250 mL IntraVENous PRN Joan Goldman MD        glucagon (rDNA) injection 1 mg  1 mg SubCUTAneous PRN Joan Goldman MD        dextrose 10 % infusion   IntraVENous Continuous PRN Joan Goldman MD          sodium chloride      dextrose         Physical Exam:  BP (!) 115/96   Pulse 84   Temp 97.2 °F (36.2 °C) (Oral)   Resp 18   Ht 5' 11\" (1.803 m)   Wt 195 lb 8 oz (88.7 kg)   SpO2 97%   BMI 27.27 kg/m²   Wt Readings from Last 3 Encounters:   10/09/22 195 lb 8 oz (88.7 kg)   07/22/22 177 lb 14.6 oz (80.7 kg)   06/30/22 177 lb 14.4 oz (80.7 kg)   Appearance: Awake, no acute respiratory distress  Skin: Intact, no rash  Head: Normocephalic, atraumatic  Eyes: EOMI, no conjunctival erythema  ENMT: No pharyngeal erythema, MMM, no rhinorrhea  Neck: Supple, no carotid bruits  Lungs: Decreased BS B/L, no wheezing  Cardiac: Regular rate and rhythm, 2/6 systolic murmur  Abdomen: Soft, nontender, +bowel sounds  Extremities: Moves all extremities x 4, +lower extremity edema  Neurologic: No focal motor deficits apparent, normal mood and affect    Intake/Output:    Intake/Output Summary (Last 24 hours) at 10/11/2022 1718  Last data filed at 10/11/2022 1554  Gross per 24 hour   Intake 210 ml   Output 2950 ml   Net -2740 ml     I/O this shift:  In: -   Out: 1250 [Urine:1250]    Laboratory Tests:  Recent Labs     10/09/22  0850 10/10/22  1330 10/11/22  0530    137 141   K 4.2 4.0 3.4*    100 101   CO2 30* 30* 33*   BUN 42* 39* 40*   CREATININE 1.3* 1.3* 1.3*   GLUCOSE 153* 122* 139*   CALCIUM 8.7 8.7 8.6     Lab Results   Component Value Date/Time    MG 2.3 10/07/2022 03:30 PM     No results for input(s): ALKPHOS, ALT, AST, PROT, BILITOT, BILIDIR, LABALBU in the last 72 hours. Recent Labs     10/09/22  0850   WBC 7.0   RBC 4.61   HGB 11.6*   HCT 38.8   MCV 84.2   MCH 25.2*   MCHC 29.9*   RDW 17.8*   *   MPV 13.3*     Lab Results   Component Value Date    CKTOTAL 113 04/08/2014    CKMB 2.8 04/08/2014    TROPONINI 0.03 02/24/2021    TROPONINI <0.01 04/14/2014    TROPONINI <0.01 04/08/2014     No results for input(s): CKTOTAL, CKMB, CKMBINDEX, TROPHS in the last 72 hours.   Lab Results   Component Value Date    INR 1.2 04/08/2014    PROTIME 13.3 (H) 04/08/2014     Lab Results   Component Value Date    TSH 2.500 10/01/2022     Lab Results   Component Value Date    LABA1C 5.9 (H) 10/01/2022     No results found for: EAG  Lab Results   Component Value Date    CHOL 105 10/01/2022    CHOL 224 (H) 09/20/2021    CHOL 296 (H) 08/12/2014     Lab Results   Component Value Date    TRIG 58 10/01/2022    TRIG 361 (H) 09/20/2021    TRIG 1,001 (H) 08/12/2014     Lab Results   Component Value Date    HDL 40 10/01/2022    HDL 33 09/20/2021    HDL 25 08/12/2014     Lab Results   Component Value Date    LDLCALC 53 10/01/2022    LDLCALC 119 (H) 09/20/2021    LDLCALC - (AA) 08/12/2014     Lab Results   Component Value Date    LABVLDL 12 10/01/2022    LABVLDL 72 09/20/2021    LABVLDL - (AA) 08/12/2014     No results found for: [address] [facility] 10/09/22  0850   PROBNP 23,861*       Cardiac Tests:  Telemetry personally reviewed (date: 10/11/2022): SR/ paced rhythm    TTE: 3/15/2021 ([facility]) Exam indication: Abnormal Cardiac Biomarkers There is a resting wall motion abnormality in the territory of the LAD and RCA. - The left ventricle is dilated. Left ventricular systolic function is severely decreased. EF = 26 ± 5% (2D biplane) Grade III left ventricular diastolic dysfunction. The right ventricle is normal in size. Right ventricular systolic function is   moderately decreased. - The left atrial cavity is severely dilated. - The right atrial cavity is dilated. There is moderately severe (3+) mitral valve regurgitation due to restricted leaflet motion likely related to ischemic heart disease. TTE: 5/13/2022 ([facility]): - Technically difficult exam due to body habitus and suboptimal positioning. - Exam indication: Evaluation of known heart failure to guide therapy - The left ventricle is severely dilated. Left ventricular systolic function is severely decreased. EF = 20 ± 5% (visual est.) Definity contrast used for endocardial border detection. Left ventricular diastolic function was not evaluated due to an arrhythmia. The right ventricle is dilated. Right ventricular systolic function is moderately to severely decreased. - The left atrial cavity is severely dilated. - The right atrial cavity is dilated. There is moderate (2+) mitral valve regurgitation. - -Peak/mean gradients of 15/7 mmHg, gradients averaged due to arrhythmia. -Prior EF reported as 26% (3/2021). Exam was compared with the prior  echocardiographic exam performed on  3/14/2021. There is no significant change. VHD: Moderate-severe ischemic MR (patient was recommended for further evaluation with EBONI and possible consideration of a clip 6/3/2022 at [facility] --> patient declined.      RHC: 5/16/2022: [facility]: Elevated biventricular filling pressures (RA 10, PCWP 26), moderate pulmonary hypertension (most likely postcapillary), preserved cardiac index by assumed Sunni 2.73. [facility], 3/16/2021: LMT: severe diffuse disease. LAD: severe diffuse disease. Additional Comment: Moderate caliber vessel with  in the proximal portion. The vessel is fed by patent LIMA graft. The distal portion is small in caliber and wraps the apex. There are L->R collaterals. LCX: severe diffuse disease. Additional Comment: Moderate caliber non-dominant vessel which is fed by SVG->RI/OM1. RAMUS: ostial Ramus - . Additional Comment: Fills in the mid-distal portion from SVG->RI graft. RCA Status: Not Applicable. Additional Comment: Occluded at the ostia. GRAFTS: LIMA Graft End to Side to the Left Anterior Descending. Additional Comments: patent. SVG End to Side to the RI. Additional  Comments - patent with mild-moderate disease in the mid-portion; supplies mid-distal LCx and backfills to partially supply LAD. SVG End to Side to the OM-2 Second Obtuse Marginal Branch. Additional Comments - occluded. SVG End to Side to the Right Posterior Descending Artery. Additional Comments - occluded. Impression:- Severe/occlusive native 3 vessel CAD with patent LIMA->LAD, SVG->RI/OM1. SVG->OM2 is occluded - Native RCA is occluded at the ostia as is the SVG->rPDA. The right is fed by L->R collaterals from the LIMA graft Recommended Treatment: Medical Therapy. ASSESSMENT / PLAN:  Acute on chronic decompensated HFrEF  Ischemic cardiomyopathy  History of CAD status post CABG x 5 (LIMA to LAD, SVG to OM1, SVG to OM2, SVG to RI and SVG to RPDA) in 2007 s/p [facility] in 2021 ([facility]) with severe multivessel disease and all the grafts were occluded except for LIMA to LAD which was patent.   HTN: controlled  Hyperlipidemia  T2DM  History of NSVT  S/p ICD ([facility]) in situ and recent generator change in 2018  History of prostate cancer    Continue IV diuresis for today (will decrease from TID to BID dosing) -- monitor BMP and I/O's (net negative 18.9 L)  Will replace potassium today  Continue Isordil/Hydralazine (prior reported intolerance to ACE-I/ARB)  Continue Toprol XL  Continue low dose aldactone  Records from [facility] reviewed  Limited code status    Greater than 35 minutes was spent counseling the patient, reviewing the rationale for the above recommendations and reviewing the patient's current medication list, problem list and results of all previously ordered testing.     Senthil Conley MD  [facility]) Cardiology

## 2022-10-11 NOTE — PROGRESS NOTES
Subjective: The patient is resting comfortably  No problems overnight. Objective:    /67   Pulse 84   Temp 97.4 °F (36.3 °C) (Oral)   Resp 18   Ht 5' 11\" (1.803 m)   Wt 195 lb 8 oz (88.7 kg)   SpO2 100%   BMI 27.27 kg/m²     Heart:  RRR, no murmurs, gallops, or rubs.   Lungs: Decreased BS at the bases  Abd: bowel sounds present, nontender, nondistended, no masses  Extrem:  edema present    Labs:  CBC:   Lab Results   Component Value Date/Time    WBC 7.0 10/09/2022 08:50 AM    RBC 4.61 10/09/2022 08:50 AM    HGB 11.6 10/09/2022 08:50 AM    HCT 38.8 10/09/2022 08:50 AM    MCV 84.2 10/09/2022 08:50 AM    MCH 25.2 10/09/2022 08:50 AM    MCHC 29.9 10/09/2022 08:50 AM    RDW 17.8 10/09/2022 08:50 AM     10/09/2022 08:50 AM    MPV 13.3 10/09/2022 08:50 AM     CMP:    Lab Results   Component Value Date/Time     10/11/2022 05:30 AM    K 3.4 10/11/2022 05:30 AM    K 4.0 10/05/2022 11:00 AM     10/11/2022 05:30 AM    CO2 33 10/11/2022 05:30 AM    BUN 40 10/11/2022 05:30 AM    CREATININE 1.3 10/11/2022 05:30 AM    GFRAA >60 10/11/2022 05:30 AM    LABGLOM 53 10/11/2022 05:30 AM    GLUCOSE 139 10/11/2022 05:30 AM    GLUCOSE 90 10/10/2011 10:15 AM    PROT 6.4 10/01/2022 05:58 PM    LABALBU 3.6 10/01/2022 05:58 PM    LABALBU 4.8 10/10/2011 10:15 AM    CALCIUM 8.6 10/11/2022 05:30 AM    BILITOT 1.0 10/01/2022 05:58 PM    ALKPHOS 151 10/01/2022 05:58 PM    AST 21 10/01/2022 05:58 PM    ALT 8 10/01/2022 05:58 PM     PT/INR:    Lab Results   Component Value Date/Time    PROTIME 13.3 04/08/2014 09:20 AM    INR 1.2 04/08/2014 09:20 AM          Current Facility-Administered Medications:     metoprolol succinate (TOPROL XL) extended release tablet 50 mg, 50 mg, Oral, BID, Jaycee Coats MD, 50 mg at 10/10/22 2126    hyoscyamine (OSCIMIN) dispersible tablet 125 mcg, 125 mcg, Oral, Q6H PRN, Minal Crabtree DO, 125 mcg at 10/09/22 2240    rosuvastatin (CRESTOR) tablet 20 mg, 20 mg, Oral, Nightly, Mehdi Shearing Rich, DO, 20 mg at 10/10/22 2125    spironolactone (ALDACTONE) tablet 12.5 mg, 12.5 mg, Oral, Daily, Anne Elizabeth MD, 12.5 mg at 10/09/22 0854    miconazole nitrate 2 % ointment, , Topical, BID, Zuly Filter, DO, Given at 10/10/22 2133    ammonium lactate (LAC-HYDRIN) 12 % lotion, , Topical, Daily, Zuly Filter, DO, Given at 10/10/22 0858    bumetanide (BUMEX) injection 1 mg, 1 mg, IntraVENous, TID, Weiss Kyleigh.  Ginder, APN, 1 mg at 10/10/22 2131    ipratropium-albuterol (DUONEB) nebulizer solution 1 ampule, 1 ampule, Inhalation, Q4H PRN, Jose Caputo APRN - CNP    LORazepam (ATIVAN) tablet 0.5 mg, 0.5 mg, Oral, TID PRN, Jose Caputo APRN - CNP, 0.5 mg at 10/11/22 0153    mirtazapine (REMERON) tablet 30 mg, 30 mg, Oral, Nightly, GINA Feng - CNP, 30 mg at 10/10/22 2126    sennosides-docusate sodium (SENOKOT-S) 8.6-50 MG tablet 2 tablet, 2 tablet, Oral, Nightly, Jose Caputo APRN - CNP, 2 tablet at 10/10/22 2124    aspirin EC tablet 81 mg, 81 mg, Oral, Daily, Shahram Butler MD, 81 mg at 10/10/22 0900    finasteride (PROSCAR) tablet 5 mg, 5 mg, Oral, Daily, Shahram Butler MD, 5 mg at 10/10/22 0859    insulin lispro (HUMALOG) injection vial 0-4 Units, 0-4 Units, SubCUTAneous, TID WC, Shahram Butler MD    insulin lispro (HUMALOG) injection vial 0-4 Units, 0-4 Units, SubCUTAneous, Nightly, Shahram Butler MD    hydrALAZINE (APRESOLINE) tablet 25 mg, 25 mg, Oral, BID, Shahram Butler MD, 25 mg at 10/10/22 2125    isosorbide dinitrate (ISORDIL) tablet 20 mg, 20 mg, Oral, TID, Shahram Butler MD, 20 mg at 10/10/22 2127    pantoprazole (PROTONIX) tablet 40 mg, 40 mg, Oral, QAM AC, Shahram Butler MD, 40 mg at 10/11/22 0534    tamsulosin (FLOMAX) capsule 0.4 mg, 0.4 mg, Oral, Daily, Shahram Butler MD, 0.4 mg at 10/10/22 0900    traZODone (DESYREL) tablet 50 mg, 50 mg, Oral, Nightly, Shahram Butler MD, 50 mg at 10/10/22 2126    sodium chloride flush 0.9 % injection 5-40 mL, 5-40 mL, IntraVENous, 2 times per day, Adelaide Jimenez MD, 10 mL at 10/10/22 2132    sodium chloride flush 0.9 % injection 5-40 mL, 5-40 mL, IntraVENous, PRN, Adelaide Jimenez MD, 10 mL at 10/06/22 1508    0.9 % sodium chloride infusion, , IntraVENous, PRN, Adelaide Jimenez MD    enoxaparin (LOVENOX) injection 40 mg, 40 mg, SubCUTAneous, Daily, Adelaide Jimenez MD, 40 mg at 10/10/22 0859    polyethylene glycol (GLYCOLAX) packet 17 g, 17 g, Oral, Daily PRN, Adelaide Jimenez MD    acetaminophen (TYLENOL) tablet 650 mg, 650 mg, Oral, Q6H PRN, 650 mg at 10/11/22 0533 **OR** acetaminophen (TYLENOL) suppository 650 mg, 650 mg, Rectal, Q6H PRN, Adelaide Jimenez MD    glucose chewable tablet 16 g, 4 tablet, Oral, PRN, Adelaide Jimenez MD    dextrose bolus 10% 125 mL, 125 mL, IntraVENous, PRN **OR** dextrose bolus 10% 250 mL, 250 mL, IntraVENous, PRN, Adelaide Jimenez MD    glucagon (rDNA) injection 1 mg, 1 mg, SubCUTAneous, PRN, Adelaide Jimenez MD    dextrose 10 % infusion, , IntraVENous, Continuous PRN, Adelaide Jimenez MD    Assessment: See below, Acute/chronic HF    ASHD , Ischemic Cardiomyopathy    Patient Active Problem List   Diagnosis    CHF (congestive heart failure) ([facility])    S/P CABG x 4    CAD (coronary artery disease)    MI, old    Hyperlipidemia    Hypertension    Cardiomyopathy ([facility] [address]    Ventricular tachycardia    Pancreatitis    AICD (automatic cardioverter/defibrillator) present    Implantable cardioverter-defibrillator (ICD) at end of device life    Chest pain, atypical    Failure to thrive in adult    Congestive heart failure of unknown etiology ([facility] [address]    Acute on chronic HFrEF (heart failure with reduced ejection fraction) ([address]    VHD (valvular heart disease)    Palliative care encounter    Goals of care, counseling/discussion    DNR (do not resuscitate) discussion    Bladder spasms    Diabetes mellitus ([facility] [address]       Plan:  See orders  Labs,meds noted  Palliative note reviewed  Plan is to D/C to home   Continued Diuresis per Cardiology  Poor prognosis        Elias New DO  9:28 AM  10/11/2022

## 2022-10-12 VITALS
HEIGHT: 71 IN | TEMPERATURE: 98.2 F | HEART RATE: 65 BPM | OXYGEN SATURATION: 95 % | SYSTOLIC BLOOD PRESSURE: 125 MMHG | RESPIRATION RATE: 20 BRPM | WEIGHT: 195.5 LBS | BODY MASS INDEX: 27.37 KG/M2 | DIASTOLIC BLOOD PRESSURE: 88 MMHG

## 2022-10-12 LAB
ANION GAP SERPL CALCULATED.3IONS-SCNC: 6 MMOL/L (ref 7–16)
BUN BLDV-MCNC: 40 MG/DL (ref 6–23)
CALCIUM SERPL-MCNC: 8.8 MG/DL (ref 8.6–10.2)
CHLORIDE BLD-SCNC: 101 MMOL/L (ref 98–107)
CO2: 31 MMOL/L (ref 22–29)
CREAT SERPL-MCNC: 1.4 MG/DL (ref 0.7–1.2)
GFR AFRICAN AMERICAN: 59
GFR NON-AFRICAN AMERICAN: 49 ML/MIN/1.73
GLUCOSE BLD-MCNC: 95 MG/DL (ref 74–99)
METER GLUCOSE: 114 MG/DL (ref 74–99)
METER GLUCOSE: 128 MG/DL (ref 74–99)
POTASSIUM SERPL-SCNC: 4.2 MMOL/L (ref 3.5–5)
SARS-COV-2, NAAT: NOT DETECTED
SODIUM BLD-SCNC: 138 MMOL/L (ref 132–146)

## 2022-10-12 PROCEDURE — 80048 BASIC METABOLIC PNL TOTAL CA: CPT

## 2022-10-12 PROCEDURE — 2700000000 HC OXYGEN THERAPY PER DAY

## 2022-10-12 PROCEDURE — 6370000000 HC RX 637 (ALT 250 FOR IP): Performed by: NURSE PRACTITIONER

## 2022-10-12 PROCEDURE — 2580000003 HC RX 258: Performed by: INTERNAL MEDICINE

## 2022-10-12 PROCEDURE — 36415 COLL VENOUS BLD VENIPUNCTURE: CPT

## 2022-10-12 PROCEDURE — 6370000000 HC RX 637 (ALT 250 FOR IP): Performed by: INTERNAL MEDICINE

## 2022-10-12 PROCEDURE — 6370000000 HC RX 637 (ALT 250 FOR IP): Performed by: FAMILY MEDICINE

## 2022-10-12 PROCEDURE — 87635 SARS-COV-2 COVID-19 AMP PRB: CPT

## 2022-10-12 PROCEDURE — 6360000002 HC RX W HCPCS: Performed by: INTERNAL MEDICINE

## 2022-10-12 PROCEDURE — 99233 SBSQ HOSP IP/OBS HIGH 50: CPT | Performed by: INTERNAL MEDICINE

## 2022-10-12 PROCEDURE — 82962 GLUCOSE BLOOD TEST: CPT

## 2022-10-12 RX ORDER — METOPROLOL SUCCINATE 50 MG/1
50 TABLET, EXTENDED RELEASE ORAL 2 TIMES DAILY
Qty: 30 TABLET | Refills: 3 | Status: ON HOLD | OUTPATIENT
Start: 2022-10-12 | End: 2022-10-26 | Stop reason: HOSPADM

## 2022-10-12 RX ORDER — BUMETANIDE 1 MG/1
1 TABLET ORAL 2 TIMES DAILY
Status: DISCONTINUED | OUTPATIENT
Start: 2022-10-12 | End: 2022-10-12 | Stop reason: HOSPADM

## 2022-10-12 RX ORDER — ROSUVASTATIN CALCIUM 20 MG/1
20 TABLET, COATED ORAL NIGHTLY
Qty: 30 TABLET | Refills: 3 | Status: ON HOLD | OUTPATIENT
Start: 2022-10-12 | End: 2022-10-26 | Stop reason: HOSPADM

## 2022-10-12 RX ORDER — AMMONIUM LACTATE 12 G/100G
LOTION TOPICAL
Qty: 1 EACH | Refills: 0 | Status: ON HOLD | OUTPATIENT
Start: 2022-10-13 | End: 2022-10-26 | Stop reason: HOSPADM

## 2022-10-12 RX ORDER — SPIRONOLACTONE 25 MG/1
12.5 TABLET ORAL DAILY
Qty: 30 TABLET | Refills: 3 | Status: ON HOLD | OUTPATIENT
Start: 2022-10-13 | End: 2022-10-26 | Stop reason: HOSPADM

## 2022-10-12 RX ORDER — BUMETANIDE 1 MG/1
1 TABLET ORAL 2 TIMES DAILY
Qty: 30 TABLET | Refills: 3 | Status: ON HOLD | OUTPATIENT
Start: 2022-10-12 | End: 2022-10-26 | Stop reason: HOSPADM

## 2022-10-12 RX ADMIN — FINASTERIDE 5 MG: 5 TABLET, FILM COATED ORAL at 09:41

## 2022-10-12 RX ADMIN — TAMSULOSIN HYDROCHLORIDE 0.4 MG: 0.4 CAPSULE ORAL at 09:42

## 2022-10-12 RX ADMIN — SPIRONOLACTONE 12.5 MG: 25 TABLET ORAL at 09:41

## 2022-10-12 RX ADMIN — PANTOPRAZOLE SODIUM 40 MG: 40 TABLET, DELAYED RELEASE ORAL at 05:10

## 2022-10-12 RX ADMIN — ACETAMINOPHEN 650 MG: 325 TABLET ORAL at 05:10

## 2022-10-12 RX ADMIN — METOPROLOL SUCCINATE 50 MG: 50 TABLET, EXTENDED RELEASE ORAL at 09:42

## 2022-10-12 RX ADMIN — ENOXAPARIN SODIUM 40 MG: 100 INJECTION SUBCUTANEOUS at 09:41

## 2022-10-12 RX ADMIN — LORAZEPAM 0.5 MG: 0.5 TABLET ORAL at 09:41

## 2022-10-12 RX ADMIN — ISOSORBIDE DINITRATE 20 MG: 10 TABLET ORAL at 09:41

## 2022-10-12 RX ADMIN — Medication: at 09:43

## 2022-10-12 RX ADMIN — ASPIRIN 81 MG: 81 TABLET, COATED ORAL at 09:41

## 2022-10-12 RX ADMIN — SODIUM CHLORIDE, PRESERVATIVE FREE 10 ML: 5 INJECTION INTRAVENOUS at 09:42

## 2022-10-12 RX ADMIN — BUMETANIDE 1 MG: 1 TABLET ORAL at 09:41

## 2022-10-12 RX ADMIN — LORAZEPAM 0.5 MG: 0.5 TABLET ORAL at 00:36

## 2022-10-12 RX ADMIN — HYOSCYAMINE SULFATE 125 MCG: 0.12 TABLET, ORALLY DISINTEGRATING ORAL at 02:21

## 2022-10-12 RX ADMIN — MICONAZOLE NITRATE: 2 OINTMENT TOPICAL at 09:42

## 2022-10-12 RX ADMIN — HYDRALAZINE HYDROCHLORIDE 25 MG: 25 TABLET, FILM COATED ORAL at 09:41

## 2022-10-12 ASSESSMENT — PAIN SCALES - GENERAL: PAINLEVEL_OUTOF10: 3

## 2022-10-12 NOTE — PROGRESS NOTES
Review of Systems    Physical Exam    Patient refuses to use bedpan, and refuses turns, will take self off of turns after placed on them. Education ineffective. Call light within reach. All needs met.

## 2022-10-12 NOTE — PROGRESS NOTES
INPATIENT CARDIOLOGY FOLLOW-UP    Name: Criss Coulter    Age: 64 y.o. Date of Admission: 10/1/2022  4:21 PM    Date of Service: 10/12/2022    Chief Complaint: Follow-up for acute on chronic HFrEF    Interim History:  Weak-appearing. Currently with no chest pain, respiratory distress, or palpitations. No new overnight cardiac complaints. SR / paced rhythm on telemetry. I/O's net negative 20.9 L thus far. Still on IV diuretic.     Review of Systems:   Cardiac: As per HPI  General: No fever, chills  Pulmonary: As per HPI  HEENT: No visual disturbances, difficult swallowing  GI: No nausea, vomiting  : No dysuria, hematuria  Endocrine: No thyroid disease, +DM  Musculoskeletal: MENESES x 4, no focal motor deficits  Skin: Intact, no rashes  Neuro: No headache, seizures  Psych: Currently with no depression, anxiety    Problem List:  Patient Active Problem List   Diagnosis    CHF (congestive heart failure) ([facility])    S/P CABG x 4    CAD (coronary artery disease)    MI, old    Hyperlipidemia    Hypertension    Cardiomyopathy ([facility] [address]    Ventricular tachycardia    Pancreatitis    AICD (automatic cardioverter/defibrillator) present    Implantable cardioverter-defibrillator (ICD) at end of device life    Chest pain, atypical    Failure to thrive in adult    Congestive heart failure of unknown etiology ([facility] [address]    Acute on chronic HFrEF (heart failure with reduced ejection fraction) ([facility])    VHD (valvular heart disease)    Palliative care encounter    Goals of care, counseling/discussion    DNR (do not resuscitate) discussion    Bladder spasms    Diabetes mellitus ([facility] [address]       Allergies:  No Known Allergies    Current Medications:  Current Facility-Administered Medications   Medication Dose Route Frequency Provider Last Rate Last Admin    bumetanide (BUMEX) injection 1 mg  1 mg IntraVENous BID Lydia Da Silva MD        metoprolol succinate (TOPROL XL) extended release tablet 50 mg  50 mg Oral BID Devonte Person MD   50 mg at 10/11/22 2222    hyoscyamine (OSCIMIN) dispersible tablet 125 mcg  125 mcg Oral Q6H PRN Karannetten Blinks, DO   125 mcg at 10/12/22 0221    rosuvastatin (CRESTOR) tablet 20 mg  20 mg Oral Nightly Bradley A Rich, DO   20 mg at 10/11/22 2252    spironolactone (ALDACTONE) tablet 12.5 mg  12.5 mg Oral Daily Maisha Rodriguez MD   12.5 mg at 10/11/22 9166    miconazole nitrate 2 % ointment   Topical BID Karilyn Blinks, DO   Given at 10/11/22 2223    ammonium lactate (LAC-HYDRIN) 12 % lotion   Topical Daily Karilyn Blinks, DO   Given at 10/11/22 0950    ipratropium-albuterol (DUONEB) nebulizer solution 1 ampule  1 ampule Inhalation Q4H PRN Berneta Ales, APRN - CNP        LORazepam (ATIVAN) tablet 0.5 mg  0.5 mg Oral TID PRN Charlotte Blanc, APRN - CNP   0.5 mg at 10/12/22 0036    mirtazapine (REMERON) tablet 30 mg  30 mg Oral Nightly Berneta Ales, APRN - CNP   30 mg at 10/11/22 2222    sennosides-docusate sodium (SENOKOT-S) 8.6-50 MG tablet 2 tablet  2 tablet Oral Nightly Berneta Ales, APRN - CNP   2 tablet at 10/11/22 2221    aspirin EC tablet 81 mg  81 mg Oral Daily Toma Ramos MD   81 mg at 10/11/22 2793    finasteride (PROSCAR) tablet 5 mg  5 mg Oral Daily Toma Ramos MD   5 mg at 10/11/22 0635    insulin lispro (HUMALOG) injection vial 0-4 Units  0-4 Units SubCUTAneous TID WC Toma Ramos MD        insulin lispro (HUMALOG) injection vial 0-4 Units  0-4 Units SubCUTAneous Nightly Toma Ramos MD        hydrALAZINE (APRESOLINE) tablet 25 mg  25 mg Oral BID Toma Ramos MD   25 mg at 10/11/22 2223    isosorbide dinitrate (ISORDIL) tablet 20 mg  20 mg Oral TID Toma Ramos MD   20 mg at 10/11/22 2224    pantoprazole (PROTONIX) tablet 40 mg  40 mg Oral QAM AC Toma Ramos MD   40 mg at 10/12/22 0510    tamsulosin (FLOMAX) capsule 0.4 mg  0.4 mg Oral Daily Toma Ramos MD   0.4 mg at 10/11/22 9838    traZODone (DESYREL) tablet 50 mg  50 mg Oral Nightly Toma Ramos MD   50 mg at 10/11/22 5622 sodium chloride flush 0.9 % injection 5-40 mL  5-40 mL IntraVENous 2 times per day Yumiko Dubois MD   10 mL at 10/11/22 2226    sodium chloride flush 0.9 % injection 5-40 mL  5-40 mL IntraVENous PRN Yumiko Dubois MD   10 mL at 10/06/22 1508    0.9 % sodium chloride infusion   IntraVENous PRN Yumiko Dubois MD        enoxaparin (LOVENOX) injection 40 mg  40 mg SubCUTAneous Daily Yumkio Dubois MD   40 mg at 10/11/22 5510    polyethylene glycol (GLYCOLAX) packet 17 g  17 g Oral Daily PRN Yumiko Dubois MD        acetaminophen (TYLENOL) tablet 650 mg  650 mg Oral Q6H PRN Yumiko Dubois MD   650 mg at 10/12/22 0510    Or    acetaminophen (TYLENOL) suppository 650 mg  650 mg Rectal Q6H PRN Yumiko Dubois MD        glucose chewable tablet 16 g  4 tablet Oral PRN Yumiko Dubois MD        dextrose bolus 10% 125 mL  125 mL IntraVENous PRN Yumiko Dubois MD        Or    dextrose bolus 10% 250 mL  250 mL IntraVENous PRN Yumiko Dubois MD        glucagon (rDNA) injection 1 mg  1 mg SubCUTAneous PRN Yumiko Dubois MD        dextrose 10 % infusion   IntraVENous Continuous PRN Yumiko Dubois MD          sodium chloride      dextrose         Physical Exam:  BP (!) 112/59   Pulse 65   Temp 98.2 °F (36.8 °C) (Skin)   Resp 20   Ht 5' 11\" (1.803 m)   Wt 195 lb 8 oz (88.7 kg)   SpO2 95%   BMI 27.27 kg/m²   Wt Readings from Last 3 Encounters:   10/09/22 195 lb 8 oz (88.7 kg)   07/22/22 177 lb 14.6 oz (80.7 kg)   06/30/22 177 lb 14.4 oz (80.7 kg)   Appearance: Awake, no acute respiratory distress  Skin: Intact, no rash  Head: Normocephalic, atraumatic  Eyes: EOMI, no conjunctival erythema  ENMT: No pharyngeal erythema, MMM, no rhinorrhea  Neck: Supple, no carotid bruits  Lungs: Decreased BS B/L, no wheezing  Cardiac: Regular rate and rhythm, 2/6 systolic murmur  Abdomen: Soft, nontender, +bowel sounds  Extremities: Moves all extremities x 4, +lower extremity edema  Neurologic: No focal motor deficits apparent, normal mood and affect    Intake/Output:    Intake/Output Summary (Last 24 hours) at 10/12/2022 0731  Last data filed at 10/12/2022 6986  Gross per 24 hour   Intake 600 ml   Output 2650 ml   Net -2050 ml     No intake/output data recorded. Laboratory Tests:  Recent Labs     10/10/22  1330 10/11/22  0530 10/12/22  0431    141 138   K 4.0 3.4* 4.2    101 101   CO2 30* 33* 31*   BUN 39* 40* 40*   CREATININE 1.3* 1.3* 1.4*   GLUCOSE 122* 139* 95   CALCIUM 8.7 8.6 8.8     Lab Results   Component Value Date/Time    MG 2.3 10/07/2022 03:30 PM     No results for input(s): ALKPHOS, ALT, AST, PROT, BILITOT, BILIDIR, LABALBU in the last 72 hours. Recent Labs     10/09/22  0850   WBC 7.0   RBC 4.61   HGB 11.6*   HCT 38.8   MCV 84.2   MCH 25.2*   MCHC 29.9*   RDW 17.8*   *   MPV 13.3*     Lab Results   Component Value Date    CKTOTAL 113 04/08/2014    CKMB 2.8 04/08/2014    TROPONINI 0.03 02/24/2021    TROPONINI <0.01 04/14/2014    TROPONINI <0.01 04/08/2014     No results for input(s): CKTOTAL, CKMB, CKMBINDEX, TROPHS in the last 72 hours.   Lab Results   Component Value Date    INR 1.2 04/08/2014    PROTIME 13.3 (H) 04/08/2014     Lab Results   Component Value Date    TSH 2.500 10/01/2022     Lab Results   Component Value Date    LABA1C 5.9 (H) 10/01/2022     No results found for: EAG  Lab Results   Component Value Date    CHOL 105 10/01/2022    CHOL 224 (H) 09/20/2021    CHOL 296 (H) 08/12/2014     Lab Results   Component Value Date    TRIG 58 10/01/2022    TRIG 361 (H) 09/20/2021    TRIG 1,001 (H) 08/12/2014     Lab Results   Component Value Date    HDL 40 10/01/2022    HDL 33 09/20/2021    HDL 25 08/12/2014     Lab Results   Component Value Date    LDLCALC 53 10/01/2022    LDLCALC 119 (H) 09/20/2021    LDLCALC - (AA) 08/12/2014     Lab Results   Component Value Date    LABVLDL 12 10/01/2022    LABVLDL 72 09/20/2021    LABVLDL - (AA) 08/12/2014     No results found for: [facility] 10/09/22  0850   PROBNP 23,861*       Cardiac Tests:  Telemetry personally reviewed (date: 10/12/2022): SR/ paced rhythm    TTE: 3/15/2021 ([facility]) Exam indication: Abnormal Cardiac Biomarkers There is a resting wall motion abnormality in the territory of the LAD and RCA. - The left ventricle is dilated. Left ventricular systolic function is severely decreased. EF = 26 ± 5% (2D biplane) Grade III left ventricular diastolic dysfunction. The right ventricle is normal in size. Right ventricular systolic function is   moderately decreased. - The left atrial cavity is severely dilated. - The right atrial cavity is dilated. There is moderately severe (3+) mitral valve regurgitation due to restricted leaflet motion likely related to ischemic heart disease. TTE: 5/13/2022 ([facility]): - Technically difficult exam due to body habitus and suboptimal positioning. - Exam indication: Evaluation of known heart failure to guide therapy - The left ventricle is severely dilated. Left ventricular systolic function is severely decreased. EF = 20 ± 5% (visual est.) Definity contrast used for endocardial border detection. Left ventricular diastolic function was not evaluated due to an arrhythmia. The right ventricle is dilated. Right ventricular systolic function is moderately to severely decreased. - The left atrial cavity is severely dilated. - The right atrial cavity is dilated. There is moderate (2+) mitral valve regurgitation. - -Peak/mean gradients of 15/7 mmHg, gradients averaged due to arrhythmia. -Prior EF reported as 26% (3/2021). Exam was compared with the prior  echocardiographic exam performed on  3/14/2021. There is no significant change. VHD: Moderate-severe ischemic MR (patient was recommended for further evaluation with EBONI and possible consideration of a clip 6/3/2022 at [facility] --> patient declined.      RHC: 5/16/2022: [facility]: Elevated biventricular filling pressures (RA 10, PCWP 26), moderate pulmonary hypertension (most likely postcapillary), preserved cardiac index by assumed Sunni 2.73. [facility], 3/16/2021: LMT: severe diffuse disease. LAD: severe diffuse disease. Additional Comment: Moderate caliber vessel with  in the proximal portion. The vessel is fed by patent LIMA graft. The distal portion is small in caliber and wraps the apex. There are L->R collaterals. LCX: severe diffuse disease. Additional Comment: Moderate caliber non-dominant vessel which is fed by SVG->RI/OM1. RAMUS: ostial Ramus - . Additional Comment: Fills in the mid-distal portion from SVG->RI graft. RCA Status: Not Applicable. Additional Comment: Occluded at the ostia. GRAFTS: LIMA Graft End to Side to the Left Anterior Descending. Additional Comments: patent. SVG End to Side to the RI. Additional  Comments - patent with mild-moderate disease in the mid-portion; supplies mid-distal LCx and backfills to partially supply LAD. SVG End to Side to the OM-2 Second Obtuse Marginal Branch. Additional Comments - occluded. SVG End to Side to the Right Posterior Descending Artery. Additional Comments - occluded. Impression:- Severe/occlusive native 3 vessel CAD with patent LIMA->LAD, SVG->RI/OM1. SVG->OM2 is occluded - Native RCA is occluded at the ostia as is the SVG->rPDA. The right is fed by L->R collaterals from the LIMA graft Recommended Treatment: Medical Therapy. ASSESSMENT / PLAN:  Acute on chronic decompensated HFrEF  Ischemic cardiomyopathy  History of CAD status post CABG x 5 (LIMA to LAD, SVG to OM1, SVG to OM2, SVG to RI and SVG to RPDA) in 2007 s/p [facility] in 2021 ([facility]) with severe multivessel disease and all the grafts were occluded except for LIMA to LAD which was patent.   HTN: controlled  Hyperlipidemia  T2DM  History of NSVT  S/p ICD ([facility]) in situ and recent generator change in 2018  History of prostate cancer  Hypokalemia -- K 3.4 --> 4.2    Will switch from IV to po bumex today (10/12/22) -- monitor BMP and I/O's (net negative 20.9 L)  K improved s/p replacement on 10/11/22  Continue isordil/hydralazine (prior reported intolerance to ACE-I/ARB)  Continue Toprol XL  Continue low dose aldactone  Records from [facility] reviewed  Limited code status    Greater than 35 minutes was spent counseling the patient, reviewing the rationale for the above recommendations and reviewing the patient's current medication list, problem list and results of all previously ordered testing.     Barrett Jacobo MD  [facility]) Cardiology

## 2022-10-12 NOTE — PROGRESS NOTES
Subjective: The patient is awake and yelling out  No problems overnight. Objective:    BP (!) 112/59   Pulse 65   Temp 98.2 °F (36.8 °C) (Skin)   Resp 20   Ht 5' 11\" (1.803 m)   Wt 195 lb 8 oz (88.7 kg)   SpO2 95%   BMI 27.27 kg/m²     Heart:  RRR, no murmurs, gallops, or rubs.   Lungs:  Decreased BS at the bases  Abd: bowel sounds present, nontender, nondistended, no masses  Extrem:  edema present    Labs:  CBC:   Lab Results   Component Value Date/Time    WBC 7.0 10/09/2022 08:50 AM    RBC 4.61 10/09/2022 08:50 AM    HGB 11.6 10/09/2022 08:50 AM    HCT 38.8 10/09/2022 08:50 AM    MCV 84.2 10/09/2022 08:50 AM    MCH 25.2 10/09/2022 08:50 AM    MCHC 29.9 10/09/2022 08:50 AM    RDW 17.8 10/09/2022 08:50 AM     10/09/2022 08:50 AM    MPV 13.3 10/09/2022 08:50 AM     CMP:    Lab Results   Component Value Date/Time     10/12/2022 04:31 AM    K 4.2 10/12/2022 04:31 AM    K 4.0 10/05/2022 11:00 AM     10/12/2022 04:31 AM    CO2 31 10/12/2022 04:31 AM    BUN 40 10/12/2022 04:31 AM    CREATININE 1.4 10/12/2022 04:31 AM    GFRAA 59 10/12/2022 04:31 AM    LABGLOM 49 10/12/2022 04:31 AM    GLUCOSE 95 10/12/2022 04:31 AM    GLUCOSE 90 10/10/2011 10:15 AM    PROT 6.4 10/01/2022 05:58 PM    LABALBU 3.6 10/01/2022 05:58 PM    LABALBU 4.8 10/10/2011 10:15 AM    CALCIUM 8.8 10/12/2022 04:31 AM    BILITOT 1.0 10/01/2022 05:58 PM    ALKPHOS 151 10/01/2022 05:58 PM    AST 21 10/01/2022 05:58 PM    ALT 8 10/01/2022 05:58 PM     PT/INR:    Lab Results   Component Value Date/Time    PROTIME 13.3 04/08/2014 09:20 AM    INR 1.2 04/08/2014 09:20 AM          Current Facility-Administered Medications:     bumetanide (BUMEX) tablet 1 mg, 1 mg, Oral, BID, Mora Miller MD    metoprolol succinate (TOPROL XL) extended release tablet 50 mg, 50 mg, Oral, BID, Arabella Galindo MD, 50 mg at 10/11/22 2222    hyoscyamine (OSCIMIN) dispersible tablet 125 mcg, 125 mcg, Oral, Q6H PRN, Joni Bradford DO, 125 mcg at 10/12/22 0221    rosuvastatin (CRESTOR) tablet 20 mg, 20 mg, Oral, Nightly, Bradley Quintanilla, DO, 20 mg at 10/11/22 2252    spironolactone (ALDACTONE) tablet 12.5 mg, 12.5 mg, Oral, Daily, Jaycee Coats MD, 12.5 mg at 10/11/22 6601    miconazole nitrate 2 % ointment, , Topical, BID, Oletha Gosling, DO, Given at 10/11/22 2223    ammonium lactate (LAC-HYDRIN) 12 % lotion, , Topical, Daily, Oletha Gosling, DO, Given at 10/11/22 0950    ipratropium-albuterol (DUONEB) nebulizer solution 1 ampule, 1 ampule, Inhalation, Q4H PRN, Omayra Pancake, APRN - CNP    LORazepam (ATIVAN) tablet 0.5 mg, 0.5 mg, Oral, TID PRN, Omayra Pancake, APRN - CNP, 0.5 mg at 10/12/22 0036    mirtazapine (REMERON) tablet 30 mg, 30 mg, Oral, Nightly, Omayra Pancake, APRN - CNP, 30 mg at 10/11/22 2222    sennosides-docusate sodium (SENOKOT-S) 8.6-50 MG tablet 2 tablet, 2 tablet, Oral, Nightly, Omayra Pancake, APRN - CNP, 2 tablet at 10/11/22 2221    aspirin EC tablet 81 mg, 81 mg, Oral, Daily, Stacy Wong MD, 81 mg at 10/11/22 6598    finasteride (PROSCAR) tablet 5 mg, 5 mg, Oral, Daily, Stacy Wong MD, 5 mg at 10/11/22 7893    insulin lispro (HUMALOG) injection vial 0-4 Units, 0-4 Units, SubCUTAneous, TID WC, Stacy Wong MD    insulin lispro (HUMALOG) injection vial 0-4 Units, 0-4 Units, SubCUTAneous, Nightly, Stacy Wong MD    hydrALAZINE (APRESOLINE) tablet 25 mg, 25 mg, Oral, BID, Stacy Wong MD, 25 mg at 10/11/22 2223    isosorbide dinitrate (ISORDIL) tablet 20 mg, 20 mg, Oral, TID, Stacy Wong MD, 20 mg at 10/11/22 2224    pantoprazole (PROTONIX) tablet 40 mg, 40 mg, Oral, QAM AC, Stacy Wong MD, 40 mg at 10/12/22 0510    tamsulosin (FLOMAX) capsule 0.4 mg, 0.4 mg, Oral, Daily, Stacy Wong MD, 0.4 mg at 10/11/22 0439    traZODone (DESYREL) tablet 50 mg, 50 mg, Oral, Nightly, Stacy Wong MD, 50 mg at 10/11/22 2220    sodium chloride flush 0.9 % injection 5-40 mL, 5-40 mL, IntraVENous, 2 times per day, Stacy Wong MD, 10 mL at 10/11/22 2226    sodium chloride flush 0.9 % injection 5-40 mL, 5-40 mL, IntraVENous, PRN, Jerod Forbes MD, 10 mL at 10/06/22 1508    0.9 % sodium chloride infusion, , IntraVENous, PRN, Jerod Forbes MD    enoxaparin (LOVENOX) injection 40 mg, 40 mg, SubCUTAneous, Daily, Jerod Forbes MD, 40 mg at 10/11/22 4979    polyethylene glycol (GLYCOLAX) packet 17 g, 17 g, Oral, Daily PRN, Jerod Forbes MD    acetaminophen (TYLENOL) tablet 650 mg, 650 mg, Oral, Q6H PRN, 650 mg at 10/12/22 0510 **OR** acetaminophen (TYLENOL) suppository 650 mg, 650 mg, Rectal, Q6H PRN, Jerod Forbes MD    glucose chewable tablet 16 g, 4 tablet, Oral, PRN, Jerod Forbes MD    dextrose bolus 10% 125 mL, 125 mL, IntraVENous, PRN **OR** dextrose bolus 10% 250 mL, 250 mL, IntraVENous, PRN, Jerod Forbes MD    glucagon (rDNA) injection 1 mg, 1 mg, SubCUTAneous, PRN, Jerod Forbes MD    dextrose 10 % infusion, , IntraVENous, Continuous PRN, Jerod Forbes MD    Assessment:  See below,Acute/Chronic HF   AMS, HX of Non-compliance     Patient Active Problem List   Diagnosis    CHF (congestive heart failure) ([facility])    S/P CABG x 4    CAD (coronary artery disease)    MI, old    Hyperlipidemia    Hypertension    Cardiomyopathy ([facility] [address]    Ventricular tachycardia    Pancreatitis    AICD (automatic cardioverter/defibrillator) present    Implantable cardioverter-defibrillator (ICD) at end of device life    Chest pain, atypical    Failure to thrive in adult    Congestive heart failure of unknown etiology ([facility] [address]    Acute on chronic HFrEF (heart failure with reduced ejection fraction) ([facility])    VHD (valvular heart disease)    Palliative care encounter    Goals of care, counseling/discussion    DNR (do not resuscitate) discussion    Bladder spasms    Diabetes mellitus ([facility] [address]       Plan:  See orders  Labs,meds noted,  Transfer to SNF when [id number] Maggie Rodriguez with leah Garcia DO  9:25 AM  10/12/2022

## 2022-10-12 NOTE — CARE COORDINATION
Continues on Bumex iv bid. Plan remains to return to [facility] on discharge- bedhold- no precert required to return. Will need COVID test on day of discharge. [facility] updated on discharge plan. Requiring O2 1lNC. DIMITRI in epic, [place] transport form on chart.  Will follow Nani Boland RN case manager

## 2022-10-12 NOTE — CARE COORDINATION
COVID - 10/12. Discharge order noted. Ambulance transport set up w/ [facility]-  time 1:30 pm. Daughter [facility]- [phone], Facility, and nursing notified of discharge destination and time.  D[facility]ITRI in epic, [place] transport form on chart Ed Barrios, RN case manager

## 2022-10-13 ENCOUNTER — CLINICAL DOCUMENTATION ONLY (OUTPATIENT)
Facility: CLINIC | Age: 77
End: 2022-10-13

## 2022-10-13 NOTE — DISCHARGE SUMMARY
[address] [address]                               DISCHARGE SUMMARY    PATIENT NAME: Ernestina Wagner                     :        1945  MED REC NO:   [id number]                            ROOM:       0403  ACCOUNT NO:   [de-identified]                           ADMIT DATE: 10/01/2022  PROVIDER:     Karon Rutledge DO                  [facility] DATE: 10/12/2022    FINAL DIAGNOSES:  1. Acute-on-chronic congestive heart failure. 2.  Long history of hypertension. 3.  Ischemic cardiomyopathy. 4.  Hyperlipidemia. 5.  Type 2 diabetes. 6.  History of nonsustained supraventricular tachycardia, status post  ICD. 7.  History of prostate cancer. 8.  Hypokalemia. 9.  Noncompliance. HISTORY AND HOSPITAL COURSE:  This is a 77-year-old  male who  presented to the emergency room with shortness of breath and after  further evaluation, was found to be in acute heart failure. He was  admitted to a telemetry area. Please review the consultation by  Cardiology and Palliative Care. Also Urology was involved in this  patient's care due to sustained bladder spasms. The patient was treated  for a significant period of time with IV Bumex due to his decompensated  heart failure. He did improve with this treatment, however, his mental  status never returned to baseline. He was very confused, many times  lashing out in the room, and not agreeing or complying with recommended  treatment. After discussion with the family, it was felt that the  patient should be transferred to a skilled nursing facility and that was  arranged. I would defer you to sequential laboratory and imaging  studies for specific details as well as consultations by Palliative  Care, Cardiology, and Urology for specific details. The patient's  prognosis is poor, however, he was discharged in satisfactory condition  back to his skilled nursing facility.   He will be under the care of the  house physician at that facility. Please review med reconciliation  sheet. At the time of discharge, his intravenous Bumex was converted to  oral prior to discharge.         Nory Santos DO    D: 10/13/2022 10:08:34       T: 10/13/2022 10:11:01     [email or web address]  Job#: [id number]     Doc#: [id number]    CC:

## 2022-10-22 ENCOUNTER — APPOINTMENT (OUTPATIENT)
Dept: CT IMAGING | Age: 77
DRG: 291 | End: 2022-10-22
Payer: MEDICARE

## 2022-10-22 ENCOUNTER — APPOINTMENT (OUTPATIENT)
Dept: GENERAL RADIOLOGY | Age: 77
DRG: 291 | End: 2022-10-22
Payer: MEDICARE

## 2022-10-22 ENCOUNTER — HOSPITAL ENCOUNTER (INPATIENT)
Age: 77
LOS: 1 days | Discharge: NURSING, REHAB OR LONG-TERM CARE | DRG: 291 | End: 2022-10-23
Attending: STUDENT IN AN ORGANIZED HEALTH CARE EDUCATION/TRAINING PROGRAM | Admitting: INTERNAL MEDICINE
Payer: MEDICARE

## 2022-10-22 DIAGNOSIS — Z91.199 HISTORY OF NONCOMPLIANCE WITH MEDICAL TREATMENT: ICD-10-CM

## 2022-10-22 DIAGNOSIS — E87.70 HYPERVOLEMIA, UNSPECIFIED HYPERVOLEMIA TYPE: ICD-10-CM

## 2022-10-22 DIAGNOSIS — N30.00 ACUTE CYSTITIS WITHOUT HEMATURIA: Primary | ICD-10-CM

## 2022-10-22 DIAGNOSIS — R79.89 ELEVATED LFTS: ICD-10-CM

## 2022-10-22 DIAGNOSIS — I50.20 SYSTOLIC CONGESTIVE HEART FAILURE, UNSPECIFIED HF CHRONICITY (HCC): ICD-10-CM

## 2022-10-22 PROBLEM — I50.33 ACUTE ON CHRONIC DIASTOLIC HEART FAILURE (HCC): Status: ACTIVE | Noted: 2022-10-22

## 2022-10-22 LAB
ABO/RH: NORMAL
ALBUMIN SERPL-MCNC: 3.5 G/DL (ref 3.5–5.2)
ALP BLD-CCNC: 246 U/L (ref 40–129)
ALT SERPL-CCNC: 57 U/L (ref 0–40)
ANION GAP SERPL CALCULATED.3IONS-SCNC: 9 MMOL/L (ref 7–16)
ANTIBODY SCREEN: NORMAL
AST SERPL-CCNC: 63 U/L (ref 0–39)
BACTERIA: ABNORMAL /HPF
BASOPHILS ABSOLUTE: 0.02 E9/L (ref 0–0.2)
BASOPHILS RELATIVE PERCENT: 0.3 % (ref 0–2)
BILIRUB SERPL-MCNC: 0.8 MG/DL (ref 0–1.2)
BILIRUBIN URINE: NEGATIVE
BLOOD, URINE: NEGATIVE
BUN BLDV-MCNC: 36 MG/DL (ref 6–23)
CALCIUM SERPL-MCNC: 9.1 MG/DL (ref 8.6–10.2)
CHLORIDE BLD-SCNC: 103 MMOL/L (ref 98–107)
CHP ED QC CHECK: YES
CLARITY: CLEAR
CO2: 29 MMOL/L (ref 22–29)
COLOR: YELLOW
CREAT SERPL-MCNC: 1.3 MG/DL (ref 0.7–1.2)
EOSINOPHILS ABSOLUTE: 0 E9/L (ref 0.05–0.5)
EOSINOPHILS RELATIVE PERCENT: 0 % (ref 0–6)
GFR SERPL CREATININE-BSD FRML MDRD: 56 ML/MIN/1.73
GLUCOSE BLD-MCNC: 63 MG/DL (ref 74–99)
GLUCOSE BLD-MCNC: 97 MG/DL
GLUCOSE URINE: NEGATIVE MG/DL
HCT VFR BLD CALC: 40.7 % (ref 37–54)
HEMOGLOBIN: 12.4 G/DL (ref 12.5–16.5)
IMMATURE GRANULOCYTES #: 0.02 E9/L
IMMATURE GRANULOCYTES %: 0.3 % (ref 0–5)
INFLUENZA A BY PCR: NOT DETECTED
INFLUENZA B BY PCR: NOT DETECTED
INR BLD: 1.5
KETONES, URINE: NEGATIVE MG/DL
LACTIC ACID: 1.6 MMOL/L (ref 0.5–2.2)
LEUKOCYTE ESTERASE, URINE: ABNORMAL
LIPASE: 84 U/L (ref 13–60)
LYMPHOCYTES ABSOLUTE: 0.95 E9/L (ref 1.5–4)
LYMPHOCYTES RELATIVE PERCENT: 16.1 % (ref 20–42)
MAGNESIUM: 2.5 MG/DL (ref 1.6–2.6)
MCH RBC QN AUTO: 25.4 PG (ref 26–35)
MCHC RBC AUTO-ENTMCNC: 30.5 % (ref 32–34.5)
MCV RBC AUTO: 83.2 FL (ref 80–99.9)
METER GLUCOSE: 97 MG/DL (ref 74–99)
MONOCYTES ABSOLUTE: 0.51 E9/L (ref 0.1–0.95)
MONOCYTES RELATIVE PERCENT: 8.6 % (ref 2–12)
NEUTROPHILS ABSOLUTE: 4.4 E9/L (ref 1.8–7.3)
NEUTROPHILS RELATIVE PERCENT: 74.7 % (ref 43–80)
NITRITE, URINE: NEGATIVE
PDW BLD-RTO: 18 FL (ref 11.5–15)
PH UA: 6 (ref 5–9)
PLATELET # BLD: 170 E9/L (ref 130–450)
PMV BLD AUTO: 12.3 FL (ref 7–12)
POTASSIUM REFLEX MAGNESIUM: 4 MMOL/L (ref 3.5–5)
PRO-BNP: ABNORMAL PG/ML (ref 0–450)
PROTEIN UA: NEGATIVE MG/DL
PROTHROMBIN TIME: 17.4 SEC (ref 9.3–12.4)
RBC # BLD: 4.89 E12/L (ref 3.8–5.8)
RBC UA: ABNORMAL /HPF (ref 0–2)
SARS-COV-2, NAAT: NOT DETECTED
SODIUM BLD-SCNC: 141 MMOL/L (ref 132–146)
SPECIFIC GRAVITY UA: 1.01 (ref 1–1.03)
TOTAL PROTEIN: 6.4 G/DL (ref 6.4–8.3)
TROPONIN, HIGH SENSITIVITY: 64 NG/L (ref 0–11)
TROPONIN, HIGH SENSITIVITY: 66 NG/L (ref 0–11)
UROBILINOGEN, URINE: 0.2 E.U./DL
WBC # BLD: 5.9 E9/L (ref 4.5–11.5)
WBC UA: ABNORMAL /HPF (ref 0–5)

## 2022-10-22 PROCEDURE — 86850 RBC ANTIBODY SCREEN: CPT

## 2022-10-22 PROCEDURE — 87635 SARS-COV-2 COVID-19 AMP PRB: CPT

## 2022-10-22 PROCEDURE — 2580000003 HC RX 258

## 2022-10-22 PROCEDURE — 87502 INFLUENZA DNA AMP PROBE: CPT

## 2022-10-22 PROCEDURE — 99285 EMERGENCY DEPT VISIT HI MDM: CPT

## 2022-10-22 PROCEDURE — 87088 URINE BACTERIA CULTURE: CPT

## 2022-10-22 PROCEDURE — 6360000002 HC RX W HCPCS

## 2022-10-22 PROCEDURE — 99223 1ST HOSP IP/OBS HIGH 75: CPT | Performed by: INTERNAL MEDICINE

## 2022-10-22 PROCEDURE — 83605 ASSAY OF LACTIC ACID: CPT

## 2022-10-22 PROCEDURE — 73630 X-RAY EXAM OF FOOT: CPT

## 2022-10-22 PROCEDURE — 83690 ASSAY OF LIPASE: CPT

## 2022-10-22 PROCEDURE — 82962 GLUCOSE BLOOD TEST: CPT

## 2022-10-22 PROCEDURE — 86900 BLOOD TYPING SEROLOGIC ABO: CPT

## 2022-10-22 PROCEDURE — 85025 COMPLETE CBC W/AUTO DIFF WBC: CPT

## 2022-10-22 PROCEDURE — 1200000000 HC SEMI PRIVATE

## 2022-10-22 PROCEDURE — 93005 ELECTROCARDIOGRAM TRACING: CPT

## 2022-10-22 PROCEDURE — 85610 PROTHROMBIN TIME: CPT

## 2022-10-22 PROCEDURE — 84484 ASSAY OF TROPONIN QUANT: CPT

## 2022-10-22 PROCEDURE — 86901 BLOOD TYPING SEROLOGIC RH(D): CPT

## 2022-10-22 PROCEDURE — 81001 URINALYSIS AUTO W/SCOPE: CPT

## 2022-10-22 PROCEDURE — 71045 X-RAY EXAM CHEST 1 VIEW: CPT

## 2022-10-22 PROCEDURE — 80053 COMPREHEN METABOLIC PANEL: CPT

## 2022-10-22 PROCEDURE — 83880 ASSAY OF NATRIURETIC PEPTIDE: CPT

## 2022-10-22 PROCEDURE — 2500000003 HC RX 250 WO HCPCS

## 2022-10-22 PROCEDURE — 36415 COLL VENOUS BLD VENIPUNCTURE: CPT

## 2022-10-22 PROCEDURE — 83735 ASSAY OF MAGNESIUM: CPT

## 2022-10-22 RX ORDER — TRAZODONE HYDROCHLORIDE 50 MG/1
50 TABLET ORAL NIGHTLY
Status: CANCELLED | OUTPATIENT
Start: 2022-10-22

## 2022-10-22 RX ORDER — SODIUM CHLORIDE 0.9 % (FLUSH) 0.9 %
10 SYRINGE (ML) INJECTION PRN
Status: CANCELLED | OUTPATIENT
Start: 2022-10-22

## 2022-10-22 RX ORDER — MIRTAZAPINE 15 MG/1
30 TABLET, FILM COATED ORAL NIGHTLY
Status: CANCELLED | OUTPATIENT
Start: 2022-10-22

## 2022-10-22 RX ORDER — PANTOPRAZOLE SODIUM 40 MG/1
40 TABLET, DELAYED RELEASE ORAL
Status: CANCELLED | OUTPATIENT
Start: 2022-10-23

## 2022-10-22 RX ORDER — FINASTERIDE 5 MG/1
5 TABLET, FILM COATED ORAL DAILY
Status: CANCELLED | OUTPATIENT
Start: 2022-10-23

## 2022-10-22 RX ORDER — DEXTROSE MONOHYDRATE 100 MG/ML
INJECTION, SOLUTION INTRAVENOUS CONTINUOUS PRN
Status: CANCELLED | OUTPATIENT
Start: 2022-10-22

## 2022-10-22 RX ORDER — SODIUM CHLORIDE 9 MG/ML
INJECTION, SOLUTION INTRAVENOUS PRN
Status: CANCELLED | OUTPATIENT
Start: 2022-10-22

## 2022-10-22 RX ORDER — INSULIN LISPRO 100 [IU]/ML
0-4 INJECTION, SOLUTION INTRAVENOUS; SUBCUTANEOUS
Status: CANCELLED | OUTPATIENT
Start: 2022-10-23

## 2022-10-22 RX ORDER — HYDRALAZINE HYDROCHLORIDE 25 MG/1
25 TABLET, FILM COATED ORAL 2 TIMES DAILY
Status: CANCELLED | OUTPATIENT
Start: 2022-10-22

## 2022-10-22 RX ORDER — SODIUM CHLORIDE 0.9 % (FLUSH) 0.9 %
10 SYRINGE (ML) INJECTION EVERY 12 HOURS SCHEDULED
Status: CANCELLED | OUTPATIENT
Start: 2022-10-22

## 2022-10-22 RX ORDER — ACETAMINOPHEN 325 MG/1
650 TABLET ORAL EVERY 6 HOURS PRN
Status: CANCELLED | OUTPATIENT
Start: 2022-10-22

## 2022-10-22 RX ORDER — ACETAMINOPHEN 650 MG/1
650 SUPPOSITORY RECTAL EVERY 6 HOURS PRN
Status: CANCELLED | OUTPATIENT
Start: 2022-10-22

## 2022-10-22 RX ORDER — INSULIN LISPRO 100 [IU]/ML
0-4 INJECTION, SOLUTION INTRAVENOUS; SUBCUTANEOUS NIGHTLY
Status: CANCELLED | OUTPATIENT
Start: 2022-10-22

## 2022-10-22 RX ORDER — ISOSORBIDE DINITRATE 10 MG/1
20 TABLET ORAL 3 TIMES DAILY
Status: CANCELLED | OUTPATIENT
Start: 2022-10-22

## 2022-10-22 RX ORDER — BUMETANIDE 0.25 MG/ML
0.5 INJECTION, SOLUTION INTRAMUSCULAR; INTRAVENOUS ONCE
Status: COMPLETED | OUTPATIENT
Start: 2022-10-22 | End: 2022-10-22

## 2022-10-22 RX ORDER — ENOXAPARIN SODIUM 100 MG/ML
40 INJECTION SUBCUTANEOUS DAILY
Status: CANCELLED | OUTPATIENT
Start: 2022-10-23

## 2022-10-22 RX ORDER — METOPROLOL SUCCINATE 25 MG/1
50 TABLET, EXTENDED RELEASE ORAL 2 TIMES DAILY
Status: CANCELLED | OUTPATIENT
Start: 2022-10-22

## 2022-10-22 RX ORDER — METOPROLOL SUCCINATE 25 MG/1
75 TABLET, EXTENDED RELEASE ORAL DAILY
Status: DISCONTINUED | OUTPATIENT
Start: 2022-10-23 | End: 2022-10-23 | Stop reason: HOSPADM

## 2022-10-22 RX ORDER — BUMETANIDE 0.25 MG/ML
1 INJECTION, SOLUTION INTRAMUSCULAR; INTRAVENOUS 2 TIMES DAILY
Status: CANCELLED | OUTPATIENT
Start: 2022-10-22

## 2022-10-22 RX ORDER — TAMSULOSIN HYDROCHLORIDE 0.4 MG/1
0.4 CAPSULE ORAL DAILY
Status: CANCELLED | OUTPATIENT
Start: 2022-10-23

## 2022-10-22 RX ORDER — POLYETHYLENE GLYCOL 3350 17 G/17G
17 POWDER, FOR SOLUTION ORAL DAILY PRN
Status: CANCELLED | OUTPATIENT
Start: 2022-10-22

## 2022-10-22 RX ORDER — ROSUVASTATIN CALCIUM 20 MG/1
20 TABLET, COATED ORAL NIGHTLY
Status: CANCELLED | OUTPATIENT
Start: 2022-10-22

## 2022-10-22 RX ORDER — SPIRONOLACTONE 25 MG/1
12.5 TABLET ORAL DAILY
Status: CANCELLED | OUTPATIENT
Start: 2022-10-23

## 2022-10-22 RX ADMIN — WATER 1000 MG: 1 INJECTION INTRAMUSCULAR; INTRAVENOUS; SUBCUTANEOUS at 23:12

## 2022-10-22 RX ADMIN — BUMETANIDE 0.5 MG: 0.25 INJECTION INTRAMUSCULAR; INTRAVENOUS at 23:13

## 2022-10-22 ASSESSMENT — ENCOUNTER SYMPTOMS
BACK PAIN: 0
EYE ITCHING: 0
CHEST TIGHTNESS: 0
APNEA: 0
EYE DISCHARGE: 0
ABDOMINAL DISTENTION: 0
ABDOMINAL PAIN: 0

## 2022-10-23 ENCOUNTER — HOSPITAL ENCOUNTER (INPATIENT)
Age: 77
LOS: 3 days | Discharge: HOSPICE | DRG: 309 | End: 2022-10-26
Attending: FAMILY MEDICINE | Admitting: FAMILY MEDICINE
Payer: MEDICARE

## 2022-10-23 VITALS
BODY MASS INDEX: 27.3 KG/M2 | HEIGHT: 71 IN | DIASTOLIC BLOOD PRESSURE: 70 MMHG | TEMPERATURE: 97.7 F | SYSTOLIC BLOOD PRESSURE: 104 MMHG | WEIGHT: 195 LBS | HEART RATE: 89 BPM | RESPIRATION RATE: 18 BRPM | OXYGEN SATURATION: 99 %

## 2022-10-23 DIAGNOSIS — Z51.5 HOSPICE CARE: Primary | ICD-10-CM

## 2022-10-23 PROBLEM — R79.89 AZOTEMIA: Status: ACTIVE | Noted: 2022-10-23

## 2022-10-23 PROBLEM — I50.20 HFREF (HEART FAILURE WITH REDUCED EJECTION FRACTION) (HCC): Status: ACTIVE | Noted: 2022-10-23

## 2022-10-23 PROBLEM — E88.09 HYPOALBUMINEMIA: Status: ACTIVE | Noted: 2022-10-23

## 2022-10-23 PROBLEM — R79.89 ELEVATED LFTS: Status: ACTIVE | Noted: 2022-10-23

## 2022-10-23 PROBLEM — I47.20 V-TACH: Status: ACTIVE | Noted: 2022-10-23

## 2022-10-23 PROBLEM — N18.30 STAGE 3 CHRONIC KIDNEY DISEASE (HCC): Status: ACTIVE | Noted: 2022-10-23

## 2022-10-23 LAB
ALBUMIN SERPL-MCNC: 3.3 G/DL (ref 3.5–5.2)
ALP BLD-CCNC: 229 U/L (ref 40–129)
ALT SERPL-CCNC: 49 U/L (ref 0–40)
ANION GAP SERPL CALCULATED.3IONS-SCNC: 11 MMOL/L (ref 7–16)
AST SERPL-CCNC: 49 U/L (ref 0–39)
BASOPHILS ABSOLUTE: 0.02 E9/L (ref 0–0.2)
BASOPHILS RELATIVE PERCENT: 0.3 % (ref 0–2)
BILIRUB SERPL-MCNC: 0.7 MG/DL (ref 0–1.2)
BUN BLDV-MCNC: 36 MG/DL (ref 6–23)
CALCIUM SERPL-MCNC: 9 MG/DL (ref 8.6–10.2)
CHLORIDE BLD-SCNC: 105 MMOL/L (ref 98–107)
CO2: 25 MMOL/L (ref 22–29)
CREAT SERPL-MCNC: 1.2 MG/DL (ref 0.7–1.2)
EKG ATRIAL RATE: 141 BPM
EKG P AXIS: 29 DEGREES
EKG P-R INTERVAL: 160 MS
EKG Q-T INTERVAL: 392 MS
EKG QRS DURATION: 108 MS
EKG QTC CALCULATION (BAZETT): 510 MS
EKG R AXIS: 60 DEGREES
EKG T AXIS: 31 DEGREES
EKG VENTRICULAR RATE: 102 BPM
EOSINOPHILS ABSOLUTE: 0.01 E9/L (ref 0.05–0.5)
EOSINOPHILS RELATIVE PERCENT: 0.2 % (ref 0–6)
GFR SERPL CREATININE-BSD FRML MDRD: >60 ML/MIN/1.73
GLUCOSE BLD-MCNC: 66 MG/DL (ref 74–99)
HCT VFR BLD CALC: 38.3 % (ref 37–54)
HEMOGLOBIN: 11.9 G/DL (ref 12.5–16.5)
IMMATURE GRANULOCYTES #: 0.01 E9/L
IMMATURE GRANULOCYTES %: 0.2 % (ref 0–5)
LYMPHOCYTES ABSOLUTE: 0.98 E9/L (ref 1.5–4)
LYMPHOCYTES RELATIVE PERCENT: 16.9 % (ref 20–42)
MAGNESIUM: 2.3 MG/DL (ref 1.6–2.6)
MCH RBC QN AUTO: 25.5 PG (ref 26–35)
MCHC RBC AUTO-ENTMCNC: 31.1 % (ref 32–34.5)
MCV RBC AUTO: 82.2 FL (ref 80–99.9)
METER GLUCOSE: 80 MG/DL (ref 74–99)
MONOCYTES ABSOLUTE: 0.55 E9/L (ref 0.1–0.95)
MONOCYTES RELATIVE PERCENT: 9.5 % (ref 2–12)
NEUTROPHILS ABSOLUTE: 4.22 E9/L (ref 1.8–7.3)
NEUTROPHILS RELATIVE PERCENT: 72.9 % (ref 43–80)
PDW BLD-RTO: 18 FL (ref 11.5–15)
PHOSPHORUS: 3.8 MG/DL (ref 2.5–4.5)
PLATELET # BLD: 162 E9/L (ref 130–450)
PMV BLD AUTO: 13 FL (ref 7–12)
POTASSIUM REFLEX MAGNESIUM: 4.4 MMOL/L (ref 3.5–5)
RBC # BLD: 4.66 E12/L (ref 3.8–5.8)
REASON FOR REJECTION: NORMAL
REJECTED TEST: NORMAL
SODIUM BLD-SCNC: 141 MMOL/L (ref 132–146)
TOTAL PROTEIN: 6.1 G/DL (ref 6.4–8.3)
WBC # BLD: 5.8 E9/L (ref 4.5–11.5)

## 2022-10-23 PROCEDURE — 85025 COMPLETE CBC W/AUTO DIFF WBC: CPT

## 2022-10-23 PROCEDURE — 6360000002 HC RX W HCPCS: Performed by: INTERNAL MEDICINE

## 2022-10-23 PROCEDURE — 6360000002 HC RX W HCPCS

## 2022-10-23 PROCEDURE — 2700000000 HC OXYGEN THERAPY PER DAY

## 2022-10-23 PROCEDURE — 82962 GLUCOSE BLOOD TEST: CPT

## 2022-10-23 PROCEDURE — 80053 COMPREHEN METABOLIC PANEL: CPT

## 2022-10-23 PROCEDURE — 6370000000 HC RX 637 (ALT 250 FOR IP): Performed by: STUDENT IN AN ORGANIZED HEALTH CARE EDUCATION/TRAINING PROGRAM

## 2022-10-23 PROCEDURE — 83735 ASSAY OF MAGNESIUM: CPT

## 2022-10-23 PROCEDURE — 93283 PRGRMG EVAL IMPLANTABLE DFB: CPT | Performed by: INTERNAL MEDICINE

## 2022-10-23 PROCEDURE — 36415 COLL VENOUS BLD VENIPUNCTURE: CPT

## 2022-10-23 PROCEDURE — 2580000003 HC RX 258: Performed by: FAMILY MEDICINE

## 2022-10-23 PROCEDURE — 6370000000 HC RX 637 (ALT 250 FOR IP): Performed by: INTERNAL MEDICINE

## 2022-10-23 PROCEDURE — 99291 CRITICAL CARE FIRST HOUR: CPT | Performed by: SURGERY

## 2022-10-23 PROCEDURE — 99291 CRITICAL CARE FIRST HOUR: CPT | Performed by: INTERNAL MEDICINE

## 2022-10-23 PROCEDURE — 2000000000 HC ICU R&B

## 2022-10-23 PROCEDURE — 37799 UNLISTED PX VASCULAR SURGERY: CPT

## 2022-10-23 PROCEDURE — 6360000002 HC RX W HCPCS: Performed by: FAMILY MEDICINE

## 2022-10-23 PROCEDURE — 84100 ASSAY OF PHOSPHORUS: CPT

## 2022-10-23 PROCEDURE — 6370000000 HC RX 637 (ALT 250 FOR IP)

## 2022-10-23 RX ORDER — TRAZODONE HYDROCHLORIDE 50 MG/1
50 TABLET ORAL NIGHTLY
Status: DISCONTINUED | OUTPATIENT
Start: 2022-10-23 | End: 2022-10-26 | Stop reason: HOSPADM

## 2022-10-23 RX ORDER — TAMSULOSIN HYDROCHLORIDE 0.4 MG/1
0.4 CAPSULE ORAL DAILY
Status: DISCONTINUED | OUTPATIENT
Start: 2022-10-23 | End: 2022-10-23

## 2022-10-23 RX ORDER — LIDOCAINE HYDROCHLORIDE ANHYDROUS AND DEXTROSE MONOHYDRATE .4; 5 G/100ML; G/100ML
1 INJECTION, SOLUTION INTRAVENOUS CONTINUOUS
Status: DISPENSED | OUTPATIENT
Start: 2022-10-23 | End: 2022-10-25

## 2022-10-23 RX ORDER — LIDOCAINE HYDROCHLORIDE 20 MG/ML
INJECTION, SOLUTION INTRAVENOUS
Status: COMPLETED
Start: 2022-10-23 | End: 2022-10-23

## 2022-10-23 RX ORDER — TAMSULOSIN HYDROCHLORIDE 0.4 MG/1
0.4 CAPSULE ORAL
Status: DISCONTINUED | OUTPATIENT
Start: 2022-10-24 | End: 2022-10-26 | Stop reason: HOSPADM

## 2022-10-23 RX ORDER — FINASTERIDE 5 MG/1
5 TABLET, FILM COATED ORAL NIGHTLY
Status: DISCONTINUED | OUTPATIENT
Start: 2022-10-23 | End: 2022-10-23

## 2022-10-23 RX ORDER — ACETAMINOPHEN 650 MG/1
650 SUPPOSITORY RECTAL EVERY 6 HOURS PRN
Status: DISCONTINUED | OUTPATIENT
Start: 2022-10-23 | End: 2022-10-26 | Stop reason: HOSPADM

## 2022-10-23 RX ORDER — ACETAMINOPHEN 325 MG/1
650 TABLET ORAL EVERY 6 HOURS PRN
Status: DISCONTINUED | OUTPATIENT
Start: 2022-10-23 | End: 2022-10-26 | Stop reason: HOSPADM

## 2022-10-23 RX ORDER — ENOXAPARIN SODIUM 100 MG/ML
40 INJECTION SUBCUTANEOUS DAILY
Status: DISCONTINUED | OUTPATIENT
Start: 2022-10-23 | End: 2022-10-26 | Stop reason: HOSPADM

## 2022-10-23 RX ORDER — BUMETANIDE 1 MG/1
2 TABLET ORAL DAILY
Status: DISCONTINUED | OUTPATIENT
Start: 2022-10-23 | End: 2022-10-24

## 2022-10-23 RX ORDER — AMIODARONE HYDROCHLORIDE 200 MG/1
200 TABLET ORAL EVERY 8 HOURS
Status: DISCONTINUED | OUTPATIENT
Start: 2022-10-23 | End: 2022-10-26 | Stop reason: HOSPADM

## 2022-10-23 RX ORDER — LANOLIN ALCOHOL/MO/W.PET/CERES
400 CREAM (GRAM) TOPICAL DAILY
Status: DISCONTINUED | OUTPATIENT
Start: 2022-10-23 | End: 2022-10-26 | Stop reason: HOSPADM

## 2022-10-23 RX ORDER — PANTOPRAZOLE SODIUM 40 MG/1
40 TABLET, DELAYED RELEASE ORAL
Status: DISCONTINUED | OUTPATIENT
Start: 2022-10-23 | End: 2022-10-26 | Stop reason: HOSPADM

## 2022-10-23 RX ORDER — SODIUM CHLORIDE 9 MG/ML
INJECTION, SOLUTION INTRAVENOUS PRN
Status: DISCONTINUED | OUTPATIENT
Start: 2022-10-23 | End: 2022-10-26 | Stop reason: HOSPADM

## 2022-10-23 RX ORDER — LIDOCAINE HYDROCHLORIDE 20 MG/ML
1 INJECTION, SOLUTION INTRAVENOUS ONCE
Status: COMPLETED | OUTPATIENT
Start: 2022-10-23 | End: 2022-10-23

## 2022-10-23 RX ORDER — PROMETHAZINE HYDROCHLORIDE 12.5 MG/1
12.5 TABLET ORAL EVERY 6 HOURS PRN
Status: DISCONTINUED | OUTPATIENT
Start: 2022-10-23 | End: 2022-10-26 | Stop reason: HOSPADM

## 2022-10-23 RX ORDER — ROSUVASTATIN CALCIUM 20 MG/1
20 TABLET, COATED ORAL NIGHTLY
Status: DISCONTINUED | OUTPATIENT
Start: 2022-10-23 | End: 2022-10-26 | Stop reason: HOSPADM

## 2022-10-23 RX ORDER — SODIUM CHLORIDE 0.9 % (FLUSH) 0.9 %
10 SYRINGE (ML) INJECTION EVERY 12 HOURS SCHEDULED
Status: DISCONTINUED | OUTPATIENT
Start: 2022-10-23 | End: 2022-10-26 | Stop reason: HOSPADM

## 2022-10-23 RX ORDER — FINASTERIDE 5 MG/1
5 TABLET, FILM COATED ORAL DAILY
Status: DISCONTINUED | OUTPATIENT
Start: 2022-10-23 | End: 2022-10-23

## 2022-10-23 RX ORDER — SODIUM CHLORIDE 0.9 % (FLUSH) 0.9 %
10 SYRINGE (ML) INJECTION PRN
Status: DISCONTINUED | OUTPATIENT
Start: 2022-10-23 | End: 2022-10-26 | Stop reason: HOSPADM

## 2022-10-23 RX ORDER — METOPROLOL SUCCINATE 25 MG/1
25 TABLET, EXTENDED RELEASE ORAL DAILY
Status: DISCONTINUED | OUTPATIENT
Start: 2022-10-24 | End: 2022-10-24

## 2022-10-23 RX ORDER — SODIUM CHLORIDE 9 MG/ML
INJECTION, SOLUTION INTRAVENOUS CONTINUOUS
Status: DISCONTINUED | OUTPATIENT
Start: 2022-10-23 | End: 2022-10-23

## 2022-10-23 RX ORDER — POLYETHYLENE GLYCOL 3350 17 G/17G
17 POWDER, FOR SOLUTION ORAL DAILY PRN
Status: DISCONTINUED | OUTPATIENT
Start: 2022-10-23 | End: 2022-10-26 | Stop reason: HOSPADM

## 2022-10-23 RX ORDER — ONDANSETRON 2 MG/ML
4 INJECTION INTRAMUSCULAR; INTRAVENOUS EVERY 6 HOURS PRN
Status: DISCONTINUED | OUTPATIENT
Start: 2022-10-23 | End: 2022-10-26 | Stop reason: HOSPADM

## 2022-10-23 RX ADMIN — SODIUM CHLORIDE, PRESERVATIVE FREE 10 ML: 5 INJECTION INTRAVENOUS at 08:16

## 2022-10-23 RX ADMIN — METOPROLOL SUCCINATE 75 MG: 25 TABLET, FILM COATED, EXTENDED RELEASE ORAL at 00:13

## 2022-10-23 RX ADMIN — TAMSULOSIN HYDROCHLORIDE 0.4 MG: 0.4 CAPSULE ORAL at 08:15

## 2022-10-23 RX ADMIN — BUMETANIDE 2 MG: 1 TABLET ORAL at 16:06

## 2022-10-23 RX ADMIN — AMIODARONE HYDROCHLORIDE 200 MG: 200 TABLET ORAL at 19:33

## 2022-10-23 RX ADMIN — PANTOPRAZOLE SODIUM 40 MG: 40 TABLET, DELAYED RELEASE ORAL at 06:26

## 2022-10-23 RX ADMIN — ROSUVASTATIN CALCIUM 20 MG: 20 TABLET, FILM COATED ORAL at 19:33

## 2022-10-23 RX ADMIN — LIDOCAINE HYDROCHLORIDE 50 MG: 20 INJECTION, SOLUTION INTRAVENOUS at 13:01

## 2022-10-23 RX ADMIN — TRAZODONE HYDROCHLORIDE 50 MG: 50 TABLET ORAL at 19:33

## 2022-10-23 RX ADMIN — LIDOCAINE HYDROCHLORIDE 88.6 MG: 20 INJECTION, SOLUTION INTRAVENOUS at 01:33

## 2022-10-23 RX ADMIN — AMIODARONE HYDROCHLORIDE 200 MG: 200 TABLET ORAL at 13:25

## 2022-10-23 RX ADMIN — LIDOCAINE HYDROCHLORIDE 1 MG/MIN: 4 INJECTION, SOLUTION INTRAVENOUS at 13:07

## 2022-10-23 RX ADMIN — Medication 400 MG: at 13:25

## 2022-10-23 RX ADMIN — Medication 50 MG: at 13:01

## 2022-10-23 RX ADMIN — SODIUM CHLORIDE, PRESERVATIVE FREE 10 ML: 5 INJECTION INTRAVENOUS at 19:33

## 2022-10-23 RX ADMIN — ENOXAPARIN SODIUM 40 MG: 100 INJECTION SUBCUTANEOUS at 08:15

## 2022-10-23 RX ADMIN — FINASTERIDE 5 MG: 5 TABLET, FILM COATED ORAL at 08:15

## 2022-10-23 ASSESSMENT — PAIN SCALES - WONG BAKER
WONGBAKER_NUMERICALRESPONSE: 0

## 2022-10-23 ASSESSMENT — PAIN SCALES - GENERAL
PAINLEVEL_OUTOF10: 0
PAINLEVEL_OUTOF10: 10
PAINLEVEL_OUTOF10: 0
PAINLEVEL_OUTOF10: 0

## 2022-10-23 ASSESSMENT — PAIN DESCRIPTION - LOCATION: LOCATION: OTHER (COMMENT)

## 2022-10-23 NOTE — ED NOTES
While reviewing saved data on the monitor, an additional run of 18 beats of V-tach were recorded at 0004, but strips cannot be printed. ED attending notified.      Lucero Burnett RN  10/23/22 5573

## 2022-10-23 NOTE — ED NOTES
While administering ordered metoprolol, pt abruptly stopped talking and monitor began alarming. Pt had 17 beats of V-tach, unable to print strips. ED attending notified. Pt to wait in ED until cardiology calls back.      Renetta Marquez RN  10/23/22 4336

## 2022-10-23 NOTE — ED NOTES
Attempting to interrogate pacemaker. Pacemaker interrogator not connecting. Attending aware.      Tariq Davidson, RN  10/23/22 0608

## 2022-10-23 NOTE — CONSULTS
[address] [facility],2Nd Floor and Vascular [facility] Electrophysiology  Consultation Report  PATIENT: Noemí Santo RECORD NUMBER: [id number]  DATE OF SERVICE:  10/23/2022  ATTENDING ELECTROPHYSIOLOGIST: Aidee Tam MD  PRIMARY ELECTROPHYSIOLOGIST: Dr Olesya Steward: Jaime Saleem DO and Mayuri Joseph DO  CHIEF COMPLAINT: ICD shocks    HPI: This is a 64 y.o. male with a history of CAD s/p CABG x 5 in 2007 s/p LHC 3/2021 [facility] showed severe disease in native vessels, all the grafts were occluded, recommended medical therapy only,  ICMP on TTE 5/2022,, EF 20-25%, mod MR, RV function reduced, RHC 5/16/22 >> increased biventricular filling pressures, mod PH and PCWP 26 mmHg, CI 2.73, ICD placed in 2010 with generator change in 2018, HTN, HLD, NSVT on interrogation 7/24/2022 (Toprol XL increased to 25 mg BID), CKD, PHTN, DM, previously a DNR-CCA, hx of intolerance to Entrestro/ACEI/ARB, and medical non compliance who presents to the hospital for change in mental status and recurrent episodes of ventricular tachycardia. The patient was hospitalized on 10/1/2022 with decompensated heart failure. He was discharged to a  skilled nursing facility on 10/12/2022. He had an altercation with a staff at the facility and was transferred to the emergency room for change in mental status. On interrogation of his ICD it appeared that the patient had multiple episodes of VT requiring ICD discharges. The patient himself is very somnolent this morning. He does awaken when spoken to but does not answer any questions except that \"he does not feel well\". He does not remember any ICD shocks. He complains of generalized pain but no dyspnea. On review of the emergency room notes it appears that the patient refused any imaging or testing although he was acceptable of any treatments including transfer to the intensive care unit.   On transfer to the CVICU here, the patient has had recurrent episodes of nonsustained VT but no ICD discharges. Cardiac electrophysiology service is consulted for recurrent VT and ICD shocks. .    Patient Active Problem List    Diagnosis Date Noted    V-tach 10/23/2022     Priority: Medium    Azotemia 10/23/2022     Priority: Medium    Hypoalbuminemia 10/23/2022     Priority: Medium    Elevated LFTs 10/23/2022     Priority: Medium    Acute cystitis without hematuria 10/22/2022     Priority: Medium    Acute on chronic diastolic heart failure ([facility] [address] 10/22/2022     Priority: Medium    Bladder spasms 10/09/2022     Priority: Medium    Diabetes mellitus ([address] 10/09/2022     Priority: Medium    Palliative care encounter 10/05/2022     Priority: Medium    Goals of care, counseling/discussion 10/05/2022     Priority: Medium    DNR (do not resuscitate) discussion 10/05/2022     Priority: Medium    Acute on chronic HFrEF (heart failure with reduced ejection fraction) ([facility] [address] 10/02/2022     Priority: Medium    VHD (valvular heart disease) 10/02/2022     Priority: Medium    Congestive heart failure of unknown etiology ([address] 10/01/2022     Priority: Medium    Failure to thrive in adult 07/21/2022     Priority: Medium    Chest pain, atypical 02/24/2021    Implantable cardioverter-defibrillator (ICD) at end of device life      Overview Note:     Replacing Deprecated Diagnoses      AICD (automatic cardioverter/defibrillator) present 09/01/2016    CHF (congestive heart failure) (HCC)     S/P CABG x 4     CAD (coronary artery disease)     MI, old     Hyperlipidemia     Hypertension     Cardiomyopathy ([address]     Ventricular tachycardia     Pancreatitis        Past Medical History:   Diagnosis Date    CAD (coronary artery disease)     Cardiomyopathy (HCC)     CHF (congestive heart failure) (HCC)     Diabetes mellitus ([address]     Diverticulitis     Hyperlipidemia     Hypertension     MI, old     Pancreatitis     Prostate cancer ([address]     prostate    S/P CABG x 4     Skin cancer     Ventricular tachycardia PMH  1. CAD  Status post CABG 12/24/2007 (LIMA-LAD, SVG-RI, OM1, OM2, PDA). Stress test ordered 3/5/2021: Cancelled due to patient having shingles. [facility], 3/16/2021: LMT: severe diffuse disease. LAD: severe diffuse disease. Additional Comment: Moderate caliber vessel with  in the proximal portion. The vessel is fed by patent LIMA graft. The distal portion is small in caliber and wraps the apex. There are L->R collaterals. LCX: severe diffuse disease. Additional Comment: Moderate caliber non-dominant vessel which is fed by SVG->RI/OM1. RAMUS: ostial Ramus - . Additional Comment: Fills in the mid-distal portion from SVG->RI graft. RCA Status: Not Applicable. Additional Comment: Occluded at the ostia. GRAFTS: LIMA Graft End to Side to the Left Anterior Descending. Additional Comments: patent. SVG End to Side to the RI. Additional  Comments - patent with mild-moderate disease in the mid-portion; supplies mid-distal LCx and backfills to partially supply LAD. SVG End to Side to the OM-2 Second Obtuse Marginal Branch. Additional Comments - occluded. SVG End to Side to the Right Posterior Descending Artery. Additional Comments - occluded. Impression:- Severe/occlusive native 3 vessel CAD with patent LIMA->LAD, SVG->RI/OM1. SVG->OM2 is occluded - Native RCA is occluded at the ostia as is the SVG->rPDA. The right is fed by L->R collaterals from the LIMA graft Recommended Treatment: Medical Therapy. 7/2022 Troponin 61-->69  ICMP/Chronic HFrEF   ACC/AHA stage D, NYHA functional class II  TTE 6/27/2012: Mildly dilated LV, LVEF 35%, 1-2+ MR.  TTE: 4/13/2018 (Dr. Camille Kennedy):  EF estimated at 30%. The entire apex is severely hypokinetic. Overall ejection fraction severely decreased. Moderate left ventricular concentric hypertrophy noted. There is doppler evidence of stage I diastolic dysfunction. Normal right ventricle structure and function. Physiologic and/or trace mitral regurgitation is present.   TTE: 3/15/2021 ([facility]) Exam indication: Abnormal Cardiac Biomarkers There is a resting wall motion abnormality in the territory of the LAD and RCA. - The left ventricle is dilated. Left ventricular systolic function is severely decreased. EF = 26 ± 5% (2D biplane) Grade III left ventricular diastolic dysfunction. The right ventricle is normal in size. Right ventricular systolic function is   moderately decreased. - The left atrial cavity is severely dilated. - The right atrial cavity is dilated. There is moderately severe (3+) mitral valve regurgitation due to restricted leaflet motion likely related to ischemic heart disease. TTE: 5/13/2022 ([facility]): - Technically difficult exam due to body habitus and suboptimal positioning. - Exam indication: Evaluation of known heart failure to guide therapy - The left ventricle is severely dilated. Left ventricular systolic function is severely decreased. EF = 20 ± 5% (visual est.) Definity contrast used for endocardial border detection. Left ventricular diastolic function was not evaluated due to an arrhythmia. The right ventricle is dilated. Right ventricular systolic function is moderately to severely decreased. - The left atrial cavity is severely dilated. - The right atrial cavity is dilated. There is moderate (2+) mitral valve regurgitation. - -Peak/mean gradients of 15/7 mmHg, gradients averaged due to arrhythmia. -Prior EF reported as 26% (3/2021). Exam was compared with the prior  echocardiographic exam performed on  3/14/2021. There is no significant change. VHD: Moderate-severe ischemic MR (patient was recommended for further evaluation with EBONI and possible consideration of a clip 6/3/2022 at [facility] --> patient declined. 7/2022 p-BNP 01572  Status post ICD (implanted 4/19/2010 for primary prevention) with generator change 4/19/2018. ([facility])  ICD interrogation 7/4/2022: AV pacing 6.7%, RV pacing 3.2%, AT/AF burden 1%. Appropriate VT therapy: Successful.   VT episodes detected on 7/3/2022 at 10:59 PM.  Duration 14 seconds with an average V rate of 181 bpm.  EGM is C/W VT and VT zone treated with burst ATP x1 which terminated arrhythmia. Telephone encounter: EP 7/5/2022 Toprol-XL was increased to 25 mg twice daily. PHTN:  RHC: 5/16/2022: [facility]: Elevated biventricular filling pressures (RA 10, PCWP 26), moderate pulmonary hypertension (most likely postcapillary), preserved cardiac index by assumed Sunni 2.73. Hypertension  Hyperlipidemia  Type 2 diabetes mellitus  History of NSVT  Anemia  History of prostate cancer specifics unknown. History of diverticulitis  History of pancreatitis  Medical noncompliance  History of hernia repair, elbow surgery, bladder surgery. History of skin melanoma (nose and right-sided cheek) status post excision 2019.   DNR-CCA       Family History   Problem Relation Age of Onset    Cancer Father         liver       Social History     Tobacco Use    Smoking status: Never    Smokeless tobacco: Never    Tobacco comments:     used to smoke occ cigar   Substance Use Topics    Alcohol use: No       Current Facility-Administered Medications   Medication Dose Route Frequency Provider Last Rate Last Admin    sodium chloride flush 0.9 % injection 10 mL  10 mL IntraVENous 2 times per day Lenord Farm, DO   10 mL at 10/23/22 0816    sodium chloride flush 0.9 % injection 10 mL  10 mL IntraVENous PRN [facility] Farm, DO        0.9 % sodium chloride infusion   IntraVENous PRN [facility]        enoxaparin (LOVENOX) injection 40 mg  40 mg SubCUTAneous Daily Lenord Farm, DO   40 mg at 10/23/22 0815    promethazine (PHENERGAN) tablet 12.5 mg  12.5 mg Oral Q6H PRN [facility] Farm, DO        Or    ondansetron [facility]) injection 4 mg  4 mg IntraVENous Q6H PRN Lenord Farm, DO        polyethylene glycol (GLYCOLAX) packet 17 g  17 g Oral Daily PRN Lenord Farm, DO        acetaminophen (TYLENOL) tablet 650 mg  650 mg Oral Q6H PRN [facility], DO        Or    acetaminophen (TYLENOL) suppository 650 mg  650 mg Rectal Q6H PRN Juancarlos Cheluis,         finasteride (PROSCAR) tablet 5 mg  5 mg Oral Daily Flaco Huston MD   5 mg at 10/23/22 0815    pantoprazole (PROTONIX) tablet 40 mg  40 mg Oral QAM AC Flaco Huston MD   40 mg at 10/23/22 6566    rosuvastatin (CRESTOR) tablet 20 mg  20 mg Oral Nightly Flaco Huston MD        tamsulosin (FLOMAX) capsule 0.4 mg  0.4 mg Oral Daily Flaco Huston MD   0.4 mg at 10/23/22 0815    traZODone (DESYREL) tablet 50 mg  50 mg Oral Nightly Flaco Huston MD            No Known Allergies    ROS: Difficult to obtain from the patient since he does not want to answer questions  PHYSICAL EXAM:   Vitals:    10/23/22 0740 10/23/22 0800 10/23/22 0900 10/23/22 1000   BP:  (!) 99/53 (!) 103/59 (!) 93/57   Pulse: 90 83 90 99   Resp: 27 19 20 22   Temp:  97 °F (36.1 °C)  97.2 °F (36.2 °C)   TempSrc:  Axillary  Axillary   SpO2: 97% 98% 99% 94%   Weight:       Height:          Constitutional: Well-developed, mildly tachypneic  Eyes: conjunctivae normal, no xanthelasma   Ears, Nose, Throat: oral mucosa moist, no cyanosis   CV: no JVD or leg edema, laterally displaced PMI with a soft S1 and S2 with left sternal border and apex  Lungs: clear to auscultation bilaterally, normal respiratory effort without used of accessory muscles  Abdomen: soft, non-tender, bowel sounds present, no masses or hepatomegaly   Musculoskeletal: no digital clubbing, no edema   Skin: warm, no rashes     I have personally reviewed the laboratory, cardiac diagnostic and radiographic testing as outlined below:    Data:    Recent Labs     10/22/22  2146 10/23/22  0500   WBC 5.9 5.8   HGB 12.4* 11.9*   HCT 40.7 38.3    162     Recent Labs     10/22/22  2146 10/23/22  0500    141   K 4.0 4.4    105   CO2 29 25   BUN 36* 36*   CREATININE 1.3* 1.2   CALCIUM 9.1 9.0      Lab Results   Component Value Date/Time    MG 2.3 10/23/2022 07:40 AM     No results for input(s): TSH in the last 72 hours. Recent Labs     10/22/22  2146   INR 1.5       CXR:     Telemetry:     FINDINGS:   Pulmonary vascular congestive changes. Small right pleural effusion. No   pneumothorax. Cardiomegaly. The osseous structures are without acute   process. Sternotomy wires and left-sided transvenous pacer device           Impression   Cardiomegaly with pulmonary vascular congestive changes. Small right pleural   effusion. This is unchanged compared to 10/01/2022       EKG: AV pacing,NSVT     Echocardiogram:   5/13/2022 ([facility]): - Technically difficult exam due to body habitus and suboptimal positioning. - Exam indication: Evaluation of known heart failure to guide therapy - The left ventricle is severely dilated. Left ventricular systolic function is severely decreased. EF = 20 ± 5% (visual est.) Definity contrast used for   endocardial border detection. Left ventricular diastolic function was not evaluated due to an arrhythmia. The right ventricle is dilated. Right ventricular systolic function is moderately to severely decreased. - The left atrial cavity is severely dilated. - The right atrial cavity is dilated. There is moderate (2+) mitral valve regurgitation. - -Peak/mean gradients of 15/7 mmHg, gradients averaged due to arrhythmia. -Prior EF reported as 26% (3/2021). Cardiac Catheterization:     [facility] (F, 3/16/2021): LMT: _ The LMT has severe diffuse disease. LAD: _ The LAD has severe diffuse disease. Additional Comment: Moderate caliber vessel with  in the proximal portion. The vessel is fed by patent LIMA graft. The distal portion is small in caliber and wraps the apex. There are L->R collaterals. LCX: _ The Circumflex has severe diffuse disease. Additional Comment: Moderate caliber non-dominant vessel which is fed by SVG->RI/OM1. RAMUS: _ The ostial Ramus - . Additional Comment: Fills in the mid-distal portion from SVG->RI graft. RCA: _ RCA Status: Not Applicable.  Additional Comment: Occluded at the ostia. GRAFTS: _ Left Internal Mammary Artery Graft End to Side to the Left Anterior Descending. Additional Comments - patent. _ Saphenous Vein Graft End to Side to the Ramus Intermedius . Additional  Comments - patent with mild-moderate disease in the mid-portion; supplies mid-distal LCx and backfills to partially supply LAD. _ Saphenous Vein Graft End to Side to the OM-2 Second Obtuse Marginal Branch. Additional Comments - occluded. _ Saphenous Vein Graft End to Side to the Right Posterior Descending Artery. Additional Comments - occluded. Impression:- Severe/occlusive native 3 vessel CAD with patent LIMA->LAD, SVG->RI/OM1. SVG->OM2 is occluded - Native RCA is occluded at the ostia as is the SVG->rPDA. The right is fed by L->R collaterals from the LIMA graft Recommended Treatment: Medical Therapy. Device Interrogation:   Underlying rhythm: AV pacing  Mode: DDDR   Battery Voltage/Longevity:  2.8-3.3 years    Charge time: 8.3 secs  Pacing: A: 11%  RV: 4.9%   P wave: 1.3 mV  Impedance: 310 ohms   Threshold: 2.0 V @ 0.5 ms  RV R wave: 11.7 mV  Impedance: 430 ohms   Threshold: 0.5 V @ 0.5 ms  HV impedance--34 ohms  Episodes: Recurrent episodes of nonsustained and sustained polymorphic VT degenerating to ventricular fibrillation  triggering ICD discharges yesterday 10/22/22  Multiple episodes of VT that responded to ATP in September 2022  Reprogramming included: AV delay increased to reduce RV pacing percentage  Lower rate kept at 60, sensor turned off  Overall device function is normal    All device programmable settings were evaluated per above and in the scanned document, along with iterative adjustments (capture thresholds) to assess and select the most appropriate final programming to provide for consistent delivery of the appropriate therapy and to verify function of the device.         I have independently reviewed all of the ECGs and rhythm strips per above     Assessment/Plan: Recurrent episodes of rapid polymorphic VT with  appropriate ICD discharges which restore sinus rhythm  Start IV lidocaine and oral amiodarone loading    2. Dual-chamber ICD in situ implanted in 2010, ICD generator change in 2018 with an [employer] [facility]. ICD function is appropriate and reprogrammed as noted above    3. Severe ischemic cardiomyopathy  Status post CABG in 2007  Most recent coronary angiography shows severe native three-vessel disease with 2 patent grafts LIMA to the LAD which is diseased, SVG to R1/OM1 with left-to-right collaterals. No invasive interventions possible. Medical therapy recommended    4.    HFrEF, LVEF of 15 to 20% with moderate pulmonary hypertension  Currently decompensated as noted with physical exam and chest x-ray  Resume oral diuresis and guideline directed medical therapy as tolerated    5. Chronic kidney disease--serum creatinine 1.2-1.4    6. Hypertension by history although the patient has been hypotensive this a.m.    7.  Hyperlipidemia    8. Diabetes    9. Noncompliance in the past    10. DNR CCA    Recommendations:    Resume oral diuresis and low-dose beta-blockade  IV lidocaine and oral amiodarone loading for VT  ICD checked personally and reprogrammed as noted above  Consider general cardiology consultation for management of heart failure    I have spent a total of 60-70 minutes with the patient reviewing the above stated recommendations. And a total of >50% of that time involved face-to-face time providing counseling and or coordination of care with the other providers, reviewing records/tests, counseling/education of the patient, ordering medications/tests/procedures, coordinating care, and documenting clinical information in the EHR. Thank you for allowing me to participate in your patient's care. Please call me if there are any questions or concerns.       Simona Bravo MD  Cardiac Electrophysiology  [facility]) Physicians  [facility] [place]: [place] [facility]  11:14 AM  10/23/2022

## 2022-10-23 NOTE — ED NOTES
Pt's monitor alarmed, pt had 7 beats of Vtach. Pt's current RN and ED attending notified.      Lucero Burnett RN  10/23/22 9802

## 2022-10-23 NOTE — PROGRESS NOTES
Patient known to Dr Whitaker patient with ICD will defer consult to EP for their recommendations and evaluation. Electronically signed by Sergio Ricketts.  MARIA C Mckeon on 10/23/2022 at 6:30 AM

## 2022-10-23 NOTE — PLAN OF CARE
Problem: Chronic Conditions and Co-morbidities  Goal: Patient's chronic conditions and co-morbidity symptoms are monitored and maintained or improved  Outcome: Progressing  Flowsheets (Taken 10/23/2022 0325 by Nayely Diane RN)  Care Plan - Patient's Chronic Conditions and Co-Morbidity Symptoms are Monitored and Maintained or Improved: Monitor and assess patient's chronic conditions and comorbid symptoms for stability, deterioration, or improvement     Problem: Pain  Goal: Verbalizes/displays adequate comfort level or baseline comfort level  Outcome: Progressing  Flowsheets (Taken 10/23/2022 0325 by Nayely Diane RN)  Verbalizes/displays adequate comfort level or baseline comfort level: Encourage patient to monitor pain and request assistance     Problem: Skin/Tissue Integrity  Goal: Absence of new skin breakdown  Description: 1. Monitor for areas of redness and/or skin breakdown  2. Assess vascular access sites hourly  3. Every 4-6 hours minimum:  Change oxygen saturation probe site  4. Every 4-6 hours:  If on nasal continuous positive airway pressure, respiratory therapy assess nares and determine need for appliance change or resting period.   Outcome: Progressing     Problem: Safety - Adult  Goal: Free from fall injury  Outcome: Progressing     Problem: ABCDS Injury Assessment  Goal: Absence of physical injury  Outcome: Progressing

## 2022-10-23 NOTE — CONSULTS
Palliative Care Department  [phone]  Palliative Care Initial Consult  Provider GINA Lee CNP      PATIENT: Criss Coulter  : 1945  MRN: [id number]  ADMISSION DATE: 10/23/2022  3:21 AM  Referring Provider: Anjelica James MD    Palliative Medicine was consulted on hospital day 1 for assistance with Goals of care, Code Status Discussion, Family support, Symptom management     HPI:     Jose Oleary is a 64 y.o. y/o male with a history of cardiomyopathy, ICD, hypertension, hyperlipidemia, HFrEF of 20% who presented to [facility]) on 10/23/2022 with altered mental status. He had some beats of V. tach in the ER. Pacer was interrogated and found that he had been shocked 4 times in the last 24 hours. He was admitted to the CVICU for further medical management. ASSESSMENT/PLAN:     Pertinent Hospital Diagnoses     V. Tach  Ischemic cardiomyopathy HFrEF    Palliative Care Encounter / Counseling Regarding Goals of Care  Please see detailed goals of care discussion as below  At this time, Criss Coulter, Does Not have capacity for medical decision-making. Capacity is time limited and situation/question specific  During encounter unable to reach surrogate medical decision-maker  Code status DNR-CCA  Advanced Directives: no POA or living will in epic  Surrogate/Legal NOK:  Claudia Allison (daughter) [phone]    Spiritual assessment: no spiritual distress identified  Bereavement and grief: to be determined  Referrals to: none today    Thank you for the opportunity to participate in the care of Criss Coulter. GINA Lee CNP   Palliative Medicine     SUBJECTIVE:     Details of Conversation: Chart reviewed and received an update from the patient's nurse. The patient was resting comfortably, no family was at the bedside. He awoke to voice, but is confused and requested to just let him sleep. Unable to hold a conversation.   The nurse explained that the daughter Paula was in earlier this morning. I called the number listed for Paula, but was unable to reach her or leave a voicemail. We will continue to follow and try to speak with the patient and daughter again. Prognosis: Guarded    OBJECTIVE:     BP (!) 99/53   Pulse 83   Temp 97 °F (36.1 °C) (Axillary)   Resp 19   Ht 5' 10\" (1.778 m)   Wt 203 lb 9.6 oz (92.4 kg)   SpO2 98%   BMI 29.21 kg/m²     Physical Examination:  Gen: elderly, thin, NAD  HEENT: normocephalic, atraumatic  Neck: trachea midline, no JVD  Lungs: respirations easy and not labored,   Heart: regular rate and rhythm, distant heart tones,   Abdomen: normoactive bowel sounds, soft, non-tender  Extremities: no clubbing, cyanosis or edema, moving all extremities    Skin: warm, dry without rashes, lesions, bruising  Neuro: Confusion, follows commands    Objective data reviewed: labs, images, records, medication use, vitals, and chart    Time/Communication  Greater than 50% of time spent, total 30 minutes in counseling and coordination of care at the bedside regarding goals of care. Thank you for allowing Palliative Medicine to participate in the care of Dwain Riley. Note: This report was completed using computerize voiced recognition software. Every effort has been made to ensure accuracy; however, inadvertent computerized transcription errors may be present.

## 2022-10-23 NOTE — LETTER
[place] Department Medicaid  CERTIFICATION OF NECESSITY  FOR NON-EMERGENCY TRANSPORTATION   BY GROUND AMBULANCE      Individual Information   1. Name: Rafa Mccall 2. [place] Medicaid Billing Number:  [id number]   0. Address: [address] [place] [address] Way      Transportation Provider Information   4. Provider Name:     5. [place] Provider Number:   National Provider Identifier (NPI):       Certification  7. Criteria:  During transport, this individual requires:  [] Medical treatment or continuous     supervision by an EMT. [x] The administration or regulation of oxygen by another person. [] Supervised protective restraint. 8. Period Beginning Date:    5. Length  [x] Not more than 1 day(s)  [] One Year     Additional Information Relevant to Certification   10. Comments or Explanations, If Necessary or Appropriate   Cardiac arrhythmia, Acute chf requiring o2. Pt unable to self regulate o2     Certifying Practitioner Information   11. Name of Practitioner:  Michael Murguia   12. [place] Medicaid Provider Number, If Applicable:   [address] Provider Identifier (NPI):       Signature Information   14. Date of Signature:   13. Name of Person Signin. Signature and Professional Designation:       [facility] [id number]  Rev. 2015    Catia Garcia #[id number] ([id number]) ([facility]:[birth year] 64 y.o.  M) (Adm: 10/23/22)  ELLA SANCHEZ [id number]  PCP    Thora Favor  Demographics  Comment        Address   [address] [place] [address]    Home Phone   [phone]    Work [facility] Phone   [phone]             Social Security Number       Insurance Information   MEDICARE    Marital Status       Roman Catholic   None               [place] Status   No [002]     Insurance Payors as of 10/24/2022    MEDICARE    Plan: MEDICARE PART A AND B Member: [id number] Effective from: 2015   Subscriber: Kayla Wolfe Subscriber ID: [id number] Guarantor: Karen Baker [facility] MEDICAID    Plan: Norman Bear [facility] MEDICAID Group: [place] Member: [id number]   Effective from: 1/1/2020 Subscriber: Walter Harris Subscriber ID: [id number]   Guarantor: Ulises Marquez to Patient    Power of  Living Will Clinical Unknown Study Attachment Consent Form ABN Waiver After Visit Summary Lab Result Scan Code Status MyChart Status Advance Care Planning    Not on File  Filed on 08/19/13  Not on File  Not on File  Filed  Not on File  Filed  Not on File  DNR-CCA [Updated on 10/23/22 0530]  Declined Jump to the Activity      Auth/Cert Information    Create auth/cert for hospital account [de-identified]     Patient Contacts    Name Relation Home Work Megha Lugo Child [phone]     [facility]      Admission Information    Current Information    Attending Provider Admitting Provider Admission Type Admission Status   MD Stanley White DO Urgent Confirmed Admission          Admission Date/Time Discharge Date Hospital Service Auth/Cert Status   90/74/24  03:21 AM  Internal Medicine [facility] Area Unit Room/Bed    [address] [place] [address] Account    Name Acct ID Class Status Primary Coverage   Sonido Signs [id number] Inpatient Open MEDICARE - MEDICARE PART A AND B            Guarantor Account (for Hospital Account [de-identified])    Name Relation to Pt Service Area Active? Acct Type   Viviann Signs [facility][1] Yes Personal/Family   Address Phone     [address] [phone](D)              Coverage Information (for Hospital Account [de-identified])      1. [address] [facility] PART A AND B    F/O Payor/Plan Precert #   MEDICARE/MEDICARE PART A AND B    Subscriber Subscriber #   Elton Gomez   Address Phone   [address] [facility] [address] [phone]     2.  Ananth Fletcher [facility] MEDICAID/[facility] MEDICAID    F/O Payor/Plan Precert #   [facility] MEDICAID/[facility] [address] Subscriber #   Tiffany Gray [id number]   Address Phone   [address] [place] [address] [phone]

## 2022-10-23 NOTE — ED NOTES
Runs of vtach reported to Dr. Tommy Wilson and Lyndsey Allen, RN, oncoming nurse. Dr. Dennise Mathew at bedside. Report given. Care relinquished.      Patrick Tomlinson RN  10/22/22 6544

## 2022-10-23 NOTE — ED NOTES
Milvia Delgado, RN, Access [facility]    Dx: continuous runs of [facility]    CVICU - Transfer request per Dr. Kun Goff, cardiology. 0122 Dr. Marielle Bustillo, accepted patient.     GERALDINE ETA [id number]  Bed: 0402  N2N#: [id number]       Moreno Moon  10/23/22 0153              Moreno Moon  10/23/22 3074

## 2022-10-23 NOTE — PROGRESS NOTES
Dr. Kristofer Merida at bedside to evaluate, informed of order for 0.9% NS at 75cc/hr and patients BNP result of > 30,000. Okay to discontinue IV fluid order.

## 2022-10-23 NOTE — ED PROVIDER NOTES
Laila Ponce 64 y.o. male PHMx of cardiomyopathy, hypertension, hyperlipidemia, pancreatitis, AICD, ICD, VHD, acute on chronic HF R EF, CHF failure to thrive presents to the ED c/o altered mental status from his nursing home. Report from EMS is that nursing home reported that he was AO x1 he is normally AOx4. However, discussing with patient he is AOx4. . Onset: Several hours ago. Location/Radiation: Denies any specific area of pain. Duration: Intermittent. Characterization: Nurse reports that he was AO x1. . Aggravating Factors: None. Relieving Factors: None. Severity: Mild. Patient reports that he got in a disagreement with the nurse about his room being too cold and he wanted a blanket, he reports the nurse got aggravated with him and he thinks that she decided to call EMS to get him out of the nursing home. Assx Sxs: None. He Denies: Chest pain/worsening shortness of breath, worsening leg swelling, any new numbness/tingling. Review of Systems   Constitutional:  Negative for activity change and appetite change. HENT:  Negative for congestion and dental problem. Eyes:  Negative for discharge and itching. Respiratory:  Negative for apnea and chest tightness. Gastrointestinal:  Negative for abdominal distention and abdominal pain. Endocrine: Negative for cold intolerance and heat intolerance. Genitourinary:  Negative for dysuria and enuresis. Musculoskeletal:  Negative for arthralgias and back pain. Allergic/Immunologic: Negative for environmental allergies and food allergies. Neurological:  Negative for dizziness and facial asymmetry. Hematological:  Negative for adenopathy. Psychiatric/Behavioral:  Negative for agitation. Physical Exam  Constitutional:       General: He is not in acute distress. Appearance: He is not ill-appearing, toxic-appearing or diaphoretic. HENT:      Head: Normocephalic and atraumatic.       Right Ear: External ear normal.      Left Ear: L via nasal cannula. Telemetry was showing that he was having multiple bouts of V. tach. Interrogation of his ICD [address] reported that he had been shocked 4 times and 29 aborted shocks. During his ER stay he continued to have rounds of Alfa Ghosh. Contacted cardiology and they recommended 75 mg of metoprolol. He was given this in the V. tach continued. He was then given 1 valeria per keg of lidocaine and accepted for transfer at the cardiovascular ICU at [address]  Patient was somewhat combative/noncompliant he refused to have CT scan of the head and abdomen pelvis done. He reported that he would fine with treatment but did not want any imaging done. The risks and potential inability to find pathology/life-threatening disease with discussed with accepted for transfer and normotensive at time of transfer to the cardiovascular ICU. Patient and he still refused. He has capacity to refuse this. Patient was also acute cystitis without hematuria. ED Course as of 10/23/22 0616   Sat Oct 22, 2022   2209 EKG: This EKG is signed and interpreted by me and attending physician. Rate: 89  Rhythm: Sinus and few PVCs  Interpretation: no acute changes  Comparison: stable as compared to patient's most recent EKG  [JR]   2210 AOx4, has complete capacity, patient declined CT scan of the head and abdomen pelvis. RN Dorene Cantu was bedside witnessed Pt refusing advanced imaging. [JR]   56 Hospitalist agreed to admit patient for further evaluation and workup. [JR]   8862 With cardiologist Jax Villalpando, he recommended to hold off on lidocaine at this time and give patient 75 mg of metoprolol succinate. He reports the patient at this time is stable for intermediate care with telemetry. [JR]   8033 Spoke with [address] [place] [address], they report that he has been shocked 4times since 845am on 10/22/22, 29 shocks aborted. Pt still having VT since receiving 75mg of metoprolol. Paging Dr. Jax Villalpando again.   [JR]   Shraddha Oct 23, 2022   6378 Spoke with Dr. Young Steele again, he recommends Lidocaine 1mg/kg and transfer to [facility] CVICU  [JR]      ED Course User Index  [JR] Whitney GarciaclarisseDO         ED Course as of 10/23/22 0616   Sat Oct 22, 2022   2209 EKG: This EKG is signed and interpreted by me and attending physician. Rate: 89  Rhythm: Sinus and few PVCs  Interpretation: no acute changes  Comparison: stable as compared to patient's most recent EKG  [JR]   2210 AOx4, has complete capacity, patient declined CT scan of the head and abdomen pelvis. RN Denny Yun was bedside witnessed Pt refusing advanced imaging. [JR]   56 Hospitalist agreed to admit patient for further evaluation and workup. [JR]   9353 With cardiologist Young Steele, he recommended to hold off on lidocaine at this time and give patient 75 mg of metoprolol succinate. He reports the patient at this time is stable for intermediate care with telemetry. [JR]   4323 Spoke with [address] [place] [address], they report that he has been shocked 4times since 845am on 10/22/22, 29 shocks aborted. Pt still having VT since receiving 75mg of metoprolol. Paging Dr. Young Steele again. [JR]   Shraddha Oct 23, 2022   0059 Spoke with Dr. Young Steele again, he recommends Lidocaine 1mg/kg and transfer to [facility] CVICU  [JR]      ED Course User Index  [JR] Whitney Garciaclarisse,        --------------------------------------------- PAST HISTORY ---------------------------------------------  Past Medical History:  has a past medical history of CAD (coronary artery disease), Cardiomyopathy ([facility] [address] [facility] Diabetes mellitus [facility] Diverticulitis, Hyperlipidemia, Hypertension, MI, old, Pancreatitis, Prostate cancer ([facility] S/P CABG x 4, Skin cancer, and Ventricular tachycardia. Past Surgical History:  has a past surgical history that includes Kidney surgery; Bladder surgery; Elbow surgery; Coronary artery bypass graft (12/24/2007); Colonoscopy; hernia repair; Cardiac pacemaker placement (04/2010);  Cardiac defibrillator placement (04/19/2018); and Skin cancer excision (2019). Social History:  reports that he has never smoked. He has never used smokeless tobacco. He reports that he does not drink alcohol and does not use drugs. Family History: family history includes Cancer in his father. The patients home medications have been reviewed. Allergies: Patient has no known allergies.     -------------------------------------------------- RESULTS -------------------------------------------------    Lab  Results for orders placed or performed during the hospital encounter of 10/22/22   COVID-19, Rapid    Specimen: Nasopharyngeal Swab   Result Value Ref Range    SARS-CoV-2, NAAT Not Detected Not Detected   RAPID INFLUENZA A/B ANTIGENS    Specimen: Nasopharyngeal   Result Value Ref Range    Influenza A by PCR Not Detected Not Detected    Influenza B by PCR Not Detected Not Detected   CBC with Auto Differential   Result Value Ref Range    WBC 5.9 4.5 - 11.5 E9/L    RBC 4.89 3.80 - 5.80 E12/L    Hemoglobin 12.4 (L) 12.5 - 16.5 g/dL    Hematocrit 40.7 37.0 - 54.0 %    MCV 83.2 80.0 - 99.9 fL    MCH 25.4 (L) 26.0 - 35.0 pg    MCHC 30.5 (L) 32.0 - 34.5 %    RDW 18.0 (H) 11.5 - 15.0 fL    Platelets 440 454 - 328 E9/L    MPV 12.3 (H) 7.0 - 12.0 fL    Neutrophils % 74.7 43.0 - 80.0 %    Immature Granulocytes % 0.3 0.0 - 5.0 %    Lymphocytes % 16.1 (L) 20.0 - 42.0 %    Monocytes % 8.6 2.0 - 12.0 %    Eosinophils % 0.0 0.0 - 6.0 %    Basophils % 0.3 0.0 - 2.0 %    Neutrophils Absolute 4.40 1.80 - 7.30 E9/L    Immature Granulocytes # 0.02 E9/L    Lymphocytes Absolute 0.95 (L) 1.50 - 4.00 E9/L    Monocytes Absolute 0.51 0.10 - 0.95 E9/L    Eosinophils Absolute 0.00 (L) 0.05 - 0.50 E9/L    Basophils Absolute 0.02 0.00 - 0.20 E9/L   Comprehensive Metabolic Panel w/ Reflex to MG   Result Value Ref Range    Sodium 141 132 - 146 mmol/L    Potassium reflex Magnesium 4.0 3.5 - 5.0 mmol/L    Chloride 103 98 - 107 mmol/L    CO2 29 22 - 29 mmol/L    Anion Gap 9 7 - 16 mmol/L    Glucose 63 (L) 74 - 99 mg/dL    BUN 36 (H) 6 - 23 mg/dL    Creatinine 1.3 (H) 0.7 - 1.2 mg/dL    EstJah Filt Rate 56 >=60 mL/min/1.73    Calcium 9.1 8.6 - 10.2 mg/dL    Total Protein 6.4 6.4 - 8.3 g/dL    Albumin 3.5 3.5 - 5.2 g/dL    Total Bilirubin 0.8 0.0 - 1.2 mg/dL    Alkaline Phosphatase 246 (H) 40 - 129 U/L    ALT 57 (H) 0 - 40 U/L    AST 63 (H) 0 - 39 U/L   Troponin   Result Value Ref Range    Troponin, High Sensitivity 66 (H) 0 - 11 ng/L   Lipase   Result Value Ref Range    Lipase 84 (H) 13 - 60 U/L   Brain Natriuretic Peptide   Result Value Ref Range    Pro-BNP 30,751 (H) 0 - 450 pg/mL   Protime-INR   Result Value Ref Range    Protime 17.4 (H) 9.3 - 12.4 sec    INR 1.5    Urinalysis with Microscopic   Result Value Ref Range    Color, UA Yellow Straw/Yellow    Clarity, UA Clear Clear    Glucose, Ur Negative Negative mg/dL    Bilirubin Urine Negative Negative    Ketones, Urine Negative Negative mg/dL    Specific Gravity, UA 1.015 1.005 - 1.030    Blood, Urine Negative Negative    pH, UA 6.0 5.0 - 9.0    Protein, UA Negative Negative mg/dL    Urobilinogen, Urine 0.2 <2.0 E.U./dL    Nitrite, Urine Negative Negative    Leukocyte Esterase, Urine MODERATE (A) Negative    WBC, UA 2-5 0 - 5 /HPF    RBC, UA NONE 0 - 2 /HPF    Bacteria, UA MODERATE (A) None Seen /HPF   Lactic Acid   Result Value Ref Range    Lactic Acid 1.6 0.5 - 2.2 mmol/L   Magnesium   Result Value Ref Range    Magnesium 2.5 1.6 - 2.6 mg/dL   Troponin   Result Value Ref Range    Troponin, High Sensitivity 64 (H) 0 - 11 ng/L   POCT Glucose   Result Value Ref Range    Glucose 97 mg/dL    QC OK?  yes    POCT Glucose   Result Value Ref Range    Meter Glucose 97 74 - 99 mg/dL   EKG 12 Lead   Result Value Ref Range    Ventricular Rate 102 BPM    Atrial Rate 141 BPM    P-R Interval 160 ms    QRS Duration 108 ms    Q-T Interval 392 ms    QTc Calculation (Bazett) 510 ms    P Axis 29 degrees    R Axis 60 degrees    T Axis 31 degrees TYPE AND SCREEN   Result Value Ref Range    ABO/Rh A NEG     Antibody Screen NEG        Radiology  XR FOOT RIGHT (MIN 3 VIEWS)   Final Result   No acute osseous abnormality. Diffuse soft tissue swelling. XR FOOT LEFT (MIN 3 VIEWS)   Final Result   No acute osseous abnormality. No radiographic evidence of acute   osteomyelitis. XR CHEST PORTABLE   Final Result   Cardiomegaly with pulmonary vascular congestive changes. Small right pleural   effusion. This is unchanged compared to 10/01/2022.               ------------------------- NURSING NOTES AND VITALS REVIEWED ---------------------------  Date / Time Roomed:  10/22/2022  9:12 PM  ED Bed Assignment:  17/17    The nursing notes within the ED encounter and vital signs as below have been reviewed. Patient Vitals for the past 24 hrs:   BP Temp Temp src Pulse Resp SpO2 Height Weight   10/23/22 0216 104/70 -- -- 89 18 99 % -- --   10/23/22 0158 104/74 -- -- 86 -- -- -- --   10/23/22 0147 -- 97.7 °F (36.5 °C) Oral -- -- -- -- --   10/23/22 0142 87/60 -- -- -- -- -- -- --   10/23/22 0138 (!) 73/53 -- -- 77 16 99 % -- --   10/23/22 0013 120/68 97.9 °F (36.6 °C) -- 79 20 99 % -- --   10/22/22 2129 122/75 97.5 °F (36.4 °C) Oral 74 18 100 % 5' 11\" (1.803 m) 195 lb (88.5 kg)   10/22/22 2127 -- 97.5 °F (36.4 °C) Oral -- -- -- -- --       Oxygen Saturation Interpretation: Normal      ------------------------------------------ PROGRESS NOTES ------------------------------------------  Re-evaluation(s):  Time: 0100. Patients symptoms show no change  Repeat physical examination is not changed    Time: 0200. Patients symptoms show no change  Repeat physical examination is not changed    I have spoken with the patient and discussed todays results, in addition to providing specific details for the plan of care and counseling regarding the diagnosis and prognosis. Their questions are answered at this time and they are agreeable with the plan.   I have discussed the risks and benefits of transfer and they wish to proceed with the transfer. --------------------------------- ADDITIONAL PROVIDER NOTES ---------------------------------  Consultations:  Spoke with Dr. Ankit London (Cardiology). Discussed case. They will provide consultation. Spoke with Dr. Isabelle Tong (Medicine). Discussed case. They will admit this patient. Reason for transfer: Cardiology and Higher level of care with CVICU. This patient's ED course included: a personal history and physicial examination, re-evaluation prior to disposition, multiple bedside re-evaluations, IV medications, cardiac monitoring, and continuous pulse oximetry    This patient has remained hemodynamically stable, remained unchanged, and initially deteriorated, but then stabilized during their ED course. Please note that the withdrawal or failure to initiate urgent interventions for this patient would likely result in a life threatening deterioration or permanent disability. Accordingly this patient received 30 minutes of critical care time, excluding separately billable procedures. Jorje Greene, talked with Cardiology two times. Pt transferred to cvicu. Clinical Impression  1. Acute cystitis without hematuria    2. Hypervolemia, unspecified hypervolemia type    3. Systolic congestive heart failure, unspecified HF chronicity ([address]    4. History of noncompliance with medical treatment    5. Elevated LFTs          Disposition  Patient's disposition: [facility] to [facility] Transferred by: MICU. Patient's condition is fair.       Chio Piper DO  Resident  10/23/22 9268

## 2022-10-23 NOTE — H&P
[facility] History and Physical      CHIEF COMPLAINT:  edema    History of Present Illness:  75-year-old male with history of coronary artery disease status post CABG in 2007 of which all grafts were occluded in 2021 heart cath. Patient with ischemic cardiomyopathy with a EF of 20%. Patient also with moderate pulmonary hypertension and wedge pressure of 26 at that time. Also history of VTE, and has ICD that was inserted in 2020 with a generator change in 2018. Patient seen by cardiology as consult and July of this year when he had CHF. Patient is apparently been referred to [facility] but is not a candidate for heart surgery, patient would essentially require a heart transplant. He has been intolerant to ACE, ARB, and Entresto and is currently on Toprol, hydralazine, and Isordil. He was admitted to the hospital earlier this month with fluid overload. Underwent IV diuresis and was discharged to nursing home. He states he had verbal altercation with the staff there because they did not provide a blanket and he was cold. Therefore they sent him for altered mentation. He is alert and oriented and answering questions. On the paper however it appears he was sent for shortness of breath and edema. He was placed on 2 L of oxygen here with saturation 88% although his baseline is 4 L. When seen however he has significant lower extremity edema. He is not able to accurately report the timeline of when this developed. Denies any significant shortness of breath. No complaints. Found to have pyuria in the ED. He denies any burning micturition.     REVIEW OF SYSTEMS:  A comprehensive 14 point review of systems was negative except for: what is in the HPI    PMH:  Past Medical History:   Diagnosis Date    CAD (coronary artery disease)     Cardiomyopathy ([facility] [address]     CHF (congestive heart failure) ([facility] [address]     Diabetes mellitus ([facility] [address]     Diverticulitis     Hyperlipidemia     Hypertension MI, old     Pancreatitis     Prostate cancer ([facility] [address]     prostate    S/P CABG x 4     Skin cancer     Ventricular tachycardia        Surgical History:  Past Surgical History:   Procedure Laterality Date    BLADDER SURGERY      CARDIAC DEFIBRILLATOR PLACEMENT  04/19/2018    D-ICD GENT CHANGE    KHUNNAWAT    CARDIAC PACEMAKER PLACEMENT  04/2010    [facility]     COLONOSCOPY      CORONARY ARTERY BYPASS GRAFT  12/24/2007    ELBOW SURGERY      HERNIA REPAIR      KIDNEY SURGERY      SKIN CANCER EXCISION  2019    nose and cheek       Medications Prior to Admission:    Prior to Admission medications    Medication Sig Start Date End Date Taking? Authorizing Provider   rosuvastatin (CRESTOR) 20 MG tablet Take 1 tablet by mouth nightly 10/12/22   Salma Richar, DO   metoprolol succinate (TOPROL XL) 50 MG extended release tablet Take 1 tablet by mouth in the morning and at bedtime 10/12/22   Salma Richar, DO   bumetanide (BUMEX) 1 MG tablet Take 1 tablet by mouth 2 times daily 10/12/22   Salma Richar, DO   spironolactone (ALDACTONE) 25 MG tablet Take 0.5 tablets by mouth daily 10/13/22   Salma Richar, DO   miconazole nitrate 2 % OINT Apply topically 2 times daily 10/12/22   Raymundo Quintanilla, DO   ammonium lactate (LAC-HYDRIN) 12 % lotion Apply topically as needed. 10/13/22   Raymundo Quintanilla,    mirtazapine (REMERON) 30 MG tablet Take 30 mg by mouth nightly    Historical Provider, MD   isosorbide dinitrate (ISORDIL) 20 MG tablet Take 1 tablet by mouth in the morning and 1 tablet at noon and 1 tablet before bedtime.  7/23/22   Frankie Jaime MD   omeprazole (PRILOSEC) 40 MG delayed release capsule Take 1 capsule by mouth in the morning. 7/23/22   Frankie Jaime MD   traZODone (DESYREL) 50 MG tablet TAKE 1 TABLET BY MOUTH AT BEDTIME 6/21/22   Historical Provider, MD   hydrALAZINE (APRESOLINE) 25 MG tablet Take 1 tablet by mouth in the morning and at bedtime 6/30/22   Carolyn Cuevas,    isosorbide mononitrate (IMDUR) 30 MG extended release tablet Take 1 tablet by mouth daily 6/30/22 7/23/22  Phil Franco, DO   nitroGLYCERIN (NITROSTAT) 0.4 MG SL tablet Place 1 tablet under the tongue every 5 minutes as needed for Chest pain 4/26/22   Phil Franco DO   acetaminophen (TYLENOL 8 HOUR ARTHRITIS PAIN) 650 MG extended release tablet Take 650 mg by mouth every 8 hours as needed for Pain    Historical Provider, MD   finasteride (PROSCAR) 5 MG tablet take 1 tablet by mouth once daily 1/3/17   Historical Provider, MD   glipiZIDE (GLUCOTROL) 5 MG tablet Take 5 mg by mouth 2 times daily (before meals)  8/30/16   Historical Provider, MD   aspirin 81 MG tablet Take 81 mg by mouth daily. Historical Provider, MD   tamsulosin (FLOMAX) 0.4 MG capsule Take 0.4 mg by mouth daily     Historical Provider, MD       Allergies:    Patient has no known allergies. Social History:    reports that he has never smoked. He has never used smokeless tobacco. He reports that he does not drink alcohol and does not use drugs. Family History:   family history includes Cancer in his father.        PHYSICAL EXAM:  Vitals:  /75   Pulse 74   Temp 97.5 °F (36.4 °C) (Oral)   Resp 18   Ht 5' 11\" (1.803 m)   Wt 195 lb (88.5 kg)   SpO2 100%   BMI 27.20 kg/m²   General Appearance: alert and oriented to person, place and time and in no acute distress  Skin: warm and dry, turgor not diminished  Head: normocephalic and atraumatic  Eyes: pupils equal, round, and reactive to light, extraocular eye movements intact, conjunctivae normal  Neck: neck supple and non tender without mass   Pulmonary/Chest: clear to auscultation bilaterally- no wheezes, rales or rhonchi, normal air movement, no respiratory distress  Cardiovascular: normal rate, normal S1 and S2 and 2 out of 6 systolic ejection murmur, elevated JVD  Abdomen: soft, non-tender, non-distended, normal bowel sounds, no masses or organomegaly  Extremities: no cyanosis, no clubbing and +2 edema up to thighs  Neurologic: no cranial nerve deficit and speech normal        LABS:  Recent Labs     10/22/22  2146 10/22/22  2217     --    K 4.0  --      --    CO2 29  --    BUN 36*  --    CREATININE 1.3*  --    GLUCOSE 63* 97   CALCIUM 9.1  --        Recent Labs     10/22/22  2146   WBC 5.9   RBC 4.89   HGB 12.4*   HCT 40.7   MCV 83.2   MCH 25.4*   MCHC 30.5*   RDW 18.0*      MPV 12.3*       No results for input(s): POCGLU in the last 72 hours. Radiology:   XR FOOT RIGHT (MIN 3 VIEWS)   Final Result   No acute osseous abnormality. Diffuse soft tissue swelling. XR FOOT LEFT (MIN 3 VIEWS)   Final Result   No acute osseous abnormality. No radiographic evidence of acute   osteomyelitis. XR CHEST PORTABLE   Final Result   Cardiomegaly with pulmonary vascular congestive changes. Small right pleural   effusion. This is unchanged compared to 10/01/2022. EKG: paced    ASSESSMENT/PLAN:  Acute on chronic combined heart failure LVEF 20%  Chronic nonzero troponin  History of atherosclerotic heart disease status post CABG  NSVT  Status post ICD  - Seen by cardiology recently. On goal-directed therapy  - Bumex IV twice daily  - Continue home regimen  - Referred to [facility] in the past and not a candidate for any surgical interventions    Diabetes mellitus type 2  - SSI   -hold glipizide as he had hypoglycemia on presentation    Transaminitis:  Monitor with diuresis    Chronic medical problems:  CKD stage III  Essential hypertension  BPH    Code Status: dnr arrest  DVT prophylaxis: lovenox      NOTE: This report was transcribed using voice recognition software. Every effort was made to ensure accuracy; however, inadvertent computerized transcription errors may be present.   Electronically signed by Riccardo Mccall MD on 10/22/2022 at 11:15 PM

## 2022-10-23 NOTE — PROGRESS NOTES
New order for cardiology consult for management of CHF received. Patient known to [facility] Cardiology. Notified group of consult via perfect serve at this time.

## 2022-10-23 NOTE — PROGRESS NOTES
Dr. Valerie Hummel notified of patients transfer to CVICU and continued short runs of v-tach. Notified of pacer/ICD interrogation last evening.

## 2022-10-23 NOTE — CONSULTS
Surgical Intensive Care Unit   Consult Note    Patient's name:  Codey Cao  Age/Gender: 64 y.o. male  Date of Admission: 10/23/2022  3:21 AM  Length of Stay: 0    Reason for ICU: hemodynamic instability. HPI:   Codey Cao is a 64year old male who presented to [facility] hospital for altered mental status. He had multiple rounds of Vtach. His ICD was interrogated and he had 4 shocks yesterday. He is currently GCS 15. He reports pain everywhere. He is on 4 L of O2 which is his baseline. Because of the events in the Emergency department at [facility], cardiology recommended transfer to CVICU. He is on ASA. Patient has extensive cardiac history including CABG x4 and cardiac catheterizations. He has ICD. He was reportedly referred to [facility] who told him that he is not a candidate for further interventions. Problem List:   Patient Active Problem List   Diagnosis    CHF (congestive heart failure) ([facility])    S/P CABG x 4    CAD (coronary artery disease)    MI, old    Hyperlipidemia    Hypertension    Cardiomyopathy ([facility] [address]    Ventricular tachycardia    Pancreatitis    AICD (automatic cardioverter/defibrillator) present    Implantable cardioverter-defibrillator (ICD) at end of device life    Chest pain, atypical    Failure to thrive in adult    Congestive heart failure of unknown etiology ([address]    Acute on chronic HFrEF (heart failure with reduced ejection fraction) ([facility])    VHD (valvular heart disease)    Palliative care encounter    Goals of care, counseling/discussion    DNR (do not resuscitate) discussion    Bladder spasms    Diabetes mellitus ([facility] [address]    Acute cystitis without hematuria    Acute on chronic diastolic heart failure ([facility] [address]    V-tach       Vent Settings: Additional Respiratory Assessments  Heart Rate: 88  Resp: 25  SpO2: 97 %  ABG: No results for input(s): PH, PCO2, PO2, HCO3, BE, O2SAT in the last 72 hours. I/O:  No intake/output data recorded.   I/O this shift:  In: 120 [P.O.:120]  Out: 150 [Urine:150]             Lines:   Peripheral IVs    Tubes:   None    Drains:   None    Drips:   sodium chloride      sodium chloride         Physical Exam:   BP (!) 98/49   Pulse 88   Temp 97.5 °F (36.4 °C) (Oral)   Resp 25   Ht 5' 10\" (1.778 m)   Wt 195 lb (88.5 kg)   SpO2 97%   BMI 27.98 kg/m²     Average, Min, and Max for last 24 hours Vitals:  Temp:  Temp  Av.6 °F (36.4 °C)  Min: 97.5 °F (36.4 °C)  Max: 97.9 °F (36.6 °C)  RR: Resp  Av  Min: 16  Max: 44  HR: Pulse  Av.2  Min: 65  Max: 050  BP:  Systolic (43ONM), FQW:924 , Min:73 , UAL:501   ; Diastolic (57ACT), JUAN:66, Min:49, Max:75    SpO2: SpO2  Av.9 %  Min: 95 %  Max: 100 %        GCS:    4 - Opens eyes on own   6 - Follows simple motor commands  5 - Alert and oriented    Pupil size:  Left 3 mm    Right 3 mm    Pupil reaction: Yes    Wiggles fingers: Left Yes Right Yes    Hand grasp:   Left decreased       Right decreased    Wiggles toes: Left Yes    Right Yes    Plantar flexion: Left decreased     Right decreased      CONSTITUTIONAL: no acute distress, lying in hospital bed  NEUROLOGIC: PERRL, oriented x 3  CARDIOVASCULAR: S1 S2, regular rate, regular rhythm, no murmur/gallop/rub  PULMONARY: no rhonchi/rales/wheezes, no use of accessory muscles. On 4 L of O2 at baseline. ABDOMEN: soft, nontender, mildly distended, nontympanic, no masses, no organomegaly, normal bowel sounds   MUSCULOSKELETAL: moves all extremities purposefully, 3/5 strength   SKIN/EXTREMITIES: Edematous. Ecchymosis throughout. ASSESSMENT / PLAN:   Neuro:      No acute issues. GCS 15  Restart home trazodone  CV:   CHF   Episodes of Vtach  Hx CABG, cardiac cath, ICD  Appreciate cardiology recommendations  Pulm:    Home O2 use- on 4 L O2 at baseline  GI:   No acute issues. Diet pending procedures today  Renal:   No acute issues. Trend BUN/Cr/UOP  Restart home Proscar and Flowmax  ID:     No acute issues. No indications for antibiotics.   Endocrine:   No acute issues. Maintain blood glucose < 180,  MSK:    No acute issues. PT/OT when able  Heme:    No acute issues. Daily CBC      Pain/Analgesia: tylenol  Bowel regimen: Glycolax  Diet: Diet NPO  DVT proph: Lovenox  GI proph: Protonix  Seizure proph: none  Glucose protocol: none  Mouth/eye care: per patient  Hu: none  CVC sites: none  Ancillary consults: medicine, cardiology  Family Update: discussed code status and patient hospital course with her. She states that the patient is DNR-CCA. CODE Status: DNR-CCA    Will discuss with Dr. Wendy Lino.     Electronically signed by Mike Maher MD on 10/23/2022 at 5:31 AM

## 2022-10-23 NOTE — H&P
Hospitalist History & Physical      PCP: Elias New DO    Date of Service: Pt seen/examined on 10/23/2022    Chief Complaint:  had no chief complaint listed for this encounter. History Of Present Illness:    Mr. Marialuisa Toney, a 64y.o. year old male  who  has a past medical history of CAD (coronary artery disease), Cardiomyopathy ([address] CHF (congestive heart failure) ([address] Diabetes mellitus ([address] Diverticulitis, Hyperlipidemia, Hypertension, MI, old, Pancreatitis, Prostate cancer ([address] S/P CABG x 4, Skin cancer, and Ventricular tachycardia. Dianne Villafuerte 64 y.o. male PHMx of cardiomyopathy, hypertension, hyperlipidemia, pancreatitis, AICD, ICD, VHD, acute on chronic HF R EF, CHF failure to thrive presents to the ED c/o altered mental status from his nursing home. Report from EMS is that nursing home reported that he was AO x1 he is normally AOx4. However, discussing with patient he is AOx4. . Onset: Several hours ago. Location/Radiation: Denies any specific area of pain. Duration: Intermittent. Characterization: Nurse reports that he was AO x1. . Aggravating Factors: None. Relieving Factors: None. Severity: Mild. Patient reports that he got in a disagreement with the nurse about his room being too cold and he wanted a blanket, he reports the nurse got aggravated with him and he thinks that she decided to call EMS to get him out of the nursing home. EKG showed sinus rhythm with a few PVCs. Cardiology was consulted recommend transfer to [facility] ICU. Patient's pacemaker has been interrogated also given shocked 4 times in the last 24 hours. Patient still having VT since receiving 75 mg metoprolol.   Patient was given lidocaine and transferred to [address] [place] [address]      Past Medical History:   Diagnosis Date    CAD (coronary artery disease)     Cardiomyopathy ([address]     CHF (congestive heart failure) ([address]     Diabetes mellitus ([address]     Diverticulitis     Hyperlipidemia Hypertension     MI, old     Pancreatitis     Prostate cancer ([facility]     prostate    S/P CABG x 4     Skin cancer     Ventricular tachycardia        Past Surgical History:   Procedure Laterality Date    BLADDER SURGERY      CARDIAC DEFIBRILLATOR PLACEMENT  04/19/2018    D-ICD GENT CHANGE    [facility]    CARDIAC PACEMAKER PLACEMENT  04/2010    [facility]     COLONOSCOPY      CORONARY ARTERY BYPASS GRAFT  12/24/2007    ELBOW SURGERY      HERNIA REPAIR      KIDNEY SURGERY      SKIN CANCER EXCISION  2019    nose and cheek       Prior to Admission medications    Medication Sig Start Date End Date Taking? Authorizing Provider   rosuvastatin (CRESTOR) 20 MG tablet Take 1 tablet by mouth nightly 10/12/22   Tobi Bryan DO   metoprolol succinate (TOPROL XL) 50 MG extended release tablet Take 1 tablet by mouth in the morning and at bedtime 10/12/22   Tobi Bryan DO   bumetanide (BUMEX) 1 MG tablet Take 1 tablet by mouth 2 times daily 10/12/22   Tobi Bryan DO   spironolactone (ALDACTONE) 25 MG tablet Take 0.5 tablets by mouth daily 10/13/22   Tobi Bryan DO   miconazole nitrate 2 % OINT Apply topically 2 times daily 10/12/22   Tomasz Quintanilla DO   ammonium lactate (LAC-HYDRIN) 12 % lotion Apply topically as needed. 10/13/22   Tomasz Quintanilla DO   mirtazapine (REMERON) 30 MG tablet Take 30 mg by mouth nightly    Historical Provider, MD   isosorbide dinitrate (ISORDIL) 20 MG tablet Take 1 tablet by mouth in the morning and 1 tablet at noon and 1 tablet before bedtime.  7/23/22   Zabrina Cary MD   omeprazole (PRILOSEC) 40 MG delayed release capsule Take 1 capsule by mouth in the morning. 7/23/22   Zabrina Cary MD   traZODone (DESYREL) 50 MG tablet TAKE 1 TABLET BY MOUTH AT BEDTIME 6/21/22   Historical Provider, MD   hydrALAZINE (APRESOLINE) 25 MG tablet Take 1 tablet by mouth in the morning and at bedtime 6/30/22   Damien Browne DO   isosorbide mononitrate (IMDUR) 30 MG extended release tablet Take 1 tablet by mouth daily 6/30/22 7/23/22  Yonatan Madrigal DO   nitroGLYCERIN (NITROSTAT) 0.4 MG SL tablet Place 1 tablet under the tongue every 5 minutes as needed for Chest pain 4/26/22   Yonatan Madrigal DO   acetaminophen (TYLENOL) 650 MG extended release tablet Take 650 mg by mouth every 8 hours as needed for Pain    Historical Provider, MD   finasteride (PROSCAR) 5 MG tablet take 1 tablet by mouth once daily 1/3/17   Historical Provider, MD   glipiZIDE (GLUCOTROL) 5 MG tablet Take 5 mg by mouth 2 times daily (before meals)  8/30/16   Historical Provider, MD   aspirin 81 MG tablet Take 81 mg by mouth daily. Historical Provider, MD   tamsulosin (FLOMAX) 0.4 MG capsule Take 0.4 mg by mouth daily     Historical Provider, MD         Allergies:  Patient has no known allergies. Social History:    TOBACCO:   reports that he has never smoked. He has never used smokeless tobacco.  ETOH:   reports no history of alcohol use. Family History:    Reviewed in detail and negative for DM, CAD, Cancer, CVA. Positive as follows\"      Problem Relation Age of Onset    Cancer Father         liver       REVIEW OF SYSTEMS:   Pertinent positives as noted in the HPI. All other systems reviewed and negative. PHYSICAL EXAM:  /63   Pulse 90   Temp 97.5 °F (36.4 °C) (Oral)   Resp 21   Ht 5' 10\" (1.778 m)   SpO2 98%   BMI 27.98 kg/m²   General appearance: No apparent distress, appears stated age and cooperative. HEENT: Normal cephalic, atraumatic without obvious deformity. Pupils equal, round, and reactive to light. Extra ocular muscles intact. Conjunctivae/corneas clear. Neck: Supple, with full range of motion. No jugular venous distention. Trachea midline. Respiratory: CTA  Cardiovascular: RRR  Abdomen: S/NT/ND  Musculoskeletal: Bilateral lower extremity edema  Skin: Normal skin color. No rashes or lesions. Neurologic:  Neurovascularly intact without any focal sensory/motor deficits.  Cranial nerves: II-XII intact, grossly non-focal.  Psychiatric: Alert and oriented, thought content appropriate, normal insight    Reviewed EKG and CXR personally      CBC:   Recent Labs     10/22/22  2146   WBC 5.9   RBC 4.89   HGB 12.4*   HCT 40.7   MCV 83.2   RDW 18.0*        BMP:   Recent Labs     10/22/22  2146      K 4.0      CO2 29   BUN 36*   CREATININE 1.3*   MG 2.5     LFT:  Recent Labs     10/22/22  2146   PROT 6.4   ALKPHOS 246*   ALT 57*   AST 63*   BILITOT 0.8   LIPASE 84*     CE:  No results for input(s): Jazmynjenny Lopezels in the last 72 hours. PT/INR:   Recent Labs     10/22/22  2146   INR 1.5     BNP: No results for input(s): BNP in the last 72 hours.   ESR: No results found for: SEDRATE  CRP: No results found for: CRP  D Dimer:   Lab Results   Component Value Date    DDIMER <200 02/24/2021      Folate and B12: No results found for: RKXNGHNM19, No results found for: FOLATE  Lactic Acid:   Lab Results   Component Value Date    LACTA 1.6 10/22/2022     Thyroid Studies:   Lab Results   Component Value Date    TSH 2.500 10/01/2022    G2AGHFP 7.9 10/10/2011       Oupatient labs:  Lab Results   Component Value Date    CHOL 105 10/01/2022    TRIG 58 10/01/2022    HDL 40 10/01/2022    LDLCALC 53 10/01/2022    TSH 2.500 10/01/2022    PSA <0.01 10/06/2022    INR 1.5 10/22/2022    LABA1C 5.9 (H) 10/01/2022       Urinalysis:    Lab Results   Component Value Date/Time    NITRU Negative 10/22/2022 10:08 PM    [address] 10/22/2022 10:08 PM    BACTERIA MODERATE 10/22/2022 10:08 PM    RBCUA NONE 10/22/2022 10:08 PM    RBCUA >20 08/02/2012 09:20 AM    BLOODU Negative 10/22/2022 10:08 PM    SPECGRAV 1.015 10/22/2022 10:08 PM    GLUCOSEU Negative 10/22/2022 10:08 PM       Imaging:  XR CHEST (2 VW)    Result Date: 10/1/2022  EXAMINATION: TWO XRAY VIEWS OF THE CHEST 10/1/2022 5:20 pm COMPARISON: 07/21/2022 HISTORY: ORDERING SYSTEM PROVIDED HISTORY: shortness of breath, weight gain TECHNOLOGIST PROVIDED HISTORY: Reason for exam:->shortness of breath, weight gain FINDINGS: The heart is enlarged. There is a dual lead cardiac pacer There are findings of mild CHF. Bilateral pleural effusions are noted, left greater than right. 1. Cardiomegaly with findings of CHF 2. Small right pleural effusion and moderate left pleural effusion 3. XR FOOT LEFT (MIN 3 VIEWS)    Result Date: 10/22/2022  EXAMINATION: THREE XRAY VIEWS OF THE LEFT FOOT 10/22/2022 10:00 pm COMPARISON: None. HISTORY: ORDERING SYSTEM PROVIDED HISTORY: swelling/warmth TECHNOLOGIST PROVIDED HISTORY: Reason for exam:->swelling/warmth FINDINGS: No acute fracture subluxation is identified. No evidence of osseous destruction or periosteal reaction to suggest acute osteomyelitis. There is diffuse soft tissue swelling. No radiopaque foreign body. No acute osseous abnormality. No radiographic evidence of acute osteomyelitis. XR FOOT RIGHT (MIN 3 VIEWS)    Result Date: 10/22/2022  EXAMINATION: THREE XRAY VIEWS OF THE RIGHT FOOT 10/22/2022 10:00 pm COMPARISON: 09/24/2019 HISTORY: ORDERING SYSTEM PROVIDED HISTORY: swelling/erythema/warmth TECHNOLOGIST PROVIDED HISTORY: Reason for exam:->swelling/erythema/warmth FINDINGS: No acute fracture or subluxation is identified. There is advanced degenerative arthrosis of the 1st MTP joint. No definite osseous destruction or periosteal reaction to suggest acute osteomyelitis. There is diffuse soft tissue swelling. No radiopaque foreign body. No acute osseous abnormality. Diffuse soft tissue swelling. XR CHEST PORTABLE    Result Date: 10/22/2022  EXAMINATION: ONE XRAY VIEW OF THE CHEST 10/22/2022 10:00 pm COMPARISON: 10/01/2022 HISTORY: ORDERING SYSTEM PROVIDED HISTORY: ams TECHNOLOGIST PROVIDED HISTORY: Reason for exam:->ams FINDINGS: Pulmonary vascular congestive changes. Small right pleural effusion. No pneumothorax. Cardiomegaly. The osseous structures are without acute process.   Sternotomy wires and left-sided transvenous pacer device     Cardiomegaly with pulmonary vascular congestive changes. Small right pleural effusion. This is unchanged compared to 10/01/2022. ASSESSMENT:  Ventricular tachycardia  -Pacemaker failure  -Hyperlipidemia  -Congestive heart failure  -Coronary artery disease  -Hypertension  -BPH  -GERD      PLAN:  -Admit to ICU  -Consult critical care  -Consult cardiology  -N.p.o. now  -Normal saline 75 mL/h  -Telemetry        Diet: Diet NPO  Code Status: Prior  Surrogate decision maker confirmed with patient:   Extended Emergency Contact Information  Primary Emergency Contact: Trent Devan, [address] [facility] Phone: [phone]  Relation: Child  Secondary Emergency Contact: Jose Aparicio, [address] Phone: [phone]  Mobile Phone: [phone]  Relation: Other    DVT Prophylaxis: []Lovenox []Heparin []PCD [] [address] []Encouraged ambulation  Disposition: []Med/Surg [] Intermediate [] ICU/CCU  Admit status: [] Observation [] Inpatient     +++++++++++++++++++++++++++++++++++++++++++++++++  Soheila Mile, DO  +++++++++++++++++++++++++++++++++++++++++++++++++  NOTE: This report was transcribed using voice recognition software. Every effort was made to ensure accuracy; however, inadvertent computerized transcription errors may be present.

## 2022-10-23 NOTE — PROGRESS NOTES
Patient agitated and upset, yelling at staff and yelling out that he wants to go home and does not want to stay here. Multiple attempts made to redirect patient. Patient remains agitated.

## 2022-10-24 PROBLEM — I47.29 POLYMORPHIC VENTRICULAR TACHYCARDIA: Status: ACTIVE | Noted: 2022-10-23

## 2022-10-24 LAB
ALBUMIN SERPL-MCNC: 3.2 G/DL (ref 3.5–5.2)
ALP BLD-CCNC: 259 U/L (ref 40–129)
ALT SERPL-CCNC: 79 U/L (ref 0–40)
ANION GAP SERPL CALCULATED.3IONS-SCNC: 11 MMOL/L (ref 7–16)
AST SERPL-CCNC: 93 U/L (ref 0–39)
BASOPHILS ABSOLUTE: 0.02 E9/L (ref 0–0.2)
BASOPHILS RELATIVE PERCENT: 0.3 % (ref 0–2)
BILIRUB SERPL-MCNC: 0.7 MG/DL (ref 0–1.2)
BUN BLDV-MCNC: 44 MG/DL (ref 6–23)
CALCIUM IONIZED: 1.19 MMOL/L (ref 1.15–1.33)
CALCIUM SERPL-MCNC: 8.8 MG/DL (ref 8.6–10.2)
CHLORIDE BLD-SCNC: 104 MMOL/L (ref 98–107)
CO2: 23 MMOL/L (ref 22–29)
CREAT SERPL-MCNC: 1.4 MG/DL (ref 0.7–1.2)
EOSINOPHILS ABSOLUTE: 0.01 E9/L (ref 0.05–0.5)
EOSINOPHILS RELATIVE PERCENT: 0.2 % (ref 0–6)
GFR SERPL CREATININE-BSD FRML MDRD: 51 ML/MIN/1.73
GLUCOSE BLD-MCNC: 77 MG/DL (ref 74–99)
HCT VFR BLD CALC: 39.5 % (ref 37–54)
HEMOGLOBIN: 12.2 G/DL (ref 12.5–16.5)
IMMATURE GRANULOCYTES #: 0.02 E9/L
IMMATURE GRANULOCYTES %: 0.3 % (ref 0–5)
LYMPHOCYTES ABSOLUTE: 1.22 E9/L (ref 1.5–4)
LYMPHOCYTES RELATIVE PERCENT: 20 % (ref 20–42)
MAGNESIUM: 2.4 MG/DL (ref 1.6–2.6)
MCH RBC QN AUTO: 25.3 PG (ref 26–35)
MCHC RBC AUTO-ENTMCNC: 30.9 % (ref 32–34.5)
MCV RBC AUTO: 81.8 FL (ref 80–99.9)
MONOCYTES ABSOLUTE: 0.52 E9/L (ref 0.1–0.95)
MONOCYTES RELATIVE PERCENT: 8.5 % (ref 2–12)
NEUTROPHILS ABSOLUTE: 4.3 E9/L (ref 1.8–7.3)
NEUTROPHILS RELATIVE PERCENT: 70.7 % (ref 43–80)
PDW BLD-RTO: 17.6 FL (ref 11.5–15)
PHOSPHORUS: 4.8 MG/DL (ref 2.5–4.5)
PLATELET # BLD: 109 E9/L (ref 130–450)
PMV BLD AUTO: ABNORMAL FL (ref 7–12)
POTASSIUM REFLEX MAGNESIUM: 4.8 MMOL/L (ref 3.5–5)
RBC # BLD: 4.83 E12/L (ref 3.8–5.8)
SODIUM BLD-SCNC: 138 MMOL/L (ref 132–146)
TOTAL PROTEIN: 6 G/DL (ref 6.4–8.3)
URINE CULTURE, ROUTINE: NORMAL
WBC # BLD: 6.1 E9/L (ref 4.5–11.5)

## 2022-10-24 PROCEDURE — 2500000003 HC RX 250 WO HCPCS: Performed by: INTERNAL MEDICINE

## 2022-10-24 PROCEDURE — 6370000000 HC RX 637 (ALT 250 FOR IP): Performed by: INTERNAL MEDICINE

## 2022-10-24 PROCEDURE — 36415 COLL VENOUS BLD VENIPUNCTURE: CPT

## 2022-10-24 PROCEDURE — 82330 ASSAY OF CALCIUM: CPT

## 2022-10-24 PROCEDURE — APPSS60 APP SPLIT SHARED TIME 46-60 MINUTES: Performed by: CLINICAL NURSE SPECIALIST

## 2022-10-24 PROCEDURE — 6370000000 HC RX 637 (ALT 250 FOR IP): Performed by: STUDENT IN AN ORGANIZED HEALTH CARE EDUCATION/TRAINING PROGRAM

## 2022-10-24 PROCEDURE — 2580000003 HC RX 258: Performed by: FAMILY MEDICINE

## 2022-10-24 PROCEDURE — 6360000002 HC RX W HCPCS: Performed by: FAMILY MEDICINE

## 2022-10-24 PROCEDURE — 99222 1ST HOSP IP/OBS MODERATE 55: CPT | Performed by: INTERNAL MEDICINE

## 2022-10-24 PROCEDURE — 2000000000 HC ICU R&B

## 2022-10-24 PROCEDURE — 83735 ASSAY OF MAGNESIUM: CPT

## 2022-10-24 PROCEDURE — 84100 ASSAY OF PHOSPHORUS: CPT

## 2022-10-24 PROCEDURE — 6360000002 HC RX W HCPCS: Performed by: INTERNAL MEDICINE

## 2022-10-24 PROCEDURE — 99291 CRITICAL CARE FIRST HOUR: CPT | Performed by: INTERNAL MEDICINE

## 2022-10-24 PROCEDURE — 93005 ELECTROCARDIOGRAM TRACING: CPT | Performed by: NURSE PRACTITIONER

## 2022-10-24 PROCEDURE — 2700000000 HC OXYGEN THERAPY PER DAY

## 2022-10-24 PROCEDURE — 80053 COMPREHEN METABOLIC PANEL: CPT

## 2022-10-24 PROCEDURE — 85025 COMPLETE CBC W/AUTO DIFF WBC: CPT

## 2022-10-24 PROCEDURE — 37799 UNLISTED PX VASCULAR SURGERY: CPT

## 2022-10-24 PROCEDURE — 6360000002 HC RX W HCPCS

## 2022-10-24 RX ORDER — BUMETANIDE 0.25 MG/ML
1 INJECTION, SOLUTION INTRAMUSCULAR; INTRAVENOUS ONCE
Status: DISCONTINUED | OUTPATIENT
Start: 2022-10-24 | End: 2022-10-26 | Stop reason: HOSPADM

## 2022-10-24 RX ORDER — GLYCOPYRROLATE 0.2 MG/ML
0.2 INJECTION INTRAMUSCULAR; INTRAVENOUS EVERY 6 HOURS PRN
Status: DISCONTINUED | OUTPATIENT
Start: 2022-10-24 | End: 2022-10-26 | Stop reason: HOSPADM

## 2022-10-24 RX ORDER — METOPROLOL SUCCINATE 25 MG/1
25 TABLET, EXTENDED RELEASE ORAL DAILY
Status: DISCONTINUED | OUTPATIENT
Start: 2022-10-25 | End: 2022-10-26 | Stop reason: HOSPADM

## 2022-10-24 RX ORDER — MORPHINE SULFATE 2 MG/ML
1 INJECTION, SOLUTION INTRAMUSCULAR; INTRAVENOUS EVERY 4 HOURS PRN
Status: DISCONTINUED | OUTPATIENT
Start: 2022-10-24 | End: 2022-10-26 | Stop reason: HOSPADM

## 2022-10-24 RX ORDER — LORAZEPAM 2 MG/ML
0.5 INJECTION INTRAMUSCULAR EVERY 6 HOURS PRN
Status: DISCONTINUED | OUTPATIENT
Start: 2022-10-24 | End: 2022-10-26 | Stop reason: HOSPADM

## 2022-10-24 RX ADMIN — ROSUVASTATIN CALCIUM 20 MG: 20 TABLET, FILM COATED ORAL at 20:49

## 2022-10-24 RX ADMIN — ENOXAPARIN SODIUM 40 MG: 100 INJECTION SUBCUTANEOUS at 08:30

## 2022-10-24 RX ADMIN — METOPROLOL SUCCINATE 25 MG: 25 TABLET, EXTENDED RELEASE ORAL at 08:00

## 2022-10-24 RX ADMIN — LIDOCAINE HYDROCHLORIDE 1 MG/MIN: 4 INJECTION, SOLUTION INTRAVENOUS at 22:19

## 2022-10-24 RX ADMIN — SODIUM CHLORIDE, PRESERVATIVE FREE 10 ML: 5 INJECTION INTRAVENOUS at 20:55

## 2022-10-24 RX ADMIN — TRAZODONE HYDROCHLORIDE 50 MG: 50 TABLET ORAL at 20:50

## 2022-10-24 RX ADMIN — PANTOPRAZOLE SODIUM 40 MG: 40 TABLET, DELAYED RELEASE ORAL at 05:30

## 2022-10-24 RX ADMIN — Medication 400 MG: at 08:00

## 2022-10-24 RX ADMIN — AMIODARONE HYDROCHLORIDE 200 MG: 200 TABLET ORAL at 14:21

## 2022-10-24 RX ADMIN — BUMETANIDE 2 MG: 1 TABLET ORAL at 08:00

## 2022-10-24 RX ADMIN — AMIODARONE HYDROCHLORIDE 200 MG: 200 TABLET ORAL at 20:50

## 2022-10-24 RX ADMIN — LORAZEPAM 0.5 MG: 2 INJECTION INTRAMUSCULAR; INTRAVENOUS at 18:23

## 2022-10-24 RX ADMIN — AMIODARONE HYDROCHLORIDE 200 MG: 200 TABLET ORAL at 05:30

## 2022-10-24 RX ADMIN — MORPHINE SULFATE 1 MG: 2 INJECTION, SOLUTION INTRAMUSCULAR; INTRAVENOUS at 18:50

## 2022-10-24 ASSESSMENT — PAIN SCALES - GENERAL
PAINLEVEL_OUTOF10: 0

## 2022-10-24 NOTE — PROGRESS NOTES
Palliative Medicine Social Work     Patient Name: Des Coulter    Age: 64 y.o. Marital Status:     [place] Status: no    Next of Kin: daughter Nydia Gleason [phone]          Minor Children: no    Advanced Directives: no    Confirm Code Status: [id number]    Current Goals of Care: to be reviewed with daughter    Mental Health History: no    Substance Abuse:no    Indications of Abuse/Neglect: no    Financial Concerns: no    Living Situation: [place] [facility]    Physical Care Needs Met: yes    Assessment: 63 yo  male admitted from SNF due to [address], He had some beats of V. tach in the ER. Pacer was interrogated and found that he had been shocked 4 times in the last 24 hours. Reviewed with Palliative medicine NP, family to be contacted for goals of care as pt is not able to participate in this discussion at this time.

## 2022-10-24 NOTE — PROGRESS NOTES
Hospitalist Progress Note      SYNOPSIS: Patient admitted on 10/23/2022   64 y.o. male PHMx of cardiomyopathy, hypertension, hyperlipidemia, pancreatitis, AICD, ICD, VHD, acute on chronic HF R EF, CHF failure to thrive presents to the ED c/o altered mental status from his nursing home. Report from EMS is that nursing home reported that he was AO x1 he is normally AOx4. However, discussing with patient he is AOx4. . Onset: Several hours ago. Location/Radiation: Denies any specific area of pain. Duration: Intermittent. Characterization: Nurse reports that he was AO x1. . Aggravating Factors: None. Relieving Factors: None. Severity: Mild. Patient reports that he got in a disagreement with the nurse about his room being too cold and he wanted a blanket, he reports the nurse got aggravated with him and he thinks that she decided to call EMS to get him out of the nursing home. EKG showed sinus rhythm with a few PVCs. Cardiology was consulted recommend transfer to [facility] ICU. Patient's pacemaker has been interrogated also given shocked 4 times in the last 24 hours. Patient still having VT since receiving 75 mg metoprolol. Patient was given lidocaine and transferred to [facility]. Patient was managed on ICU by EP and Cardiology. Placed on lidocaine drip. Device interrogated on 10/22/022 with normal overall function         SUBJECTIVE:    Patient is seen at bedside, comfortable, offered no complaints, denies pains, or increased shortness of breath   Records reviewed. Stable overnight. No overnight issues reported    Temp (24hrs), Av.7 °F (36.5 °C), Min:97.4 °F (36.3 °C), Max:98 °F (36.7 °C)    DIET: ADULT DIET; Regular;  No Added Salt (3-4 gm)  CODE: DNR-CCA    Intake/Output Summary (Last 24 hours) at 10/24/2022 1004  Last data filed at 10/24/2022 0819  Gross per 24 hour   Intake 1298.74 ml   Output 75 ml   Net 1223.74 ml       OBJECTIVE:    BP 91/65   Pulse 64   Temp 97.8 °F (36.6 °C) (Oral)   Resp 19   Ht 5' 10\" (1.778 m)   Wt 203 lb 9.6 oz (92.4 kg)   SpO2 97%   BMI 29.21 kg/m²     General appearance: No apparent distress, appears stated age and cooperative. HEENT:  Normocephalic. Conjunctivae/corneas clear. Moist mucosa   Neck: Supple. No jugular venous distention. Thyroid not visible, non tender   Respiratory: Normal respiratory effort. Clear to auscultation bilaterally. No stridor/wheezing/rhonchi/crackles   Cardiovascular: Regular heart beats, normal S1-S2. No M/G/R  Abdomen: Non distended, soft, non tender, no visceromegaly, no mass, normal bowel sounds   Musculoskeletal: No clubbing, cyanosis, no bilateral lower extremity edema. Brisk capillary refill. Skin:  No rashes  on visible skin  Neurologic: awake, alert and oriented x 3. Following commands. No focal neurological deficit. Cranial nerves 2-12 grossly normal      ASSESSMENT:    Ventricular tachycardia  S/p ICD  HFrEF. TTE on 5/2022 per EP note report from [facility]: LVEF 20 ± 5%  visually estimated   Hyperlipidemia  Coronary artery disease.  Severe occlusive disease on [facility] 3/2021 and was recommended medical therapy   Hypertension  BPH  GERD     PLAN:    - Continue ICU level of care  - Cardiology and EP following  - Device interrogated on 10/22/022 with normal overall function    - On lidocaine drip/ troprol XL 25 mg BID/amiodarone 200 mg PO q 8 hrs  - Bumex 1 mg IV once given today  - Patient is DNR-CCA      DVT prophylaxis lovenox     DISPOSITION: ICU care    Medications:  REVIEWED DAILY    Infusion Medications    sodium chloride      lidocaine 1 mg/min (10/24/22 0707)     Scheduled Medications    bumetanide  1 mg IntraVENous Once    [START ON 10/25/2022] metoprolol succinate  25 mg Oral Daily    sodium chloride flush  10 mL IntraVENous 2 times per day    enoxaparin  40 mg SubCUTAneous Daily    pantoprazole  40 mg Oral QAM AC    rosuvastatin  20 mg Oral Nightly    traZODone  50 mg Oral Nightly    amiodarone  200 mg Oral q8h magnesium oxide  400 mg Oral Daily    [Held by provider] tamsulosin  0.4 mg Oral Dinner     PRN Meds: sodium chloride flush, sodium chloride, promethazine **OR** ondansetron, polyethylene glycol, acetaminophen **OR** acetaminophen    Labs:     Recent Labs     10/22/22  2146 10/23/22  0500 10/24/22  0348   WBC 5.9 5.8 6.1   HGB 12.4* 11.9* 12.2*   HCT 40.7 38.3 39.5    162 109*       Recent Labs     10/22/22  2146 10/23/22  0500 10/23/22  0740 10/24/22  0348    141  --  138   K 4.0 4.4  --  4.8    105  --  104   CO2 29 25  --  23   BUN 36* 36*  --  44*   CREATININE 1.3* 1.2  --  1.4*   CALCIUM 9.1 9.0  --  8.8   PHOS  --   --  3.8 4.8*       Recent Labs     10/22/22  2146 10/23/22  0500 10/24/22  0348   PROT 6.4 6.1* 6.0*   ALKPHOS 246* 229* 259*   ALT 57* 49* 79*   AST 63* 49* 93*   BILITOT 0.8 0.7 0.7   LIPASE 84*  --   --        Recent Labs     10/22/22  2146   INR 1.5       No results for input(s): Vik Mullen in the last 72 hours. Chronic labs:    Lab Results   Component Value Date    CHOL 105 10/01/2022    TRIG 58 10/01/2022    HDL 40 10/01/2022    LDLCALC 53 10/01/2022    TSH 2.500 10/01/2022    PSA <0.01 10/06/2022    INR 1.5 10/22/2022    LABA1C 5.9 (H) 10/01/2022       Radiology: REVIEWED DAILY    +++++++++++++++++++++++++++++++++++++++++++++++++  Ady Parra MD  [facility] [place] Physician - [address] [facility] [place]  +++++++++++++++++++++++++++++++++++++++++++++++++  NOTE: This report was transcribed using voice recognition software. Every effort was made to ensure accuracy; however, inadvertent computerized transcription errors may be present.

## 2022-10-24 NOTE — PROGRESS NOTES
SURGICAL INTENSIVE CARE  PROGRESS NOTE    Date of admission:  10/23/2022  Reason for ICU transfer:  hemodynamic instability    SUBJECTIVE:  Patient on lidocaine drip overnight. Reports no pain. HOSPITAL COURSE:  10/23/22  -  Presented to [address] for altered mental status. He had multiple rounds of Vtach. His ICD was interrogated and he had 4 shocks yesterday. He is currently GCS 15. He reports pain everywhere. He is on 4 L of O2 which is his baseline. Because of the events in the Emergency department at [place] [address], cardiology recommended transfer to CVICU  10/24/22: Started on lidocaine drip. EP following. PHYSICAL EXAM:  BP (!) 89/56   Pulse 64   Temp 97.8 °F (36.6 °C) (Oral)   Resp 19   Ht 5' 10\" (1.778 m)   Wt 203 lb 9.6 oz (92.4 kg)   SpO2 97%   BMI 29.21 kg/m²     GENERAL:  NAD. A&Ox3. Does not appear to understand disease prognosis. HEAD:  Normocephalic, atraumatic. EYES:   No scleral icterus. PERRLA. LUNGS: 4L O2 nasal cannula. Breath sounds equal bilaterally. CARDIOVASCULAR: RR, hypotensive  ABDOMEN:  Soft, non-distended, non-tender. No guarding, rigidity, rebound. EXTREMITIES:   MAEx4. Atraumatic. No LE edema. SKIN:  Warm and dry  NEUROLOGIC:  GCS 15    ASSESSMENT / PLAN:  Neuro:      No acute issues. GCS 15  Restart home trazodone  CV:   CHF   Episodes of Vtach  Hx CABG, cardiac cath, ICD  Appreciate cardiology recommendations  Pulm:    Home O2 use- on 4 L O2 at baseline  GI:   No acute issues. Diet pending procedures today  Renal:   No acute issues. Trend BUN/Cr/UOP  Restart home Proscar and Flowmax  ID:     No acute issues. No indications for antibiotics. Endocrine:   No acute issues. Maintain blood glucose < 180,  MSK:    No acute issues. PT/OT when able  Heme:    No acute issues.  Daily CBC        Pain/Analgesia: tylenol  Bowel regimen: Glycolax  Diet: Diet NPO  DVT proph: Lovenox  GI proph: Protonix  Seizure proph: none  Glucose protocol: none  Mouth/eye care: per patient  Hu: none  CVC sites: none  Ancillary consults: medicine, cardiology  Family Update: discussed code status and patient hospital course with her. She states that the patient is DNR-CCA. CODE Status: DNR-CCA      Critical care to sign off. Will be available as needed.      Johny Cortez MD  Resident, PGY-2  10/24/2022  12:24 PM

## 2022-10-24 NOTE — PROGRESS NOTES
[address]    Made visit to patient's bedside. Daughter Paula had left. Call placed. Unable to leave a . This nurse had spoken with Paula patient's last admission two weeks ago at [facility]. [facility] and patient decided to return to [facility] not go home. Paula called this nurse back. Discussed patient's condition. Shaggy Murphy is  restless/agitated and dyspneic. She would like him to have comfort medications to manage his symptoms. She would like to take him home with hospice, but wants to see how effective comfort medications are for him. Discussed patient's AICD and lidocaine drip. She would like to continue meds and leave AICD activated until day of discharge as she wants him to be able to die at home as that is what she believes he wants. Paula was on her way to get her son and had to get off the phone. Hospice will follow-up tomorrow and discuss further discharge home with daughter. Updated bedside nurse.   Electronically signed by Brunilda Joe RN on 10/24/2022 at 4:33 PM   [phone]

## 2022-10-24 NOTE — PLAN OF CARE
Problem: Chronic Conditions and Co-morbidities  Goal: Patient's chronic conditions and co-morbidity symptoms are monitored and maintained or improved  10/23/2022 2254 by Mansoor Lawson RN  Outcome: Progressing  10/23/2022 2253 by Mansoor Lawson RN  Outcome: Progressing     Problem: Cardiovascular - Adult  Goal: Maintains optimal cardiac output and hemodynamic stability  Outcome: Progressing  Flowsheets (Taken 10/23/2022 2254)  Maintains optimal cardiac output and hemodynamic stability:   Monitor blood pressure and heart rate   Monitor urine output and notify Licensed Independent Practitioner for values outside of normal range   Assess for signs of decreased cardiac output   Administer fluid and/or volume expanders as ordered   Administer vasoactive medications as ordered  Goal: Absence of cardiac dysrhythmias or at baseline  Outcome: Progressing  Flowsheets (Taken 10/23/2022 2254)  Absence of cardiac dysrhythmias or at baseline:   Monitor cardiac rate and rhythm   Assess for signs of decreased cardiac output   Administer antiarrhythmia medication and electrolyte replacement as ordered

## 2022-10-24 NOTE — CONSULTS
Inpatient Cardiology Consultation      Reason for Consult:  Clari Chávez / CHF    Consulting Physician: Dr. Aniyah Araujo    Requesting Physician:  Antonieta Crowder     Date of Consultation: 10/24/2022    HISTORY OF PRESENT ILLNESS: 64 yr old white male known to Dr. Bell Anderson - has also had admissions to [facility] 6/2022. Last seen by Dr. Frantz Yung during admission 10/2/2022 for Acute on Chronic decompensated HFrEF / Ischemic Cardiomyopathy - diuresed - discharged to skilled nursing on 10/12/22      PMH: CAD s/p CABG x 5 in 2007 s/p LHC 3/2021 [facility] showed severe disease in native vessels, all the grafts were occluded, recommended medical therapy only,  ICMP on TE 5/2022 - EF 20-25%, mod MR, RV function reduced, RHC 5/16/22 >> increased biventricular filling pressures, mod PH and PCWP 26 mmHg, CI 2.73, ICD placed in 2010 with generator change in 2018, HTN, HLD, NSVT on interrogation 7/24/2022 (Toprol XL increased to 25 mg BID), CKD, PHTN, DM, previously a DNR-CCA, hx of intolerance to Entrestro/ACEI/ARB, and medical non compliance takes medications intermittently - Per Dr Dori Gillette 7/23/2022->Based on his history and testing at [facility], he is not a candidate for any further cardiac intervention.   We will continue guideline directed medical therapy        ED - [facility] 10/22/2022: Nursing Home reported alert only to self  - altercation with staff - EMS called   17 beats VT & 18 beats - ICD fired 4 X per interrogation - recurrent NSVT     ED Vitals: T 97.5  RR 18 HR 74 /75 SPO2 100% 4 L NC  ED Labs:  Pro BNP 04757  HS Trop 66>>64   K 4.0 Mag 2.5 BUN 36 Cr 1.3 >> BUN 44 Cr 1.4 Lactic Acid 1.6 AST 63 ALT 57 WBC 5.9   Urine Culture pending - UA positive for moderate leukocytes & bacteria - negative nitrites   COVID  Influenza A & B - negative      ED treatment:  Bumex 0.5 mg IV X 1, Lidocaine IV drip , Rocephin, Toprol XL 75 mg     Transferred to [address] [place] [address] - Seen by EP - Dr. Antonieta Crowder - ICD setting changed - Lidocaine & oral amiodarone loading - Cardiology consulted to manage heart failure     Patient seen & examined on 3822A:  Patient is a poor historian - is now alert to self & year - thinks he is at the Nursing home. Reorients. Unable to provide much history regarding PTA. States they sent him in because he was fighting with the cleaning staff. Denies any chest discomfort, SOB, palpations. Denies feeling ICD shocks. No distress at rest - currently on Lidocaine drip IV 1 mg / min         Please note: past medical records were reviewed per electronic medical record (EMR) - see detailed reports under Past Medical/ Surgical History. Past Medical History:      CAD  Status post CABG 12/24/2007 (LIMA-LAD, SVG-RI, OM1, OM2, PDA). Stress test ordered 3/5/2021: Cancelled due to patient having shingles. [facility], 3/16/2021: LMT: severe diffuse disease. LAD: severe diffuse disease. Additional Comment: Moderate caliber vessel with  in the proximal portion. The vessel is fed by patent LIMA graft. The distal portion is small in caliber and wraps the apex. There are L->R collaterals. LCX: severe diffuse disease. Additional Comment: Moderate caliber non-dominant vessel which is fed by SVG->RI/OM1. RAMUS: ostial Ramus - . Additional Comment: Fills in the mid-distal portion from SVG->RI graft. RCA Status: Not Applicable. Additional Comment: Occluded at the ostia. GRAFTS: LIMA Graft End to Side to the Left Anterior Descending. Additional Comments: patent. SVG End to Side to the RI. Additional  Comments - patent with mild-moderate disease in the mid-portion; supplies mid-distal LCx and backfills to partially supply LAD. SVG End to Side to the OM-2 Second Obtuse Marginal Branch. Additional Comments - occluded. SVG End to Side to the Right Posterior Descending Artery. Additional Comments - occluded. Impression:- Severe/occlusive native 3 vessel CAD with patent LIMA->LAD, SVG->RI/OM1.  SVG->OM2 is occluded - Native RCA is occluded at the ostia as is the SVG->rPDA. The right is fed by L->R collaterals from the LIMA graft Recommended Treatment: Medical Therapy. 7/2022 Troponin 61-->69  ICMP/Chronic HFrEF   ACC/AHA stage D, NYHA functional class II  TTE 6/27/2012: Mildly dilated LV, LVEF 35%, 1-2+ MR.  TTE: 4/13/2018 (Dr. Reyna Vences):  EF estimated at 30%. The entire apex is severely hypokinetic. Overall ejection fraction severely decreased. Moderate left ventricular concentric hypertrophy noted. There is doppler evidence of stage I diastolic dysfunction. Normal right ventricle structure and function. Physiologic and/or trace mitral regurgitation is present. TTE: 3/15/2021 ([facility]) Exam indication: Abnormal Cardiac Biomarkers There is a resting wall motion abnormality in the territory of the LAD and RCA. - The left ventricle is dilated. Left ventricular systolic function is severely decreased. EF = 26 ± 5% (2D biplane) Grade III left ventricular diastolic dysfunction. The right ventricle is normal in size. Right ventricular systolic function is   moderately decreased. - The left atrial cavity is severely dilated. - The right atrial cavity is dilated. There is moderately severe (3+) mitral valve regurgitation due to restricted leaflet motion likely related to ischemic heart disease. TTE: 5/13/2022 ([facility]): - Technically difficult exam due to body habitus and suboptimal positioning. - Exam indication: Evaluation of known heart failure to guide therapy - The left ventricle is severely dilated. Left ventricular systolic function is severely decreased. EF = 20 ± 5% (visual est.) Definity contrast used for endocardial border detection. Left ventricular diastolic function was not evaluated due to an arrhythmia. The right ventricle is dilated. Right ventricular systolic function is moderately to severely decreased. - The left atrial cavity is severely dilated. - The right atrial cavity is dilated. There is moderate (2+) mitral valve regurgitation. - -Peak/mean gradients of 15/7 mmHg, gradients averaged due to arrhythmia. -Prior EF reported as 26% (3/2021). Exam was compared with the prior  echocardiographic exam performed on  3/14/2021. There is no significant change. VHD: Moderate-severe ischemic MR (patient was recommended for further evaluation with EBONI and possible consideration of a clip 6/3/2022 at [facility] --> patient declined. 7/2022 p-BNP 82077  Status post ICD (implanted 4/19/2010 for primary prevention) with generator change 4/19/2018. ([facility])  ICD interrogation 7/4/2022: AV pacing 6.7%, RV pacing 3.2%, AT/AF burden 1%. Appropriate VT therapy: Successful. VT episodes detected on 7/3/2022 at 10:59 PM.  Duration 14 seconds with an average V rate of 181 bpm.  EGM is C/W VT and VT zone treated with burst ATP x1 which terminated arrhythmia. Telephone encounter: EP 7/5/2022 Toprol-XL was increased to 25 mg twice daily. Admission 10/2022 - Acute on chronic HFrEF - ProBNP 49,9547 - IV Diuresis    ICD Device interrogation as per Dr. Jatin Carlson consult 10/23:  Device Interrogation:   Underlying rhythm: AV pacing  Mode: DDDR   Battery Voltage/Longevity:  2.8-3.3 years    Charge time: 8.3 secs  Pacing: A: 11%  RV: 4.9%   P wave: 1.3 mV  Impedance: 310 ohms   Threshold: 2.0 V @ 0.5 ms  RV R wave: 11.7 mV  Impedance: 430 ohms   Threshold: 0.5 V @ 0.5 ms  HV impedance--34 ohms  Episodes: Recurrent episodes of nonsustained and sustained polymorphic VT degenerating to ventricular fibrillation triggering ICD discharges  10/22/22  Multiple episodes of VT that responded to ATP in September 2022  Reprogramming included: AV delay increased to reduce RV pacing percentage  Lower rate kept at 60, sensor turned off  Overall device function is normal  PHTN:  RHC: 5/16/2022: [facility]: Elevated biventricular filling pressures (RA 10, PCWP 26), moderate pulmonary hypertension (most likely postcapillary), preserved cardiac index by assumed Sunni 2.73.    Hypertension  Hyperlipidemia  Type 2 diabetes mellitus  History of NSVT  Anemia  History of prostate cancer specifics unknown. History of diverticulitis  History of pancreatitis  Medical noncompliance  History of hernia repair, elbow surgery, bladder surgery. History of skin melanoma (nose and right-sided cheek) status post excision 2019. DNR-CCA    Past Surgical History:    Past Surgical History:   Procedure Laterality Date    BLADDER SURGERY      CARDIAC DEFIBRILLATOR PLACEMENT  04/19/2018    D-ICD GENT CHANGE    [facility]    CARDIAC PACEMAKER PLACEMENT  04/2010    [facility]     COLONOSCOPY      CORONARY ARTERY BYPASS GRAFT  12/24/2007    ELBOW SURGERY      HERNIA REPAIR      KIDNEY SURGERY      SKIN CANCER EXCISION  2019    nose and cheek           Medications Prior to admit:  Prior to Admission medications    Medication Sig Start Date End Date Taking? Authorizing Provider   rosuvastatin (CRESTOR) 20 MG tablet Take 1 tablet by mouth nightly 10/12/22   Arabella Gandhi, DO   metoprolol succinate (TOPROL XL) 50 MG extended release tablet Take 1 tablet by mouth in the morning and at bedtime 10/12/22   Arabella Gandhi, DO   bumetanide (BUMEX) 1 MG tablet Take 1 tablet by mouth 2 times daily 10/12/22   Arabella Gandhi, DO   spironolactone (ALDACTONE) 25 MG tablet Take 0.5 tablets by mouth daily 10/13/22   Arabella Gandhi, DO   miconazole nitrate 2 % OINT Apply topically 2 times daily 10/12/22   Reji Quintanilla, DO   ammonium lactate (LAC-HYDRIN) 12 % lotion Apply topically as needed. 10/13/22   Reji Quintanilla,    mirtazapine (REMERON) 30 MG tablet Take 30 mg by mouth nightly    Historical Provider, MD   isosorbide dinitrate (ISORDIL) 20 MG tablet Take 1 tablet by mouth in the morning and 1 tablet at noon and 1 tablet before bedtime.  7/23/22   Gulshan Perez MD   omeprazole (PRILOSEC) 40 MG delayed release capsule Take 1 capsule by mouth in the morning. 7/23/22   Franck Arana MD   traZODone (DESYREL) 50 MG tablet TAKE 1 TABLET BY MOUTH AT BEDTIME 6/21/22   Historical Provider, MD   hydrALAZINE (APRESOLINE) 25 MG tablet Take 1 tablet by mouth in the morning and at bedtime 6/30/22   Vesta Creighton, DO   isosorbide mononitrate (IMDUR) 30 MG extended release tablet Take 1 tablet by mouth daily 6/30/22 7/23/22  Vesta Creighton, DO   nitroGLYCERIN (NITROSTAT) 0.4 MG SL tablet Place 1 tablet under the tongue every 5 minutes as needed for Chest pain 4/26/22   Vesta Chen, DO   acetaminophen (TYLENOL) 650 MG extended release tablet Take 650 mg by mouth every 8 hours as needed for Pain    Historical Provider, MD   finasteride (PROSCAR) 5 MG tablet take 1 tablet by mouth once daily 1/3/17   Historical Provider, MD   glipiZIDE (GLUCOTROL) 5 MG tablet Take 5 mg by mouth 2 times daily (before meals)  8/30/16   Historical Provider, MD   aspirin 81 MG tablet Take 81 mg by mouth daily.     Historical Provider, MD   tamsulosin (FLOMAX) 0.4 MG capsule Take 0.4 mg by mouth daily     Historical Provider, MD       Current Medications:    Current Facility-Administered Medications: sodium chloride flush 0.9 % injection 10 mL, 10 mL, IntraVENous, 2 times per day  sodium chloride flush 0.9 % injection 10 mL, 10 mL, IntraVENous, PRN  0.9 % sodium chloride infusion, , IntraVENous, PRN  enoxaparin (LOVENOX) injection 40 mg, 40 mg, SubCUTAneous, Daily  promethazine (PHENERGAN) tablet 12.5 mg, 12.5 mg, Oral, Q6H PRN **OR** ondansetron (ZOFRAN) injection 4 mg, 4 mg, IntraVENous, Q6H PRN  polyethylene glycol (GLYCOLAX) packet 17 g, 17 g, Oral, Daily PRN  acetaminophen (TYLENOL) tablet 650 mg, 650 mg, Oral, Q6H PRN **OR** acetaminophen (TYLENOL) suppository 650 mg, 650 mg, Rectal, Q6H PRN  pantoprazole (PROTONIX) tablet 40 mg, 40 mg, Oral, QAM AC  rosuvastatin (CRESTOR) tablet 20 mg, 20 mg, Oral, Nightly  traZODone (DESYREL) tablet 50 mg, 50 mg, Oral, Nightly  lidocaine 2000 mg in dextrose 5% 500 mL infusion, 1 mg/min, IntraVENous, Continuous  amiodarone (CORDARONE) tablet 200 mg, 200 mg, Oral, q8h  magnesium oxide (MAG-OX) tablet 400 mg, 400 mg, Oral, Daily  tamsulosin (FLOMAX) capsule 0.4 mg, 0.4 mg, Oral, Dinner  bumetanide (BUMEX) tablet 2 mg, 2 mg, Oral, Daily  metoprolol succinate (TOPROL XL) extended release tablet 25 mg, 25 mg, Oral, Daily    Allergies:  Patient has no known allergies. Social History:    Denies cigarettes, Hx of occasional cigar smoker  Denies ETOH/Illicit Drugs  Code Status: [facility]  Lives at [facility]     Family History: Non contributory secondary to age    REVIEW OF SYSTEMS:   Poor historian - states \"no\" to all questions     PHYSICAL EXAM:     /69   Pulse 68   Temp 97.7 °F (36.5 °C) (Temporal)   Resp 18   Ht 5' 10\" (1.778 m)   Wt 203 lb 9.6 oz (92.4 kg)   SpO2 99%   BMI 29.21 kg/m²   CONST:  Well developed, well nourished elderly white male who appears of stated age. Asleep -Awakens easily to verbal stimuli, alert to self, year, thinks he is at nursing home. He is cooperative. No apparent distress. HEENT:   Head- Normocephalic, atraumatic   Eyes- Conjunctivae pink  Throat- Oral mucosa pink dry - cracked lips   Neck-  No stridor, trachea midline, trace jugular venous distention. No carotid bruit. CHEST: Chest symmetrical and non-tender to palpation. No accessory muscle use or intercostal retractions /  L chest ICD   RESPIRATORY: Lung sounds - diminished throughout fields - on O2 NC 4 L   CARDIOVASCULAR:     Heart Inspection- shows no noted pulsations  Heart Palpation- no heaves or thrills; PMI is non-displaced   Heart Ausculation- distant heart sounds - Regular rate and rhythm, no murmur. No s3, s4 or rub   PV: Bilateral lower extremity edema L > R 2 plus pitting / PCDs on / foam Heel protectors on - heels soft. No varicosities. Pedal pulses palpable, no clubbing or cyanosis   ABDOMEN: Soft, non-tender to light palpation. Bowel sounds present. No palpable masses   MS: fair muscle strength and tone. No atrophy or abnormal movements. :  - external  catheter - intact  yellow straw urine noted   SKIN: Warm and dry no statis dermatitis or ulcers  / dry skin   NEURO / PSYCH: Oriented to person and time. Speech clear and appropriate. Follows all commands. Depressed affect     DATA:    12 lead ECG: as per Dr. Evelio Read strips: SR PAC PVC 60's runs of NS VT (polymorphic)         Intake/Output Summary (Last 24 hours) at 10/24/2022 0752  Last data filed at 10/24/2022 0707  Gross per 24 hour   Intake 1098.74 ml   Output 175 ml   Net 923.74 ml       Labs:   CBC:   Recent Labs     10/23/22  0500 10/24/22  0348   WBC 5.8 6.1   HGB 11.9* 12.2*   HCT 38.3 39.5    109*     BMP:   Recent Labs     10/23/22  0500 10/24/22  0348    138   K 4.4 4.8   CO2 25 23   BUN 36* 44*   CREATININE 1.2 1.4*   LABGLOM >60 51   CALCIUM 9.0 8.8               Mag:   Recent Labs     10/23/22  0740 10/24/22  0348   MG 2.3 2.4     Phos:   Recent Labs     10/23/22  0740 10/24/22  0348   PHOS 3.8 4.8*       HgA1c:   Lab Results   Component Value Date    LABA1C 5.9 (H) 10/01/2022       proBNP:   Recent Labs     10/22/22  2146   PROBNP 30,751*           PT/INR:   Recent Labs     10/22/22  2146   PROTIME 17.4*   INR 1.5       CARDIAC ENZYMES:  Recent Labs     10/22/22  2146 10/22/22  2242   TROPHS 66* 64*            FASTING LIPID PANEL:  Lab Results   Component Value Date/Time    CHOL 105 10/01/2022 05:58 PM    HDL 40 10/01/2022 05:58 PM    LDLCALC 53 10/01/2022 05:58 PM    TRIG 58 10/01/2022 05:58 PM     LIVER PROFILE:  Recent Labs     10/23/22  0500 10/24/22  0348   AST 49* 93*   ALT 49* 79*   LABALBU 3.3* 3.2*     [facility], 3/16/2021: LMT: severe diffuse disease. LAD: severe diffuse disease. Additional Comment: Moderate caliber vessel with  in the proximal portion. The vessel is fed by patent LIMA graft. The distal portion is small in caliber and wraps the apex. There are L->R collaterals. LCX: severe diffuse disease.  Additional Comment: Moderate caliber non-dominant vessel which is fed by SVG->RI/OM1. RAMUS: ostial Ramus - . Additional Comment: Fills in the mid-distal portion from SVG->RI graft. RCA Status: Not Applicable. Additional Comment: Occluded at the ostia. GRAFTS: LIMA Graft End to Side to the Left Anterior Descending. Additional Comments: patent. SVG End to Side to the RI. Additional  Comments - patent with mild-moderate disease in the mid-portion; supplies mid-distal LCx and backfills to partially supply LAD. SVG End to Side to the OM-2 Second Obtuse Marginal Branch. Additional Comments - occluded. SVG End to Side to the Right Posterior Descending Artery. Additional Comments - occluded. Impression:- Severe/occlusive native 3 vessel CAD with patent LIMA->LAD, SVG->RI/OM1. SVG->OM2 is occluded - Native RCA is occluded at the ostia as is the SVG->rPDA. The right is fed by L->R collaterals from the LIMA graft Recommended Treatment: Medical Therapy. TTE: 5/13/2022 ([facility]): - Technically difficult exam due to body habitus and suboptimal positioning. - Exam indication: Evaluation of known heart failure to guide therapy - The left ventricle is severely dilated. Left ventricular systolic function is severely decreased. EF = 20 ± 5% (visual est.) Definity contrast used for endocardial border detection. Left ventricular diastolic function was not evaluated due to an arrhythmia. The right ventricle is dilated. Right ventricular systolic function is moderately to severely decreased. - The left atrial cavity is severely dilated. - The right atrial cavity is dilated. There is moderate (2+) mitral valve regurgitation. - -Peak/mean gradients of 15/7 mmHg, gradients averaged due to arrhythmia. -Prior EF reported as 26% (3/2021). Exam was compared with the prior [facility] echocardiographic exam performed on  3/14/2021. There is no significant change.  VHD: Moderate-severe ischemic MR (patient was recommended for further evaluation with EBONI and possible consideration of a clip 6/3/2022 at [facility] --> patient declined. ICD Device interrogation as per Dr. Keily Paredes consult 10/23:    Device Interrogation:   Underlying rhythm: AV pacing  Mode: DDDR   Battery Voltage/Longevity:  2.8-3.3 years    Charge time: 8.3 secs  Pacing: A: 11%  RV: 4.9%   P wave: 1.3 mV  Impedance: 310 ohms   Threshold: 2.0 V @ 0.5 ms  RV R wave: 11.7 mV  Impedance: 430 ohms   Threshold: 0.5 V @ 0.5 ms  HV impedance--34 ohms  Episodes: Recurrent episodes of nonsustained and sustained polymorphic VT degenerating to ventricular fibrillation triggering ICD discharges  10/22/22  Multiple episodes of VT that responded to ATP in September 2022  Reprogramming included: AV delay increased to reduce RV pacing percentage  Lower rate kept at 60, sensor turned off  Overall device function is normal      Assessment & Plan: as per Dr. Denise Hodges     Electronically signed by GINA Head on 10/24/2022 at 7:52 AM      ASSESSMENT / PLAN:     1. Recurrent episodes of rapid polymorphic VT with  appropriate ICD discharges   On IV lidocaine and p.o. amiodarone  Also on p.o. beta-blocker  EP is following and managing recurrent VT  Monitor electrolyte closely and keep potassium at 4 magnesium at 2  Agree with palliative consultation. 2.   Dual-chamber ICD in situ implanted in 2010, ICD generator change in 2018   Per device interrogation device is functioning appropriately  EP following. 3.   Complex coronary artery disease with severe ischemic cardiomyopathy  status post CABG x5 with LIMA to LAD, SVG to OM1, SVG to OM 2, SVG to ramus intermedius, and SVG to RPDA in 2007. [facility] 2021  Impression:- Severe/occlusive native 3 vessel CAD with patent LIMA->LAD,   SVG->RI/OM1. SVG->OM2 is occluded   - Native RCA is occluded at the ostia as is the SVG->rPDA.  The right is fed by   L->R collaterals from the LIMA graft   Medical Therapy was recommended with plans to optimize high-intensity statin as well as maximize

## 2022-10-24 NOTE — PROGRESS NOTES
Hospice consult noted, agency choices reviewed with pt and daughter Paula. He has spoken with [facility] and would like a referral made to [facility]. [facility] notified.

## 2022-10-24 NOTE — PROGRESS NOTES
[address] and [address] Electrophysiology  Inpatient Progess Report  PATIENT: Des Coulter  MEDICAL RECORD NUMBER: [id number]  DATE OF SERVICE:  10/24/2022  ATTENDING ELECTROPHYSIOLOGIST: Kim Wetzel MD  PRIMARY ELECTROPHYSIOLOGIST: Kim Wetzel MD   REFERRING PHYSICIAN: Bradley Oreilly DO and Mary Singh DO  CHIEF COMPLAINT: ICD shocks    HPI: This is a 64 y.o. male with a history of CAD s/p CABG x 5 in 2007 s/p LHC 3/2021 [facility] showed severe disease in native vessels, all the grafts were occluded, recommended medical therapy only,  ICMP on TTE 5/2022,, EF 20-25%, mod MR, RV function reduced, RHC 5/16/22 >> increased biventricular filling pressures, mod PH and PCWP 26 mmHg, CI 2.73, ICD placed in 2010 with generator change in 2018, HTN, HLD, NSVT on interrogation 7/24/2022 (Toprol XL increased to 25 mg BID), CKD, PHTN, DM, previously a DNR-CCA, hx of intolerance to Entrestro/ACEI/ARB, and medical non compliance who presents to the hospital for change in mental status and recurrent episodes of ventricular tachycardia. The patient was hospitalized on 10/1/2022 with decompensated heart failure. He was discharged to a  skilled nursing facility on 10/12/2022. He had an altercation with a staff at the facility and was transferred to the emergency room for change in mental status. On interrogation of his ICD it appeared that the patient had multiple episodes of VT requiring ICD discharges. The patient himself is very somnolent this morning. He does awaken when spoken to but does not answer any questions except that \"he does not feel well\". He does not remember any ICD shocks. He complains of generalized pain but no dyspnea. On review of the emergency room notes it appears that the patient refused any imaging or testing although he was acceptable of any treatments including transfer to the intensive care unit.   On transfer to the CVICU here, the patient has had recurrent episodes of nonsustained VT but no ICD discharges. Cardiac electrophysiology service is consulted for recurrent VT and ICD shocks. 10/24/2022: He last had a run of nonsustained PMVT at 1956 yesterday, today he remains on IV Lidocaine at 1mg/min and is somewhat confused, yet tolerating PO Amiodarone.      Patient Active Problem List    Diagnosis Date Noted    V-tach 10/23/2022     Priority: Medium    Azotemia 10/23/2022     Priority: Medium    Hypoalbuminemia 10/23/2022     Priority: Medium    Elevated LFTs 10/23/2022     Priority: Medium    Stage 3 chronic kidney disease ([facility] 10/23/2022     Priority: Medium    HFrEF (heart failure with reduced ejection fraction) ([facility] 10/23/2022     Priority: Medium    Acute cystitis without hematuria 10/22/2022     Priority: Medium    Acute on chronic diastolic heart failure ([facility] 10/22/2022     Priority: Medium    Bladder spasms 10/09/2022     Priority: Medium    Diabetes mellitus ([facility] 10/09/2022     Priority: Medium    Palliative care encounter 10/05/2022     Priority: Medium    Goals of care, counseling/discussion 10/05/2022     Priority: Medium    DNR (do not resuscitate) discussion 10/05/2022     Priority: Medium    Acute on chronic HFrEF (heart failure with reduced ejection fraction) ([facility] 10/02/2022     Priority: Medium    VHD (valvular heart disease) 10/02/2022     Priority: Medium    Congestive heart failure of unknown etiology ([facility] 10/01/2022     Priority: Medium    Failure to thrive in adult 07/21/2022     Priority: Medium    Chest pain, atypical 02/24/2021    Implantable cardioverter-defibrillator (ICD) at end of device life      Overview Note:     Replacing Deprecated Diagnoses      Presence of automatic cardioverter/defibrillator (AICD) 09/01/2016    CHF (congestive heart failure) (HCC)     S/P CABG x 4     CAD (coronary artery disease)     MI, old     Hyperlipidemia     Hypertension     Cardiomyopathy ([facility]     Ventricular tachycardia Pancreatitis        Past Medical History:   Diagnosis Date    CAD (coronary artery disease)     Cardiomyopathy ([facility]     CHF (congestive heart failure) (HCC)     Diabetes mellitus ([facility]     Diverticulitis     Hyperlipidemia     Hypertension     MI, old     Pancreatitis     Prostate cancer ([facility] [address]     prostate    S/P CABG x 4     Skin cancer     Ventricular tachycardia    PMH  1. CAD  Status post CABG 12/24/2007 (LIMA-LAD, SVG-RI, OM1, OM2, PDA). Stress test ordered 3/5/2021: Cancelled due to patient having shingles. [facility], 3/16/2021: LMT: severe diffuse disease. LAD: severe diffuse disease. Additional Comment: Moderate caliber vessel with  in the proximal portion. The vessel is fed by patent LIMA graft. The distal portion is small in caliber and wraps the apex. There are L->R collaterals. LCX: severe diffuse disease. Additional Comment: Moderate caliber non-dominant vessel which is fed by SVG->RI/OM1. RAMUS: ostial Ramus - . Additional Comment: Fills in the mid-distal portion from SVG->RI graft. RCA Status: Not Applicable. Additional Comment: Occluded at the ostia. GRAFTS: LIMA Graft End to Side to the Left Anterior Descending. Additional Comments: patent. SVG End to Side to the RI. Additional  Comments - patent with mild-moderate disease in the mid-portion; supplies mid-distal LCx and backfills to partially supply LAD. SVG End to Side to the OM-2 Second Obtuse Marginal Branch. Additional Comments - occluded. SVG End to Side to the Right Posterior Descending Artery. Additional Comments - occluded. Impression:- Severe/occlusive native 3 vessel CAD with patent LIMA->LAD, SVG->RI/OM1. SVG->OM2 is occluded - Native RCA is occluded at the ostia as is the SVG->rPDA. The right is fed by L->R collaterals from the LIMA graft Recommended Treatment: Medical Therapy.    7/2022 Troponin 61-->69  ICMP/Chronic HFrEF   ACC/AHA stage D, NYHA functional class II  TTE 6/27/2012: Mildly dilated LV, LVEF 35%, 1-2+ MR.  TTE: 4/13/2018 (Dr. Piter Gutierrez):  EF estimated at 30%. The entire apex is severely hypokinetic. Overall ejection fraction severely decreased. Moderate left ventricular concentric hypertrophy noted. There is doppler evidence of stage I diastolic dysfunction. Normal right ventricle structure and function. Physiologic and/or trace mitral regurgitation is present. TTE: 3/15/2021 ([facility]) Exam indication: Abnormal Cardiac Biomarkers There is a resting wall motion abnormality in the territory of the LAD and RCA. - The left ventricle is dilated. Left ventricular systolic function is severely decreased. EF = 26 ± 5% (2D biplane) Grade III left ventricular diastolic dysfunction. The right ventricle is normal in size. Right ventricular systolic function is   moderately decreased. - The left atrial cavity is severely dilated. - The right atrial cavity is dilated. There is moderately severe (3+) mitral valve regurgitation due to restricted leaflet motion likely related to ischemic heart disease. TTE: 5/13/2022 ([facility]): - Technically difficult exam due to body habitus and suboptimal positioning. - Exam indication: Evaluation of known heart failure to guide therapy - The left ventricle is severely dilated. Left ventricular systolic function is severely decreased. EF = 20 ± 5% (visual est.) Definity contrast used for endocardial border detection. Left ventricular diastolic function was not evaluated due to an arrhythmia. The right ventricle is dilated. Right ventricular systolic function is moderately to severely decreased. - The left atrial cavity is severely dilated. - The right atrial cavity is dilated. There is moderate (2+) mitral valve regurgitation. - -Peak/mean gradients of 15/7 mmHg, gradients averaged due to arrhythmia. -Prior EF reported as 26% (3/2021). Exam was compared with the prior  echocardiographic exam performed on  3/14/2021. There is no significant change.  VHD: Moderate-severe ischemic MR (patient was recommended for further evaluation with EBONI and possible consideration of a clip 6/3/2022 at [facility] --> patient declined. 7/2022 p-BNP 44288  Status post ICD (implanted 4/19/2010 for primary prevention) with generator change 4/19/2018. ([facility])  ICD interrogation 7/4/2022: AV pacing 6.7%, RV pacing 3.2%, AT/AF burden 1%. Appropriate VT therapy: Successful. VT episodes detected on 7/3/2022 at 10:59 PM.  Duration 14 seconds with an average V rate of 181 bpm.  EGM is C/W VT and VT zone treated with burst ATP x1 which terminated arrhythmia. Telephone encounter: EP 7/5/2022 Toprol-XL was increased to 25 mg twice daily. PHTN:  RHC: 5/16/2022: [facility]: Elevated biventricular filling pressures (RA 10, PCWP 26), moderate pulmonary hypertension (most likely postcapillary), preserved cardiac index by assumed Sunni 2.73. Hypertension  Hyperlipidemia  Type 2 diabetes mellitus  History of NSVT  Anemia  History of prostate cancer specifics unknown. History of diverticulitis  History of pancreatitis  Medical noncompliance  History of hernia repair, elbow surgery, bladder surgery. History of skin melanoma (nose and right-sided cheek) status post excision 2019.   DNR-CCA       Family History   Problem Relation Age of Onset    Cancer Father         liver       Social History     Tobacco Use    Smoking status: Never    Smokeless tobacco: Never    Tobacco comments:     used to smoke occ cigar   Substance Use Topics    Alcohol use: No       Current Facility-Administered Medications   Medication Dose Route Frequency Provider Last Rate Last Admin    sodium chloride flush 0.9 % injection 10 mL  10 mL IntraVENous 2 times per day Sid Seen, DO   10 mL at 10/23/22 1933    sodium chloride flush 0.9 % injection 10 mL  10 mL IntraVENous PRN Sid Seen, DO        0.9 % sodium chloride infusion   IntraVENous PRN Sid Seen, DO        enoxaparin (LOVENOX) injection 40 mg  40 mg SubCUTAneous Daily Sid Seen, DO   40 mg at 10/24/22 0756 promethazine (PHENERGAN) tablet 12.5 mg  12.5 mg Oral Q6H PRN Sherrill Hire, DO        Or    ondansetron [facility]) injection 4 mg  4 mg IntraVENous Q6H PRN Sherrill Hire, DO        polyethylene glycol (GLYCOLAX) packet 17 g  17 g Oral Daily PRN Sherrill Hire, DO        acetaminophen (TYLENOL) tablet 650 mg  650 mg Oral Q6H PRN Sherrill Hire, DO        Or    acetaminophen (TYLENOL) suppository 650 mg  650 mg Rectal Q6H PRN Sherrill Hire, DO        pantoprazole (PROTONIX) tablet 40 mg  40 mg Oral QAM AC Flaco Huston MD   40 mg at 10/24/22 0530    rosuvastatin (CRESTOR) tablet 20 mg  20 mg Oral Nightly Flaco Huston MD   20 mg at 10/23/22 1933    traZODone (DESYREL) tablet 50 mg  50 mg Oral Nightly Flaco Huston MD   50 mg at 10/23/22 1933    lidocaine 2000 mg in dextrose 5% 500 mL infusion  1 mg/min IntraVENous Continuous Simona Bravo MD 15 mL/hr at 10/24/22 0707 1 mg/min at 10/24/22 8289    amiodarone (CORDARONE) tablet 200 mg  200 mg Oral q8h Simona Bravo MD   200 mg at 10/24/22 0530    magnesium oxide (MAG-OX) tablet 400 mg  400 mg Oral Daily Simona Bravo MD   400 mg at 10/24/22 0800    tamsulosin (FLOMAX) capsule 0.4 mg  0.4 mg Oral Dinner Simona Bravo MD        bumetanide (BUMEX) tablet 2 mg  2 mg Oral Daily Simona Bravo MD   2 mg at 10/24/22 0800    metoprolol succinate (TOPROL XL) extended release tablet 25 mg  25 mg Oral Daily Simona Bravo MD   25 mg at 10/24/22 0800        No Known Allergies    ROS: Difficult to obtain from the patient since he does not want to answer questions  PHYSICAL EXAM:   Vitals:    10/24/22 0500 10/24/22 0600 10/24/22 0700 10/24/22 0800   BP: (!) 83/56 104/68 100/69 91/65   Pulse: 68 68 68 64   Resp: 24 18 18 19   Temp:    97.8 °F (36.6 °C)   TempSrc:    Oral   SpO2: 95% 98% 99% 97%   Weight:       Height:          Constitutional: Well-developed, no acute distress, confused   Eyes: conjunctivae normal, no xanthelasma   Ears, Nose, Throat: oral mucosa moist, no cyanosis CV: no JVD or leg edema, laterally displaced PMI with a soft S1 and S2 with left sternal border and apex  Lungs: clear to auscultation bilaterally, normal respiratory effort without used of accessory muscles  Abdomen: soft, non-tender, bowel sounds present, no masses or hepatomegaly   Musculoskeletal: no digital clubbing, no edema   Skin: warm, no rashes   Device site: Left chest free of erosion and migration. I have personally reviewed the laboratory, cardiac diagnostic and radiographic testing as outlined below:    Data:    Recent Labs     10/22/22  2146 10/23/22  0500 10/24/22  0348   WBC 5.9 5.8 6.1   HGB 12.4* 11.9* 12.2*   HCT 40.7 38.3 39.5    162 109*     Recent Labs     10/22/22  2146 10/23/22  0500 10/24/22  0348    141 138   K 4.0 4.4 4.8    105 104   CO2 29 25 23   BUN 36* 36* 44*   CREATININE 1.3* 1.2 1.4*   CALCIUM 9.1 9.0 8.8      Lab Results   Component Value Date/Time    MG 2.4 10/24/2022 03:48 AM     No results for input(s): TSH in the last 72 hours. Recent Labs     10/22/22  2146   INR 1.5     Telemetry:  nonsustained polymorphic VT at [facility] in 10/23/22 currently  alternating with Vs  complexes and PVCs     CXR 10/22/22:    FINDINGS:   Pulmonary vascular congestive changes. Small right pleural effusion. No   pneumothorax. Cardiomegaly. The osseous structures are without acute   process. Sternotomy wires and left-sided transvenous pacer device           Impression   Cardiomegaly with pulmonary vascular congestive changes. Small right pleural   effusion. This is unchanged compared to 10/01/2022       EKG: AV pacing,NSVT     Echocardiogram:   5/13/2022 ([facility]): - Technically difficult exam due to body habitus and suboptimal positioning. The left ventricle is severely dilated. Left ventricular systolic function is severely decreased. EF = 20 ± 5% (visual est.) Definity contrast used for   endocardial border detection.  Left ventricular diastolic function was not evaluated due to an arrhythmia. The right ventricle is dilated. Right ventricular systolic function is moderately to severely decreased. - The left atrial cavity is severely dilated. - The right atrial cavity is dilated. There is moderate (2+) mitral valve regurgitation. - -Peak/mean gradients of 15/7 mmHg, gradients averaged due to arrhythmia. -Prior EF reported as 26% (3/2021). Cardiac Catheterization:     [facility] ([facility], 3/16/2021): LMT: _ The LMT has severe diffuse disease. LAD: _ The LAD has severe diffuse disease. Additional Comment: Moderate caliber vessel with  in the proximal portion. The vessel is fed by patent LIMA graft. The distal portion is small in caliber and wraps the apex. There are L->R collaterals. LCX: _ The Circumflex has severe diffuse disease. Additional Comment: Moderate caliber non-dominant vessel which is fed by SVG->RI/OM1. RAMUS: _ The ostial Ramus - . Additional Comment: Fills in the mid-distal portion from SVG->RI graft. RCA: _ RCA Status: Not Applicable. Additional Comment: Occluded at the ostia. GRAFTS: _ Left Internal Mammary Artery Graft End to Side to the Left Anterior Descending. Additional Comments - patent. _ Saphenous Vein Graft End to Side to the Ramus Intermedius . Additional  Comments - patent with mild-moderate disease in the mid-portion; supplies mid-distal LCx and backfills to partially supply LAD. _ Saphenous Vein Graft End to Side to the OM-2 Second Obtuse Marginal Branch. Additional Comments - occluded. _ Saphenous Vein Graft End to Side to the Right Posterior Descending Artery. Additional Comments - occluded. Impression:- Severe/occlusive native 3 vessel CAD with patent LIMA->LAD, SVG->RI/OM1. SVG->OM2 is occluded - Native RCA is occluded at the ostia as is the SVG->rPDA. The right is fed by L->R collaterals from the LIMA graft   Recommended Treatment: Medical Therapy.          Device Interrogation ( 10/22/2022 )  Make/Model Abbott Assura*  Mode DDDR 60/120  P 05/13/2022  - GDMT: Toprol 25mg daily,Aldactone, Bumex, Hydralzine, Imdur   - last heart failure admission 10/02/2022   - Cardiology consulted. 4. Coronary artery disease   - Prior CABG 2007 X 5 in 2007  - Last [facility] 03/16/2021 severe CAD, :- Severe/occlusive native 3 vessel CAD with patent LIMA->LAD, SVG->RI/OM1. SVG->OM2 is occluded - Native RCA is occluded at the ostia as is the SVG->rPDA. The right is fed by L->R collaterals from the LIMA graft Recommended Treatment: Medical Therapy.   - BB, Nitrate Stain, ASA (at home)     5. Chronic kidney disease  -serum creatinine -1.4    6. Hx Hypertension  - Now Hypotensive     7. Hyperlipidemia    8. Diabetes    9. Noncompliance in the past    10. DNR CCA    11. BPH   - Flomax     Recommendations:    Continue Toprol 25mg daily (home dose 50mg BID) and titrate as BP permits. Continue IV Lidocaine at 1mg/min today and start Mexitil 150mg TID, If no events overnight stop IV Lidocaine. Agree with Cardiology consult for HFrEF   Monitor in ICU. I have spent a total of 10 minutes with the patient reviewing the above stated recommendations. And a total of >50% of that time involved face-to-face time providing counseling and or coordination of care with the other providers, reviewing records/tests, counseling/education of the patient, ordering medications/tests/procedures, coordinating care, and documenting clinical information in the EHR. Thank you for allowing me to participate in your patient's care. Please call me if there are any questions or concerns. Discussed with Dr. Suhail Light. Deandre Yu, GINA - CNP  Cardiac Electrophysiology  [facility]) Physicians  [facility] [place]: [facility]  8:43 AM  10/24/2022    Attending Physician's Statement    Patient seen with the ANP. Agree with the findings, assessment and plan. Management plan was discussed.  I have personally interviewed the patient, independently performed a focused cardiac examination, reviewed the pertinent laboratory and diagnostic testing with the patient and directly participated in the medical decision-making as noted above with the following additions:     Confused. No recurrent VT overnight. Last PMVT was 10/23/22 at 19.56 PM.    Physical exam showed no JVD, RRR, normal S1S2, no murmur, decrease breath sound at bases, no edema    Continue oral Amiodarone loading dose. Continue IV Lidocaine overnight and consider switch to Mexiletine tomorrow. Continue ToproL XL 25 mg daily. Will check EKG for QTc. Keep potassium and magnesium at high normal.    I have spent a total of 35 CCT minutes with the patient and the family reviewing the above stated recommendations. And a total of >50% of that time involved face-to-face time providing counseling and/or coordination of care with the other providers, reviewing records/tests, counseling/education of the patient, ordering medications/tests/procedures, coordinating care, and documenting clinical information in the EHR.     Hasmukh Turner MD  Cardiac Electrophysiology  [address]  [facility] [place]: [place]  2:59 PM  10/24/2022

## 2022-10-24 NOTE — CARE COORDINATION
Pt was readmitted from [facility] for recurrent episodes of VT and chf. Pt currently asleep. On Iv lido drip and po amio. Diuresing wit iv bumex. Voicemail left with [facility] liaison as to bed hold status. Ambulance form placed in envelope in soft chart. Will need dated and signed on dc day. 2pm  Per Rosalva Distance at [facility]. Pt is not a bed hold, but can return.

## 2022-10-24 NOTE — PROGRESS NOTES
Palliative Care Department  [phone]  Palliative Care Progress Note  Provider GINA Torres CNP      PATIENT: Olen Nyhan  : 1945  MRN: [id number]  ADMISSION DATE: 10/23/2022  3:21 AM  Referring Provider: Jd Bell MD    Palliative Medicine was consulted on hospital day 1 for assistance with Goals of care, Code Status Discussion, Family support, Symptom management     HPI:     Kayode Sher is a 64 y.o. y/o male with a history of cardiomyopathy, ICD, hypertension, hyperlipidemia, HFrEF of 20% who presented to [facility]) on 10/23/2022 with altered mental status. He had some beats of V. tach in the ER. Pacer was interrogated and found that he had been shocked 4 times in the last 24 hours. He was admitted to the CVICU for further medical management. ASSESSMENT/PLAN:     Pertinent Hospital Diagnoses     V. Tach  Ischemic cardiomyopathy HFrEF    Palliative Care Encounter / Counseling Regarding Goals of Care  Please see detailed goals of care discussion as below  At this time, Olen Nyhan, Does Not have capacity for medical decision-making. Capacity is time limited and situation/question specific  During encounter, daughter Rachel De La Vega, was surrogate medical decision-maker  Outcome of goals of care meeting:  Patient and daughter wants hospice to be consulted, with plan on returning home with hospice  CODE STATUS changed to limited meds only  Code status Limited meds only  Advanced Directives: no POA or living will in epic  Surrogate/Legal NOK:  Juan Manuel Al (daughter) [phone]    Spiritual assessment: no spiritual distress identified  Bereavement and grief: to be determined  Referrals to: none today    Thank you for the opportunity to participate in the care of Olen Nyhan. GINA Bai CNP   Palliative Medicine     SUBJECTIVE:     Details of Conversation:     Chart reviewed.   Before seeing patient at the bedside, I had spoken with patient's daughter Paula over the phone. Paula stated that she visited her father yesterday, and he had requested that he wanted to go home, and also wanted to rediscuss hospice. Met with patient at the bedside. He still confused to situation, however he stated of wanting to return home to his daughter, under hospice care. CODE STATUS was discussed, but patient daughter, and changed to limited meds only. Hospice was consulted. We will continue to follow    1630  Addendum: [address] spoke with family, plan is for patient to return home with hospice, per family after spoken with hospice of the [place], they want comfort medication to be initiated today. Orders were placed. We will continue to follow      Prognosis: Guarded    OBJECTIVE:     BP 90/69   Pulse 60   Temp 98 °F (36.7 °C) (Temporal)   Resp (!) 35   Ht 5' 10\" (1.778 m)   Wt 203 lb 9.6 oz (92.4 kg)   SpO2 97%   BMI 29.21 kg/m²     Physical Examination:  Gen: elderly, thin, NAD  HEENT: normocephalic, atraumatic  Neck: trachea midline, no JVD  Lungs: respirations easy and not labored,   Heart: regular rate and rhythm, distant heart tones,   Abdomen: normoactive bowel sounds, soft, non-tender  Extremities: no clubbing, cyanosis or edema, moving all extremities    Skin: warm, dry without rashes, lesions, bruising  Neuro: Confusion, follows commands    Objective data reviewed: labs, images, records, medication use, vitals, and chart    Time/Communication  Greater than 50% of time spent, total 45 minutes in counseling and coordination of care at the bedside regarding goals of care. Thank you for allowing Palliative Medicine to participate in the care of Tramaine Allen. Note: This report was completed using [employer] voiced recognition software. Every effort has been made to ensure accuracy; however, inadvertent computerized transcription errors may be present.

## 2022-10-24 NOTE — CONSULTS
INPATIENT CARDIOLOGY CONSULT    Name: Salina Schaeffer    Age: 64 y.o. Date of Admission: 10/23/2022  3:21 AM    Date of Service: 10/24/2022    Reason for Consultation: No chief complaint on file. Referring Physician: Dalia Malave DO    History of Present Illness: 68-year-old male with complex medical history and complex cardiac history including but not limited to heart failure with depressed LV function status post ICD [facility] with generator change in 2018, coronary artery disease status post CABG x5 with LIMA to LAD, SVG to OM1, SVG to OM 2, SVG to ramus intermedius, and SVG to RPDA in 2007. Chart record shows that cardiac catheterization in 2021 at [facility] revealed all the grafts are occluded as the LIMA to LAD which was patent. Patient also has hypertension, hyperlipidemia, diabetes, nonsustained SVT, and prostate cancer who is hospitalized for polymorphic VT with ICD shock. It is noted that patient has been intolerant to ACE or ARB and subsequently has been on Isordil and hydralazine and Toprol in the past.  Patient is currently limited CODE STATUS. Cardiology is consulted for management of acute on chronic heart failure with depressed systolic function. Patient is currently on IV lidocaine and sleeping comfortably and not in any acute distress. I spoke with the nurse at length and no recurrent VT overnight. He is also on p.o. amiodarone        PMH  1. CAD  Status post CABG 12/24/2007 (LIMA-LAD, SVG-RI, OM1, OM2, PDA). Stress test ordered 3/5/2021: Cancelled due to patient having shingles. [facility], 3/16/2021: LMT: severe diffuse disease. LAD: severe diffuse disease. Additional Comment: Moderate caliber vessel with  in the proximal portion. The vessel is fed by patent LIMA graft. The distal portion is small in caliber and wraps the apex. There are L->R collaterals. LCX: severe diffuse disease.  Additional Comment: Moderate caliber non-dominant vessel which is fed by SVG->RI/OM1. RAMUS: ostial Ramus - . Additional Comment: Fills in the mid-distal portion from SVG->RI graft. RCA Status: Not Applicable. Additional Comment: Occluded at the ostia. GRAFTS: LIMA Graft End to Side to the Left Anterior Descending. Additional Comments: patent. SVG End to Side to the RI. Additional  Comments - patent with mild-moderate disease in the mid-portion; supplies mid-distal LCx and backfills to partially supply LAD. SVG End to Side to the OM-2 Second Obtuse Marginal Branch. Additional Comments - occluded. SVG End to Side to the Right Posterior Descending Artery. Additional Comments - occluded. Impression:- Severe/occlusive native 3 vessel CAD with patent LIMA->LAD, SVG->RI/OM1. SVG->OM2 is occluded - Native RCA is occluded at the ostia as is the SVG->rPDA. The right is fed by L->R collaterals from the LIMA graft Recommended Treatment: Medical Therapy. 7/2022 Troponin 61-->69  ICMP/Chronic HFrEF   ACC/AHA stage D, NYHA functional class II  TTE 6/27/2012: Mildly dilated LV, LVEF 35%, 1-2+ MR.  TTE: 4/13/2018 (Dr. Minh Chance):  EF estimated at 30%. The entire apex is severely hypokinetic. Overall ejection fraction severely decreased. Moderate left ventricular concentric hypertrophy noted. There is doppler evidence of stage I diastolic dysfunction. Normal right ventricle structure and function. Physiologic and/or trace mitral regurgitation is present. TTE: 3/15/2021 ([facility]) Exam indication: Abnormal Cardiac Biomarkers There is a resting wall motion abnormality in the territory of the LAD and RCA. - The left ventricle is dilated. Left ventricular systolic function is severely decreased. EF = 26 ± 5% (2D biplane) Grade III left ventricular diastolic dysfunction. The right ventricle is normal in size. Right ventricular systolic function is   moderately decreased. - The left atrial cavity is severely dilated. - The right atrial cavity is dilated.   There is moderately severe (3+) mitral valve regurgitation due to restricted leaflet motion likely related to ischemic heart disease. TTE: 5/13/2022 ([facility]): - Technically difficult exam due to body habitus and suboptimal positioning. - Exam indication: Evaluation of known heart failure to guide therapy - The left ventricle is severely dilated. Left ventricular systolic function is severely decreased. EF = 20 ± 5% (visual est.) Definity contrast used for endocardial border detection. Left ventricular diastolic function was not evaluated due to an arrhythmia. The right ventricle is dilated. Right ventricular systolic function is moderately to severely decreased. - The left atrial cavity is severely dilated. - The right atrial cavity is dilated. There is moderate (2+) mitral valve regurgitation. - -Peak/mean gradients of 15/7 mmHg, gradients averaged due to arrhythmia. -Prior EF reported as 26% (3/2021). Exam was compared with the prior  echocardiographic exam performed on  3/14/2021. There is no significant change. VHD: Moderate-severe ischemic MR (patient was recommended for further evaluation with EBONI and possible consideration of a clip 6/3/2022 at [facility] --> patient declined. 7/2022 p-BNP 99221  Status post ICD (implanted 4/19/2010 for primary prevention) with generator change 4/19/2018. ([facility])  ICD interrogation 7/4/2022: AV pacing 6.7%, RV pacing 3.2%, AT/AF burden 1%. Appropriate VT therapy: Successful. VT episodes detected on 7/3/2022 at 10:59 PM.  Duration 14 seconds with an average V rate of 181 bpm.  EGM is C/W VT and VT zone treated with burst ATP x1 which terminated arrhythmia. Telephone encounter: EP 7/5/2022 Toprol-XL was increased to 25 mg twice daily. PHTN:  RHC: 5/16/2022: [facility]: Elevated biventricular filling pressures (RA 10, PCWP 26), moderate pulmonary hypertension (most likely postcapillary), preserved cardiac index by assumed Sunni 2.73.    Hypertension  Hyperlipidemia  Type 2 diabetes mellitus  History of NSVT  Anemia  History of prostate cancer specifics unknown. History of diverticulitis  History of pancreatitis  Medical noncompliance  History of hernia repair, elbow surgery, bladder surgery. History of skin melanoma (nose and right-sided cheek) status post excision 2019. DNR-CCA          Past Medical History:  Past Medical History:   Diagnosis Date    CAD (coronary artery disease)     Cardiomyopathy ([facility]     CHF (congestive heart failure) ([facility] [address]     Diabetes mellitus ([facility]     Diverticulitis     Hyperlipidemia     Hypertension     MI, old     Pancreatitis     Prostate cancer ([facility]     prostate    S/P CABG x 4     Skin cancer     Ventricular tachycardia        Past Surgical History:  Past Surgical History:   Procedure Laterality Date    BLADDER SURGERY      CARDIAC DEFIBRILLATOR PLACEMENT  04/19/2018    D-ICD GENT CHANGE    KHUNNAWAT    CARDIAC PACEMAKER PLACEMENT  04/2010    [facility]     COLONOSCOPY      CORONARY ARTERY BYPASS GRAFT  12/24/2007    ELBOW SURGERY      HERNIA REPAIR      KIDNEY SURGERY      SKIN CANCER EXCISION  2019    nose and cheek       Family History:  Family History   Problem Relation Age of Onset    Cancer Father         liver       Social History:  Social History     Socioeconomic History    Marital status:       Spouse name: Not on file    Number of children: Not on file    Years of education: Not on file    Highest education level: Not on file   Occupational History    Not on file   Tobacco Use    Smoking status: Never    Smokeless tobacco: Never    Tobacco comments:     used to smoke occ cigar   Vaping Use    Vaping Use: Never used   Substance and Sexual Activity    Alcohol use: No    Drug use: No    Sexual activity: Not Currently   Other Topics Concern    Not on file   Social History Narrative    Not on file     Social Determinants of Health     Financial Resource Strain: Not on file   Food Insecurity: Not on file   Transportation Needs: Not on file   Physical Activity: Not on file   Stress: Not on file Social Connections: Not on file   Intimate Partner Violence: Not on file   Housing Stability: Not on file       Allergies:  No Known Allergies    Home Medications:  Prior to Admission medications    Medication Sig Start Date End Date Taking? Authorizing Provider   rosuvastatin (CRESTOR) 20 MG tablet Take 1 tablet by mouth nightly 10/12/22   Zuly Filter, DO   metoprolol succinate (TOPROL XL) 50 MG extended release tablet Take 1 tablet by mouth in the morning and at bedtime 10/12/22   Zuly Filter, DO   bumetanide (BUMEX) 1 MG tablet Take 1 tablet by mouth 2 times daily 10/12/22   Zuly Filter, DO   spironolactone (ALDACTONE) 25 MG tablet Take 0.5 tablets by mouth daily 10/13/22   Zuly Filter, DO   miconazole nitrate 2 % OINT Apply topically 2 times daily 10/12/22   Shahab Quintanilla, DO   ammonium lactate (LAC-HYDRIN) 12 % lotion Apply topically as needed. 10/13/22   Shahab Quintanilla, DO   mirtazapine (REMERON) 30 MG tablet Take 30 mg by mouth nightly    Historical Provider, MD   isosorbide dinitrate (ISORDIL) 20 MG tablet Take 1 tablet by mouth in the morning and 1 tablet at noon and 1 tablet before bedtime.  7/23/22   Sheryle Finger, MD   omeprazole (PRILOSEC) 40 MG delayed release capsule Take 1 capsule by mouth in the morning. 7/23/22   Sheryle Finger, MD   traZODone (DESYREL) 50 MG tablet TAKE 1 TABLET BY MOUTH AT BEDTIME 6/21/22   Historical Provider, MD   hydrALAZINE (APRESOLINE) 25 MG tablet Take 1 tablet by mouth in the morning and at bedtime 6/30/22   Mala Preston DO   isosorbide mononitrate (IMDUR) 30 MG extended release tablet Take 1 tablet by mouth daily 6/30/22 7/23/22  Mala Preston DO   nitroGLYCERIN (NITROSTAT) 0.4 MG SL tablet Place 1 tablet under the tongue every 5 minutes as needed for Chest pain 4/26/22   Mala Preston DO   acetaminophen (TYLENOL) 650 MG extended release tablet Take 650 mg by mouth every 8 hours as needed for Pain    Historical Provider, MD   finasteride (PROSCAR) 5 MG tablet take 1 tablet by mouth once daily 1/3/17   Historical Provider, MD   glipiZIDE (GLUCOTROL) 5 MG tablet Take 5 mg by mouth 2 times daily (before meals)  8/30/16   Historical Provider, MD   aspirin 81 MG tablet Take 81 mg by mouth daily.     Historical Provider, MD   tamsulosin (FLOMAX) 0.4 MG capsule Take 0.4 mg by mouth daily     Historical Provider, MD       Current Medications:  Current Facility-Administered Medications   Medication Dose Route Frequency Provider Last Rate Last Admin    sodium chloride flush 0.9 % injection 10 mL  10 mL IntraVENous 2 times per day Jerrye Ladan, DO   10 mL at 10/23/22 1933    sodium chloride flush 0.9 % injection 10 mL  10 mL IntraVENous PRN Jerrye Ladan, DO        0.9 % sodium chloride infusion   IntraVENous PRN Jerrye Ladan, DO        enoxaparin (LOVENOX) injection 40 mg  40 mg SubCUTAneous Daily Jerrye Ladan, DO   40 mg at 10/24/22 0830    promethazine (PHENERGAN) tablet 12.5 mg  12.5 mg Oral Q6H PRN Jerrye Ladan, DO        Or    [facility] injection 4 mg  4 mg IntraVENous Q6H PRN Jerrye Ladan, DO        polyethylene glycol (GLYCOLAX) packet 17 g  17 g Oral Daily PRN Jerrye Ladan, DO        acetaminophen (TYLENOL) tablet 650 mg  650 mg Oral Q6H PRN Jerrye Ladan, DO        Or    acetaminophen (TYLENOL) suppository 650 mg  650 mg Rectal Q6H PRN Jerrye Ladan, DO        pantoprazole (PROTONIX) tablet 40 mg  40 mg Oral QAM AC Claritza Ace MD   40 mg at 10/24/22 0530    rosuvastatin (CRESTOR) tablet 20 mg  20 mg Oral Nightly Claritza Ace MD   20 mg at 10/23/22 1933    traZODone (DESYREL) tablet 50 mg  50 mg Oral Nightly Claritza Ace MD   50 mg at 10/23/22 1933    lidocaine 2000 mg in dextrose 5% 500 mL infusion  1 mg/min IntraVENous Continuous Ralph Salas MD 15 mL/hr at 10/24/22 0707 1 mg/min at 10/24/22 2094    amiodarone (CORDARONE) tablet 200 mg  200 mg Oral q8h Ralph Salas MD   200 mg at 10/24/22 0530    magnesium oxide (MAG-OX) tablet 400 mg 400 mg Oral Daily Ralph Salas MD   400 mg at 10/24/22 0800    tamsulosin (FLOMAX) capsule 0.4 mg  0.4 mg Oral Dinner Ralph Salas MD        bumetanide (BUMEX) tablet 2 mg  2 mg Oral Daily Ralph Salas MD   2 mg at 10/24/22 0800    metoprolol succinate (TOPROL XL) extended release tablet 25 mg  25 mg Oral Daily Ralph Salas MD   25 mg at 10/24/22 0800      sodium chloride      lidocaine 1 mg/min (10/24/22 0707)           Review of Systems:   Cardiac: As per HPI  General: Denies fever or chills  Pulmonary: As per HPI  HEENT: Denies runny nose  GI: No complaints  : No complaints  Endocrine: Denies night sweats  Musculoskeletal: No complaints  Skin: Dry skin  Neuro: No complaints  Psych: Denies depression    Physical Exam:  BP 91/65   Pulse 64   Temp 97.8 °F (36.6 °C) (Oral)   Resp 19   Ht 5' 10\" (1.778 m)   Wt 203 lb 9.6 oz (92.4 kg)   SpO2 97%   BMI 29.21 kg/m²   Wt Readings from Last 3 Encounters:   10/23/22 203 lb 9.6 oz (92.4 kg)   10/22/22 195 lb (88.5 kg)   10/09/22 195 lb 8 oz (88.7 kg)       Appearance: Alert and oriented x3 not in acute distress. Skin: Dry skin  Head: Atraumatic  Eyes: Intact extraocular muscles   ENMT: Mucous membranes are moist  Neck: Supple  Lungs: Clear to auscultation  Cardiac: Normal S1 and S2  Abdomen: Protuberant  Extremities: Intact range of motion  Neurologic: No focal neurological deficits  Peripheral Pulses: 2+ peripheral pulses      Intake/Output:    Intake/Output Summary (Last 24 hours) at 10/24/2022 0907  Last data filed at 10/24/2022 0819  Gross per 24 hour   Intake 1298.74 ml   Output 75 ml   Net 1223.74 ml     I/O this shift: In: 450.9 [P.O.:200;  I.V.:250.9]  Out: -       Laboratory Tests:  Recent Labs     10/22/22  2146 10/22/22  2217 10/23/22  0500 10/24/22  0348     --  141 138   K 4.0  --  4.4 4.8     --  105 104   CO2 29  --  25 23   BUN 36*  --  36* 44*   CREATININE 1.3*  --  1.2 1.4*   GLUCOSE 63* 97 66* 77   CALCIUM 9.1  --  9.0 8.8     Lab Results   Component Value Date/Time    MG 2.4 10/24/2022 03:48 AM    MG 2.3 10/23/2022 07:40 AM    MG 2.5 10/22/2022 09:46 PM     Recent Labs     10/22/22  2146 10/23/22  0500 10/24/22  0348   ALKPHOS 246* 229* 259*   ALT 57* 49* 79*   AST 63* 49* 93*   PROT 6.4 6.1* 6.0*   BILITOT 0.8 0.7 0.7   LABALBU 3.5 3.3* 3.2*     Recent Labs     10/22/22  2146 10/23/22  0500 10/24/22  0348   WBC 5.9 5.8 6.1   RBC 4.89 4.66 4.83   HGB 12.4* 11.9* 12.2*   HCT 40.7 38.3 39.5   MCV 83.2 82.2 81.8   MCH 25.4* 25.5* 25.3*   MCHC 30.5* 31.1* 30.9*   RDW 18.0* 18.0* 17.6*    162 109*   MPV 12.3* 13.0* NOT CALC     Lab Results   Component Value Date    CKTOTAL 113 04/08/2014    CKMB 2.8 04/08/2014    TROPONINI 0.03 02/24/2021    TROPONINI <0.01 04/14/2014    TROPONINI <0.01 04/08/2014     Recent Labs     10/22/22  2146 10/22/22  2242   TROPHS 66* 64*     Lab Results   Component Value Date    INR 1.5 10/22/2022    INR 1.2 04/08/2014    PROTIME 17.4 (H) 10/22/2022    PROTIME 13.3 (H) 04/08/2014     Lab Results   Component Value Date    TSH 2.500 10/01/2022    TSH 1.330 09/20/2021    TSH 1.800 07/31/2020     Lab Results   Component Value Date    LABA1C 5.9 (H) 10/01/2022    LABA1C 5.5 09/20/2021    LABA1C 6.3 (H) 07/31/2020     No results found for: EAG  Lab Results   Component Value Date    CHOL 105 10/01/2022    CHOL 224 (H) 09/20/2021    CHOL 296 (H) 08/12/2014     Lab Results   Component Value Date    TRIG 58 10/01/2022    TRIG 361 (H) 09/20/2021    TRIG 1,001 (H) 08/12/2014     Lab Results   Component Value Date    HDL 40 10/01/2022    HDL 33 09/20/2021    HDL 25 08/12/2014     Lab Results   Component Value Date    LDLCALC 53 10/01/2022    LDLCALC 119 (H) 09/20/2021    LDLCALC - (AA) 08/12/2014     Lab Results   Component Value Date    LABVLDL 12 10/01/2022    LABVLDL 72 09/20/2021    LABVLDL - (AA) 08/12/2014     No results found for: [facility]  Recent Labs     10/22/22  2146   PROBNP 30,751*       Cardiac Tests:        TTE: 3/15/2021 ([facility]) Exam indication: Abnormal Cardiac Biomarkers There is a resting wall motion abnormality in the territory of the LAD and RCA. - The left ventricle is dilated. Left ventricular systolic function is severely decreased. EF = 26 ± 5% (2D biplane) Grade III left ventricular diastolic dysfunction. The right ventricle is normal in size. Right ventricular systolic function is   moderately decreased. - The left atrial cavity is severely dilated. - The right atrial cavity is dilated. There is moderately severe (3+) mitral valve regurgitation due to restricted leaflet motion likely related to ischemic heart disease. TTE: 5/13/2022 ([facility]): - Technically difficult exam due to body habitus and suboptimal positioning. - Exam indication: Evaluation of known heart failure to guide therapy - The left ventricle is severely dilated. Left ventricular systolic function is severely decreased. EF = 20 ± 5% (visual est.) Definity contrast used for endocardial border detection. Left ventricular diastolic function was not evaluated due to an arrhythmia. The right ventricle is dilated. Right ventricular systolic function is moderately to severely decreased. - The left atrial cavity is severely dilated. - The right atrial cavity is dilated. There is moderate (2+) mitral valve regurgitation. - -Peak/mean gradients of 15/7 mmHg, gradients averaged due to arrhythmia. -Prior EF reported as 26% (3/2021). Exam was compared with the prior  echocardiographic exam performed on  3/14/2021. There is no significant change. VHD: Moderate-severe ischemic MR (patient was recommended for further evaluation with EBONI and possible consideration of a clip 6/3/2022 at [facility] --> patient declined. [facility]: 5/16/2022: [facility]: Elevated biventricular filling pressures (RA 10, PCWP 26), moderate pulmonary hypertension (most likely postcapillary), preserved cardiac index by assumed Sunni 2.73.       [facility], 3/16/2021: LMT: severe diffuse disease. LAD: severe diffuse disease. Additional Comment: Moderate caliber vessel with  in the proximal portion. The vessel is fed by patent LIMA graft. The distal portion is small in caliber and wraps the apex. There are L->R collaterals. LCX: severe diffuse disease. Additional Comment: Moderate caliber non-dominant vessel which is fed by SVG->RI/OM1. RAMUS: ostial Ramus - . Additional Comment: Fills in the mid-distal portion from SVG->RI graft. RCA Status: Not Applicable. Additional Comment: Occluded at the ostia. GRAFTS: LIMA Graft End to Side to the Left Anterior Descending. Additional Comments: patent. SVG End to Side to the RI. Additional  Comments - patent with mild-moderate disease in the mid-portion; supplies mid-distal LCx and backfills to partially supply LAD. SVG End to Side to the OM-2 Second Obtuse Marginal Branch. Additional Comments - occluded. SVG End to Side to the Right Posterior Descending Artery. Additional Comments - occluded. Impression:- Severe/occlusive native 3 vessel CAD with patent LIMA->LAD, SVG->RI/OM1. SVG->OM2 is occluded - Native RCA is occluded at the ostia as is the SVG->rPDA. The right is fed by L->R collaterals from the LIMA graft Recommended Treatment: Medical Therapy. [facility] 2021  Impression:- Severe/occlusive native 3 vessel CAD with patent LIMA->LAD,   SVG->RI/OM1. SVG->OM2 is occluded   - Native RCA is occluded at the ostia as is the SVG->rPDA. The right is fed by   L->R collaterals from the LIMA graft     Recommended Treatment: Medical Therapy. Plan: - Recommend aggressive medical therapy for severe coronary artery disease   - start high-intensity statin   - maximize anti-anginal and heart failure therapy     CXR reviewed: The ASCVD Risk score (Roxy POLLOCK, et al., 2019) failed to calculate for the following reasons:     The patient has a prior MI or stroke diagnosis    ASSESSMENT / PLAN:    Recurrent episodes of rapid polymorphic VT with  appropriate ICD discharges   On IV lidocaine and p.o. amiodarone  Also on p.o. beta-blocker  EP is following and managing recurrent VT  Monitor electrolyte closely and keep potassium at 4 magnesium at 2  Agree with palliative consultation. 2.   Dual-chamber ICD in situ implanted in 2010, ICD generator change in 2018   Per device interrogation device is functioning appropriately  EP following. 3.   Complex coronary artery disease with severe ischemic cardiomyopathy  status post CABG x5 with LIMA to LAD, SVG to OM1, SVG to OM 2, SVG to ramus intermedius, and SVG to RPDA in 2007. [facility] 2021  Impression:- Severe/occlusive native 3 vessel CAD with patent LIMA->LAD,   SVG->RI/OM1. SVG->OM2 is occluded   - Native RCA is occluded at the ostia as is the SVG->rPDA. The right is fed by   L->R collaterals from the LIMA graft   Medical Therapy was recommended with plans to optimize high-intensity statin as well as maximize anti-anginal and heart failure therapy   Management of management of ischemic heart disease has been very difficult due to tenuous blood pressure  Will continue current low-dose beta-blocker for now and uptitrate as blood pressure allows  If blood pressure can tolerate we will add Imdur  Up titration of statin has also been limited due to elevated liver enzymes  Hold statin if liver function worsens  Resume aspirin if there is no contraindication    4.    HFrEF, LVEF of 15 to 20% with moderate pulmonary hypertension  Will switch from p.o.  Bumex to IV but due to hypotension we will decrease the dose from 2mg to 1 mg IV twice a day with holding parameters  Strict in and out monitor  Currently decompensated as noted with physical exam and chest x-ray  Resume oral diuresis and guideline directed medical therapy as tolerated  Given hypotension, consider switching from Flomax to finasteride in order to make room for up titration of guideline directed medical therapy  Strict in and out monitoring, daily weight, low-salt diet and keep potassium at 4 magnesium at 2  Once blood pressure stabilizes, resume home dose BiDil/hydralazine and eventually add Aldactone if blood pressure can allow    5. Chronic kidney disease--serum creatinine   Monitor closely  6. Hypertension   Now on blood pressure on the low side  Management as mentioned above  7. Hyperlipidemia  On statin but liver enzymes are elevated  Monitor closely and please hold statin if worsening liver function tests    8. Diabetes  Management per primary  9. Noncompliance in the past     10. Pulmonary hypertension  Likely due to left heart disease  Management as mentioned above    11. DNR CCA                  Thank you for allowing me to participate in your patient's care. Please feel free to contact me if you have any questions or concerns.     Danny Salazar MD  [facility]) Cardiology

## 2022-10-25 LAB
ALBUMIN SERPL-MCNC: 3.2 G/DL (ref 3.5–5.2)
ALP BLD-CCNC: 237 U/L (ref 40–129)
ALT SERPL-CCNC: 110 U/L (ref 0–40)
ANION GAP SERPL CALCULATED.3IONS-SCNC: 15 MMOL/L (ref 7–16)
AST SERPL-CCNC: 109 U/L (ref 0–39)
BASOPHILS ABSOLUTE: 0.01 E9/L (ref 0–0.2)
BASOPHILS RELATIVE PERCENT: 0.1 % (ref 0–2)
BILIRUB SERPL-MCNC: 0.7 MG/DL (ref 0–1.2)
BUN BLDV-MCNC: 59 MG/DL (ref 6–23)
CALCIUM SERPL-MCNC: 8.8 MG/DL (ref 8.6–10.2)
CHLORIDE BLD-SCNC: 100 MMOL/L (ref 98–107)
CO2: 20 MMOL/L (ref 22–29)
CREAT SERPL-MCNC: 2 MG/DL (ref 0.7–1.2)
EOSINOPHILS ABSOLUTE: 0 E9/L (ref 0.05–0.5)
EOSINOPHILS RELATIVE PERCENT: 0 % (ref 0–6)
GFR SERPL CREATININE-BSD FRML MDRD: 33 ML/MIN/1.73
GLUCOSE BLD-MCNC: 94 MG/DL (ref 74–99)
HCT VFR BLD CALC: 41.5 % (ref 37–54)
HEMOGLOBIN: 12.9 G/DL (ref 12.5–16.5)
IMMATURE GRANULOCYTES #: 0.03 E9/L
IMMATURE GRANULOCYTES %: 0.4 % (ref 0–5)
LYMPHOCYTES ABSOLUTE: 1.09 E9/L (ref 1.5–4)
LYMPHOCYTES RELATIVE PERCENT: 14.8 % (ref 20–42)
MAGNESIUM: 2.6 MG/DL (ref 1.6–2.6)
MCH RBC QN AUTO: 25 PG (ref 26–35)
MCHC RBC AUTO-ENTMCNC: 31.1 % (ref 32–34.5)
MCV RBC AUTO: 80.6 FL (ref 80–99.9)
MONOCYTES ABSOLUTE: 0.51 E9/L (ref 0.1–0.95)
MONOCYTES RELATIVE PERCENT: 6.9 % (ref 2–12)
NEUTROPHILS ABSOLUTE: 5.74 E9/L (ref 1.8–7.3)
NEUTROPHILS RELATIVE PERCENT: 77.8 % (ref 43–80)
PDW BLD-RTO: 18 FL (ref 11.5–15)
PHOSPHORUS: 6.1 MG/DL (ref 2.5–4.5)
PLATELET # BLD: 136 E9/L (ref 130–450)
PMV BLD AUTO: ABNORMAL FL (ref 7–12)
POTASSIUM REFLEX MAGNESIUM: 5.4 MMOL/L (ref 3.5–5)
RBC # BLD: 5.15 E12/L (ref 3.8–5.8)
SODIUM BLD-SCNC: 135 MMOL/L (ref 132–146)
TOTAL PROTEIN: 6.1 G/DL (ref 6.4–8.3)
WBC # BLD: 7.4 E9/L (ref 4.5–11.5)

## 2022-10-25 PROCEDURE — 6370000000 HC RX 637 (ALT 250 FOR IP): Performed by: INTERNAL MEDICINE

## 2022-10-25 PROCEDURE — 2580000003 HC RX 258: Performed by: FAMILY MEDICINE

## 2022-10-25 PROCEDURE — 6370000000 HC RX 637 (ALT 250 FOR IP): Performed by: STUDENT IN AN ORGANIZED HEALTH CARE EDUCATION/TRAINING PROGRAM

## 2022-10-25 PROCEDURE — 37799 UNLISTED PX VASCULAR SURGERY: CPT

## 2022-10-25 PROCEDURE — 36415 COLL VENOUS BLD VENIPUNCTURE: CPT

## 2022-10-25 PROCEDURE — 99291 CRITICAL CARE FIRST HOUR: CPT | Performed by: INTERNAL MEDICINE

## 2022-10-25 PROCEDURE — 6360000002 HC RX W HCPCS: Performed by: FAMILY MEDICINE

## 2022-10-25 PROCEDURE — 2000000000 HC ICU R&B

## 2022-10-25 PROCEDURE — 80053 COMPREHEN METABOLIC PANEL: CPT

## 2022-10-25 PROCEDURE — 6370000000 HC RX 637 (ALT 250 FOR IP): Performed by: NURSE PRACTITIONER

## 2022-10-25 PROCEDURE — 85025 COMPLETE CBC W/AUTO DIFF WBC: CPT

## 2022-10-25 PROCEDURE — 2700000000 HC OXYGEN THERAPY PER DAY

## 2022-10-25 PROCEDURE — 6360000002 HC RX W HCPCS

## 2022-10-25 PROCEDURE — 99233 SBSQ HOSP IP/OBS HIGH 50: CPT | Performed by: INTERNAL MEDICINE

## 2022-10-25 PROCEDURE — 83735 ASSAY OF MAGNESIUM: CPT

## 2022-10-25 PROCEDURE — 84100 ASSAY OF PHOSPHORUS: CPT

## 2022-10-25 RX ORDER — MEXILETINE HYDROCHLORIDE 150 MG/1
150 CAPSULE ORAL EVERY 8 HOURS SCHEDULED
Status: DISCONTINUED | OUTPATIENT
Start: 2022-10-25 | End: 2022-10-26 | Stop reason: HOSPADM

## 2022-10-25 RX ADMIN — AMIODARONE HYDROCHLORIDE 200 MG: 200 TABLET ORAL at 20:58

## 2022-10-25 RX ADMIN — MEXILETINE HYDROCHLORIDE 150 MG: 150 CAPSULE ORAL at 10:22

## 2022-10-25 RX ADMIN — MEXILETINE HYDROCHLORIDE 150 MG: 150 CAPSULE ORAL at 21:00

## 2022-10-25 RX ADMIN — METOPROLOL SUCCINATE 25 MG: 25 TABLET, EXTENDED RELEASE ORAL at 10:23

## 2022-10-25 RX ADMIN — AMIODARONE HYDROCHLORIDE 200 MG: 200 TABLET ORAL at 05:43

## 2022-10-25 RX ADMIN — SODIUM CHLORIDE, PRESERVATIVE FREE 10 ML: 5 INJECTION INTRAVENOUS at 21:00

## 2022-10-25 RX ADMIN — Medication 400 MG: at 10:23

## 2022-10-25 RX ADMIN — LORAZEPAM 0.5 MG: 2 INJECTION INTRAMUSCULAR; INTRAVENOUS at 00:55

## 2022-10-25 RX ADMIN — TRAZODONE HYDROCHLORIDE 50 MG: 50 TABLET ORAL at 20:58

## 2022-10-25 RX ADMIN — SODIUM CHLORIDE, PRESERVATIVE FREE 10 ML: 5 INJECTION INTRAVENOUS at 10:23

## 2022-10-25 RX ADMIN — ENOXAPARIN SODIUM 40 MG: 100 INJECTION SUBCUTANEOUS at 10:23

## 2022-10-25 RX ADMIN — MEXILETINE HYDROCHLORIDE 150 MG: 150 CAPSULE ORAL at 14:22

## 2022-10-25 RX ADMIN — PANTOPRAZOLE SODIUM 40 MG: 40 TABLET, DELAYED RELEASE ORAL at 10:22

## 2022-10-25 RX ADMIN — AMIODARONE HYDROCHLORIDE 200 MG: 200 TABLET ORAL at 14:22

## 2022-10-25 ASSESSMENT — PAIN SCALES - GENERAL
PAINLEVEL_OUTOF10: 0
PAINLEVEL_OUTOF10: 0

## 2022-10-25 NOTE — PROGRESS NOTES
[address] and [address] Electrophysiology  Inpatient Progess Report  PATIENT: Jeanne Castro RECORD NUMBER: [id number]  DATE OF SERVICE:  10/25/2022  ATTENDING ELECTROPHYSIOLOGIST: Rae Clark MD  PRIMARY ELECTROPHYSIOLOGIST: Rae Clark MD   REFERRING PHYSICIAN: Dianne Rodriguez DO and Franklin Finn DO  CHIEF COMPLAINT: ICD shocks    HPI: This is a 64 y.o. male with a history of CAD s/p CABG x 5 in 2007 s/p LHC 3/2021 [facility] showed severe disease in native vessels, all the grafts were occluded, recommended medical therapy only,  ICMP on TTE 5/2022,, EF 20-25%, mod MR, RV function reduced, RHC 5/16/22 >> increased biventricular filling pressures, mod PH and PCWP 26 mmHg, CI 2.73, ICD placed in 2010 with generator change in 2018, HTN, HLD, NSVT on interrogation 7/24/2022 (Toprol XL increased to 25 mg BID), CKD, PHTN, DM, previously a DNR-CCA, hx of intolerance to Entrestro/ACEI/ARB, and medical non compliance who presents to the hospital for change in mental status and recurrent episodes of ventricular tachycardia. The patient was hospitalized on 10/1/2022 with decompensated heart failure. He was discharged to a  skilled nursing facility on 10/12/2022. He had an altercation with a staff at the facility and was transferred to the emergency room for change in mental status. On interrogation of his ICD it appeared that the patient had multiple episodes of VT requiring ICD discharges. The patient himself is very somnolent this morning. He does awaken when spoken to but does not answer any questions except that \"he does not feel well\". He does not remember any ICD shocks. He complains of generalized pain but no dyspnea. On review of the emergency room notes it appears that the patient refused any imaging or testing although he was acceptable of any treatments including transfer to the intensive care unit.   On transfer to the CVICU here, the patient has had recurrent episodes of nonsustained VT but no ICD discharges. Cardiac electrophysiology service is consulted for recurrent VT and ICD shocks. 10/24/2022: He last had a run of nonsustained PMVT at 1956 yesterday, today he remains on IV Lidocaine at 1mg/min and is somewhat confused, yet tolerating PO Amiodarone. 10/25/2022: He has been free of recurrent sustained VT, now with plans for discharge home with hospice and discontinuation of tachy therapy prior to discharge. Continues to tolerate Amiodarone will discontinue IV Lidocaine today.      Patient Active Problem List    Diagnosis Date Noted    Polymorphic ventricular tachycardia 10/23/2022     Priority: Medium    Azotemia 10/23/2022     Priority: Medium    Hypoalbuminemia 10/23/2022     Priority: Medium    Elevated LFTs 10/23/2022     Priority: Medium    Stage 3 chronic kidney disease ([address] 10/23/2022     Priority: Medium    HFrEF (heart failure with reduced ejection fraction) ([facility] 10/23/2022     Priority: Medium    Acute cystitis without hematuria 10/22/2022     Priority: Medium    Acute on chronic diastolic heart failure ([address] 10/22/2022     Priority: Medium    Bladder spasms 10/09/2022     Priority: Medium    Diabetes mellitus ([address] 10/09/2022     Priority: Medium    Palliative care encounter 10/05/2022     Priority: Medium    Goals of care, counseling/discussion 10/05/2022     Priority: Medium    DNR (do not resuscitate) discussion 10/05/2022     Priority: Medium    Acute on chronic HFrEF (heart failure with reduced ejection fraction) ([address] 10/02/2022     Priority: Medium    VHD (valvular heart disease) 10/02/2022     Priority: Medium    Congestive heart failure of unknown etiology ([address] 10/01/2022     Priority: Medium    Failure to thrive in adult 07/21/2022     Priority: Medium    Chest pain, atypical 02/24/2021    Implantable cardioverter-defibrillator (ICD) at end of device life      Overview Note:     Replacing Deprecated Diagnoses Presence of automatic cardioverter/defibrillator (AICD) 09/01/2016    CHF (congestive heart failure) ([facility])     S/P CABG x 4     CAD (coronary artery disease)     MI, old     Hyperlipidemia     Hypertension     Cardiomyopathy ([facility]     Ventricular tachycardia     Pancreatitis        Past Medical History:   Diagnosis Date    CAD (coronary artery disease)     Cardiomyopathy ([facility]     CHF (congestive heart failure) ([facility]     Diabetes mellitus ([facility]     Diverticulitis     Hyperlipidemia     Hypertension     MI, old     Pancreatitis     Prostate cancer ([facility]     prostate    S/P CABG x 4     Skin cancer     Ventricular tachycardia    PMH  1. CAD  Status post CABG 12/24/2007 (LIMA-LAD, SVG-RI, OM1, OM2, PDA). Stress test ordered 3/5/2021: Cancelled due to patient having shingles. [facility], 3/16/2021: LMT: severe diffuse disease. LAD: severe diffuse disease. Additional Comment: Moderate caliber vessel with  in the proximal portion. The vessel is fed by patent LIMA graft. The distal portion is small in caliber and wraps the apex. There are L->R collaterals. LCX: severe diffuse disease. Additional Comment: Moderate caliber non-dominant vessel which is fed by SVG->RI/OM1. RAMUS: ostial Ramus - . Additional Comment: Fills in the mid-distal portion from SVG->RI graft. RCA Status: Not Applicable. Additional Comment: Occluded at the ostia. GRAFTS: LIMA Graft End to Side to the Left Anterior Descending. Additional Comments: patent. SVG End to Side to the RI. Additional  Comments - patent with mild-moderate disease in the mid-portion; supplies mid-distal LCx and backfills to partially supply LAD. SVG End to Side to the OM-2 Second Obtuse Marginal Branch. Additional Comments - occluded. SVG End to Side to the Right Posterior Descending Artery. Additional Comments - occluded. Impression:- Severe/occlusive native 3 vessel CAD with patent LIMA->LAD, SVG->RI/OM1.  SVG->OM2 is occluded - Native RCA is occluded at the ostia as is the SVG->rPDA. The right is fed by L->R collaterals from the LIMA graft Recommended Treatment: Medical Therapy. 7/2022 Troponin 61-->69  ICMP/Chronic HFrEF   ACC/AHA stage D, NYHA functional class II  TTE 6/27/2012: Mildly dilated LV, LVEF 35%, 1-2+ MR.  TTE: 4/13/2018 (Dr. Pina Nugent):  EF estimated at 30%. The entire apex is severely hypokinetic. Overall ejection fraction severely decreased. Moderate left ventricular concentric hypertrophy noted. There is doppler evidence of stage I diastolic dysfunction. Normal right ventricle structure and function. Physiologic and/or trace mitral regurgitation is present. TTE: 3/15/2021 ([facility]) Exam indication: Abnormal Cardiac Biomarkers There is a resting wall motion abnormality in the territory of the LAD and RCA. - The left ventricle is dilated. Left ventricular systolic function is severely decreased. EF = 26 ± 5% (2D biplane) Grade III left ventricular diastolic dysfunction. The right ventricle is normal in size. Right ventricular systolic function is   moderately decreased. - The left atrial cavity is severely dilated. - The right atrial cavity is dilated. There is moderately severe (3+) mitral valve regurgitation due to restricted leaflet motion likely related to ischemic heart disease. TTE: 5/13/2022 ([facility]): - Technically difficult exam due to body habitus and suboptimal positioning. - Exam indication: Evaluation of known heart failure to guide therapy - The left ventricle is severely dilated. Left ventricular systolic function is severely decreased. EF = 20 ± 5% (visual est.) Definity contrast used for endocardial border detection. Left ventricular diastolic function was not evaluated due to an arrhythmia. The right ventricle is dilated. Right ventricular systolic function is moderately to severely decreased. - The left atrial cavity is severely dilated. - The right atrial cavity is dilated. There is moderate (2+) mitral valve regurgitation. - -Peak/mean gradients of 15/7 mmHg, gradients averaged due to arrhythmia. -Prior EF reported as 26% (3/2021). Exam was compared with the prior  echocardiographic exam performed on  3/14/2021. There is no significant change. VHD: Moderate-severe ischemic MR (patient was recommended for further evaluation with EBONI and possible consideration of a clip 6/3/2022 at F --> patient declined. 7/2022 p-BNP 54736  Status post ICD (implanted 4/19/2010 for primary prevention) with generator change 4/19/2018. ([facility])  ICD interrogation 7/4/2022: AV pacing 6.7%, RV pacing 3.2%, AT/AF burden 1%. Appropriate VT therapy: Successful. VT episodes detected on 7/3/2022 at 10:59 PM.  Duration 14 seconds with an average V rate of 181 bpm.  EGM is C/W VT and VT zone treated with burst ATP x1 which terminated arrhythmia. Telephone encounter: EP 7/5/2022 Toprol-XL was increased to 25 mg twice daily. PHTN:  RHC: 5/16/2022: [facility]: Elevated biventricular filling pressures (RA 10, PCWP 26), moderate pulmonary hypertension (most likely postcapillary), preserved cardiac index by assumed Sunni 2.73. Hypertension  Hyperlipidemia  Type 2 diabetes mellitus  History of NSVT  Anemia  History of prostate cancer specifics unknown. History of diverticulitis  History of pancreatitis  Medical noncompliance  History of hernia repair, elbow surgery, bladder surgery. History of skin melanoma (nose and right-sided cheek) status post excision 2019.   DNR-CCA       Family History   Problem Relation Age of Onset    Cancer Father         liver       Social History     Tobacco Use    Smoking status: Never    Smokeless tobacco: Never    Tobacco comments:     used to smoke occ cigar   Substance Use Topics    Alcohol use: No       Current Facility-Administered Medications   Medication Dose Route Frequency Provider Last Rate Last Admin    mexiletine (MEXITIL) capsule 150 mg  150 mg Oral 3 times per day Krystal Bernabe APRN - CNP        bumetanide (BUMEX) injection 1 mg  1 mg IntraVENous Once Yesy Onelia Arellano MD        metoprolol succinate (TOPROL XL) extended release tablet 25 mg  25 mg Oral Daily Clement Naldo Arellano MD        LORazepam (ATIVAN) injection 0.5 mg  0.5 mg IntraVENous Q6H PRN Rita A Costlow, APRN - CNP   0.5 mg at 10/25/22 0055    morphine (PF) injection 1 mg  1 mg IntraVENous Q4H PRN Rita A Costlow, APRN - CNP   1 mg at 10/24/22 1850    glycopyrrolate (ROBINUL) injection 0.2 mg  0.2 mg IntraVENous Q6H PRN Rita A Costlow, APRN - CNP        sodium chloride flush 0.9 % injection 10 mL  10 mL IntraVENous 2 times per day Meriel Pleasure, DO   10 mL at 10/24/22 2055    sodium chloride flush 0.9 % injection 10 mL  10 mL IntraVENous PRN Meriel Pleasure, DO        0.9 % sodium chloride infusion   IntraVENous PRN Meriel Pleasure, DO        enoxaparin (LOVENOX) injection 40 mg  40 mg SubCUTAneous Daily Meriel Pleasure, DO   40 mg at 10/24/22 0830    promethazine (PHENERGAN) tablet 12.5 mg  12.5 mg Oral Q6H PRN Meriel Pleasure, DO        Or    ondansetron [facility]) injection 4 mg  4 mg IntraVENous Q6H PRN Meriel Pleasure, DO        polyethylene glycol (GLYCOLAX) packet 17 g  17 g Oral Daily PRN Meriel Pleasure, DO        acetaminophen (TYLENOL) tablet 650 mg  650 mg Oral Q6H PRN Meriel Pleasure, DO        Or    acetaminophen (TYLENOL) suppository 650 mg  650 mg Rectal Q6H PRN Meriel Pleasure, DO        pantoprazole (PROTONIX) tablet 40 mg  40 mg Oral QAM AC Dav Aguilar MD   40 mg at 10/24/22 0530    rosuvastatin (CRESTOR) tablet 20 mg  20 mg Oral Nightly Dav Aguilar MD   20 mg at 10/24/22 2049    traZODone (DESYREL) tablet 50 mg  50 mg Oral Nightly Dav Aguilar MD   50 mg at 10/24/22 2050    lidocaine 2000 mg in dextrose 5% 500 mL infusion  1 mg/min IntraVENous Continuous Chavez Nipple, APRN - CNP 15 mL/hr at 10/24/22 2219 1 mg/min at 10/24/22 2219    amiodarone (CORDARONE) tablet 200 mg  200 mg Oral q8h Rexann Aase, MD   200 mg at 10/25/22 0543    magnesium oxide (MAG-OX) tablet 400 mg  400 mg Oral Daily Rexann Aase, MD   400 mg at 10/24/22 0800    [Held by provider] tamsulosin (FLOMAX) capsule 0.4 mg  0.4 mg Oral Dinner Rexann Aase, MD            No Known Allergies    ROS: Difficult to obtain from the patient since he does not want to answer questions  PHYSICAL EXAM:   Vitals:    10/25/22 0400 10/25/22 0500 10/25/22 0600 10/25/22 0700   BP: 101/67 90/63 93/64 114/72   Pulse: 66 65 65 60   Resp: 20 28 22 30   Temp: 97.1 °F (36.2 °C)      TempSrc: Temporal      SpO2: 96% 96% 95% 97%   Weight:       Height:          Constitutional: Well-developed, no acute distress, confused, yelling out  Eyes: conjunctivae normal, no xanthelasma   Ears, Nose, Throat: oral mucosa moist, no cyanosis   CV: no JVD or leg edema, laterally displaced PMI with a soft S1 and S2 with left sternal border and apex  Lungs: clear to auscultation bilaterally, normal respiratory effort without used of accessory muscles  Abdomen: soft, non-tender, bowel sounds present, no masses or hepatomegaly   Musculoskeletal: no digital clubbing, no edema   Skin: warm, no rashes   Device site: Left chest free of erosion and migration. I have personally reviewed the laboratory, cardiac diagnostic and radiographic testing as outlined below:    Data:    Recent Labs     10/22/22  2146 10/23/22  0500 10/24/22  0348   WBC 5.9 5.8 6.1   HGB 12.4* 11.9* 12.2*   HCT 40.7 38.3 39.5    162 109*     Recent Labs     10/22/22  2146 10/23/22  0500 10/24/22  0348    141 138   K 4.0 4.4 4.8    105 104   CO2 29 25 23   BUN 36* 36* 44*   CREATININE 1.3* 1.2 1.4*   CALCIUM 9.1 9.0 8.8      Lab Results   Component Value Date/Time    MG 2.4 10/24/2022 03:48 AM     No results for input(s): TSH in the last 72 hours. Recent Labs     10/22/22  2146   INR 1.5     Telemetry:  currently  alternating with Vs  complexes and PVCs     CXR 10/22/22:    FINDINGS:   Pulmonary vascular congestive changes.   Small right pleural effusion. No   pneumothorax. Cardiomegaly. The osseous structures are without acute   process. Sternotomy wires and left-sided transvenous pacer device           Impression   Cardiomegaly with pulmonary vascular congestive changes. Small right pleural   effusion. This is unchanged compared to 10/01/2022       EKG: AV pacing,NSVT, . Echocardiogram:   5/13/2022 ([facility]): - Technically difficult exam due to body habitus and suboptimal positioning. The left ventricle is severely dilated. Left ventricular systolic function is severely decreased. EF = 20 ± 5% (visual est.) Definity contrast used for   endocardial border detection. Left ventricular diastolic function was not evaluated due to an arrhythmia. The right ventricle is dilated. Right ventricular systolic function is moderately to severely decreased. - The left atrial cavity is severely dilated. - The right atrial cavity is dilated. There is moderate (2+) mitral valve regurgitation. - -Peak/mean gradients of 15/7 mmHg, gradients averaged due to arrhythmia. -Prior EF reported as 26% (3/2021). Cardiac Catheterization:     [facility] (F, 3/16/2021): LMT: _ The LMT has severe diffuse disease. LAD: _ The LAD has severe diffuse disease. Additional Comment: Moderate caliber vessel with  in the proximal portion. The vessel is fed by patent LIMA graft. The distal portion is small in caliber and wraps the apex. There are L->R collaterals. LCX: _ The Circumflex has severe diffuse disease. Additional Comment: Moderate caliber non-dominant vessel which is fed by SVG->RI/OM1. RAMUS: _ The ostial Ramus - . Additional Comment: Fills in the mid-distal portion from SVG->RI graft. RCA: _ RCA Status: Not Applicable. Additional Comment: Occluded at the ostia. GRAFTS: _ Left Internal Mammary Artery Graft End to Side to the Left Anterior Descending. Additional Comments - patent. _ Saphenous Vein Graft End to Side to the Ramus Intermedius .  Additional  Comments - patent with mild-moderate disease in the mid-portion; supplies mid-distal LCx and backfills to partially supply LAD. _ Saphenous Vein Graft End to Side to the OM-2 Second Obtuse Marginal Branch. Additional Comments - occluded. _ Saphenous Vein Graft End to Side to the Right Posterior Descending Artery. Additional Comments - occluded. Impression:- Severe/occlusive native 3 vessel CAD with patent LIMA->LAD, SVG->RI/OM1. SVG->OM2 is occluded - Native RCA is occluded at the ostia as is the SVG->rPDA. The right is fed by L->R collaterals from the LIMA graft   Recommended Treatment: Medical Therapy. Device Interrogation ( 10/22/2022 )  Make/Model Abbott AssValcon*  Mode DDDR 60/120  P wave: 1.3 mV  Impedance: 310 ohms   Threshold: 2.0 V @ 0.5 ms  RV R wave: 11.7 mV  Impedance: 430 ohms   Threshold: 0.5 V @ 0.5 ms  HV impedance--34 ohms  Pacing: A: 11%  RV: 4.9%    Battery Voltage/Longevity:  2.8-3.3 years.      Arrhythmias: 07/03/22 VT rate 181 Tx ATP  09/13/2022 VF rate 226 Tx ATP  09/14/2022 VF rate 218 Tx ATP  09/25/2022 @0502   Tx ATP   09/25/2022@ 546 AM rate 250 bpm Tx ATP, 36J   09/25/2022 @919 244 Tx ATP  09/25/2022 @ 2040 rate 250 bpm Tx ATP    09/29/2022 @ 1316  rate 226 bpm, Tx ATP  09/29/2022 @ 1553 Rate 206 bpm Tx ATP   10/05/2022 rate 240 bpm Tx ATP  10/22/2022 @ 1050  rate 292 Tx 36 J   10/22/2022 @1240 rate 235 bpm, Tx ATP 36J   10/22/2022 @ 1309 rate 222 Tx ATP   10/22/2022 @ 1446 rate 244 bpm ATP, 36J shock  10/22/2022 @ 1036 rate 266 bpm self terminated   10/22/2022@ 2243 rate 255 bpm self terminated   Reprogramming included AV delay increased to reduce RV pacing percentage  Lower rate kept at 60, sensor turned of  Overall device function is normal  All device programmable settings were evaluated per above and in the scanned document, along with iterative adjustments (capture thresholds) to assess and select the most appropriate final programming to provide for consistent delivery of the appropriate therapy and to verify function of the device. I have independently reviewed all of the ECGs and rhythm strips per above     Assessment/Plan:      Ventricular Tachycardia   - First Episode 09/13/2022   - Most recent episode 10/22/22 at 2243  - Amiodarone 200mg TID started 10/23/22, IV Lidocaine started 10/23/2022 currently at 1mg/min   - No recurrence in the last 24 hours. 2. Dual-Chamber ICD in situ  - [place] 2010, primary prevention at that time  - PGR 2018   - Now secondary prevention. 3. HFrEF   - Ischemic   - Decompensated   - NYHA FC III ACC stage C  - LVEF 35% on TTE 06/27/2012  - LVEF  30% on TTE 04/12/2018  - LVEF 20-25% on TTE 05/13/2022  - GDMT: Toprol 25mg daily,Aldactone, Bumex, Hydralzine, Imdur   - last heart failure admission 10/02/2022   - Bumex currently IV   - Cardiology consulted. 4. Coronary artery disease   - Prior CABG 2007 X 5 in 2007  - Last [facility] 03/16/2021 severe CAD, :- Severe/occlusive native 3 vessel CAD with patent LIMA->LAD, SVG->RI/OM1. SVG->OM2 is occluded - Native RCA is occluded at the ostia as is the SVG->rPDA. The right is fed by L->R collaterals from the LIMA graft Recommended Treatment: Medical Therapy.   - BB, Nitrate Stain, ASA (at home)     5. Chronic kidney disease  -serum creatinine -1.4    6. Hx Hypertension  - Now Hypotensive     7. Hyperlipidemia    8. Diabetes    9. Noncompliance in the past    10. DNR CCA  - with plans to enrol in hospice, and discontinue tachy therapy prior to discharge. 11. BPH   - Flomax     Recommendations:    Continue Toprol 25mg daily (home dose 50mg BID) and titrate as BP permits. Stop IV Lidocaine today at noon and start Mexitil 150mg TID,   Agree with Cardiology consult for HFrEF   Noted Hospice involvement with planned discontinuation of tachy therapy prior to hospital discharge with family agreeable to ongoing use of cardiac medications.      I have spent a total of 10 minutes with the patient reviewing the above stated recommendations. And a total of >50% of that time involved face-to-face time providing counseling and or coordination of care with the other providers, reviewing records/tests, counseling/education of the patient, ordering medications/tests/procedures, coordinating care, and documenting clinical information in the EHR. Thank you for allowing me to participate in your patient's care. Please call me if there are any questions or concerns. Discussed with Dr. Diane Peterson. Dileep Nesbitt, APRN - CNP  Cardiac Electrophysiology  [facility]  [facility] [place]: [facility] Electrophysiology  9:05 AM  10/25/2022    Attending Physician's Statement    Patient seen with the ANP. Agree with the findings, assessment and plan. Management plan was discussed. I have personally interviewed the patient, independently performed a focused cardiac examination, reviewed the pertinent laboratory and diagnostic testing with the patient and directly participated in the medical decision-making as noted above with the following additions:     Confused and drowsy. No recurrent VT overnight. Remains on IV Lidocine. Physical exam showed no JVD, RRR, normal S1S2, no murmur, decrease breath sound at base bilaterally, trace edema    Discontinue IV Lidocaine and change to Mexiletine 150 mg every 8 hours. Continue oral Amiodarone loading dose. Continue Toprol XL 25 mg daily if BP allows. Consider turn off tachy therapy of ICD prior to discharge the patient back to home with Hospice. I have spent a total of 35 CCT minutes with the patient and the family reviewing the above stated recommendations.   And a total of >50% of that time involved face-to-face time providing counseling and/or coordination of care with the other providers, reviewing records/tests, counseling/education of the patient, ordering medications/tests/procedures, coordinating care, and documenting clinical information in the EHR.     Cody Leal MD  Cardiac Electrophysiology  [address]  [facility] [place]: [place] [facility]rophys[facility]  4:55 PM  10/25/2022

## 2022-10-25 NOTE — PROGRESS NOTES
INPATIENT CARDIOLOGY CONSULT follow-up visit    Name: Suresh Espinosa    Age: 64 y.o. Date of Admission: 10/23/2022  3:21 AM    Date of Service: 10/25/2022    Reason for Consultation: No chief complaint on file. Referring Physician: Juancarlos Morrison, DO    No significant cardiac event overnight. PMH  1. CAD  Status post CABG 12/24/2007 (LIMA-LAD, SVG-RI, OM1, OM2, PDA). Stress test ordered 3/5/2021: Cancelled due to patient having shingles. [facility], 3/16/2021: LMT: severe diffuse disease. LAD: severe diffuse disease. Additional Comment: Moderate caliber vessel with  in the proximal portion. The vessel is fed by patent LIMA graft. The distal portion is small in caliber and wraps the apex. There are L->R collaterals. LCX: severe diffuse disease. Additional Comment: Moderate caliber non-dominant vessel which is fed by SVG->RI/OM1. RAMUS: ostial Ramus - . Additional Comment: Fills in the mid-distal portion from SVG->RI graft. RCA Status: Not Applicable. Additional Comment: Occluded at the ostia. GRAFTS: LIMA Graft End to Side to the Left Anterior Descending. Additional Comments: patent. SVG End to Side to the RI. Additional  Comments - patent with mild-moderate disease in the mid-portion; supplies mid-distal LCx and backfills to partially supply LAD. SVG End to Side to the OM-2 Second Obtuse Marginal Branch. Additional Comments - occluded. SVG End to Side to the Right Posterior Descending Artery. Additional Comments - occluded. Impression:- Severe/occlusive native 3 vessel CAD with patent LIMA->LAD, SVG->RI/OM1. SVG->OM2 is occluded - Native RCA is occluded at the ostia as is the SVG->rPDA. The right is fed by L->R collaterals from the LIMA graft Recommended Treatment: Medical Therapy. 7/2022 Troponin 61-->69  ICMP/Chronic HFrEF   ACC/AHA stage D, NYHA functional class II  TTE 6/27/2012: Mildly dilated LV, LVEF 35%, 1-2+ MR.  TTE: 4/13/2018 (Dr. Minh Chance):  EF estimated at 30%.  The entire apex is severely hypokinetic. Overall ejection fraction severely decreased. Moderate left ventricular concentric hypertrophy noted. There is doppler evidence of stage I diastolic dysfunction. Normal right ventricle structure and function. Physiologic and/or trace mitral regurgitation is present. TTE: 3/15/2021 ([facility]) Exam indication: Abnormal Cardiac Biomarkers There is a resting wall motion abnormality in the territory of the LAD and RCA. - The left ventricle is dilated. Left ventricular systolic function is severely decreased. EF = 26 ± 5% (2D biplane) Grade III left ventricular diastolic dysfunction. The right ventricle is normal in size. Right ventricular systolic function is   moderately decreased. - The left atrial cavity is severely dilated. - The right atrial cavity is dilated. There is moderately severe (3+) mitral valve regurgitation due to restricted leaflet motion likely related to ischemic heart disease. TTE: 5/13/2022 ([facility]): - Technically difficult exam due to body habitus and suboptimal positioning. - Exam indication: Evaluation of known heart failure to guide therapy - The left ventricle is severely dilated. Left ventricular systolic function is severely decreased. EF = 20 ± 5% (visual est.) Definity contrast used for endocardial border detection. Left ventricular diastolic function was not evaluated due to an arrhythmia. The right ventricle is dilated. Right ventricular systolic function is moderately to severely decreased. - The left atrial cavity is severely dilated. - The right atrial cavity is dilated. There is moderate (2+) mitral valve regurgitation. - -Peak/mean gradients of 15/7 mmHg, gradients averaged due to arrhythmia. -Prior EF reported as 26% (3/2021). Exam was compared with the prior  echocardiographic exam performed on  3/14/2021. There is no significant change.  VHD: Moderate-severe ischemic MR (patient was recommended for further evaluation with EBONI and possible consideration of a clip 6/3/2022 at [facility] --> patient declined. 7/2022 p-BNP 88329  Status post ICD (implanted 4/19/2010 for primary prevention) with generator change 4/19/2018. ([facility])  ICD interrogation 7/4/2022: AV pacing 6.7%, RV pacing 3.2%, AT/AF burden 1%. Appropriate VT therapy: Successful. VT episodes detected on 7/3/2022 at 10:59 PM.  Duration 14 seconds with an average V rate of 181 bpm.  EGM is C/W VT and VT zone treated with burst ATP x1 which terminated arrhythmia. Telephone encounter: EP 7/5/2022 Toprol-XL was increased to 25 mg twice daily. PHTN:  RHC: 5/16/2022: [facility]: Elevated biventricular filling pressures (RA 10, PCWP 26), moderate pulmonary hypertension (most likely postcapillary), preserved cardiac index by assumed Sunni 2.73. Hypertension  Hyperlipidemia  Type 2 diabetes mellitus  History of NSVT  Anemia  History of prostate cancer specifics unknown. History of diverticulitis  History of pancreatitis  Medical noncompliance  History of hernia repair, elbow surgery, bladder surgery. History of skin melanoma (nose and right-sided cheek) status post excision 2019.   DNR-CCA          Past Medical History:  Past Medical History:   Diagnosis Date    CAD (coronary artery disease)     Cardiomyopathy ([address]     CHF (congestive heart failure) ([address]     Diabetes mellitus ([address]     Diverticulitis     Hyperlipidemia     Hypertension     MI, old     Pancreatitis     Prostate cancer ([address]     prostate    S/P CABG x 4     Skin cancer     Ventricular tachycardia        Past Surgical History:  Past Surgical History:   Procedure Laterality Date    BLADDER SURGERY      CARDIAC DEFIBRILLATOR PLACEMENT  04/19/2018    D-ICD GENT CHANGE    [facility]    CARDIAC PACEMAKER PLACEMENT  04/2010    [facility]     COLONOSCOPY      CORONARY ARTERY BYPASS GRAFT  12/24/2007    ELBOW SURGERY      HERNIA REPAIR      KIDNEY SURGERY      SKIN CANCER EXCISION  2019    nose and cheek       Family History:  Family History Problem Relation Age of Onset    Cancer Father         liver       Social History:  Social History     Socioeconomic History    Marital status:      Spouse name: Not on file    Number of children: Not on file    Years of education: Not on file    Highest education level: Not on file   Occupational History    Not on file   Tobacco Use    Smoking status: Never    Smokeless tobacco: Never    Tobacco comments:     used to smoke occ cigar   Vaping Use    Vaping Use: Never used   Substance and Sexual Activity    Alcohol use: No    Drug use: No    Sexual activity: Not Currently   Other Topics Concern    Not on file   Social History Narrative    Not on file     Social Determinants of Health     Financial Resource Strain: Not on file   Food Insecurity: Not on file   Transportation Needs: Not on file   Physical Activity: Not on file   Stress: Not on file   Social Connections: Not on file   Intimate Partner Violence: Not on file   Housing Stability: Not on file       Allergies:  No Known Allergies    Home Medications:  Prior to Admission medications    Medication Sig Start Date End Date Taking? Authorizing Provider   rosuvastatin (CRESTOR) 20 MG tablet Take 1 tablet by mouth nightly 10/12/22   Miah Pillion, DO   metoprolol succinate (TOPROL XL) 50 MG extended release tablet Take 1 tablet by mouth in the morning and at bedtime 10/12/22   Miah Pillion, DO   bumetanide (BUMEX) 1 MG tablet Take 1 tablet by mouth 2 times daily 10/12/22   Miah Pillion, DO   spironolactone (ALDACTONE) 25 MG tablet Take 0.5 tablets by mouth daily 10/13/22   Miah Pillion, DO   miconazole nitrate 2 % OINT Apply topically 2 times daily 10/12/22   Ashutosh Quintanilla, DO   ammonium lactate (LAC-HYDRIN) 12 % lotion Apply topically as needed.  10/13/22   Ashutosh Quintanilla, DO   mirtazapine (REMERON) 30 MG tablet Take 30 mg by mouth nightly    Historical Provider, MD   isosorbide dinitrate (ISORDIL) 20 MG tablet Take 1 tablet by mouth in the morning and 1 tablet at noon and 1 tablet before bedtime. 7/23/22   Frankie Jaime MD   omeprazole (PRILOSEC) 40 MG delayed release capsule Take 1 capsule by mouth in the morning. 7/23/22   Frankie Jaime MD   traZODone (DESYREL) 50 MG tablet TAKE 1 TABLET BY MOUTH AT BEDTIME 6/21/22   Historical Provider, MD   hydrALAZINE (APRESOLINE) 25 MG tablet Take 1 tablet by mouth in the morning and at bedtime 6/30/22   Carolyn Cuevas,    isosorbide mononitrate (IMDUR) 30 MG extended release tablet Take 1 tablet by mouth daily 6/30/22 7/23/22  Carolynjoseph Cuevas DO   nitroGLYCERIN (NITROSTAT) 0.4 MG SL tablet Place 1 tablet under the tongue every 5 minutes as needed for Chest pain 4/26/22   Carolyn Cuevas, DO   acetaminophen (TYLENOL) 650 MG extended release tablet Take 650 mg by mouth every 8 hours as needed for Pain    Historical Provider, MD   finasteride (PROSCAR) 5 MG tablet take 1 tablet by mouth once daily 1/3/17   Historical Provider, MD   glipiZIDE (GLUCOTROL) 5 MG tablet Take 5 mg by mouth 2 times daily (before meals)  8/30/16   Historical Provider, MD   aspirin 81 MG tablet Take 81 mg by mouth daily.     Historical Provider, MD   tamsulosin (FLOMAX) 0.4 MG capsule Take 0.4 mg by mouth daily     Historical Provider, MD       Current Medications:  Current Facility-Administered Medications   Medication Dose Route Frequency Provider Last Rate Last Admin    mexiletine (MEXITIL) capsule 150 mg  150 mg Oral 3 times per day Ulysess GINA Swain - CNP   150 mg at 10/25/22 1422    bumetanide (BUMEX) injection 1 mg  1 mg IntraVENous Once Andie Ezequiel Arellano MD        metoprolol succinate (TOPROL XL) extended release tablet 25 mg  25 mg Oral Daily Clement Arellano MD   25 mg at 10/25/22 1023    LORazepam (ATIVAN) injection 0.5 mg  0.5 mg IntraVENous Q6H PRN Rita A Costlow, APRN - CNP   0.5 mg at 10/25/22 0055    morphine (PF) injection 1 mg  1 mg IntraVENous Q4H PRN Rita A Costlow, APRN - CNP   1 mg at 10/24/22 3193 glycopyrrolate (ROBINUL) injection 0.2 mg  0.2 mg IntraVENous Q6H PRN Ritakaleb Okeefe, APRN - CNP        sodium chloride flush 0.9 % injection 10 mL  10 mL IntraVENous 2 times per day Estle Quitman, DO   10 mL at 10/25/22 1023    sodium chloride flush 0.9 % injection 10 mL  10 mL IntraVENous PRN Estle Quitman, DO        0.9 % sodium chloride infusion   IntraVENous PRN Estle Quitman, DO        enoxaparin (LOVENOX) injection 40 mg  40 mg SubCUTAneous Daily Estle Quitman, DO   40 mg at 10/25/22 1023    promethazine (PHENERGAN) tablet 12.5 mg  12.5 mg Oral Q6H PRN Estle Quitman, DO        Or    ondansetron [facility]) injection 4 mg  4 mg IntraVENous Q6H PRN Estle Quitman, DO        polyethylene glycol (GLYCOLAX) packet 17 g  17 g Oral Daily PRN Estle Quitman, DO        acetaminophen (TYLENOL) tablet 650 mg  650 mg Oral Q6H PRN [facility] Quitman, DO        Or    acetaminophen (TYLENOL) suppository 650 mg  650 mg Rectal Q6H PRN Estle Quitman, DO        pantoprazole (PROTONIX) tablet 40 mg  40 mg Oral QAM AC Dagoberto Snider MD   40 mg at 10/25/22 1022    rosuvastatin (CRESTOR) tablet 20 mg  20 mg Oral Nightly Dagoberto Snider MD   20 mg at 10/24/22 2049    traZODone (DESYREL) tablet 50 mg  50 mg Oral Nightly Dagoberto Snider MD   50 mg at 10/24/22 2050    amiodarone (CORDARONE) tablet 200 mg  200 mg Oral q8h Edwin Castaneda MD   200 mg at 10/25/22 1422    magnesium oxide (MAG-OX) tablet 400 mg  400 mg Oral Daily Edwin Castaneda MD   400 mg at 10/25/22 1023    [Held by provider] tamsulosin (FLOMAX) capsule 0.4 mg  0.4 mg Oral Dinner Edwin Castaneda MD          sodium chloride             Review of Systems:   Cardiac: As per HPI  General: Denies fever or chills  Pulmonary: As per HPI  HEENT: Denies runny nose  GI: No complaints  : No complaints  Endocrine: Denies night sweats  Musculoskeletal: No complaints  Skin: Dry skin  Neuro: No complaints  Psych: Denies depression    Physical Exam:  /64   Pulse 60   Temp 97.3 °F (36.3 °C) (Temporal)   Resp 25   Ht 5' 10\" (1.778 m)   Wt 203 lb 9.6 oz (92.4 kg)   SpO2 95%   BMI 29.21 kg/m²   Wt Readings from Last 3 Encounters:   10/23/22 203 lb 9.6 oz (92.4 kg)   10/22/22 195 lb (88.5 kg)   10/09/22 195 lb 8 oz (88.7 kg)       Appearance: Alert and oriented x3 not in acute distress. Skin: Dry skin  Head: Atraumatic  Eyes: Intact extraocular muscles   ENMT: Mucous membranes are moist  Neck: Supple  Lungs: Clear to auscultation  Cardiac: Normal S1 and S2  Abdomen: Protuberant  Extremities: Intact range of motion  Neurologic: No focal neurological deficits  Peripheral Pulses: 2+ peripheral pulses      Intake/Output:    Intake/Output Summary (Last 24 hours) at 10/25/2022 1453  Last data filed at 10/25/2022 1400  Gross per 24 hour   Intake 950.83 ml   Output 700 ml   Net 250.83 ml     I/O this shift:  In: 270 [P.O.:180; I.V.:90]  Out: 100 [Urine:100]      Laboratory Tests:  Recent Labs     10/22/22  2146 10/22/22  2217 10/23/22  0500 10/24/22  0348 10/25/22  1200     --  141 138 135   K 4.0  --  4.4 4.8 5.4*     --  105 104 100   CO2 29  --  25 23 20*   BUN 36*  --  36* 44* 59*   CREATININE 1.3*  --  1.2 1.4* 2.0*   GLUCOSE 63*   < > 66* 77 94   CALCIUM 9.1  --  9.0 8.8 8.8    < > = values in this interval not displayed.      Lab Results   Component Value Date/Time    MG 2.6 10/25/2022 12:00 PM    MG 2.4 10/24/2022 03:48 AM    MG 2.3 10/23/2022 07:40 AM     Recent Labs     10/23/22  0500 10/24/22  0348 10/25/22  1200   ALKPHOS 229* 259* 237*   ALT 49* 79* 110*   AST 49* 93* 109*   PROT 6.1* 6.0* 6.1*   BILITOT 0.7 0.7 0.7   LABALBU 3.3* 3.2* 3.2*     Recent Labs     10/23/22  0500 10/24/22  0348 10/25/22  1200   WBC 5.8 6.1 7.4   RBC 4.66 4.83 5.15   HGB 11.9* 12.2* 12.9   HCT 38.3 39.5 41.5   MCV 82.2 81.8 80.6   MCH 25.5* 25.3* 25.0*   MCHC 31.1* 30.9* 31.1*   RDW 18.0* 17.6* 18.0*    109* 136   MPV 13.0* NOT CALC NOT CALC     Lab Results   Component Value Date    CKTOTAL 113 04/08/2014    CKMB 2.8 04/08/2014    TROPONINI 0.03 02/24/2021    TROPONINI <0.01 04/14/2014    TROPONINI <0.01 04/08/2014     Recent Labs     10/22/22  2146 10/22/22  2242   TROPHS 66* 64*     Lab Results   Component Value Date    INR 1.5 10/22/2022    INR 1.2 04/08/2014    PROTIME 17.4 (H) 10/22/2022    PROTIME 13.3 (H) 04/08/2014     Lab Results   Component Value Date    TSH 2.500 10/01/2022    TSH 1.330 09/20/2021    TSH 1.800 07/31/2020     Lab Results   Component Value Date    LABA1C 5.9 (H) 10/01/2022    LABA1C 5.5 09/20/2021    LABA1C 6.3 (H) 07/31/2020     No results found for: EAG  Lab Results   Component Value Date    CHOL 105 10/01/2022    CHOL 224 (H) 09/20/2021    CHOL 296 (H) 08/12/2014     Lab Results   Component Value Date    TRIG 58 10/01/2022    TRIG 361 (H) 09/20/2021    TRIG 1,001 (H) 08/12/2014     Lab Results   Component Value Date    HDL 40 10/01/2022    HDL 33 09/20/2021    HDL 25 08/12/2014     Lab Results   Component Value Date    LDLCALC 53 10/01/2022    LDLCALC 119 (H) 09/20/2021    LDLCALC - (AA) 08/12/2014     Lab Results   Component Value Date    LABVLDL 12 10/01/2022    LABVLDL 72 09/20/2021    LABVLDL - (AA) 08/12/2014     No results found for: [facility]  Recent Labs     10/22/22  2146   PROBNP 30,751*       Cardiac Tests:        TTE: 3/15/2021 ([facility]) Exam indication: Abnormal Cardiac Biomarkers There is a resting wall motion abnormality in the territory of the LAD and RCA. - The left ventricle is dilated. Left ventricular systolic function is severely decreased. EF = 26 ± 5% (2D biplane) Grade III left ventricular diastolic dysfunction. The right ventricle is normal in size. Right ventricular systolic function is   moderately decreased. - The left atrial cavity is severely dilated. - The right atrial cavity is dilated. There is moderately severe (3+) mitral valve regurgitation due to restricted leaflet motion likely related to ischemic heart disease.    TTE: 5/13/2022 ([facility]): - Technically difficult exam due to body habitus and suboptimal positioning. - Exam indication: Evaluation of known heart failure to guide therapy - The left ventricle is severely dilated. Left ventricular systolic function is severely decreased. EF = 20 ± 5% (visual est.) Definity contrast used for endocardial border detection. Left ventricular diastolic function was not evaluated due to an arrhythmia. The right ventricle is dilated. Right ventricular systolic function is moderately to severely decreased. - The left atrial cavity is severely dilated. - The right atrial cavity is dilated. There is moderate (2+) mitral valve regurgitation. - -Peak/mean gradients of 15/7 mmHg, gradients averaged due to arrhythmia. -Prior EF reported as 26% (3/2021). Exam was compared with the prior  echocardiographic exam performed on  3/14/2021. There is no significant change. VHD: Moderate-severe ischemic MR (patient was recommended for further evaluation with EBONI and possible consideration of a clip 6/3/2022 at [facility] --> patient declined. [facility]: 5/16/2022: [facility]: Elevated biventricular filling pressures (RA 10, PCWP 26), moderate pulmonary hypertension (most likely postcapillary), preserved cardiac index by assumed Sunni 2.73. [facility], 3/16/2021: LMT: severe diffuse disease. LAD: severe diffuse disease. Additional Comment: Moderate caliber vessel with  in the proximal portion. The vessel is fed by patent LIMA graft. The distal portion is small in caliber and wraps the apex. There are L->R collaterals. LCX: severe diffuse disease. Additional Comment: Moderate caliber non-dominant vessel which is fed by SVG->RI/OM1. RAMUS: ostial Ramus - . Additional Comment: Fills in the mid-distal portion from SVG->RI graft. RCA Status: Not Applicable. Additional Comment: Occluded at the ostia. GRAFTS: LIMA Graft End to Side to the Left Anterior Descending. Additional Comments: patent. SVG End to Side to the RI.  Additional  Comments - patent with mild-moderate disease in the mid-portion; supplies mid-distal LCx and backfills to partially supply LAD. SVG End to Side to the OM-2 Second Obtuse Marginal Branch. Additional Comments - occluded. SVG End to Side to the Right Posterior Descending Artery. Additional Comments - occluded. Impression:- Severe/occlusive native 3 vessel CAD with patent LIMA->LAD, SVG->RI/OM1. SVG->OM2 is occluded - Native RCA is occluded at the ostia as is the SVG->rPDA. The right is fed by L->R collaterals from the LIMA graft Recommended Treatment: Medical Therapy. [facility] 2021  Impression:- Severe/occlusive native 3 vessel CAD with patent LIMA->LAD,   SVG->RI/OM1. SVG->OM2 is occluded   - Native RCA is occluded at the ostia as is the SVG->rPDA. The right is fed by   L->R collaterals from the LIMA graft     Recommended Treatment: Medical Therapy. Plan: - Recommend aggressive medical therapy for severe coronary artery disease   - start high-intensity statin   - maximize anti-anginal and heart failure therapy     CXR reviewed: The ASCVD Risk score (Roxy DK, et al., 2019) failed to calculate for the following reasons: The patient has a prior MI or stroke diagnosis    ASSESSMENT / PLAN:    Recurrent episodes of rapid polymorphic VT with  appropriate ICD discharges   On mexiletine and amiodarone  Also on p.o. beta-blocker  EP is following and managing recurrent VT  Monitor electrolyte closely and keep potassium at 4 magnesium at 2  Agree with palliative consultation. 2.   Dual-chamber ICD in situ implanted in 2010, ICD generator change in 2018   Per device interrogation device is functioning appropriately  EP following. 3.   Complex coronary artery disease with severe ischemic cardiomyopathy  status post CABG x5 with LIMA to LAD, SVG to OM1, SVG to OM 2, SVG to ramus intermedius, and SVG to RPDA in 2007. [facility] 2021  Impression:- Severe/occlusive native 3 vessel CAD with patent LIMA->LAD,   SVG->RI/OM1. SVG->OM2 is occluded   - Native RCA is occluded at the ostia as is the SVG->rPDA. The right is fed by   L->R collaterals from the LIMA graft   Medical Therapy was recommended with plans to optimize high-intensity statin as well as maximize anti-anginal and heart failure therapy   Management of management of ischemic heart disease has been very difficult due to tenuous blood pressure  Will continue current low-dose beta-blocker for now and uptitrate as blood pressure allows  If blood pressure can tolerate we will add Imdur  Up titration of statin has also been limited due to elevated liver enzymes  Hold statin if liver function worsens  Resume aspirin if there is no contraindication    4.    HFrEF, LVEF of 15 to 20% with moderate pulmonary hypertension  Hold Bumex given worsening kidney function  Strict in and out monitor  Blood pressure has been on the low side and subsequently it has been difficult to uptitrate guideline directed medical therapy  Given hypotension, consider switching from Flomax to finasteride in order to make room for up titration of guideline directed medical therapy  Strict in and out monitoring, daily weight, low-salt diet and keep potassium at 4 magnesium at 2  Once blood pressure stabilizes, resume home dose BiDil/hydralazine and eventually add Aldactone if blood pressure can allow and kidney function are stable    5. Chronic kidney disease--serum creatinine   Renal function is worsening  Avoid diuresis for now and monitor BUN/creatinine closely    6. Hypertension   Now on blood pressure on the low side  Management as mentioned above  7. Hyperlipidemia  Holding statin given worsening liver enzyme    8. Diabetes  Management per primary  9. Noncompliance in the past     10. Pulmonary hypertension  Likely due to left heart disease  Management as mentioned above    11. DNR CCA                  Thank you for allowing me to participate in your patient's care.  Please feel free to contact me if you have any questions or concerns.     Vinnie Koroma MD  [facility] ([facility]) Cardiology

## 2022-10-25 NOTE — PLAN OF CARE
Problem: Chronic Conditions and Co-morbidities  Goal: Patient's chronic conditions and co-morbidity symptoms are monitored and maintained or improved  Outcome: Progressing     Problem: Discharge Planning  Goal: Discharge to home or other facility with appropriate resources  Outcome: Progressing     Problem: Pain  Goal: Verbalizes/displays adequate comfort level or baseline comfort level  10/24/2022 2139 by Milind Goss RN  Outcome: Progressing  Flowsheets (Taken 10/23/2022 0325 by Marce Diehl RN)  Verbalizes/displays adequate comfort level or baseline comfort level: Encourage patient to monitor pain and request assistance  10/24/2022 0821 by Chela Lamb RN  Outcome: Progressing     Problem: Skin/Tissue Integrity  Goal: Absence of new skin breakdown  Description: 1. Monitor for areas of redness and/or skin breakdown  2. Assess vascular access sites hourly  3. Every 4-6 hours minimum:  Change oxygen saturation probe site  4. Every 4-6 hours:  If on nasal continuous positive airway pressure, respiratory therapy assess nares and determine need for appliance change or resting period.   10/24/2022 2139 by Milind Goss RN  Outcome: Progressing  10/24/2022 0821 by Chela Lamb RN  Outcome: Progressing     Problem: Safety - Adult  Goal: Free from fall injury  Outcome: Progressing  Flowsheets (Taken 10/24/2022 2139)  Free From Fall Injury:   Instruct family/caregiver on patient safety   Based on caregiver fall risk screen, instruct family/caregiver to ask for assistance with transferring infant if caregiver noted to have fall risk factors     Problem: ABCDS Injury Assessment  Goal: Absence of physical injury  Outcome: Progressing     Problem: Cardiovascular - Adult  Goal: Maintains optimal cardiac output and hemodynamic stability  Outcome: Progressing  Goal: Absence of cardiac dysrhythmias or at baseline  Outcome: Progressing  Flowsheets (Taken 10/23/2022 2253 by Sharad Marcus RN)  Absence of cardiac dysrhythmias or at baseline:   Monitor cardiac rate and rhythm   Assess for signs of decreased cardiac output   Administer antiarrhythmia medication and electrolyte replacement as ordered

## 2022-10-25 NOTE — PLAN OF CARE
Problem: Chronic Conditions and Co-morbidities  Goal: Patient's chronic conditions and co-morbidity symptoms are monitored and maintained or improved  10/25/2022 1133 by Eulogio Willoughby RN  Outcome: Not Progressing  Flowsheets (Taken 10/25/2022 0800)  Care Plan - Patient's Chronic Conditions and Co-Morbidity Symptoms are Monitored and Maintained or Improved:   Monitor and assess patient's chronic conditions and comorbid symptoms for stability, deterioration, or improvement   Collaborate with multidisciplinary team to address chronic and comorbid conditions and prevent exacerbation or deterioration   Update acute care plan with appropriate goals if chronic or comorbid symptoms are exacerbated and prevent overall improvement and discharge  10/24/2022 2139 by Ayala Bauer RN  Outcome: Progressing     Problem: Gastrointestinal - Adult  Goal: Maintains adequate nutritional intake  Outcome: Not Progressing  Flowsheets (Taken 10/25/2022 0800)  Maintains adequate nutritional intake: Monitor percentage of each meal consumed     Problem: Discharge Planning  Goal: Discharge to home or other facility with appropriate resources  10/25/2022 1133 by Eulogio Willoughby RN  Outcome: Progressing  Flowsheets (Taken 10/25/2022 0800)  Discharge to home or other facility with appropriate resources: Identify barriers to discharge with patient and caregiver  10/24/2022 2139 by Ayala Bauer RN  Outcome: Progressing     Problem: Pain  Goal: Verbalizes/displays adequate comfort level or baseline comfort level  10/25/2022 1133 by Eulogio Willoughby RN  Outcome: Progressing  Flowsheets (Taken 10/25/2022 0800)  Verbalizes/displays adequate comfort level or baseline comfort level:   Encourage patient to monitor pain and request assistance   Assess pain using appropriate pain scale   Administer analgesics based on type and severity of pain and evaluate response   Implement non-pharmacological measures as appropriate and evaluate response Consider cultural and social influences on pain and pain management   Notify Licensed Independent Practitioner if interventions unsuccessful or patient reports new pain  10/24/2022 2139 by Helen Emerson RN  Outcome: Progressing  [address] (Taken 10/23/2022 0325 by Leilani Perez RN)  Verbalizes/displays adequate comfort level or baseline comfort level: Encourage patient to monitor pain and request assistance     Problem: Skin/Tissue Integrity  Goal: Absence of new skin breakdown  Description: 1. Monitor for areas of redness and/or skin breakdown  2. Assess vascular access sites hourly  3. Every 4-6 hours minimum:  Change oxygen saturation probe site  4. Every 4-6 hours:  If on nasal continuous positive airway pressure, respiratory therapy assess nares and determine need for appliance change or resting period.   10/25/2022 1133 by Ileana Guerrero RN  Outcome: Progressing  10/24/2022 2139 by Helen Emersno RN  Outcome: Progressing     Problem: Safety - Adult  Goal: Free from fall injury  10/25/2022 1133 by Ileana Guerrero RN  Outcome: Progressing  Flowsheets (Taken 10/25/2022 0800)  Free From Fall Injury:   Instruct family/caregiver on patient safety   Based on caregiver fall risk screen, instruct family/caregiver to ask for assistance with transferring infant if caregiver noted to have fall risk factors  10/24/2022 2139 by Helen Emerson RN  Outcome: Progressing  [address] (Taken 10/24/2022 2139)  Free From Fall Injury:   Instruct family/caregiver on patient safety   Based on caregiver fall risk screen, instruct family/caregiver to ask for assistance with transferring infant if caregiver noted to have fall risk factors     Problem: ABCDS Injury Assessment  Goal: Absence of physical injury  10/25/2022 1133 by Ileana Guerrero RN  Outcome: Progressing  Flowsheets (Taken 10/25/2022 0800)  Absence of Physical Injury: Implement safety measures based on patient assessment  10/24/2022 2139 by Helen Emerson RN  Outcome: Progressing     Problem: Cardiovascular - Adult  Goal: Maintains optimal cardiac output and hemodynamic stability  10/25/2022 1133 by Tio Tovar RN  Outcome: Progressing  Flowsheets (Taken 10/25/2022 0800)  Maintains optimal cardiac output and hemodynamic stability:   Monitor blood pressure and heart rate   Monitor urine output and notify Licensed Independent Practitioner for values outside of normal range   Assess for signs of decreased cardiac output  10/24/2022 2139 by Antonio Silver RN  Outcome: Progressing  Goal: Absence of cardiac dysrhythmias or at baseline  10/25/2022 1133 by Tio Tovar RN  Outcome: Progressing  Flowsheets (Taken 10/25/2022 0800)  Absence of cardiac dysrhythmias or at baseline:   Monitor cardiac rate and rhythm   Assess for signs of decreased cardiac output  10/24/2022 2139 by Antonio Silver RN  Outcome: Progressing  Flowsheets (Taken 10/23/2022 2254 by Amber Cabral RN)  Absence of cardiac dysrhythmias or at baseline:   Monitor cardiac rate and rhythm   Assess for signs of decreased cardiac output   Administer antiarrhythmia medication and electrolyte replacement as ordered     Problem: Neurosensory - Adult  Goal: Achieves stable or improved neurological status  Outcome: Progressing  Flowsheets (Taken 10/25/2022 0800)  Achieves stable or improved neurological status: Assess for and report changes in neurological status  Goal: Absence of seizures  Outcome: Progressing  Flowsheets (Taken 10/25/2022 0800)  Absence of seizures:   Monitor for seizure activity.   If seizure occurs, document type and location of movements and any associated apnea   If seizure occurs, turn head to side and suction secretions as needed  Goal: Remains free of injury related to seizures activity  Outcome: Progressing  Flowsheets (Taken 10/25/2022 0800)  Remains free of injury related to seizure activity:   Maintain airway, patient safety  and administer oxygen as ordered   Monitor patient for seizure activity, document and report duration and description of seizure to Licensed Independent Practitioner   If seizure occurs, turn patient to side and suction secretions as needed     Problem: Respiratory - Adult  Goal: Achieves optimal ventilation and oxygenation  Outcome: Progressing  Flowsheets (Taken 10/25/2022 0800)  Achieves optimal ventilation and oxygenation:   Assess for changes in respiratory status   Assess for changes in mentation and behavior   Position to facilitate oxygenation and minimize respiratory effort   Oxygen supplementation based on oxygen saturation or arterial blood gases   Encourage broncho-pulmonary hygiene including cough, deep breathe, incentive spirometry   Assess the need for suctioning and aspirate as needed   Assess and instruct to report shortness of breath or any respiratory difficulty   Respiratory therapy support as indicated     Problem: Skin/Tissue Integrity - Adult  Goal: Skin integrity remains intact  Outcome: Progressing  Flowsheets (Taken 10/25/2022 0800)  Skin Integrity Remains Intact:   Monitor for areas of redness and/or skin breakdown   Assess vascular access sites hourly  Goal: Oral mucous membranes remain intact  Outcome: Progressing  Flowsheets (Taken 10/25/2022 0800)  Oral Mucous Membranes Remain Intact:   Assess oral mucosa and hygiene practices   Implement preventative oral hygiene regimen   Implement oral medicated treatments as ordered     Problem: Gastrointestinal - Adult  Goal: Minimal or absence of nausea and vomiting  Outcome: Progressing  Goal: Maintains or returns to baseline bowel function  Outcome: Progressing  Flowsheets (Taken 10/25/2022 0800)  Maintains or returns to baseline bowel function: Assess bowel function     Problem: Genitourinary - Adult  Goal: Absence of urinary retention  Outcome: Progressing  Flowsheets (Taken 10/25/2022 0800)  Absence of urinary retention:   Assess patients ability to void and empty bladder   Monitor intake/output and perform bladder scan as needed     Problem: Infection - Adult  Goal: Absence of infection at discharge  Outcome: Progressing  Flowsheets (Taken 10/25/2022 0800)  Absence of infection at discharge:   Assess and monitor for signs and symptoms of infection   Monitor lab/diagnostic results   Monitor all insertion sites i.e., indwelling lines, tubes and drains  Goal: Absence of infection during hospitalization  Outcome: Progressing  Flowsheets (Taken 10/25/2022 0800)  Absence of infection during hospitalization:   Assess and monitor for signs and symptoms of infection   Monitor lab/diagnostic results   Monitor all insertion sites i.e., indwelling lines, tubes and drains  Goal: Absence of fever/infection during anticipated neutropenic period  Outcome: Progressing  Flowsheets (Taken 10/25/2022 0800)  Absence of fever/infection during anticipated neutropenic period: Monitor white blood cell count     Problem: Metabolic/Fluid and Electrolytes - Adult  Goal: Electrolytes maintained within normal limits  Outcome: Progressing  Flowsheets (Taken 10/25/2022 0800)  Electrolytes maintained within normal limits:   Monitor labs and assess patient for signs and symptoms of electrolyte imbalances   Administer electrolyte replacement as ordered   Monitor response to electrolyte replacements, including repeat lab results as appropriate  Goal: Hemodynamic stability and optimal renal function maintained  Outcome: Progressing  Flowsheets (Taken 10/25/2022 0800)  Hemodynamic stability and optimal renal function maintained:   Monitor labs and assess for signs and symptoms of volume excess or deficit   Monitor intake, output and patient weight  Goal: Glucose maintained within prescribed range  Outcome: Progressing  Flowsheets (Taken 10/25/2022 0800)  Glucose maintained within prescribed range:   Monitor blood glucose as ordered   Assess for signs and symptoms of hyperglycemia and hypoglycemia     Problem: Hematologic - Adult  Goal: Maintains hematologic stability  Outcome: Progressing  Flowsheets (Taken 10/25/2022 0800)  Maintains hematologic stability:   Assess for signs and symptoms of bleeding or hemorrhage   Monitor labs for bleeding or clotting disorders

## 2022-10-25 NOTE — PROGRESS NOTES
0992 - Met patient at bedside. No family present. Call placed to patient's daughter, Erin Raines but no voice mail option available. Met with bedside nurse for patient updates. 1010 - Return call from daughter . Daughter briefly spoke due to being at work. Daughter will call Hospice nurse back as soon as she is able. Discussed plan to discharge patient back to home with Hospice. Await return call from daughter to clarify time of discharge and home DME needs. 1019 - Return call from patient's daughter Paula. Arranging patient's discharge for 10/26/2022 in afternoon after daughter returns from work. DME arrangements for delivery of bed and oxygen. Patient's daughter requested no change in code status until tomorrow. Daughter is agreeable to deactivation of defibrillator tomorrow prior to discharge. Spoke with [facility]. They will send comfort scripts to [facility] and change code status tomorrow.

## 2022-10-25 NOTE — PROGRESS NOTES
Hospitalist Progress Note      SYNOPSIS: Patient admitted on 10/23/2022   64 y.o. male PHMx of cardiomyopathy, hypertension, hyperlipidemia, pancreatitis, AICD, ICD, VHD, acute on chronic HF R EF, CHF failure to thrive presents to the ED c/o altered mental status from his nursing home. Report from EMS is that nursing home reported that he was AO x1 he is normally AOx4. However, discussing with patient he is AOx4. . Onset: Several hours ago. Location/Radiation: Denies any specific area of pain. Duration: Intermittent. Characterization: Nurse reports that he was AO x1. . Aggravating Factors: None. Relieving Factors: None. Severity: Mild. Patient reports that he got in a disagreement with the nurse about his room being too cold and he wanted a blanket, he reports the nurse got aggravated with him and he thinks that she decided to call EMS to get him out of the nursing home. EKG showed sinus rhythm with a few PVCs. Cardiology was consulted recommend transfer to [facility] ICU. Patient's pacemaker has been interrogated also given shocked 4 times in the last 24 hours. Patient still having VT since receiving 75 mg metoprolol. Patient was given lidocaine and transferred to [facility]. Patient was managed on ICU by EP and Cardiology. Placed on lidocaine drip. Device interrogated on 10/22/022 with normal overall function         SUBJECTIVE:    Patient is seen in ICU today, he has no complaints, denies ongoing chest pain or respiratory distress    Records reviewed. Stable overnight. No overnight issues reported    Temp (24hrs), Av.5 °F (36.4 °C), Min:97 °F (36.1 °C), Max:98 °F (36.7 °C)    DIET: ADULT DIET; Regular;  No Added Salt (3-4 gm)  CODE: Limited    Intake/Output Summary (Last 24 hours) at 10/25/2022 1113  Last data filed at 10/25/2022 0600  Gross per 24 hour   Intake 680.83 ml   Output 600 ml   Net 80.83 ml       OBJECTIVE:    /82   Pulse 60   Temp 97 °F (36.1 °C) (Temporal) Resp 19   Ht 5' 10\" (1.778 m)   Wt 203 lb 9.6 oz (92.4 kg)   SpO2 97%   BMI 29.21 kg/m²     General appearance: No apparent distress, appears stated age and cooperative. HEENT:  Normocephalic. Conjunctivae/corneas clear. Moist mucosa   Neck: Supple. No jugular venous distention. Thyroid not visible, non tender   Respiratory: Normal respiratory effort. Clear to auscultation bilaterally. No stridor/wheezing/rhonchi/crackles   Cardiovascular: Regular heart beats, normal S1-S2. No M/G/R  Abdomen: Non distended, soft, non tender, no visceromegaly, no mass, normal bowel sounds   Musculoskeletal: No clubbing, cyanosis, no bilateral lower extremity edema. Brisk capillary refill. Skin:  No rashes  on visible skin  Neurologic: awake, alert and oriented x 3. Following commands. No focal neurological deficit. Cranial nerves 2-12 grossly normal      ASSESSMENT:    Ventricular tachycardia  S/p ICD  HFrEF. TTE on 5/2022 per EP note report from [facility]: LVEF 20 ± 5%  visually estimated   Hyperlipidemia  Coronary artery disease. Severe occlusive disease on [facility] 3/2021 and was recommended medical therapy   Hypertension  BPH  GERD     PLAN:    - Continue ICU level of care  - Cardiology and EP following  - Device interrogated on 10/22/022 with normal overall function    - On lidocaine drip with indications to stop today and start Mexitil 150mg TID by EP / troprol XL 25 mg BID/amiodarone 200 mg PO q 8 hrs  - Bumex 1 mg IV once given today  - Patient is DNR-[facility]  - Labs hemolyzed today per nurse report.  Will not redraw as hospice consideration and no major abnormalities from yesterday   - Note Hospice evaluation with possible home hospice with his regular meds and AICD      DVT prophylaxis lovenox     DISPOSITION: home hospice time to be determined    Medications:  REVIEWED DAILY    Infusion Medications    sodium chloride      lidocaine 1 mg/min (10/24/22 2219)     Scheduled Medications    mexiletine  150 mg Oral 3 times per day bumetanide  1 mg IntraVENous Once    metoprolol succinate  25 mg Oral Daily    sodium chloride flush  10 mL IntraVENous 2 times per day    enoxaparin  40 mg SubCUTAneous Daily    pantoprazole  40 mg Oral QAM AC    rosuvastatin  20 mg Oral Nightly    traZODone  50 mg Oral Nightly    amiodarone  200 mg Oral q8h    magnesium oxide  400 mg Oral Daily    [Held by provider] tamsulosin  0.4 mg Oral Dinner     PRN Meds: LORazepam, morphine, glycopyrrolate, sodium chloride flush, sodium chloride, promethazine **OR** ondansetron, polyethylene glycol, acetaminophen **OR** acetaminophen    Labs:     Recent Labs     10/22/22  2146 10/23/22  0500 10/24/22  0348   WBC 5.9 5.8 6.1   HGB 12.4* 11.9* 12.2*   HCT 40.7 38.3 39.5    162 109*       Recent Labs     10/22/22  2146 10/23/22  0500 10/23/22  0740 10/24/22  0348    141  --  138   K 4.0 4.4  --  4.8    105  --  104   CO2 29 25  --  23   BUN 36* 36*  --  44*   CREATININE 1.3* 1.2  --  1.4*   CALCIUM 9.1 9.0  --  8.8   PHOS  --   --  3.8 4.8*       Recent Labs     10/22/22  2146 10/23/22  0500 10/24/22  0348   PROT 6.4 6.1* 6.0*   ALKPHOS 246* 229* 259*   ALT 57* 49* 79*   AST 63* 49* 93*   BILITOT 0.8 0.7 0.7   LIPASE 84*  --   --        Recent Labs     10/22/22  2146   INR 1.5       No results for input(s): CKTOTAL, TROPONINI in the last 72 hours. Chronic labs:    Lab Results   Component Value Date    CHOL 105 10/01/2022    TRIG 58 10/01/2022    HDL 40 10/01/2022    LDLCALC 53 10/01/2022    TSH 2.500 10/01/2022    PSA <0.01 10/06/2022    INR 1.5 10/22/2022    LABA1C 5.9 (H) 10/01/2022       Radiology: REVIEWED DAILY    +++++++++++++++++++++++++++++++++++++++++++++++++  Normand Osler, MD  [employer] - [address] [place] [address]  +++++++++++++++++++++++++++++++++++++++++++++++++  NOTE: This report was transcribed using voice recognition software.  Every effort was made to ensure accuracy; however, inadvertent computerized transcription errors may be present.

## 2022-10-25 NOTE — PROGRESS NOTES
[facility] placed to [facility]. Transport arranged. Patient transport  at 1500 tomorrow via stretcher to home.

## 2022-10-26 VITALS
HEIGHT: 70 IN | WEIGHT: 203.6 LBS | OXYGEN SATURATION: 94 % | TEMPERATURE: 97.6 F | BODY MASS INDEX: 29.15 KG/M2 | RESPIRATION RATE: 18 BRPM | SYSTOLIC BLOOD PRESSURE: 110 MMHG | HEART RATE: 64 BPM | DIASTOLIC BLOOD PRESSURE: 77 MMHG

## 2022-10-26 LAB
EKG ATRIAL RATE: 67 BPM
EKG P AXIS: 19 DEGREES
EKG P-R INTERVAL: 216 MS
EKG Q-T INTERVAL: 452 MS
EKG QRS DURATION: 106 MS
EKG QTC CALCULATION (BAZETT): 477 MS
EKG R AXIS: -25 DEGREES
EKG T AXIS: 151 DEGREES
EKG VENTRICULAR RATE: 67 BPM

## 2022-10-26 PROCEDURE — 99233 SBSQ HOSP IP/OBS HIGH 50: CPT | Performed by: INTERNAL MEDICINE

## 2022-10-26 PROCEDURE — 6360000002 HC RX W HCPCS: Performed by: FAMILY MEDICINE

## 2022-10-26 PROCEDURE — 2700000000 HC OXYGEN THERAPY PER DAY

## 2022-10-26 PROCEDURE — 6370000000 HC RX 637 (ALT 250 FOR IP): Performed by: INTERNAL MEDICINE

## 2022-10-26 PROCEDURE — 6370000000 HC RX 637 (ALT 250 FOR IP): Performed by: NURSE PRACTITIONER

## 2022-10-26 PROCEDURE — 6360000002 HC RX W HCPCS

## 2022-10-26 PROCEDURE — 93010 ELECTROCARDIOGRAM REPORT: CPT | Performed by: INTERNAL MEDICINE

## 2022-10-26 PROCEDURE — 2580000003 HC RX 258: Performed by: FAMILY MEDICINE

## 2022-10-26 PROCEDURE — 99291 CRITICAL CARE FIRST HOUR: CPT | Performed by: INTERNAL MEDICINE

## 2022-10-26 PROCEDURE — 6370000000 HC RX 637 (ALT 250 FOR IP): Performed by: STUDENT IN AN ORGANIZED HEALTH CARE EDUCATION/TRAINING PROGRAM

## 2022-10-26 RX ORDER — LORAZEPAM 1 MG/1
1 TABLET ORAL EVERY 6 HOURS PRN
Qty: 28 TABLET | Refills: 0 | Status: SHIPPED | OUTPATIENT
Start: 2022-10-26 | End: 2022-10-27 | Stop reason: SDUPTHER

## 2022-10-26 RX ORDER — MORPHINE SULFATE 20 MG/ML
5 SOLUTION ORAL
Qty: 15 ML | Refills: 0 | Status: SHIPPED | OUTPATIENT
Start: 2022-10-26 | End: 2022-10-27 | Stop reason: SDUPTHER

## 2022-10-26 RX ORDER — GLYCOPYRROLATE 1 MG/1
1 TABLET ORAL EVERY 4 HOURS PRN
Qty: 42 TABLET | Refills: 0 | Status: SHIPPED | OUTPATIENT
Start: 2022-10-26 | End: 2022-11-02

## 2022-10-26 RX ADMIN — SODIUM CHLORIDE, PRESERVATIVE FREE 10 ML: 5 INJECTION INTRAVENOUS at 08:40

## 2022-10-26 RX ADMIN — ENOXAPARIN SODIUM 40 MG: 100 INJECTION SUBCUTANEOUS at 08:40

## 2022-10-26 RX ADMIN — PANTOPRAZOLE SODIUM 40 MG: 40 TABLET, DELAYED RELEASE ORAL at 05:54

## 2022-10-26 RX ADMIN — MORPHINE SULFATE 1 MG: 2 INJECTION, SOLUTION INTRAMUSCULAR; INTRAVENOUS at 13:27

## 2022-10-26 RX ADMIN — Medication 400 MG: at 08:40

## 2022-10-26 RX ADMIN — METOPROLOL SUCCINATE 25 MG: 25 TABLET, EXTENDED RELEASE ORAL at 08:40

## 2022-10-26 RX ADMIN — MEXILETINE HYDROCHLORIDE 150 MG: 150 CAPSULE ORAL at 05:22

## 2022-10-26 RX ADMIN — AMIODARONE HYDROCHLORIDE 200 MG: 200 TABLET ORAL at 05:22

## 2022-10-26 RX ADMIN — LORAZEPAM 0.5 MG: 2 INJECTION INTRAMUSCULAR; INTRAVENOUS at 09:55

## 2022-10-26 ASSESSMENT — PAIN DESCRIPTION - LOCATION: LOCATION: FOOT

## 2022-10-26 ASSESSMENT — PAIN DESCRIPTION - ORIENTATION: ORIENTATION: LEFT;RIGHT

## 2022-10-26 ASSESSMENT — PAIN SCALES - GENERAL
PAINLEVEL_OUTOF10: 0
PAINLEVEL_OUTOF10: 9
PAINLEVEL_OUTOF10: 0

## 2022-10-26 ASSESSMENT — PAIN DESCRIPTION - DESCRIPTORS: DESCRIPTORS: ACHING;DISCOMFORT;TENDER

## 2022-10-26 NOTE — FLOWSHEET NOTE
Pt  discharged home with [facility] , pt's belongings a phone and  cord and under garment sent with pt

## 2022-10-26 NOTE — PROGRESS NOTES
Confirmed that palliative has sent scripts for comfort medications to outpatient Rx. Spoke to outpatient Rx they will have medications up prior to discharge.

## 2022-10-26 NOTE — PROGRESS NOTES
INPATIENT CARDIOLOGY CONSULT follow-up visit    Name: Ayo Brown    Age: 64 y.o. Date of Admission: 10/23/2022  3:21 AM    Date of Service: 10/26/2022    Reason for Consultation: No chief complaint on file. Referring Physician: Erica Sheldon DO    Interim: Hospitalist service involving the case and apparently family planning for home hospice. Cardiology will sign off. PMH  1. CAD  Status post CABG 12/24/2007 (LIMA-LAD, SVG-RI, OM1, OM2, PDA). Stress test ordered 3/5/2021: Cancelled due to patient having shingles. [facility], 3/16/2021: LMT: severe diffuse disease. LAD: severe diffuse disease. Additional Comment: Moderate caliber vessel with  in the proximal portion. The vessel is fed by patent LIMA graft. The distal portion is small in caliber and wraps the apex. There are L->R collaterals. LCX: severe diffuse disease. Additional Comment: Moderate caliber non-dominant vessel which is fed by SVG->RI/OM1. RAMUS: ostial Ramus - . Additional Comment: Fills in the mid-distal portion from SVG->RI graft. RCA Status: Not Applicable. Additional Comment: Occluded at the ostia. GRAFTS: LIMA Graft End to Side to the Left Anterior Descending. Additional Comments: patent. SVG End to Side to the RI. Additional  Comments - patent with mild-moderate disease in the mid-portion; supplies mid-distal LCx and backfills to partially supply LAD. SVG End to Side to the OM-2 Second Obtuse Marginal Branch. Additional Comments - occluded. SVG End to Side to the Right Posterior Descending Artery. Additional Comments - occluded. Impression:- Severe/occlusive native 3 vessel CAD with patent LIMA->LAD, SVG->RI/OM1. SVG->OM2 is occluded - Native RCA is occluded at the ostia as is the SVG->rPDA. The right is fed by L->R collaterals from the LIMA graft Recommended Treatment: Medical Therapy.    7/2022 Troponin 61-->69  ICMP/Chronic HFrEF   ACC/AHA stage D, NYHA functional class II  TTE 6/27/2012: Mildly dilated LV, LVEF 35%, 1-2+ MR.  TTE: 4/13/2018 (Dr. Marcelina Bob):  EF estimated at 30%. The entire apex is severely hypokinetic. Overall ejection fraction severely decreased. Moderate left ventricular concentric hypertrophy noted. There is doppler evidence of stage I diastolic dysfunction. Normal right ventricle structure and function. Physiologic and/or trace mitral regurgitation is present. TTE: 3/15/2021 ([facility]) Exam indication: Abnormal Cardiac Biomarkers There is a resting wall motion abnormality in the territory of the LAD and RCA. - The left ventricle is dilated. Left ventricular systolic function is severely decreased. EF = 26 ± 5% (2D biplane) Grade III left ventricular diastolic dysfunction. The right ventricle is normal in size. Right ventricular systolic function is   moderately decreased. - The left atrial cavity is severely dilated. - The right atrial cavity is dilated. There is moderately severe (3+) mitral valve regurgitation due to restricted leaflet motion likely related to ischemic heart disease. TTE: 5/13/2022 ([facility]): - Technically difficult exam due to body habitus and suboptimal positioning. - Exam indication: Evaluation of known heart failure to guide therapy - The left ventricle is severely dilated. Left ventricular systolic function is severely decreased. EF = 20 ± 5% (visual est.) Definity contrast used for endocardial border detection. Left ventricular diastolic function was not evaluated due to an arrhythmia. The right ventricle is dilated. Right ventricular systolic function is moderately to severely decreased. - The left atrial cavity is severely dilated. - The right atrial cavity is dilated. There is moderate (2+) mitral valve regurgitation. - -Peak/mean gradients of 15/7 mmHg, gradients averaged due to arrhythmia. -Prior EF reported as 26% (3/2021). Exam was compared with the prior  echocardiographic exam performed on  3/14/2021. There is no significant change.  VHD: Moderate-severe ischemic MR (patient was recommended for further evaluation with EBONI and possible consideration of a clip 6/3/2022 at [facility] --> patient declined. 7/2022 p-BNP 69599  Status post ICD (implanted 4/19/2010 for primary prevention) with generator change 4/19/2018. ([facility])  ICD interrogation 7/4/2022: AV pacing 6.7%, RV pacing 3.2%, AT/AF burden 1%. Appropriate VT therapy: Successful. VT episodes detected on 7/3/2022 at 10:59 PM.  Duration 14 seconds with an average V rate of 181 bpm.  EGM is C/W VT and VT zone treated with burst ATP x1 which terminated arrhythmia. Telephone encounter: EP 7/5/2022 Toprol-XL was increased to 25 mg twice daily. PHTN:  RHC: 5/16/2022: [facility]: Elevated biventricular filling pressures (RA 10, PCWP 26), moderate pulmonary hypertension (most likely postcapillary), preserved cardiac index by assumed Sunni 2.73. Hypertension  Hyperlipidemia  Type 2 diabetes mellitus  History of NSVT  Anemia  History of prostate cancer specifics unknown. History of diverticulitis  History of pancreatitis  Medical noncompliance  History of hernia repair, elbow surgery, bladder surgery. History of skin melanoma (nose and right-sided cheek) status post excision 2019.   DNR-CCA          Past Medical History:  Past Medical History:   Diagnosis Date    CAD (coronary artery disease)     Cardiomyopathy ([address]     CHF (congestive heart failure) ([address]     Diabetes mellitus ([address]     Diverticulitis     Hyperlipidemia     Hypertension     MI, old     Pancreatitis     Prostate cancer ([address]     prostate    S/P CABG x 4     Skin cancer     Ventricular tachycardia        Past Surgical History:  Past Surgical History:   Procedure Laterality Date    BLADDER SURGERY      CARDIAC DEFIBRILLATOR PLACEMENT  04/19/2018    D-ICD GENT CHANGE    [facility]    CARDIAC PACEMAKER PLACEMENT  04/2010    [facility]     COLONOSCOPY      CORONARY ARTERY BYPASS GRAFT  12/24/2007    ELBOW SURGERY      HERNIA REPAIR      KIDNEY SURGERY      SKIN CANCER EXCISION  2019    nose and cheek       Family History:  Family History   Problem Relation Age of Onset    Cancer Father         liver       Social History:  Social History     Socioeconomic History    Marital status:      Spouse name: Not on file    Number of children: Not on file    Years of education: Not on file    Highest education level: Not on file   Occupational History    Not on file   Tobacco Use    Smoking status: Never    Smokeless tobacco: Never    Tobacco comments:     used to smoke occ cigar   Vaping Use    Vaping Use: Never used   Substance and Sexual Activity    Alcohol use: No    Drug use: No    Sexual activity: Not Currently   Other Topics Concern    Not on file   Social History Narrative    Not on file     Social Determinants of Health     Financial Resource Strain: Not on file   Food Insecurity: Not on file   Transportation Needs: Not on file   Physical Activity: Not on file   Stress: Not on file   Social Connections: Not on file   Intimate Partner Violence: Not on file   Housing Stability: Not on file       Allergies:  No Known Allergies    Home Medications:  Prior to Admission medications    Medication Sig Start Date End Date Taking? Authorizing Provider   Morphine Sulfate (MORPHINE 20MG/ML) SOLN concentrated solution Take 0.25 mLs by mouth every 2 hours as needed for Pain for up to 7 days. 10/26/22 11/2/22 Yes GINA Bai CNP   LORazepam (ATIVAN) 1 MG tablet Take 1 tablet by mouth every 6 hours as needed for Anxiety for up to 7 days.  10/26/22 11/2/22 Yes GINA Bai CNP   glycopyrrolate (ROBINUL) 1 MG tablet Take 1 tablet by mouth every 4 hours as needed (secretions) 10/26/22 11/2/22 Yes GINA Bai CNP   isosorbide mononitrate (IMDUR) 30 MG extended release tablet Take 1 tablet by mouth daily 6/30/22 7/23/22  Mala Preston DO       Current Medications:  Current Facility-Administered Medications   Medication Dose Route Frequency Provider Last Rate Last Admin    mexiletine (MEXITIL) capsule 150 mg  150 mg Oral 3 times per day Krystal Bernabe, APRN - CNP   150 mg at 10/26/22 0522    bumetanide (BUMEX) injection 1 mg  1 mg IntraVENous Once Tello Prior MD Eileen        metoprolol succinate (TOPROL XL) extended release tablet 25 mg  25 mg Oral Daily Clement Naldo MD Eileen   25 mg at 10/26/22 0840    LORazepam (ATIVAN) injection 0.5 mg  0.5 mg IntraVENous Q6H PRN Rita A Costlow, APRN - CNP   0.5 mg at 10/26/22 0955    morphine (PF) injection 1 mg  1 mg IntraVENous Q4H PRN Rita A Costlow, APRN - CNP   1 mg at 10/26/22 1327    glycopyrrolate (ROBINUL) injection 0.2 mg  0.2 mg IntraVENous Q6H PRN Rita A Costlow, APRN - CNP        sodium chloride flush 0.9 % injection 10 mL  10 mL IntraVENous 2 times per day Christina Paula, DO   10 mL at 10/26/22 0840    sodium chloride flush 0.9 % injection 10 mL  10 mL IntraVENous PRN Christina Paula, DO        0.9 % sodium chloride infusion   IntraVENous PRN Christina Paula, DO        enoxaparin (LOVENOX) injection 40 mg  40 mg SubCUTAneous Daily Christina Paula, DO   40 mg at 10/26/22 0840    promethazine (PHENERGAN) tablet 12.5 mg  12.5 mg Oral Q6H PRN Christina Paula, DO        Or    [facility]) injection 4 mg  4 mg IntraVENous Q6H PRN Christina Paula, DO        polyethylene glycol (GLYCOLAX) packet 17 g  17 g Oral Daily PRN Christina Paula, DO        acetaminophen (TYLENOL) tablet 650 mg  650 mg Oral Q6H PRN Christina Paula, DO        Or    acetaminophen (TYLENOL) suppository 650 mg  650 mg Rectal Q6H PRN Christina Paula, DO        pantoprazole (PROTONIX) tablet 40 mg  40 mg Oral QAM AC Vera Martin MD   40 mg at 10/26/22 0554    [Held by provider] rosuvastatin (CRESTOR) tablet 20 mg  20 mg Oral Nightly Vera Martin MD   20 mg at 10/24/22 2049    traZODone (DESYREL) tablet 50 mg  50 mg Oral Nightly Vera Martin MD   50 mg at 10/25/22 2058    amiodarone (CORDARONE) tablet 200 mg  200 mg Oral q8h Simona Bravo MD   200 mg at 10/26/22 0522    magnesium oxide (MAG-OX) tablet 400 mg  400 mg Oral Daily Simona Bravo MD   400 mg at 10/26/22 0840    [Held by provider] tamsulosin (FLOMAX) capsule 0.4 mg  0.4 mg Oral Dinner Simona Bravo MD          sodium chloride             Review of Systems:   Cardiac: As per HPI  General: Denies fever or chills  Pulmonary: As per HPI  HEENT: Denies runny nose  GI: No complaints  : No complaints  Endocrine: Denies night sweats  Musculoskeletal: No complaints  Skin: Dry skin  Neuro: No complaints  Psych: Denies depression    Physical Exam:  /62   Pulse 60   Temp 97.4 °F (36.3 °C) (Temporal)   Resp 18   Ht 5' 10\" (1.778 m)   Wt 203 lb 9.6 oz (92.4 kg)   SpO2 97%   BMI 29.21 kg/m²   Wt Readings from Last 3 Encounters:   10/23/22 203 lb 9.6 oz (92.4 kg)   10/22/22 195 lb (88.5 kg)   10/09/22 195 lb 8 oz (88.7 kg)       Appearance: Alert and oriented x3 not in acute distress. Skin: Dry skin  Head: Atraumatic  Eyes: Intact extraocular muscles   ENMT: Mucous membranes are moist  Neck: Supple  Lungs: Clear to auscultation  Cardiac: Normal S1 and S2  Abdomen: Protuberant  Extremities: Intact range of motion  Neurologic: No focal neurological deficits  Peripheral Pulses: 2+ peripheral pulses      Intake/Output:    Intake/Output Summary (Last 24 hours) at 10/26/2022 1609  Last data filed at 10/26/2022 0600  Gross per 24 hour   Intake 720 ml   Output 200 ml   Net 520 ml     No intake/output data recorded.       Laboratory Tests:  Recent Labs     10/24/22  0348 10/25/22  1200    135   K 4.8 5.4*    100   CO2 23 20*   BUN 44* 59*   CREATININE 1.4* 2.0*   GLUCOSE 77 94   CALCIUM 8.8 8.8     Lab Results   Component Value Date/Time    MG 2.6 10/25/2022 12:00 PM    MG 2.4 10/24/2022 03:48 AM    MG 2.3 10/23/2022 07:40 AM     Recent Labs     10/24/22  0348 10/25/22  1200   ALKPHOS 259* 237*   ALT 79* 110*   AST 93* 109*   PROT 6.0* 6.1*   BILITOT 0.7 0.7   LABALBU 3.2* 3.2* Recent Labs     10/24/22  0348 10/25/22  1200   WBC 6.1 7.4   RBC 4.83 5.15   HGB 12.2* 12.9   HCT 39.5 41.5   MCV 81.8 80.6   MCH 25.3* 25.0*   MCHC 30.9* 31.1*   RDW 17.6* 18.0*   * 136   MPV NOT CALC NOT CALC     Lab Results   Component Value Date    CKTOTAL 113 04/08/2014    CKMB 2.8 04/08/2014    TROPONINI 0.03 02/24/2021    TROPONINI <0.01 04/14/2014    TROPONINI <0.01 04/08/2014     No results for input(s): CKTOTAL, CKMB, CKMBINDEX, TROPHS in the last 72 hours. Lab Results   Component Value Date    INR 1.5 10/22/2022    INR 1.2 04/08/2014    PROTIME 17.4 (H) 10/22/2022    PROTIME 13.3 (H) 04/08/2014     Lab Results   Component Value Date    TSH 2.500 10/01/2022    TSH 1.330 09/20/2021    TSH 1.800 07/31/2020     Lab Results   Component Value Date    LABA1C 5.9 (H) 10/01/2022    LABA1C 5.5 09/20/2021    LABA1C 6.3 (H) 07/31/2020     No results found for: EAG  Lab Results   Component Value Date    CHOL 105 10/01/2022    CHOL 224 (H) 09/20/2021    CHOL 296 (H) 08/12/2014     Lab Results   Component Value Date    TRIG 58 10/01/2022    TRIG 361 (H) 09/20/2021    TRIG 1,001 (H) 08/12/2014     Lab Results   Component Value Date    HDL 40 10/01/2022    HDL 33 09/20/2021    HDL 25 08/12/2014     Lab Results   Component Value Date    LDLCALC 53 10/01/2022    LDLCALC 119 (H) 09/20/2021    LDLCALC - (AA) 08/12/2014     Lab Results   Component Value Date    LABVLDL 12 10/01/2022    LABVLDL 72 09/20/2021    LABVLDL - (AA) 08/12/2014     No results found for: CHOLHDLRATIO  No results for input(s): PROBNP in the last 72 hours. Cardiac Tests:        TTE: 3/15/2021 ([facility]) Exam indication: Abnormal Cardiac Biomarkers There is a resting wall motion abnormality in the territory of the LAD and RCA. - The left ventricle is dilated. Left ventricular systolic function is severely decreased. EF = 26 ± 5% (2D biplane) Grade III left ventricular diastolic dysfunction. The right ventricle is normal in size.  Right Ramus - . Additional Comment: Fills in the mid-distal portion from SVG->RI graft. RCA Status: Not Applicable. Additional Comment: Occluded at the ostia. GRAFTS: LIMA Graft End to Side to the Left Anterior Descending. Additional Comments: patent. SVG End to Side to the RI. Additional  Comments - patent with mild-moderate disease in the mid-portion; supplies mid-distal LCx and backfills to partially supply LAD. SVG End to Side to the OM-2 Second Obtuse Marginal Branch. Additional Comments - occluded. SVG End to Side to the Right Posterior Descending Artery. Additional Comments - occluded. Impression:- Severe/occlusive native 3 vessel CAD with patent LIMA->LAD, SVG->RI/OM1. SVG->OM2 is occluded - Native RCA is occluded at the ostia as is the SVG->rPDA. The right is fed by L->R collaterals from the LIMA graft Recommended Treatment: Medical Therapy. [facility] 2021  Impression:- Severe/occlusive native 3 vessel CAD with patent LIMA->LAD,   SVG->RI/OM1. SVG->OM2 is occluded   - Native RCA is occluded at the ostia as is the SVG->rPDA. The right is fed by   L->R collaterals from the LIMA graft     Recommended Treatment: Medical Therapy. Plan: - Recommend aggressive medical therapy for severe coronary artery disease   - start high-intensity statin   - maximize anti-anginal and heart failure therapy     CXR reviewed: The ASCVD Risk score (Roxy DK, et al., 2019) failed to calculate for the following reasons: The patient has a prior MI or stroke diagnosis    ASSESSMENT / PLAN:    Recurrent episodes of rapid polymorphic VT with  appropriate ICD discharges   On mexiletine and amiodarone  Also on p.o. beta-blocker  EP is following and managing recurrent VT  Hospice service on the case and family planning home hospice  Cardiology will sign off. Please call with questions.       2.   Dual-chamber ICD in situ implanted in 2010, ICD generator change in 2018   Per device interrogation device is functioning appropriately  EP following. 3.   Complex coronary artery disease with severe ischemic cardiomyopathy  status post CABG x5 with LIMA to LAD, SVG to OM1, SVG to OM 2, SVG to ramus intermedius, and SVG to RPDA in 2007. [facility] 2021  Impression:- Severe/occlusive native 3 vessel CAD with patent LIMA->LAD,   SVG->RI/OM1. SVG->OM2 is occluded   - Native RCA is occluded at the ostia as is the SVG->rPDA. The right is fed by   L->R collaterals from the LIMA graft   Medical Therapy was recommended with plans to optimize high-intensity statin as well as maximize anti-anginal and heart failure therapy   Management of management of ischemic heart disease has been very difficult due to tenuous blood pressure  Hospice service on the [facility] hosp[facility]  Cardiology will sign off. Please call with questions. 4.    HFrEF, LVEF of 15 to 20% with moderate pulmonary hypertension  Hold Bumex given worsening kidney function  Strict in and out monitor  Blood pressure has been on the low side and subsequently it has been difficult to uptitrate guideline directed medical therapy  Hospice service on the [facility] planning [place] hospice  Cardiology will sign off. Please call with questions. 5.  Chronic kidney disease--serum creatinine   Renal function is worsening  Hospice service on the [facility] hosp[facility]  Cardiology will sign off. Please call with questions. 6.  Hypertension     7. Hyperlipidemia  Holding statin given worsening liver enzyme    8. Diabetes  Management per primary  9. Noncompliance in the past     10. Pulmonary hypertension  Likely due to left heart disease  Management as mentioned above    11. DNR CCA  Hospice service on the [facility] hosp[facility]  Cardiology will sign off. Please call with questions. Thank you for allowing me to participate in your patient's care.  Please feel free to contact me if you have any questions or concerns.     Julio C Rosales MD  [facility] ([facility]) Cardiology

## 2022-10-26 NOTE — PLAN OF CARE
Problem: Chronic Conditions and Co-morbidities  Goal: Patient's chronic conditions and co-morbidity symptoms are monitored and maintained or improved  Outcome: Progressing     Problem: Discharge Planning  Goal: Discharge to home or other facility with appropriate resources  Outcome: Progressing     Problem: Cardiovascular - Adult  Goal: Maintains optimal cardiac output and hemodynamic stability  Outcome: Progressing  Goal: Absence of cardiac dysrhythmias or at baseline  Outcome: Progressing

## 2022-10-26 NOTE — PROGRESS NOTES
CLINICAL PHARMACY NOTE: MEDS TO BEDS    Total # of Prescriptions Filled: 3   The following medications were delivered to the patient:  Lorazepam 1  Morphine 100 Sol  Glycopyrrolate 1    Additional Documentation:   To PRESTON Beltran

## 2022-10-26 NOTE — PROGRESS NOTES
[facility],[address] and [facility] Electrophysiology  Inpatient Progess Report  PATIENT: Shanel Ovalle  MEDICAL RECORD NUMBER: [id number]  DATE OF SERVICE:  10/26/2022  ATTENDING ELECTROPHYSIOLOGIST: Chandler Santos MD  PRIMARY ELECTROPHYSIOLOGIST: Chandler Santos MD   REFERRING PHYSICIAN: Guevara Yuen DO and Avila Joshi DO  CHIEF COMPLAINT: ICD shocks    HPI: This is a 64 y.o. male with a history of CAD s/p CABG x 5 in 2007 s/p LHC 3/2021 [facility] showed severe disease in native vessels, all the grafts were occluded, recommended medical therapy only,  ICMP on TTE 5/2022,, EF 20-25%, mod MR, RV function reduced, RHC 5/16/22 >> increased biventricular filling pressures, mod PH and PCWP 26 mmHg, CI 2.73, ICD placed in 2010 with generator change in 2018, HTN, HLD, NSVT on interrogation 7/24/2022 (Toprol XL increased to 25 mg BID), CKD, PHTN, DM, previously a DNR-CCA, hx of intolerance to Entrestro/ACEI/ARB, and medical non compliance who presents to the hospital for change in mental status and recurrent episodes of ventricular tachycardia. The patient was hospitalized on 10/1/2022 with decompensated heart failure. He was discharged to a  skilled nursing facility on 10/12/2022. He had an altercation with a staff at the facility and was transferred to the emergency room for change in mental status. On interrogation of his ICD it appeared that the patient had multiple episodes of VT requiring ICD discharges. The patient himself is very somnolent this morning. He does awaken when spoken to but does not answer any questions except that \"he does not feel well\". He does not remember any ICD shocks. He complains of generalized pain but no dyspnea. On review of the emergency room notes it appears that the patient refused any imaging or testing although he was acceptable of any treatments including transfer to the intensive care unit.   On transfer to the CVICU here, the patient has had recurrent episodes of nonsustained VT but no ICD discharges. Cardiac electrophysiology service is consulted for recurrent VT and ICD shocks. 10/24/2022: He last had a run of nonsustained PMVT at 1956 yesterday, today he remains on IV Lidocaine at 1mg/min and is somewhat confused, yet tolerating PO Amiodarone. 10/25/2022: He has been free of recurrent sustained VT, now with plans for discharge home with hospice and discontinuation of tachy therapy prior to discharge. Continues to tolerate Amiodarone will discontinue IV Lidocaine today. 10/26/2022: He is tolerating amiodarone and Mexitil without recurrent VT, and is being discharged home today with Hospice, per request of the family will turn off tachy therapy. He is resting in room without complaints and is somewhat confused.      Patient Active Problem List    Diagnosis Date Noted    Polymorphic ventricular tachycardia 10/23/2022     Priority: Medium    Azotemia 10/23/2022     Priority: Medium    Hypoalbuminemia 10/23/2022     Priority: Medium    Elevated LFTs 10/23/2022     Priority: Medium    Stage 3 chronic kidney disease ([address] 10/23/2022     Priority: Medium    HFrEF (heart failure with reduced ejection fraction) ([address] 10/23/2022     Priority: Medium    Acute cystitis without hematuria 10/22/2022     Priority: Medium    Acute on chronic diastolic heart failure ([address] 10/22/2022     Priority: Medium    Bladder spasms 10/09/2022     Priority: Medium    Diabetes mellitus ([address] 10/09/2022     Priority: Medium    Palliative care encounter 10/05/2022     Priority: Medium    Goals of care, counseling/discussion 10/05/2022     Priority: Medium    DNR (do not resuscitate) discussion 10/05/2022     Priority: Medium    Acute on chronic HFrEF (heart failure with reduced ejection fraction) ([address] 10/02/2022     Priority: Medium    VHD (valvular heart disease) 10/02/2022     Priority: Medium    Congestive heart failure of unknown etiology ([address] 10/01/2022 Priority: Medium    Failure to thrive in adult 07/21/2022     Priority: Medium    Chest pain, atypical 02/24/2021    Implantable cardioverter-defibrillator (ICD) at end of device life      Overview Note:     Replacing Deprecated Diagnoses      Presence of automatic cardioverter/defibrillator (AICD) 09/01/2016    CHF (congestive heart failure) ([facility])     S/P CABG x 4     CAD (coronary artery disease)     MI, old     Hyperlipidemia     Hypertension     Cardiomyopathy ([address]     Ventricular tachycardia     Pancreatitis        Past Medical History:   Diagnosis Date    CAD (coronary artery disease)     Cardiomyopathy ([address]     CHF (congestive heart failure) ([address]     Diabetes mellitus ([address]     Diverticulitis     Hyperlipidemia     Hypertension     MI, old     Pancreatitis     Prostate cancer ([address]     prostate    S/P CABG x 4     Skin cancer     Ventricular tachycardia    PMH  1. CAD  Status post CABG 12/24/2007 (LIMA-LAD, SVG-RI, OM1, OM2, PDA). Stress test ordered 3/5/2021: Cancelled due to patient having shingles. [facility], 3/16/2021: LMT: severe diffuse disease. LAD: severe diffuse disease. Additional Comment: Moderate caliber vessel with  in the proximal portion. The vessel is fed by patent LIMA graft. The distal portion is small in caliber and wraps the apex. There are L->R collaterals. LCX: severe diffuse disease. Additional Comment: Moderate caliber non-dominant vessel which is fed by SVG->RI/OM1. RAMUS: ostial Ramus - . Additional Comment: Fills in the mid-distal portion from SVG->RI graft. RCA Status: Not Applicable. Additional Comment: Occluded at the ostia. GRAFTS: LIMA Graft End to Side to the Left Anterior Descending. Additional Comments: patent. SVG End to Side to the RI. Additional  Comments - patent with mild-moderate disease in the mid-portion; supplies mid-distal LCx and backfills to partially supply LAD. SVG End to Side to the OM-2 Second Obtuse Marginal Branch. Additional Comments - occluded. SVG End to Side to the Right Posterior Descending Artery. Additional Comments - occluded. Impression:- Severe/occlusive native 3 vessel CAD with patent LIMA->LAD, SVG->RI/OM1. SVG->OM2 is occluded - Native RCA is occluded at the ostia as is the SVG->rPDA. The right is fed by L->R collaterals from the LIMA graft Recommended Treatment: Medical Therapy. 7/2022 Troponin 61-->69  ICMP/Chronic HFrEF   ACC/AHA stage D, NYHA functional class II  TTE 6/27/2012: Mildly dilated LV, LVEF 35%, 1-2+ MR.  TTE: 4/13/2018 (Dr. Piter Gutierrez):  EF estimated at 30%. The entire apex is severely hypokinetic. Overall ejection fraction severely decreased. Moderate left ventricular concentric hypertrophy noted. There is doppler evidence of stage I diastolic dysfunction. Normal right ventricle structure and function. Physiologic and/or trace mitral regurgitation is present. TTE: 3/15/2021 ([facility]) Exam indication: Abnormal Cardiac Biomarkers There is a resting wall motion abnormality in the territory of the LAD and RCA. - The left ventricle is dilated. Left ventricular systolic function is severely decreased. EF = 26 ± 5% (2D biplane) Grade III left ventricular diastolic dysfunction. The right ventricle is normal in size. Right ventricular systolic function is   moderately decreased. - The left atrial cavity is severely dilated. - The right atrial cavity is dilated. There is moderately severe (3+) mitral valve regurgitation due to restricted leaflet motion likely related to ischemic heart disease. TTE: 5/13/2022 ([facility]): - Technically difficult exam due to body habitus and suboptimal positioning. - Exam indication: Evaluation of known heart failure to guide therapy - The left ventricle is severely dilated. Left ventricular systolic function is severely decreased. EF = 20 ± 5% (visual est.) Definity contrast used for endocardial border detection. Left ventricular diastolic function was not evaluated due to an arrhythmia.   The right ventricle is dilated. Right ventricular systolic function is moderately to severely decreased. - The left atrial cavity is severely dilated. - The right atrial cavity is dilated. There is moderate (2+) mitral valve regurgitation. - -Peak/mean gradients of 15/7 mmHg, gradients averaged due to arrhythmia. -Prior EF reported as 26% (3/2021). Exam was compared with the prior  echocardiographic exam performed on  3/14/2021. There is no significant change. VHD: Moderate-severe ischemic MR (patient was recommended for further evaluation with EBONI and possible consideration of a clip 6/3/2022 at [facility] --> patient declined. 7/2022 p-BNP 16132  Status post ICD (implanted 4/19/2010 for primary prevention) with generator change 4/19/2018. ([facility])  ICD interrogation 7/4/2022: AV pacing 6.7%, RV pacing 3.2%, AT/AF burden 1%. Appropriate VT therapy: Successful. VT episodes detected on 7/3/2022 at 10:59 PM.  Duration 14 seconds with an average V rate of 181 bpm.  EGM is C/W VT and VT zone treated with burst ATP x1 which terminated arrhythmia. Telephone encounter: EP 7/5/2022 Toprol-XL was increased to 25 mg twice daily. PHTN:  RHC: 5/16/2022: [facility]: Elevated biventricular filling pressures (RA 10, PCWP 26), moderate pulmonary hypertension (most likely postcapillary), preserved cardiac index by assumed Sunni 2.73. Hypertension  Hyperlipidemia  Type 2 diabetes mellitus  History of NSVT  Anemia  History of prostate cancer specifics unknown. History of diverticulitis  History of pancreatitis  Medical noncompliance  History of hernia repair, elbow surgery, bladder surgery. History of skin melanoma (nose and right-sided cheek) status post excision 2019.   DNR-CCA       Family History   Problem Relation Age of Onset    Cancer Father         liver       Social History     Tobacco Use    Smoking status: Never    Smokeless tobacco: Never    Tobacco comments:     used to smoke occ cigar   Substance Use Topics    Alcohol use: No       Current Facility-Administered Medications   Medication Dose Route Frequency Provider Last Rate Last Admin    mexiletine (MEXITIL) capsule 150 mg  150 mg Oral 3 times per day Rolando ByrdGINA - CNP   150 mg at 10/26/22 0522    bumetanide (BUMEX) injection 1 mg  1 mg IntraVENous Once Luzmaria Les Arellano MD        metoprolol succinate (TOPROL XL) extended release tablet 25 mg  25 mg Oral Daily Clement Naldo Arellano MD   25 mg at 10/26/22 0840    LORazepam (ATIVAN) injection 0.5 mg  0.5 mg IntraVENous Q6H PRN Rita A Costlow, APRN - CNP   0.5 mg at 10/26/22 0955    morphine (PF) injection 1 mg  1 mg IntraVENous Q4H PRN Rita A Costlow, APRN - CNP   1 mg at 10/24/22 1850    glycopyrrolate (ROBINUL) injection 0.2 mg  0.2 mg IntraVENous Q6H PRN Rita A Costlow, APRN - CNP        sodium chloride flush 0.9 % injection 10 mL  10 mL IntraVENous 2 times per day Shaheen Garza, DO   10 mL at 10/26/22 0840    sodium chloride flush 0.9 % injection 10 mL  10 mL IntraVENous PRN hSaheen Neat, DO        0.9 % sodium chloride infusion   IntraVENous PRN Shaheen Garza, DO        enoxaparin (LOVENOX) injection 40 mg  40 mg SubCUTAneous Daily Shaheen Garza, DO   40 mg at 10/26/22 0840    promethazine (PHENERGAN) tablet 12.5 mg  12.5 mg Oral Q6H PRN Shaheen Garza, DO        Or    [facility] injection 4 mg  4 mg IntraVENous Q6H PRN Shaheen Neat, DO        polyethylene glycol (GLYCOLAX) packet 17 g  17 g Oral Daily PRN Shaheen Garza, DO        acetaminophen (TYLENOL) tablet 650 mg  650 mg Oral Q6H PRN Shaheen Garza, DO        Or    acetaminophen (TYLENOL) suppository 650 mg  650 mg Rectal Q6H PRN Shaheen Neat, DO        pantoprazole (PROTONIX) tablet 40 mg  40 mg Oral QAM AC Elin Taylor MD   40 mg at 10/26/22 0554    [Held by provider] rosuvastatin (CRESTOR) tablet 20 mg  20 mg Oral Nightly Elin Taylor MD   20 mg at 10/24/22 2049    traZODone (DESYREL) tablet 50 mg  50 mg Oral Nightly Elin Taylor MD   50 mg at 10/25/22 2058    amiodarone (CORDARONE) tablet 200 mg  200 mg Oral q8h Aidee Tam MD   200 mg at 10/26/22 0522    magnesium oxide (MAG-OX) tablet 400 mg  400 mg Oral Daily Aidee Tam MD   400 mg at 10/26/22 0840    [Held by provider] tamsulosin (FLOMAX) capsule 0.4 mg  0.4 mg Oral Dinner Aidee Tam MD            No Known Allergies    ROS: Difficult to obtain from the patient since he does not want to answer questions  PHYSICAL EXAM:   Vitals:    10/26/22 0900 10/26/22 0930 10/26/22 1000 10/26/22 1030   BP: 116/72 113/65 111/68 (!) 97/59   Pulse: 60 61 60 64   Resp: 17 20 23 21   Temp:       TempSrc:       SpO2: 100% 98% 98% 94%   Weight:       Height:          Constitutional: Well-developed, no acute distress, confused but resting comfortably. Eyes: conjunctivae normal, no xanthelasma   Ears, Nose, Throat: oral mucosa moist, no cyanosis   CV: no JVD or leg edema, laterally displaced PMI with a soft S1 and S2 with left sternal border and apex  Lungs: clear to auscultation bilaterally, normal respiratory effort without used of accessory muscles  Abdomen: soft, non-tender, bowel sounds present, no masses or hepatomegaly   Musculoskeletal: no digital clubbing, no edema   Skin: warm, no rashes   Device site: Left chest free of erosion and migration. I have personally reviewed the laboratory, cardiac diagnostic and radiographic testing as outlined below:    Data:    Recent Labs     10/24/22  0348 10/25/22  1200   WBC 6.1 7.4   HGB 12.2* 12.9   HCT 39.5 41.5   * 136     Recent Labs     10/24/22  0348 10/25/22  1200    135   K 4.8 5.4*    100   CO2 23 20*   BUN 44* 59*   CREATININE 1.4* 2.0*   CALCIUM 8.8 8.8      Lab Results   Component Value Date/Time    MG 2.6 10/25/2022 12:00 PM     No results for input(s): TSH in the last 72 hours. No results for input(s): INR in the last 72 hours. Telemetry:  currently  alternating with Vs  complexes and PVCs  no recurrent NSVT or sustained VT. CXR 10/22/22:    FINDINGS:   Pulmonary vascular congestive changes. Small right pleural effusion. No   pneumothorax. Cardiomegaly. The osseous structures are without acute   process. Sternotomy wires and left-sided transvenous pacer device           Impression   Cardiomegaly with pulmonary vascular congestive changes. Small right pleural   effusion. This is unchanged compared to 10/01/2022       EKG: AV pacing,NSVT, . Echocardiogram:   5/13/2022 ([facility]): - Technically difficult exam due to body habitus and suboptimal positioning. The left ventricle is severely dilated. Left ventricular systolic function is severely decreased. EF = 20 ± 5% (visual est.) Definity contrast used for   endocardial border detection. Left ventricular diastolic function was not evaluated due to an arrhythmia. The right ventricle is dilated. Right ventricular systolic function is moderately to severely decreased. - The left atrial cavity is severely dilated. - The right atrial cavity is dilated. There is moderate (2+) mitral valve regurgitation. - -Peak/mean gradients of 15/7 mmHg, gradients averaged due to arrhythmia. -Prior EF reported as 26% (3/2021). Cardiac Catheterization:     [facility] (F, 3/16/2021): LMT: _ The LMT has severe diffuse disease. LAD: _ The LAD has severe diffuse disease. Additional Comment: Moderate caliber vessel with  in the proximal portion. The vessel is fed by patent LIMA graft. The distal portion is small in caliber and wraps the apex. There are L->R collaterals. LCX: _ The Circumflex has severe diffuse disease. Additional Comment: Moderate caliber non-dominant vessel which is fed by SVG->RI/OM1. RAMUS: _ The ostial Ramus - . Additional Comment: Fills in the mid-distal portion from SVG->RI graft. RCA: _ RCA Status: Not Applicable. Additional Comment: Occluded at the ostia. GRAFTS: _ Left Internal Mammary Artery Graft End to Side to the Left Anterior Descending. Additional Comments - patent.  _ most appropriate final programming to provide for consistent delivery of the appropriate therapy and to verify function of the device. I have independently reviewed all of the ECGs and rhythm strips per above     Assessment/Plan:      Ventricular Tachycardia   - First Episode 09/13/2022   - Most recent episode 10/22/22 at 2243  - Amiodarone 200mg TID started 10/23/22, IV Lidocaine started 10/23/2022 and stopped 10/25/22  - No recurrence in the last 24 hours. 2. Dual-Chamber ICD in situ  - [place] 2010, primary prevention at that time  - PGR 2018   - Now secondary prevention. 3. HFrEF   - Ischemic   - Decompensated   - NYHA FC III ACC stage C  - LVEF 35% on TTE 06/27/2012  - LVEF  30% on TTE 04/12/2018  - LVEF 20-25% on TTE 05/13/2022  - GDMT: Toprol 25mg daily,Aldactone, Bumex, Hydralzine, Imdur   - last heart failure admission 10/02/2022   - Bumex currently IV   - Cardiology consulted. 4. Coronary artery disease   - Prior CABG 2007 X 5 in 2007  - Last [facility] 03/16/2021 severe CAD, :- Severe/occlusive native 3 vessel CAD with patent LIMA->LAD, SVG->RI/OM1. SVG->OM2 is occluded - Native RCA is occluded at the ostia as is the SVG->rPDA. The right is fed by L->R collaterals from the LIMA graft Recommended Treatment: Medical Therapy.   - BB, Nitrate Stain, ASA (at home)     5. Chronic kidney disease  -serum creatinine -1.4    6. Hx Hypertension  - Now Hypotensive     7. Hyperlipidemia    8. Diabetes    9. Noncompliance in the past    10. DNR CCA  - With plans to enrol in hospice, and discontinue tachy therapy prior to discharge. 11. BPH   - Flomax     Recommendations:    Continue Toprol 25mg daily (home dose 50mg BID) and titrate as BP permits.    Continue Mexitil 150mg TID,   Agree with Cardiology consult for HFrEF   Noted Hospice involvement with planned discontinuation of tachy therapy prior to hospital discharge with family agreeable to ongoing use of cardiac medications. Planned discharge today at 1700 will have tachy therapy turned off prior to discharge. I have spent a total of 10 minutes with the patient reviewing the above stated recommendations. And a total of >50% of that time involved face-to-face time providing counseling and or coordination of care with the other providers, reviewing records/tests, counseling/education of the patient, ordering medications/tests/procedures, coordinating care, and documenting clinical information in the EHR. Thank you for allowing me to participate in your patient's care. Please call me if there are any questions or concerns. Discussed with Dr. Sebastian Kevin. Amy Mcdonald, APRN - CNP  Cardiac Electrophysiology  [facility]) Physicians  [facility] [place]: [place] [facility]  11:27 AM  10/26/2022    Attending Physician's Statement    Patient seen with the ANP. Agree with the findings, assessment and plan. Management plan was discussed. I have personally interviewed the patient, independently performed a focused cardiac examination, reviewed the pertinent laboratory and diagnostic testing with the patient and directly participated in the medical decision-making as noted above with the following additions:     Confused. No recurrent VT overnight. Physical exam showed no JVD, RRR, normal S1S2, no murmur, clear lungs bilaterally, no edema    Will turned off tachy therapy of ICD per family wishes and hospice care team requested. Continue oral Toprol XL, Amiodarone  and Mexiletine. I have spent a total of 35 CCT minutes with the patient and the family reviewing the above stated recommendations. And a total of >50% of that time involved face-to-face time providing counseling and/or coordination of care with the other providers, reviewing records/tests, counseling/education of the patient, ordering medications/tests/procedures, coordinating care, and documenting clinical information in the EHR. Rosalind Farfan MD  Cardiac Electrophysiology  [address]  [facility] [place]: [place] [facility]  5:50 PM  10/26/2022

## 2022-10-26 NOTE — DISCHARGE SUMMARY
Hospitalist Discharge Summary    Patient ID: Des Coulter   Patient : 1945  Patient's PCP: Mary Singh DO    Admit Date: 10/23/2022   Admitting Physician: Get Zhong DO    Discharge Date:  10/26/2022   Discharge Physician: Juan J Xiong MD   Discharge Condition:  under hospice care with comfort meds  Discharge Disposition: [facility] course in brief:  (Please refer to daily progress notes for a comprehensive review of the hospitalization by requesting medical records)    64 y.o. male PHMx of cardiomyopathy, hypertension, hyperlipidemia, pancreatitis, AICD, ICD, VHD, acute on chronic HF R EF, CHF failure to thrive presents to the ED c/o altered mental status from his nursing home. Report from EMS is that nursing home reported that he was AO x1 he is normally AOx4. However, discussing with patient he is AOx4. . Onset: Several hours ago. Location/Radiation: Denies any specific area of pain. Duration: Intermittent. Characterization: Nurse reports that he was AO x1. . Aggravating Factors: None. Relieving Factors: None. Severity: Mild. Patient reports that he got in a disagreement with the nurse about his room being too cold and he wanted a blanket, he reports the nurse got aggravated with him and he thinks that she decided to call EMS to get him out of the nursing home. EKG showed sinus rhythm with a few PVCs. Cardiology was consulted recommend transfer to [facility] ICU. Patient's pacemaker has been interrogated also given shocked 4 times in the last 24 hours. Patient still having VT since receiving 75 mg metoprolol. Patient was given lidocaine and transferred [facility]. Patient was managed on ICU by EP and Cardiology. Placed on lidocaine drip. Device interrogated on 10/22/022 with normal overall function. Lidocaine drip      HFrEF. TTE on 2022 per EP note report from [facility]: LVEF 20 ± 5%  visually estimated.  Lidocaine drip was stopped on 10/25 and started Mexitil 150mg TID by EP / troprol XL 25 mg BID/amiodarone 200 mg PO q 8 hrs. Patient was evaluated by Hospice team and after discussion with patient's daughter decision is to take patient home with hospice today. Daughter has requetsed the AICD to be deactivated just before discharge, stop tachy medications, by hospice team patient with go home on comfort medications, prescriptions sent by Palliative Medicine. Patient is to leave later today  Met patient at bedside      Exam:    General appearance: No apparent distress, appears stated age, confused  HEENT:  Normocephalic. Conjunctivae/corneas clear. Moist mucosa   Neck: Supple. No jugular venous distention. Thyroid not visible, non tender   Respiratory: Normal respiratory effort. Clear to auscultation bilaterally. No stridor/wheezing/rhonchi/crackles   Cardiovascular: Regular heart beats, normal S1-S2. No M/G/R  Abdomen: Non distended, soft, non tender, no visceromegaly, no mass, normal bowel sounds   Musculoskeletal: No clubbing, cyanosis, no bilateral lower extremity edema. Brisk capillary refill. Skin:  No rashes  on visible skin  Neurologic: awake, and confused, knows his name. Following commands. No focal neurological deficit. Consults:   IP CONSULT TO CARDIOLOGY  IP CONSULT TO CRITICAL CARE  IP CONSULT TO ELECTROPHYSIOLOGY  IP CONSULT TO PALLIATIVE CARE  IP CONSULT TO CARDIOLOGY  IP CONSULT TO HOSPICE    Discharge Diagnoses:    Hospice care on comfort medications   Ventricular tachycardia  S/p ICD that will be deactivated on discharge   HFrEF. TTE on 5/2022 per EP note report from [facility]: LVEF 20 ± 5%  visually estimated   Hyperlipidemia  Coronary artery disease.  Severe occlusive disease on [facility] 3/2021 and was recommended medical therapy   Hypertension  BPH  GERD       Discharge Instructions / Follow up:    Future Appointments   Date Time Provider Negro Morrison   11/2/2022 11:15 AM Maureen Varghese, [address] [place] [address]       Continued appropriate risk factor modification of blood pressure, diabetes and serum lipids will remain essential to reducing risk of future atherosclerotic development    Activity: activity as tolerated    Significant labs:  CBC:   Recent Labs     10/24/22  0348 10/25/22  1200   WBC 6.1 7.4   RBC 4.83 5.15   HGB 12.2* 12.9   HCT 39.5 41.5   MCV 81.8 80.6   RDW 17.6* 18.0*   * 136     BMP:   Recent Labs     10/24/22  0348 10/25/22  1200    135   K 4.8 5.4*    100   CO2 23 20*   BUN 44* 59*   CREATININE 1.4* 2.0*   MG 2.4 2.6   PHOS 4.8* 6.1*     LFT:  Recent Labs     10/24/22  0348 10/25/22  1200   PROT 6.0* 6.1*   ALKPHOS 259* 237*   ALT 79* 110*   AST 93* 109*   BILITOT 0.7 0.7     PT/INR: No results for input(s): INR, APTT in the last 72 hours. BNP: No results for input(s): BNP in the last 72 hours. Hgb A1C:   Lab Results   Component Value Date    LABA1C 5.9 (H) 10/01/2022     Folate and B12: No results found for: [id number], No results found for: FOLATE  Thyroid Studies:   Lab Results   Component Value Date    TSH 2.500 10/01/2022    V7PCULJ 7.9 10/10/2011       Urinalysis:    Lab Results   Component Value Date/Time    NITRU Negative 10/22/2022 10:08 PM    [address] 10/22/2022 10:08 PM    BACTERIA MODERATE 10/22/2022 10:08 PM    RBCUA NONE 10/22/2022 10:08 PM    RBCUA >20 08/02/2012 09:20 AM    BLOODU Negative 10/22/2022 10:08 PM    SPECGRAV 1.015 10/22/2022 10:08 PM    GLUCOSEU Negative 10/22/2022 10:08 PM       Imaging:  XR CHEST (2 VW)    Result Date: 10/1/2022  EXAMINATION: TWO XRAY VIEWS OF THE CHEST 10/1/2022 5:20 pm COMPARISON: 07/21/2022 HISTORY: ORDERING SYSTEM PROVIDED HISTORY: shortness of breath, weight gain TECHNOLOGIST PROVIDED HISTORY: Reason for exam:->shortness of breath, weight gain FINDINGS: The heart is enlarged. There is a dual lead cardiac pacer There are findings of mild CHF. Bilateral pleural effusions are noted, left greater than right.      1. Cardiomegaly with findings of CHF 2. Small right pleural effusion and moderate left pleural effusion 3. XR FOOT LEFT (MIN 3 VIEWS)    Result Date: 10/22/2022  EXAMINATION: THREE XRAY VIEWS OF THE LEFT FOOT 10/22/2022 10:00 pm COMPARISON: None. HISTORY: ORDERING SYSTEM PROVIDED HISTORY: swelling/warmth TECHNOLOGIST PROVIDED HISTORY: Reason for exam:->swelling/warmth FINDINGS: No acute fracture subluxation is identified. No evidence of osseous destruction or periosteal reaction to suggest acute osteomyelitis. There is diffuse soft tissue swelling. No radiopaque foreign body. No acute osseous abnormality. No radiographic evidence of acute osteomyelitis. XR FOOT RIGHT (MIN 3 VIEWS)    Result Date: 10/22/2022  EXAMINATION: THREE XRAY VIEWS OF THE RIGHT FOOT 10/22/2022 10:00 pm COMPARISON: 09/24/2019 HISTORY: ORDERING SYSTEM PROVIDED HISTORY: swelling/erythema/warmth TECHNOLOGIST PROVIDED HISTORY: Reason for exam:->swelling/erythema/warmth FINDINGS: No acute fracture or subluxation is identified. There is advanced degenerative arthrosis of the 1st MTP joint. No definite osseous destruction or periosteal reaction to suggest acute osteomyelitis. There is diffuse soft tissue swelling. No radiopaque foreign body. No acute osseous abnormality. Diffuse soft tissue swelling. XR CHEST PORTABLE    Result Date: 10/22/2022  EXAMINATION: ONE XRAY VIEW OF THE CHEST 10/22/2022 10:00 pm COMPARISON: 10/01/2022 HISTORY: ORDERING SYSTEM PROVIDED HISTORY: ams TECHNOLOGIST PROVIDED HISTORY: Reason for exam:->ams FINDINGS: Pulmonary vascular congestive changes. Small right pleural effusion. No pneumothorax. Cardiomegaly. The osseous structures are without acute process. Sternotomy wires and left-sided transvenous pacer device     Cardiomegaly with pulmonary vascular congestive changes. Small right pleural effusion. This is unchanged compared to 10/01/2022.        Discharge Medications:      Medication List        START taking these medications      glycopyrrolate 1 MG tablet  Commonly known as: Robinul  Take 1 tablet by mouth every 4 hours as needed (secretions)     LORazepam 1 MG tablet  Commonly known as: ATIVAN  Take 1 tablet by mouth every 6 hours as needed for Anxiety for up to 7 days. morphine 20MG/ML Soln concentrated solution  Take 0.25 mLs by mouth every 2 hours as needed for Pain for up to 7 days.             STOP taking these medications      acetaminophen 650 MG extended release tablet  Commonly known as: TYLENOL     ammonium lactate 12 % lotion  Commonly known as: LAC-HYDRIN     aspirin 81 MG tablet     bumetanide 1 MG tablet  Commonly known as: BUMEX     finasteride 5 MG tablet  Commonly known as: PROSCAR     glipiZIDE 5 MG tablet  Commonly known as: GLUCOTROL     hydrALAZINE 25 MG tablet  Commonly known as: APRESOLINE     isosorbide dinitrate 20 MG tablet  Commonly known as: ISORDIL     isosorbide mononitrate 30 MG extended release tablet  Commonly known as: IMDUR     metoprolol succinate 50 MG extended release tablet  Commonly known as: TOPROL XL     miconazole nitrate 2 % Oint     mirtazapine 30 MG tablet  Commonly known as: REMERON     nitroGLYCERIN 0.4 MG SL tablet  Commonly known as: NITROSTAT     omeprazole 40 MG delayed release capsule  Commonly known as: PRILOSEC     rosuvastatin 20 MG tablet  Commonly known as: CRESTOR     spironolactone 25 MG tablet  Commonly known as: ALDACTONE     tamsulosin 0.4 MG capsule  Commonly known as: FLOMAX     traZODone 50 MG tablet  Commonly known as: DESYREL               Where to Get Your Medications        These medications were sent to [facility], [address] [address]      Phone: [phone]   glycopyrrolate 1 MG tablet  LORazepam 1 MG tablet  morphine 20MG/ML Soln concentrated solution         Time Spent on discharge is more than 45 minutes in the examination, evaluation, counseling and review of medications and discharge plan.    +++++++++++++++++++++++++++++++++++++++++++++++++  Normand Osler, MD  [facility] - [address] [place] [address]  +++++++++++++++++++++++++++++++++++++++++++++++++  NOTE: This report was transcribed using voice recognition software. Every effort was made to ensure accuracy; however, inadvertent computerized transcription errors may be present.

## 2022-10-26 NOTE — PLAN OF CARE
Problem: Gastrointestinal - Adult  Goal: Maintains adequate nutritional intake  10/25/2022 2011 by Briseida Roa RN  Outcome: Not Progressing  10/25/2022 1133 by Meng Huber RN  Outcome: Not Progressing  Flowsheets (Taken 10/25/2022 0800)    Problem: Chronic Conditions and Co-morbidities  Goal: Patient's chronic conditions and co-morbidity symptoms are monitored and maintained or improved  10/25/2022 2011 by Briseida Roa RN  Outcome: Progressing  10/25/2022 1133 by Meng Huber RN  Outcome: Not Progressing  Flowsheets (Taken 10/25/2022 0800)  Care Plan - Patient's Chronic Conditions and Co-Morbidity Symptoms are Monitored and Maintained or Improved:   Monitor and assess patient's chronic conditions and comorbid symptoms for stability, deterioration, or improvement   Collaborate with multidisciplinary team to address chronic and comorbid conditions and prevent exacerbation or deterioration   Update acute care plan with appropriate goals if chronic or comorbid symptoms are exacerbated and prevent overall improvement and discharge     Problem: Discharge Planning  Goal: Discharge to home or other facility with appropriate resources  10/25/2022 2011 by Briseida Roa RN  Outcome: Progressing  10/25/2022 1133 by Meng Huber RN  Outcome: Progressing  Flowsheets (Taken 10/25/2022 0800)  Discharge to home or other facility with appropriate resources: Identify barriers to discharge with patient and caregiver     Problem: Pain  Goal: Verbalizes/displays adequate comfort level or baseline comfort level  10/25/2022 2011 by Briseida Roa RN  Outcome: Progressing  10/25/2022 1133 by Meng Huber RN  Outcome: Progressing  Flowsheets (Taken 10/25/2022 0800)  Verbalizes/displays adequate comfort level or baseline comfort level:   Encourage patient to monitor pain and request assistance   Assess pain using appropriate pain scale   Administer analgesics based on type and severity of pain and evaluate response   Implement non-pharmacological measures as appropriate and evaluate response   Consider cultural and social influences on pain and pain management   Notify Licensed Independent Practitioner if interventions unsuccessful or patient reports new pain     Problem: Skin/Tissue Integrity  Goal: Absence of new skin breakdown  Description: 1. Monitor for areas of redness and/or skin breakdown  2. Assess vascular access sites hourly  3. Every 4-6 hours minimum:  Change oxygen saturation probe site  4. Every 4-6 hours:  If on nasal continuous positive airway pressure, respiratory therapy assess nares and determine need for appliance change or resting period.   10/25/2022 2011 by Collette Vincent RN  Outcome: Progressing  10/25/2022 1133 by Martin Llanos RN  Outcome: Progressing     Problem: Safety - Adult  Goal: Free from fall injury  10/25/2022 2011 by Collette Vincent RN  Outcome: Progressing  10/25/2022 1133 by Martin Llanos RN  Outcome: Progressing  Flowsheets (Taken 10/25/2022 0800)  Free From Fall Injury:   Instruct family/caregiver on patient safety   Based on caregiver fall risk screen, instruct family/caregiver to ask for assistance with transferring infant if caregiver noted to have fall risk factors     Problem: ABCDS Injury Assessment  Goal: Absence of physical injury  10/25/2022 2011 by Collette Vincent RN  Outcome: Progressing  10/25/2022 1133 by Martin Llanos RN  Outcome: Progressing  Flowsheets (Taken 10/25/2022 0800)  Absence of Physical Injury: Implement safety measures based on patient assessment     Problem: Cardiovascular - Adult  Goal: Maintains optimal cardiac output and hemodynamic stability  10/25/2022 2011 by Collette Vincent RN  Outcome: Progressing  10/25/2022 1133 by Martin Llanos RN  Outcome: Progressing  Flowsheets (Taken 10/25/2022 0800)  Maintains optimal cardiac output and hemodynamic stability:   Monitor blood pressure and heart rate   Monitor urine output and notify Licensed Independent Practitioner for values outside of normal range   Assess for signs of decreased cardiac output  Goal: Absence of cardiac dysrhythmias or at baseline  10/25/2022 2011 by Briseida Roa RN  Outcome: Progressing  10/25/2022 1133 by Meng Huber RN  Outcome: Progressing  Flowsheets (Taken 10/25/2022 0800)  Absence of cardiac dysrhythmias or at baseline:   Monitor cardiac rate and rhythm   Assess for signs of decreased cardiac output     Problem: Neurosensory - Adult  Goal: Achieves stable or improved neurological status  10/25/2022 2011 by Briseida Roa RN  Outcome: Progressing  10/25/2022 1133 by Meng Huber RN  Outcome: Progressing  Flowsheets (Taken 10/25/2022 0800)  Achieves stable or improved neurological status: Assess for and report changes in neurological status  Goal: Absence of seizures  10/25/2022 2011 by Briseida Roa RN  Outcome: Progressing  10/25/2022 1133 by Meng Huber RN  Outcome: Progressing  Flowsheets (Taken 10/25/2022 0800)  Absence of seizures:   Monitor for seizure activity.   If seizure occurs, document type and location of movements and any associated apnea   If seizure occurs, turn head to side and suction secretions as needed  Goal: Remains free of injury related to seizures activity  10/25/2022 2011 by Briseida Roa RN  Outcome: Progressing  Flowsheets (Taken 10/25/2022 0800 by Meng Huber RN)  Remains free of injury related to seizure activity:   Maintain airway, patient safety  and administer oxygen as ordered   Monitor patient for seizure activity, document and report duration and description of seizure to Licensed Independent Practitioner   If seizure occurs, turn patient to side and suction secretions as needed  10/25/2022 1133 by Meng Huber RN  Outcome: Progressing  Flowsheets (Taken 10/25/2022 0800)  Remains free of injury related to seizure activity:   Maintain airway, patient safety  and administer oxygen as ordered   Monitor patient for seizure activity, document and report duration and description of seizure to Licensed Independent Practitioner   If seizure occurs, turn patient to side and suction secretions as needed  Goal: Achieves maximal functionality and self care  Outcome: Progressing     Problem: Respiratory - Adult  Goal: Achieves optimal ventilation and oxygenation  10/25/2022 2011 by Winston Ferreira RN  Outcome: Progressing  Flowsheets (Taken 10/25/2022 0800 by Gini Santos RN)  Achieves optimal ventilation and oxygenation:   Assess for changes in respiratory status   Assess for changes in mentation and behavior   Position to facilitate oxygenation and minimize respiratory effort   Oxygen supplementation based on oxygen saturation or arterial blood gases   Encourage broncho-pulmonary hygiene including cough, deep breathe, incentive spirometry   Assess the need for suctioning and aspirate as needed   Assess and instruct to report shortness of breath or any respiratory difficulty   Respiratory therapy support as indicated  10/25/2022 1133 by Gini Santos RN  Outcome: Progressing  Flowsheets (Taken 10/25/2022 0800)  Achieves optimal ventilation and oxygenation:   Assess for changes in respiratory status   Assess for changes in mentation and behavior   Position to facilitate oxygenation and minimize respiratory effort   Oxygen supplementation based on oxygen saturation or arterial blood gases   Encourage broncho-pulmonary hygiene including cough, deep breathe, incentive spirometry   Assess the need for suctioning and aspirate as needed   Assess and instruct to report shortness of breath or any respiratory difficulty   Respiratory therapy support as indicated     Problem: Skin/Tissue Integrity - Adult  Goal: Skin integrity remains intact  10/25/2022 2011 by Winston Ferreira RN  Outcome: Progressing  10/25/2022 1133 by Gini Santos RN  Outcome: Progressing  Flowsheets (Taken 10/25/2022 0800)  Skin Integrity Remains Intact: Monitor for areas of redness and/or skin breakdown   Assess vascular access sites hourly  Goal: Incisions, wounds, or drain sites healing without S/S of infection  Outcome: Progressing  Goal: Oral mucous membranes remain intact  10/25/2022 2011 by Ayala Bauer RN  Outcome: Progressing  10/25/2022 1133 by Eulogio Willoughby RN  Outcome: Progressing  Flowsheets (Taken 10/25/2022 0800)  Oral Mucous Membranes Remain Intact:   Assess oral mucosa and hygiene practices   Implement preventative oral hygiene regimen   Implement oral medicated treatments as ordered     Problem: Musculoskeletal - Adult  Goal: Return mobility to safest level of function  Outcome: Progressing  Goal: Maintain proper alignment of affected body part  Outcome: Progressing  Goal: Return ADL status to a safe level of function  Outcome: Progressing     Problem: Gastrointestinal - Adult  Goal: Minimal or absence of nausea and vomiting  10/25/2022 1133 by Eulogio Willoughby RN  Outcome: Progressing  Goal: Maintains or returns to baseline bowel function  10/25/2022 1133 by Eulogio Willoughby RN  Outcome: Progressing  Flowsheets (Taken 10/25/2022 0800)  Maintains or returns to baseline bowel function: Assess bowel function     Problem: Genitourinary - Adult  Goal: Absence of urinary retention  10/25/2022 1133 by Eulogio Willoughby RN  Outcome: Progressing  Flowsheets (Taken 10/25/2022 0800)  Absence of urinary retention:   Assess patients ability to void and empty bladder   Monitor intake/output and perform bladder scan as needed     Problem: Infection - Adult  Goal: Absence of infection at discharge  10/25/2022 2011 by Ayala Bauer RN  Outcome: Progressing  10/25/2022 1133 by Eulogio Willoughby RN  Outcome: Progressing  Flowsheets (Taken 10/25/2022 0800)  Absence of infection at discharge:   Assess and monitor for signs and symptoms of infection   Monitor lab/diagnostic results   Monitor all insertion sites i.e., indwelling lines, tubes and drains  Goal: Absence of infection during hospitalization  10/25/2022 1133 by Soheila Gan RN  Outcome: Progressing  Flowsheets (Taken 10/25/2022 0800)  Absence of infection during hospitalization:   Assess and monitor for signs and symptoms of infection   Monitor lab/diagnostic results   Monitor all insertion sites i.e., indwelling lines, tubes and drains  Goal: Absence of fever/infection during anticipated neutropenic period  10/25/2022 1133 by Soheila Gan RN  Outcome: Progressing  Flowsheets (Taken 10/25/2022 0800)  Absence of fever/infection during anticipated neutropenic period: Monitor white blood cell count     Problem: Metabolic/Fluid and Electrolytes - Adult  Goal: Electrolytes maintained within normal limits  10/25/2022 1133 by Soheila Gan RN  Outcome: Progressing  Flowsheets (Taken 10/25/2022 0800)  Electrolytes maintained within normal limits:   Monitor labs and assess patient for signs and symptoms of electrolyte imbalances   Administer electrolyte replacement as ordered   Monitor response to electrolyte replacements, including repeat lab results as appropriate  Goal: Hemodynamic stability and optimal renal function maintained  10/25/2022 1133 by Soheila Gan RN  Outcome: Progressing  Flowsheets (Taken 10/25/2022 0800)  Hemodynamic stability and optimal renal function maintained:   Monitor labs and assess for signs and symptoms of volume excess or deficit   Monitor intake, output and patient weight  Goal: Glucose maintained within prescribed range  10/25/2022 1133 by Soheila Gan RN  Outcome: Progressing  Flowsheets (Taken 10/25/2022 0800)  Glucose maintained within prescribed range:   Monitor blood glucose as ordered   Assess for signs and symptoms of hyperglycemia and hypoglycemia     Problem: Hematologic - Adult  Goal: Maintains hematologic stability  10/25/2022 2011 by Noni Funk RN  Outcome: Progressing  Flowsheets (Taken 10/25/2022 0800 by Soheila Gan RN)  Maintains hematologic stability:   Assess for signs and symptoms of bleeding or hemorrhage   Monitor labs for bleeding or clotting disorders  10/25/2022 1133 by Debbie Mendieta RN  Outcome: Progressing  Flowsheets (Taken 10/25/2022 0800)  Maintains hematologic stability:   Assess for signs and symptoms of bleeding or hemorrhage   Monitor labs for bleeding or clotting disorders

## 2022-10-26 NOTE — PROGRESS NOTES
[facility] discharge instructions reviewed with DTR Socorro Hui, included medication regimen, all questions answered. Written discharge instructions provided. Filled prescriptions of Lorazepam 1, Morphine 100 Sol, and Glycopyrrolate 1 given to [employer] also at this time.

## 2022-10-26 NOTE — PROGRESS NOTES
Spoke to Luis Angel Bush with [facility] over the phone at this time, informed her of need for dedicated progress note regarding the deactivation of ICD.

## 2022-10-26 NOTE — PROGRESS NOTES
[id number] - Call placed to patient's daughter Paula. Paula confirmed that today is good for patient to return to home. Paula is working today and requested patient transport be changed to 4pm or after. This nurse will reschedule  with [facility]. Paula confirmed that DME arrived at home and she is ready for patient. Discussion with Paula to confirm that her wishes are to have patient's defibrillator deactivated here in the hospital setting prior to patient discharge today. Paula agrees to deactivation of defibrillator. Met patient at bedside. No family present. Met with bedside nurse who confirms EP will deactivate defibrillator once [facility] confirms patient family wishes for deactivation. [id number] Call placed to [facility]. Patient  time changed to 1700 today.

## 2022-10-27 ENCOUNTER — CLINICAL DOCUMENTATION ONLY (OUTPATIENT)
Facility: CLINIC | Age: 77
End: 2022-10-27

## 2022-10-27 DIAGNOSIS — F41.9 ANXIETY: ICD-10-CM

## 2022-10-27 DIAGNOSIS — Z51.5 HOSPICE CARE PATIENT: Primary | ICD-10-CM

## 2022-10-27 DIAGNOSIS — R09.89 AIR HUNGER: ICD-10-CM

## 2022-10-27 DIAGNOSIS — Z51.5 HOSPICE CARE: ICD-10-CM

## 2022-10-27 DIAGNOSIS — R52 PAIN: ICD-10-CM

## 2022-10-27 RX ORDER — LORAZEPAM 1 MG/1
1 TABLET ORAL
Qty: 30 TABLET | Refills: 3 | Status: SHIPPED | OUTPATIENT
Start: 2022-10-27 | End: 2022-11-26

## 2022-10-27 RX ORDER — MORPHINE SULFATE 20 MG/ML
5 SOLUTION ORAL
Qty: 30 ML | Refills: 0 | Status: SHIPPED | OUTPATIENT
Start: 2022-10-27 | End: 2022-11-26

## 2022-11-01 NOTE — PROGRESS NOTES
Physician Progress Note      PATIENT:               Orlie Lesch  CSN #:                  [id number]  :                       1945  ADMIT DATE:       10/23/2022 3:21 AM  [address] DATE:        10/26/2022 6:07 PM  RESPONDING  PROVIDER #:        Toma Ware MD          QUERY TEXT:    Pt admitted with ventricular tachycardia. Noted documentation of  currently   decompensated  HFrEF per 10/24 cardiology consultant. If possible, please   document in progress notes and discharge summary:    The medical record reflects the following:  Risk Factors: cardiomyopathy, hypertension, hyperlipidemia, pancreatitis,   AICD, ICD, VHD, chronic HFREF, failure to thrive  Clinical Indicators: 10/22 BNP 30, 751. 10/22 CXR- Cardiomegaly with pulmonary   vascular congestive changes. ?Small right pleural effusion. 10/24 Consult-    Patient also has hypertension, hyperlipidemia, diabetes, nonsustained SVT, and   prostate cancer who is hospitalized for polymorphic VT with ICD shock. Cardiology is consulted for management of acute on chronic heart failure with   depressed systolic function. Patient is currently on IV lidocaine and   sleeping comfortably and not in any acute distress. ** HFrEF, LVEF of 15 to   20% with moderate pulmonary hypertension. Will swi  Treatment: CXR, Lidocaine, Bumex, monitoring, labs, consults, ICD   interrogated/settings changed, Normal saline 75 mL/h    Thank you,  Justen Xiong RN, CRCR  Clinical Documentation Improvement  Options provided:  -- Acute on chronic HFrEF confirmed present on admission  -- Chronic HFrEF confirmed present on admission  -- Other heart failure ## please specify, confirmed present on admission,   please specify, was confirmed as present on admission.   -- Other - I will add my own diagnosis  -- Disagree - Not applicable / Not valid  -- Disagree - Clinically unable to determine / Unknown  -- Refer to Clinical Documentation Reviewer    PROVIDER RESPONSE TEXT:    The diagnosis of Cronic HFrEF was confirmed as present on admission.     Query created by: Malina Stevens on 10/31/2022 9:44 AM      Electronically signed by:  Moises Marcus MD 11/1/2022 4:41 PM

## 2024-09-12 NOTE — PROGRESS NOTES
Pt's marrero leaking, attempted to reposition catheter by deflating and re inflating balloon. Contacted Dr Tonio Cannon regarding leaking marrero. Consult to urology ordered d/t history of prostate cancer, will place in morning. [Negative] : Allergic/Immunologic

## 2025-01-23 NOTE — TELEPHONE ENCOUNTER
Bed: TH7  Expected date:   Expected time:   Means of arrival:   Comments:  DG: Nosebleed   If more CP then ED. If stable then OV first available with Lis or with me Monday. Nataliya Roth D.O.   Cardiologist  Cardiology, [address] [facility]